# Patient Record
Sex: FEMALE | Race: WHITE | Employment: OTHER
[De-identification: names, ages, dates, MRNs, and addresses within clinical notes are randomized per-mention and may not be internally consistent; named-entity substitution may affect disease eponyms.]

---

## 2017-01-03 ENCOUNTER — HOME CARE VISIT (OUTPATIENT)
Dept: SCHEDULING | Facility: HOME HEALTH | Age: 62
End: 2017-01-03
Payer: COMMERCIAL

## 2017-01-03 VITALS
RESPIRATION RATE: 17 BRPM | DIASTOLIC BLOOD PRESSURE: 78 MMHG | HEART RATE: 102 BPM | TEMPERATURE: 99.9 F | SYSTOLIC BLOOD PRESSURE: 118 MMHG

## 2017-01-03 PROCEDURE — G0157 HHC PT ASSISTANT EA 15: HCPCS

## 2017-01-06 ENCOUNTER — HOME CARE VISIT (OUTPATIENT)
Dept: SCHEDULING | Facility: HOME HEALTH | Age: 62
End: 2017-01-06
Payer: COMMERCIAL

## 2017-01-06 VITALS
SYSTOLIC BLOOD PRESSURE: 128 MMHG | HEART RATE: 96 BPM | TEMPERATURE: 99.2 F | RESPIRATION RATE: 19 BRPM | DIASTOLIC BLOOD PRESSURE: 76 MMHG

## 2017-01-06 PROCEDURE — G0157 HHC PT ASSISTANT EA 15: HCPCS

## 2017-01-11 ENCOUNTER — HOME CARE VISIT (OUTPATIENT)
Dept: SCHEDULING | Facility: HOME HEALTH | Age: 62
End: 2017-01-11
Payer: COMMERCIAL

## 2017-01-11 VITALS
RESPIRATION RATE: 17 BRPM | DIASTOLIC BLOOD PRESSURE: 78 MMHG | SYSTOLIC BLOOD PRESSURE: 160 MMHG | HEART RATE: 60 BPM | TEMPERATURE: 96.6 F

## 2017-01-11 PROCEDURE — G0151 HHCP-SERV OF PT,EA 15 MIN: HCPCS

## 2018-01-29 ENCOUNTER — HOSPITAL ENCOUNTER (OUTPATIENT)
Dept: GENERAL RADIOLOGY | Age: 63
Discharge: HOME | End: 2018-01-29
Payer: COMMERCIAL

## 2018-01-29 DIAGNOSIS — R10.9 RIGHT FLANK PAIN: ICD-10-CM

## 2018-01-29 DIAGNOSIS — N20.0 RENAL STONE: ICD-10-CM

## 2018-01-29 PROCEDURE — 74018 RADEX ABDOMEN 1 VIEW: CPT

## 2018-01-31 ENCOUNTER — HOSPITAL ENCOUNTER (OUTPATIENT)
Dept: SURGERY | Age: 63
Discharge: HOME | End: 2018-01-31
Payer: COMMERCIAL

## 2018-01-31 VITALS
WEIGHT: 206.38 LBS | BODY MASS INDEX: 38.96 KG/M2 | TEMPERATURE: 98.4 F | OXYGEN SATURATION: 97 % | DIASTOLIC BLOOD PRESSURE: 75 MMHG | HEART RATE: 57 BPM | RESPIRATION RATE: 20 BRPM | HEIGHT: 61 IN | SYSTOLIC BLOOD PRESSURE: 147 MMHG

## 2018-01-31 LAB
ANION GAP SERPL CALC-SCNC: 11 MMOL/L (ref 7–16)
APPEARANCE UR: CLEAR
BILIRUB UR QL: NEGATIVE
BUN SERPL-MCNC: 21 MG/DL (ref 8–23)
CALCIUM SERPL-MCNC: 9.8 MG/DL (ref 8.3–10.4)
CHLORIDE SERPL-SCNC: 102 MMOL/L (ref 98–107)
CO2 SERPL-SCNC: 26 MMOL/L (ref 21–32)
COLOR UR: YELLOW
CREAT SERPL-MCNC: 0.79 MG/DL (ref 0.6–1)
ERYTHROCYTE [DISTWIDTH] IN BLOOD BY AUTOMATED COUNT: 13.1 % (ref 11.9–14.6)
GLUCOSE BLD STRIP.AUTO-MCNC: 156 MG/DL (ref 65–100)
GLUCOSE SERPL-MCNC: 150 MG/DL (ref 65–100)
GLUCOSE UR STRIP.AUTO-MCNC: NEGATIVE MG/DL
HCT VFR BLD AUTO: 39.6 % (ref 35.8–46.3)
HGB BLD-MCNC: 13.8 G/DL (ref 11.7–15.4)
HGB UR QL STRIP: NEGATIVE
KETONES UR QL STRIP.AUTO: NEGATIVE MG/DL
LEUKOCYTE ESTERASE UR QL STRIP.AUTO: NEGATIVE
MCH RBC QN AUTO: 32.5 PG (ref 26.1–32.9)
MCHC RBC AUTO-ENTMCNC: 34.8 G/DL (ref 31.4–35)
MCV RBC AUTO: 93.2 FL (ref 79.6–97.8)
NITRITE UR QL STRIP.AUTO: NEGATIVE
PH UR STRIP: 6 [PH] (ref 5–9)
PLATELET # BLD AUTO: 204 K/UL (ref 150–450)
PMV BLD AUTO: 11.4 FL (ref 10.8–14.1)
POTASSIUM SERPL-SCNC: 3.7 MMOL/L (ref 3.5–5.1)
PROT UR STRIP-MCNC: NEGATIVE MG/DL
RBC # BLD AUTO: 4.25 M/UL (ref 4.05–5.25)
SODIUM SERPL-SCNC: 139 MMOL/L (ref 136–145)
SP GR UR REFRACTOMETRY: 1.02 (ref 1–1.02)
UROBILINOGEN UR QL STRIP.AUTO: 0.2 EU/DL (ref 0.2–1)
WBC # BLD AUTO: 6.6 K/UL (ref 4.3–11.1)

## 2018-01-31 PROCEDURE — 81003 URINALYSIS AUTO W/O SCOPE: CPT | Performed by: UROLOGY

## 2018-01-31 PROCEDURE — 85027 COMPLETE CBC AUTOMATED: CPT | Performed by: UROLOGY

## 2018-01-31 PROCEDURE — 80048 BASIC METABOLIC PNL TOTAL CA: CPT | Performed by: UROLOGY

## 2018-01-31 PROCEDURE — 87086 URINE CULTURE/COLONY COUNT: CPT | Performed by: UROLOGY

## 2018-01-31 PROCEDURE — 82962 GLUCOSE BLOOD TEST: CPT

## 2018-01-31 PROCEDURE — 93005 ELECTROCARDIOGRAM TRACING: CPT | Performed by: ANESTHESIOLOGY

## 2018-01-31 RX ORDER — GLUCOSAMINE SULFATE 1500 MG
1000 POWDER IN PACKET (EA) ORAL DAILY
COMMUNITY
End: 2020-01-01

## 2018-01-31 NOTE — PERIOP NOTES
Patient verified name, , and surgery as listed in [facility]. Patient provided medical/health information and PTA medications to the best of their ability. TYPE  CASE:1b  Orders per surgeon: Received on chart-- dated 17   Labs per surgeon:cbc, bmp, urine, urine culture  Labs per anesthesia protocol: no add't  EKG  :  Pt with last ekg 2016 --- 2nd degree AV BLOCK-- MOBITZ1---- was found in p.a.t. 2016 prior to a surg-dr jacques---- per pt-- no follow up was done-- pt states has had a \" skipped beat \" my whole life----- pt states that mda in 2016 said if it became a problem then she should f/u with a cardioolgist--- pt also states surg was not postpone for this-- this found to be true based on notes in cc--- spoke to dr Luan Pelletier-- per protocol no ekg needed but since this has been documented in cc wanted to see what mda said-- spoke to dr Luan Pelletier-- does want ekg today--- this completed-- does cont to show type1--- will have mda to review in he comes back to unit    Patient provided with and instructed on education handouts including Guide to Surgery, blood transfusions, pain management, and hand hygiene for the family and community, and [facility] brochure. dawnmist and instructions given per hospital policy. Instructed patient to continue previous medications as prescribed prior to surgery unless otherwise directed and to take the following medications the day of surgery according to anesthesia guidelines : synthroid, claritin, omeprazole, levemir = 8 units am of surg--and if needed norco .  Instructed patient to hold  the following medications: glucosamine. Original medication prescription bottles was not visualized during patient appointment. Patient teach back successful and patient demonstrates knowledge of instruction.

## 2018-01-31 NOTE — PERIOP NOTES
todays ekg same as 9/22/2016--dr león whiting with this for surg----- he came to unit to review 2016 ekg along with todays--- sent chart to chartroom-- pending urine cult results

## 2018-02-01 LAB
ATRIAL RATE: 84 BPM
CALCULATED R AXIS, ECG10: 3 DEGREES
CALCULATED T AXIS, ECG11: 54 DEGREES
DIAGNOSIS, 93000: NORMAL
Q-T INTERVAL, ECG07: 442 MS
QRS DURATION, ECG06: 90 MS
QTC CALCULATION (BEZET), ECG08: 433 MS
VENTRICULAR RATE, ECG03: 58 BPM

## 2018-02-03 LAB
BACTERIA SPEC CULT: NORMAL
SERVICE CMNT-IMP: NORMAL

## 2018-02-05 ENCOUNTER — ANESTHESIA EVENT (OUTPATIENT)
Dept: SURGERY | Age: 63
End: 2018-02-05
Payer: COMMERCIAL

## 2018-02-05 RX ORDER — FAMOTIDINE 20 MG/1
20 TABLET, FILM COATED ORAL ONCE
Status: CANCELLED | OUTPATIENT
Start: 2018-02-05 | End: 2018-02-05

## 2018-02-05 RX ORDER — MIDAZOLAM HYDROCHLORIDE 1 MG/ML
2 INJECTION, SOLUTION INTRAMUSCULAR; INTRAVENOUS ONCE
Status: CANCELLED | OUTPATIENT
Start: 2018-02-05 | End: 2018-02-05

## 2018-02-05 RX ORDER — FENTANYL CITRATE 50 UG/ML
25 INJECTION, SOLUTION INTRAMUSCULAR; INTRAVENOUS ONCE
Status: CANCELLED | OUTPATIENT
Start: 2018-02-05 | End: 2018-02-05

## 2018-02-06 ENCOUNTER — HOSPITAL ENCOUNTER (OUTPATIENT)
Age: 63
Setting detail: OUTPATIENT SURGERY
Discharge: HOME | End: 2018-02-06
Attending: UROLOGY | Admitting: UROLOGY
Payer: COMMERCIAL

## 2018-02-06 ENCOUNTER — ANESTHESIA (OUTPATIENT)
Dept: SURGERY | Age: 63
End: 2018-02-06
Payer: COMMERCIAL

## 2018-02-06 VITALS
RESPIRATION RATE: 15 BRPM | HEART RATE: 56 BPM | WEIGHT: 208.4 LBS | SYSTOLIC BLOOD PRESSURE: 150 MMHG | OXYGEN SATURATION: 96 % | DIASTOLIC BLOOD PRESSURE: 69 MMHG | BODY MASS INDEX: 39.38 KG/M2 | TEMPERATURE: 97.4 F

## 2018-02-06 DIAGNOSIS — N20.0 KIDNEY STONE: Primary | ICD-10-CM

## 2018-02-06 LAB
GLUCOSE BLD STRIP.AUTO-MCNC: 101 MG/DL (ref 65–100)
GLUCOSE BLD STRIP.AUTO-MCNC: 108 MG/DL (ref 65–100)

## 2018-02-06 PROCEDURE — 76060000033 HC ANESTHESIA 1 TO 1.5 HR: Performed by: UROLOGY

## 2018-02-06 PROCEDURE — 77030032490 HC SLV COMPR SCD KNE COVD -B: Performed by: UROLOGY

## 2018-02-06 PROCEDURE — 77030008703 HC TU ET UNCUF COVD -A: Performed by: ANESTHESIOLOGY

## 2018-02-06 PROCEDURE — 76210000021 HC REC RM PH II 0.5 TO 1 HR: Performed by: UROLOGY

## 2018-02-06 PROCEDURE — 74011000250 HC RX REV CODE- 250

## 2018-02-06 PROCEDURE — 74011250636 HC RX REV CODE- 250/636

## 2018-02-06 PROCEDURE — 74011250636 HC RX REV CODE- 250/636: Performed by: UROLOGY

## 2018-02-06 PROCEDURE — 76210000063 HC OR PH I REC FIRST 0.5 HR: Performed by: UROLOGY

## 2018-02-06 PROCEDURE — 77030008477 HC STYL SATN SLP COVD -A: Performed by: ANESTHESIOLOGY

## 2018-02-06 PROCEDURE — 50590 FRAGMENTING OF KIDNEY STONE: CPT | Performed by: UROLOGY

## 2018-02-06 PROCEDURE — 74011250636 HC RX REV CODE- 250/636: Performed by: ANESTHESIOLOGY

## 2018-02-06 PROCEDURE — 82962 GLUCOSE BLOOD TEST: CPT

## 2018-02-06 PROCEDURE — 76010000149 HC OR TIME 1 TO 1.5 HR: Performed by: UROLOGY

## 2018-02-06 RX ORDER — SODIUM CHLORIDE, SODIUM LACTATE, POTASSIUM CHLORIDE, CALCIUM CHLORIDE 600; 310; 30; 20 MG/100ML; MG/100ML; MG/100ML; MG/100ML
100 INJECTION, SOLUTION INTRAVENOUS CONTINUOUS
Status: DISCONTINUED | OUTPATIENT
Start: 2018-02-06 | End: 2018-02-06 | Stop reason: HOSPADM

## 2018-02-06 RX ORDER — SODIUM CHLORIDE 9 MG/ML
50 INJECTION, SOLUTION INTRAVENOUS CONTINUOUS
Status: DISCONTINUED | OUTPATIENT
Start: 2018-02-06 | End: 2018-02-06 | Stop reason: HOSPADM

## 2018-02-06 RX ORDER — EPHEDRINE SULFATE 50 MG/ML
INJECTION, SOLUTION INTRAVENOUS AS NEEDED
Status: DISCONTINUED | OUTPATIENT
Start: 2018-02-06 | End: 2018-02-06 | Stop reason: HOSPADM

## 2018-02-06 RX ORDER — KETOROLAC TROMETHAMINE 30 MG/ML
INJECTION, SOLUTION INTRAMUSCULAR; INTRAVENOUS AS NEEDED
Status: DISCONTINUED | OUTPATIENT
Start: 2018-02-06 | End: 2018-02-06 | Stop reason: HOSPADM

## 2018-02-06 RX ORDER — HYDROMORPHONE HYDROCHLORIDE 2 MG/ML
0.5 INJECTION, SOLUTION INTRAMUSCULAR; INTRAVENOUS; SUBCUTANEOUS
Status: DISCONTINUED | OUTPATIENT
Start: 2018-02-06 | End: 2018-02-06 | Stop reason: HOSPADM

## 2018-02-06 RX ORDER — SODIUM CHLORIDE 0.9 % (FLUSH) 0.9 %
5-10 SYRINGE (ML) INJECTION AS NEEDED
Status: DISCONTINUED | OUTPATIENT
Start: 2018-02-06 | End: 2018-02-06 | Stop reason: HOSPADM

## 2018-02-06 RX ORDER — ONDANSETRON 2 MG/ML
INJECTION INTRAMUSCULAR; INTRAVENOUS AS NEEDED
Status: DISCONTINUED | OUTPATIENT
Start: 2018-02-06 | End: 2018-02-06 | Stop reason: HOSPADM

## 2018-02-06 RX ORDER — DIPHENHYDRAMINE HYDROCHLORIDE 50 MG/ML
12.5 INJECTION, SOLUTION INTRAMUSCULAR; INTRAVENOUS ONCE
Status: DISCONTINUED | OUTPATIENT
Start: 2018-02-06 | End: 2018-02-06 | Stop reason: HOSPADM

## 2018-02-06 RX ORDER — MIDAZOLAM HYDROCHLORIDE 1 MG/ML
2 INJECTION, SOLUTION INTRAMUSCULAR; INTRAVENOUS
Status: DISCONTINUED | OUTPATIENT
Start: 2018-02-06 | End: 2018-02-06 | Stop reason: HOSPADM

## 2018-02-06 RX ORDER — DEXAMETHASONE SODIUM PHOSPHATE 4 MG/ML
INJECTION, SOLUTION INTRA-ARTICULAR; INTRALESIONAL; INTRAMUSCULAR; INTRAVENOUS; SOFT TISSUE AS NEEDED
Status: DISCONTINUED | OUTPATIENT
Start: 2018-02-06 | End: 2018-02-06 | Stop reason: HOSPADM

## 2018-02-06 RX ORDER — PROPOFOL 10 MG/ML
INJECTION, EMULSION INTRAVENOUS AS NEEDED
Status: DISCONTINUED | OUTPATIENT
Start: 2018-02-06 | End: 2018-02-06 | Stop reason: HOSPADM

## 2018-02-06 RX ORDER — OXYCODONE HYDROCHLORIDE 5 MG/1
5 TABLET ORAL
Status: DISCONTINUED | OUTPATIENT
Start: 2018-02-06 | End: 2018-02-06 | Stop reason: HOSPADM

## 2018-02-06 RX ORDER — LIDOCAINE HYDROCHLORIDE 20 MG/ML
INJECTION, SOLUTION EPIDURAL; INFILTRATION; INTRACAUDAL; PERINEURAL AS NEEDED
Status: DISCONTINUED | OUTPATIENT
Start: 2018-02-06 | End: 2018-02-06 | Stop reason: HOSPADM

## 2018-02-06 RX ORDER — LIDOCAINE HYDROCHLORIDE 10 MG/ML
0.1 INJECTION INFILTRATION; PERINEURAL AS NEEDED
Status: DISCONTINUED | OUTPATIENT
Start: 2018-02-06 | End: 2018-02-06 | Stop reason: HOSPADM

## 2018-02-06 RX ORDER — ROCURONIUM BROMIDE 10 MG/ML
INJECTION, SOLUTION INTRAVENOUS AS NEEDED
Status: DISCONTINUED | OUTPATIENT
Start: 2018-02-06 | End: 2018-02-06 | Stop reason: HOSPADM

## 2018-02-06 RX ORDER — TAMSULOSIN HYDROCHLORIDE 0.4 MG/1
0.4 CAPSULE ORAL
Qty: 7 CAP | Refills: 0 | Status: SHIPPED | OUTPATIENT
Start: 2018-02-06 | End: 2018-02-20 | Stop reason: CLARIF

## 2018-02-06 RX ORDER — OXYCODONE AND ACETAMINOPHEN 5; 325 MG/1; MG/1
1 TABLET ORAL AS NEEDED
Status: DISCONTINUED | OUTPATIENT
Start: 2018-02-06 | End: 2018-02-06 | Stop reason: HOSPADM

## 2018-02-06 RX ORDER — CIPROFLOXACIN 2 MG/ML
400 INJECTION, SOLUTION INTRAVENOUS ONCE
Status: COMPLETED | OUTPATIENT
Start: 2018-02-06 | End: 2018-02-06

## 2018-02-06 RX ORDER — HYDROCODONE BITARTRATE AND ACETAMINOPHEN 10; 325 MG/1; MG/1
1 TABLET ORAL
Qty: 20 TAB | Refills: 0 | Status: SHIPPED | OUTPATIENT
Start: 2018-02-06 | End: 2018-02-20

## 2018-02-06 RX ORDER — PROMETHAZINE HYDROCHLORIDE 25 MG/1
25 TABLET ORAL
Qty: 10 TAB | Refills: 1 | Status: SHIPPED | OUTPATIENT
Start: 2018-02-06 | End: 2018-02-20 | Stop reason: CLARIF

## 2018-02-06 RX ORDER — FENTANYL CITRATE 50 UG/ML
INJECTION, SOLUTION INTRAMUSCULAR; INTRAVENOUS AS NEEDED
Status: DISCONTINUED | OUTPATIENT
Start: 2018-02-06 | End: 2018-02-06 | Stop reason: HOSPADM

## 2018-02-06 RX ORDER — SODIUM CHLORIDE 0.9 % (FLUSH) 0.9 %
5-10 SYRINGE (ML) INJECTION EVERY 8 HOURS
Status: DISCONTINUED | OUTPATIENT
Start: 2018-02-06 | End: 2018-02-06 | Stop reason: HOSPADM

## 2018-02-06 RX ORDER — SUCCINYLCHOLINE CHLORIDE 20 MG/ML
INJECTION INTRAMUSCULAR; INTRAVENOUS AS NEEDED
Status: DISCONTINUED | OUTPATIENT
Start: 2018-02-06 | End: 2018-02-06 | Stop reason: HOSPADM

## 2018-02-06 RX ADMIN — SUCCINYLCHOLINE CHLORIDE 200 MG: 20 INJECTION INTRAMUSCULAR; INTRAVENOUS at 10:24

## 2018-02-06 RX ADMIN — KETOROLAC TROMETHAMINE 30 MG: 30 INJECTION, SOLUTION INTRAMUSCULAR; INTRAVENOUS at 10:53

## 2018-02-06 RX ADMIN — ROCURONIUM BROMIDE 10 MG: 10 INJECTION, SOLUTION INTRAVENOUS at 10:24

## 2018-02-06 RX ADMIN — EPHEDRINE SULFATE 5 MG: 50 INJECTION, SOLUTION INTRAVENOUS at 10:38

## 2018-02-06 RX ADMIN — SODIUM CHLORIDE, SODIUM LACTATE, POTASSIUM CHLORIDE, AND CALCIUM CHLORIDE 100 ML/HR: 600; 310; 30; 20 INJECTION, SOLUTION INTRAVENOUS at 09:31

## 2018-02-06 RX ADMIN — FENTANYL CITRATE 50 MCG: 50 INJECTION, SOLUTION INTRAMUSCULAR; INTRAVENOUS at 10:15

## 2018-02-06 RX ADMIN — MIDAZOLAM HYDROCHLORIDE 2 MG: 1 INJECTION, SOLUTION INTRAMUSCULAR; INTRAVENOUS at 09:31

## 2018-02-06 RX ADMIN — PROPOFOL 200 MG: 10 INJECTION, EMULSION INTRAVENOUS at 10:24

## 2018-02-06 RX ADMIN — EPHEDRINE SULFATE 5 MG: 50 INJECTION, SOLUTION INTRAVENOUS at 10:51

## 2018-02-06 RX ADMIN — EPHEDRINE SULFATE 5 MG: 50 INJECTION, SOLUTION INTRAVENOUS at 11:05

## 2018-02-06 RX ADMIN — DEXAMETHASONE SODIUM PHOSPHATE 4 MG: 4 INJECTION, SOLUTION INTRA-ARTICULAR; INTRALESIONAL; INTRAMUSCULAR; INTRAVENOUS; SOFT TISSUE at 10:33

## 2018-02-06 RX ADMIN — ONDANSETRON 4 MG: 2 INJECTION INTRAMUSCULAR; INTRAVENOUS at 10:33

## 2018-02-06 RX ADMIN — CIPROFLOXACIN 400 MG: 2 INJECTION, SOLUTION INTRAVENOUS at 10:30

## 2018-02-06 RX ADMIN — LIDOCAINE HYDROCHLORIDE 100 MG: 20 INJECTION, SOLUTION EPIDURAL; INFILTRATION; INTRACAUDAL; PERINEURAL at 10:24

## 2018-02-06 NOTE — BRIEF OP NOTE
BRIEF OPERATIVE NOTE    Date of Procedure: 2/6/2018   Preoperative Diagnosis: Flank pain [R10. 9]Right Ureteral Stone  Postoperative Diagnosis: Flank pain [R10. 9]Right Ureteral Stone    Procedure(s):  RIGHT LITHOTRIPSY EXTRACORPOREAL SHOCKWAVE ESWL  Surgeon(s) and Role:     * Lord Alexandru MD - Primary         Assistant Staff: None      Surgical Staff:  Circ-1: Rocky Bunch RN  Radiology Technician: Sudha Rosas, RT, R, CT  Event Time In   Incision Start 1038   Incision Close 1112     Anesthesia: General   Estimated Blood Loss: 0  Specimens: * No specimens in log *   Findings: stone appears in more than one piece  Complications: 0Implants: * No implants in log *      Op [id number]

## 2018-02-06 NOTE — ANESTHESIA POSTPROCEDURE EVALUATION
Post-Anesthesia Evaluation and Assessment    Patient: Raj Lagos MRN: [id number]  SSN: [id number]    YOB: 1955  Age: 58 y.o. Sex: female       Cardiovascular Function/Vital Signs  Visit Vitals    /69 (BP 1 Location: Left arm, BP Patient Position: At rest)    Pulse (!) 56    Temp 36.3 °C (97.4 °F)    Resp 15    Wt 94.5 kg (208 lb 6.4 oz)    SpO2 96%    BMI 39.38 kg/m2       Patient is status post general anesthesia for Procedure(s):  RIGHT LITHOTRIPSY EXTRACORPOREAL SHOCKWAVE ESWL. Nausea/Vomiting: None    Postoperative hydration reviewed and adequate. Pain:  Pain Scale 1: Numeric (0 - 10) (02/06/18 1153)  Pain Intensity 1: 0 (02/06/18 1153)   Managed    Neurological Status:   Neuro (WDL): Exceptions to WDL (02/06/18 1153)  Neuro  Neurologic State: Drowsy (02/06/18 1153)   At baseline    Mental Status and Level of Consciousness: Arousable    Pulmonary Status:   O2 Device: Room air (02/06/18 1153)   Adequate oxygenation and airway patent    Complications related to anesthesia: None    Post-anesthesia assessment completed.  No concerns    Signed By: Mark Celeste MD     February 6, 2018

## 2018-02-06 NOTE — PERIOP NOTES
Discharge instructions given to spouse. Verbalized understanding and opportunity for questions was given. Dr Randy whitingay to discharge at this time. Pt and belongings taken via wheelchair to car.

## 2018-02-06 NOTE — DISCHARGE INSTRUCTIONS
Lithotripsy is a way to treat kidney stones without surgery. It is also called extracorporeal shock wave lithotripsy, or ESWL. This treatment uses sound waves to break kidney stones into tiny pieces. These pieces can then pass out of the body in the urine. Lithotripsy does not make your stone disappear. The treatment is meant to crush your stone so that the fragments can be passed. Strain your urine, save any fragments, and take them to your doctor. You may experience slight bruising at the treated site. ACTIVITY  · As tolerated and as directed by your doctor. · Walking and mild exercise help to pass the stone fragments. DIET  · Clear liquids until no nausea and vomiting; then resume light diet for the first day  · Advance to regular diet on second day, unless your doctor orders otherwise. · If nauseated and/ or vomiting, call your doctor. · Drink at least 8 glasses of water a day (avoid caffeinated beverages). PAIN  · Take pain medication as directed. · Call your doctor if pain is NOT relieved by medication. · DO NOT take aspirin or blood thinners until directed by your doctor. CALL YOUR DOCTOR IF   · Expect blood-tinged urine. Call your doctor if it lasts more than 72 hours or if you cannot see through the urine. · Aches, chills, or fever of 101 degree F or above  · Unable to urinate      AFTER ANESTHESIA   · For the first 24 hours: DO NOT Drive, Drink alcoholic beverages, or Make important decisions. · Be aware of dizziness following anesthesia and while taking pain medication. YOUR DOCTOR'S PHONE NUMBER [phone].     DISCHARGE SUMMARY from Nurse    PATIENT INSTRUCTIONS:    After general anesthesia or intravenous sedation, for 24 hours or while taking prescription Narcotics:  · Limit your activities  · Do not drive and operate hazardous machinery  · Do not make important personal or business decisions  · Do  not drink alcoholic beverages  · If you have not urinated within 8 hours after discharge, please contact your surgeon on call. *  Please give a list of your current medications to your Primary Care Provider. *  Please update this list whenever your medications are discontinued, doses are      changed, or new medications (including over-the-counter products) are added. *  Please carry medication information at all times in case of emergency situations. These are general instructions for a healthy lifestyle:    No smoking/ No tobacco products/ Avoid exposure to second hand smoke    Surgeon General's Warning:  Quitting smoking now greatly reduces serious risk to your health. Obesity, smoking, and sedentary lifestyle greatly increases your risk for illness    A healthy diet, regular physical exercise & weight monitoring are important for maintaining a healthy lifestyle    You may be retaining fluid if you have a history of heart failure or if you experience any of the following symptoms:  Weight gain of 3 pounds or more overnight or 5 pounds in a week, increased swelling in our hands or feet or shortness of breath while lying flat in bed. Please call your doctor as soon as you notice any of these symptoms; do not wait until your next office visit. Recognize signs and symptoms of STROKE:    F-face looks uneven    A-arms unable to move or move unevenly    S-speech slurred or non-existent    T-time-call 911 as soon as signs and symptoms begin-DO NOT go       Back to bed or wait to see if you get better-TIME IS BRAIN.

## 2018-02-06 NOTE — ANESTHESIA PREPROCEDURE EVALUATION
Anesthetic History     PONV          Review of Systems / Medical History  Patient summary reviewed and pertinent labs reviewed    Pulmonary            Asthma (years ago)        Neuro/Psych   Within defined limits           Cardiovascular    Hypertension: well controlled        Dysrhythmias : PVC    Pertinent negatives: No CAD  Exercise tolerance: >4 METS     GI/Hepatic/Renal     GERD: well controlled    Renal disease: stones       Endo/Other    Diabetes: type 2  Hypothyroidism: well controlled  Morbid obesity, arthritis and cancer (melanoma)     Other Findings   Comments: DJD         Physical Exam    Airway  Mallampati: II  TM Distance: 4 - 6 cm  Neck ROM: normal range of motion   Mouth opening: Normal     Cardiovascular  Regular rate and rhythm,  S1 and S2 normal,  no murmur, click, rub, or gallop  Rhythm: regular  Rate: normal         Dental  No notable dental hx       Pulmonary  Breath sounds clear to auscultation               Abdominal  GI exam deferred       Other Findings            Anesthetic Plan    ASA: 3  Anesthesia type: general          Induction: Intravenous  Anesthetic plan and risks discussed with: Patient

## 2018-02-06 NOTE — IP AVS SNAPSHOT
Vicky Christian 
 
 
 [address] 
[phone] Patient: Charito Ugarte MRN: [id number] EQI:0/07/[birth year]19 About your hospitalization You were admitted on:  February 6, 2018 You last received care in the:  [facility] OP PACU You were discharged on:  February 6, 2018 Why you were hospitalized Your primary diagnosis was:  Not on File Follow-up Information Follow up With Details Comments Contact Info Vannesa Hernandez MD  Office will call you with your follow up appointment [address] [address] 
[phone] Your Scheduled Appointments Thursday February 15, 2018  1:30 PM EST  
SFD PHONE ASSESSMENT with SFD PHONE APPOINTMENT [facility] Pre Assessment [facility] OR PRE ASSESSMENT) [address]  
[phone] Date/time displayed does not reflect when your phone assessment will be completed. Wednesday February 21, 2018 SHOULDER ARTHROSCOPY ACROMIOPLASTY ROTATOR CUFF REPAIR with Mandy Zacarias MD  
[facility] ([address] [place] [address]) [address]  
[phone] Tuesday March 20, 2018  9:30 AM EDT  
ESTABLISHED PATIENT with Paty Simmons MD  
[address] [place] [address] ([address] [place] [address]) [address] [place] [address]  
[phone] Discharge Orders None A check doyle indicates which time of day the medication should be taken. My Medications START taking these medications Instructions Each Dose to Equal  
 Morning Noon Evening Bedtime  
 promethazine 25 mg tablet Commonly known as:  PHENERGAN Your last dose was: Your next dose is: Take 1 Tab by mouth every six (6) hours as needed for Nausea. 25 mg  
    
   
   
   
  
 tamsulosin 0.4 mg capsule Commonly known as:  FLOMAX Your last dose was: Your next dose is: Take 1 Cap by mouth nightly. 0.4 mg  
    
   
   
   
  
  
CHANGE how you take these medications Instructions Each Dose to Equal  
 Morning Noon Evening Bedtime * HYDROcodone-acetaminophen 5-325 mg per tablet Commonly known as:  Sonido Daring What changed:  Another medication with the same name was added. Make sure you understand how and when to take each. Your last dose was: Your next dose is: Take 1-2 Tabs by mouth every six (6) hours as needed for Pain. Max Daily Amount: 8 Tabs. 1-2 Tab  
    
   
   
   
  
 * HYDROcodone-acetaminophen  mg tablet Commonly known as:  Biloxi Daring What changed: You were already taking a medication with the same name, and this prescription was added. Make sure you understand how and when to take each. Your last dose was: Your next dose is: Take 1 Tab by mouth every six (6) hours as needed for Pain. Max Daily Amount: 4 Tabs. 1 Tab  
    
   
   
   
  
 insulin aspart 100 unit/mL Inpn Commonly known as:  Katie Pulido What changed:   
- when to take this 
- additional instructions Your last dose was: Your next dose is:    
   
   
 10 units before meals if blood sugar is greater than 150  Indications: type 2 diabetes mellitus  
     
   
   
   
  
 insulin detemir 100 unit/mL (3 mL) Inpn Commonly known as:  Kt Bumps What changed:   
- how much to take - when to take this 
- additional instructions Your last dose was: Your next dose is:    
   
   
 65-75 Units by SubCUTAneous route every evening. 15 units every morning and 60 units at bedtime. by SubCUTAneous route every evening. Indications: type 2 diabetes mellitus  Indications: type 2 diabetes mellitus 65-75 Units  
    
   
   
   
  
 omeprazole 40 mg capsule Commonly known as:  PRILOSEC What changed:   
- when to take this - reasons to take this Your last dose was: Your next dose is: Take 1 Cap by mouth daily. Indications: gastroesophageal reflux disease 40 mg  
    
   
   
   
  
 * Notice: This list has 2 medication(s) that are the same as other medications prescribed for you. Read the directions carefully, and ask your doctor or other care provider to review them with you. CONTINUE taking these medications Instructions Each Dose to Equal  
 Morning Noon Evening Bedtime CALCIUM 600 + D 600-125 mg-unit Tab Generic drug:  calcium-cholecalciferol (d3) Your last dose was: Your next dose is: Take 1 Tab by mouth two (2) times a day. Hold for surgery - last dose 9/22/16  
 1 Tab CLARITIN 10 mg tablet Generic drug:  loratadine Your last dose was: Your next dose is: Take 10 mg by mouth daily. 10 mg  
    
   
   
   
  
 glipiZIDE 10 mg tablet Commonly known as:  Idalia Merced Your last dose was: Your next dose is: TAKE ONE TABLET BY MOUTH TWICE DAILY  Indications: type 2 diabetes mellitus  
     
   
   
   
  
 levothyroxine 100 mcg tablet Commonly known as:  SYNTHROID Your last dose was: Your next dose is: TAKE 1 TABLET BY MOUTH BEFORE BREAKFAST  Indications: hypothyroidism  
     
   
   
   
  
 lisinopril 20 mg tablet Commonly known as:  Amrit Kansky Your last dose was: Your next dose is: Take 1 Tab by mouth daily. 20 mg  
    
   
   
   
  
 metFORMIN 1,000 mg tablet Commonly known as:  GLUCOPHAGE Your last dose was: Your next dose is: Take 1 Tab by mouth two (2) times daily (with meals). 1000 mg OSTEO BI-FLEX PO Your last dose was: Your next dose is: Take 1 Tab by mouth two (2) times a day. Stopped 1/31/18  
 1 Tab VITAMIN D3 1,000 unit Cap Generic drug:  cholecalciferol Your last dose was: Your next dose is: Take 1,000 Units by mouth daily. 1000 Units Where to Get Your Medications Information on where to get these meds will be given to you by the nurse or doctor. ! Ask your nurse or doctor about these medications HYDROcodone-acetaminophen  mg tablet  
 promethazine 25 mg tablet  
 tamsulosin 0.4 mg capsule Discharge Instructions Lithotripsy is a way to treat kidney stones without surgery. It is also called extracorporeal shock wave lithotripsy, or ESWL. This treatment uses sound waves to break kidney stones into tiny pieces. These pieces can then pass out of the body in the urine. Lithotripsy does not make your stone disappear. The treatment is meant to crush your stone so that the fragments can be passed. Strain your urine, save any fragments, and take them to your doctor. You may experience slight bruising at the treated site. ACTIVITY · As tolerated and as directed by your doctor. · Walking and mild exercise help to pass the stone fragments. DIET · Clear liquids until no nausea and vomiting; then resume light diet for the first day · Advance to regular diet on second day, unless your doctor orders otherwise. · If nauseated and/ or vomiting, call your doctor. · Drink at least 8 glasses of water a day (avoid caffeinated beverages). PAIN 
· Take pain medication as directed. · Call your doctor if pain is NOT relieved by medication. · DO NOT take aspirin or blood thinners until directed by your doctor. CALL YOUR DOCTOR IF  
· Expect blood-tinged urine. Call your doctor if it lasts more than 72 hours or if you cannot see through the urine. · Aches, chills, or fever of 101 degree F or above · Unable to urinate AFTER ANESTHESIA · For the first 24 hours: DO NOT Drive, Drink alcoholic beverages, or Make important decisions. · Be aware of dizziness following anesthesia and while taking pain medication. YOUR DOCTOR'S PHONE NUMBER [phone]. DISCHARGE SUMMARY from Nurse PATIENT INSTRUCTIONS: 
 
After general anesthesia or intravenous sedation, for 24 hours or while taking prescription Narcotics: · Limit your activities · Do not drive and operate hazardous machinery · Do not make important personal or business decisions · Do  not drink alcoholic beverages · If you have not urinated within 8 hours after discharge, please contact your surgeon on call. *  Please give a list of your current medications to your Primary Care Provider. *  Please update this list whenever your medications are discontinued, doses are 
    changed, or new medications (including over-the-counter products) are added. *  Please carry medication information at all times in case of emergency situations. These are general instructions for a healthy lifestyle: No smoking/ No tobacco products/ Avoid exposure to second hand smoke Surgeon General's Warning:  Quitting smoking now greatly reduces serious risk to your health. Obesity, smoking, and sedentary lifestyle greatly increases your risk for illness A healthy diet, regular physical exercise & weight monitoring are important for maintaining a healthy lifestyle You may be retaining fluid if you have a history of heart failure or if you experience any of the following symptoms:  Weight gain of 3 pounds or more overnight or 5 pounds in a week, increased swelling in our hands or feet or shortness of breath while lying flat in bed. Please call your doctor as soon as you notice any of these symptoms; do not wait until your next office visit. Recognize signs and symptoms of STROKE: 
 
F-face looks uneven A-arms unable to move or move unevenly S-speech slurred or non-existent T-time-call 911 as soon as signs and symptoms begin-DO NOT go Back to bed or wait to see if you get better-TIME IS BRAIN. Introducing hospitals & HEALTH SERVICES! Dear Karan Gonzalez: Thank you for requesting a [facility] account. Our records indicate that you already have an active [facility] account. You can access your account anytime at [email or web address] Did you know that you can access your hospital and ER discharge instructions at any time in [facility]? You can also review all of your test results from your hospital stay or ER visit. Additional Information If you have questions, please visit the Frequently Asked Questions section of the [facility] website at [email or web address] Remember, [facility] is NOT to be used for urgent needs. For medical emergencies, dial 911. Now available from your iPhone and Android! Providers Seen During Your Hospitalization Provider Specialty Primary office phone George Burton MD Urology [phone] Your Primary Care Physician (PCP) Primary Care Physician Office Phone Office Fax Essence Chicas [address] You are allergic to the following Allergen Reactions Other Food Swelling Jalapeno. -- tongue and lip swells Bee Sting (Sting, Bee) Swelling Local swelling Other Plant, Animal, Environmental Other (comments) Trees, grass, dust, mold---- congestion Recent Documentation Weight BMI OB Status Smoking Status 94.5 kg 39.38 kg/m2 Postmenopausal Never Smoker Emergency Contacts Name Discharge Info Relation Home Work Mobile [facility] CAREGIVER [3] Spouse [3] [phone] Patient Belongings The following personal items are in your possession at time of discharge: 
  Dental Appliances: None         Home Medications: None   Jewelry: None  Clothing: Footwear, Pants, Shirt    Other Valuables: None Please provide this summary of care documentation to your next provider. Signatures-by signing, you are acknowledging that this After Visit Summary has been reviewed with you and you have received a copy. Patient Signature:  ____________________________________________________________ Date:  ____________________________________________________________  
  
Flora Payor Provider Signature:  ____________________________________________________________ Date:  ____________________________________________________________

## 2018-02-06 NOTE — ANESTHESIA POSTPROCEDURE EVALUATION
Post-Anesthesia Evaluation and Assessment    Patient: Riley Mosher MRN: [id number]  SSN: [id number]    YOB: 1955  Age: 58 y.o. Sex: female       Cardiovascular Function/Vital Signs  Visit Vitals    /69    Pulse (!) 56    Temp 36.3 °C (97.4 °F)    Resp 13    Wt 94.5 kg (208 lb 6.4 oz)    SpO2 96%    BMI 39.38 kg/m2       Patient is status post general anesthesia for Procedure(s):  RIGHT LITHOTRIPSY EXTRACORPOREAL SHOCKWAVE ESWL. Nausea/Vomiting: None    Postoperative hydration reviewed and adequate. Pain:  Pain Scale 1: Numeric (0 - 10) (02/06/18 1125)  Pain Intensity 1: 0 (02/06/18 1125)   Managed    Neurological Status:   Neuro (WDL): Exceptions to WDL (02/06/18 1125)  Neuro  Neurologic State: Drowsy (02/06/18 1125)   At baseline    Mental Status and Level of Consciousness: Arousable    Pulmonary Status:   O2 Device: Nasal cannula (02/06/18 1125)   Adequate oxygenation and airway patent    Complications related to anesthesia: None    Post-anesthesia assessment completed.  No concerns    Signed By: Keyla Jacobsen MD     February 6, 2018

## 2018-02-06 NOTE — PERIOP NOTES
Preoperative flank skin condition: clear  Lead shield: YES  Patient ear protection: L YES  Gel applied to patient's flank and Lithotripsy head:YES  Starting power level: 3  Shock start time (first  fluoroscopy): 1038  Shock stop time (last lithotripsy shock): 0944  Ending power level:11  Total shocks:3000  Total fluoroscopy time: 3.07  Postoperative flank skin condition:pink

## 2018-02-20 ENCOUNTER — ANESTHESIA EVENT (OUTPATIENT)
Dept: SURGERY | Age: 63
End: 2018-02-20
Payer: COMMERCIAL

## 2018-02-20 RX ORDER — FAMOTIDINE 20 MG/1
20 TABLET, FILM COATED ORAL ONCE
Status: CANCELLED | OUTPATIENT
Start: 2018-02-20 | End: 2018-02-20

## 2018-02-21 ENCOUNTER — HOSPITAL ENCOUNTER (OUTPATIENT)
Age: 63
Setting detail: OUTPATIENT SURGERY
Discharge: HOME | End: 2018-02-21
Attending: ORTHOPAEDIC SURGERY | Admitting: ORTHOPAEDIC SURGERY
Payer: COMMERCIAL

## 2018-02-21 ENCOUNTER — ANESTHESIA (OUTPATIENT)
Dept: SURGERY | Age: 63
End: 2018-02-21
Payer: COMMERCIAL

## 2018-02-21 VITALS
HEART RATE: 80 BPM | BODY MASS INDEX: 38.55 KG/M2 | TEMPERATURE: 97.4 F | DIASTOLIC BLOOD PRESSURE: 68 MMHG | SYSTOLIC BLOOD PRESSURE: 121 MMHG | OXYGEN SATURATION: 97 % | WEIGHT: 204 LBS | RESPIRATION RATE: 16 BRPM

## 2018-02-21 LAB
ATRIAL RATE: 91 BPM
CALCULATED R AXIS, ECG10: -14 DEGREES
CALCULATED T AXIS, ECG11: 44 DEGREES
DIAGNOSIS, 93000: NORMAL
GLUCOSE BLD STRIP.AUTO-MCNC: 126 MG/DL (ref 65–100)
GLUCOSE BLD STRIP.AUTO-MCNC: 167 MG/DL (ref 65–100)
Q-T INTERVAL, ECG07: 428 MS
QRS DURATION, ECG06: 92 MS
QTC CALCULATION (BEZET), ECG08: 465 MS
VENTRICULAR RATE, ECG03: 71 BPM

## 2018-02-21 PROCEDURE — C1713 ANCHOR/SCREW BN/BN,TIS/BN: HCPCS | Performed by: ORTHOPAEDIC SURGERY

## 2018-02-21 PROCEDURE — 77030033073 HC TBNG ARTHSC PMP OUTFLO STRY -B: Performed by: ORTHOPAEDIC SURGERY

## 2018-02-21 PROCEDURE — 82962 GLUCOSE BLOOD TEST: CPT

## 2018-02-21 PROCEDURE — 77030020143 HC AIRWY LARYN INTUB CGAS -A: Performed by: ANESTHESIOLOGY

## 2018-02-21 PROCEDURE — 77030003666 HC NDL SPINAL BD -A: Performed by: ORTHOPAEDIC SURGERY

## 2018-02-21 PROCEDURE — 76010010054 HC POST OP PAIN BLOCK: Performed by: ORTHOPAEDIC SURGERY

## 2018-02-21 PROCEDURE — 76942 ECHO GUIDE FOR BIOPSY: CPT | Performed by: ORTHOPAEDIC SURGERY

## 2018-02-21 PROCEDURE — 77030027384 HC PRB ARTHSCP SERFAS STRY -C: Performed by: ORTHOPAEDIC SURGERY

## 2018-02-21 PROCEDURE — 77030018673: Performed by: ORTHOPAEDIC SURGERY

## 2018-02-21 PROCEDURE — 77030033005 HC TBNG ARTHSC PMP STRY -B: Performed by: ORTHOPAEDIC SURGERY

## 2018-02-21 PROCEDURE — 77030002933 HC SUT MCRYL J&J -A: Performed by: ORTHOPAEDIC SURGERY

## 2018-02-21 PROCEDURE — 77030006668 HC BLD SHV MENSCS STRY -B: Performed by: ORTHOPAEDIC SURGERY

## 2018-02-21 PROCEDURE — 76060000033 HC ANESTHESIA 1 TO 1.5 HR: Performed by: ORTHOPAEDIC SURGERY

## 2018-02-21 PROCEDURE — 77030018836 HC SOL IRR NACL ICUM -A: Performed by: ORTHOPAEDIC SURGERY

## 2018-02-21 PROCEDURE — 77030003660 HC NDL SHTTL S&N -C: Performed by: ORTHOPAEDIC SURGERY

## 2018-02-21 PROCEDURE — 74011000250 HC RX REV CODE- 250

## 2018-02-21 PROCEDURE — 93005 ELECTROCARDIOGRAM TRACING: CPT | Performed by: ANESTHESIOLOGY

## 2018-02-21 PROCEDURE — 74011250636 HC RX REV CODE- 250/636

## 2018-02-21 PROCEDURE — 77030032490 HC SLV COMPR SCD KNE COVD -B: Performed by: ORTHOPAEDIC SURGERY

## 2018-02-21 PROCEDURE — 76010000161 HC OR TIME 1 TO 1.5 HR INTENSV-TIER 1: Performed by: ORTHOPAEDIC SURGERY

## 2018-02-21 PROCEDURE — 77030008477 HC STYL SATN SLP COVD -A: Performed by: ANESTHESIOLOGY

## 2018-02-21 PROCEDURE — 77030004453 HC BUR SHV STRY -B: Performed by: ORTHOPAEDIC SURGERY

## 2018-02-21 PROCEDURE — 77030008703 HC TU ET UNCUF COVD -A: Performed by: ANESTHESIOLOGY

## 2018-02-21 PROCEDURE — 74011250636 HC RX REV CODE- 250/636: Performed by: ORTHOPAEDIC SURGERY

## 2018-02-21 PROCEDURE — 77030010430: Performed by: ORTHOPAEDIC SURGERY

## 2018-02-21 PROCEDURE — 74011250636 HC RX REV CODE- 250/636: Performed by: ANESTHESIOLOGY

## 2018-02-21 PROCEDURE — 77030019605: Performed by: ORTHOPAEDIC SURGERY

## 2018-02-21 PROCEDURE — 77030003602 HC NDL NRV BLK BBMI -B: Performed by: ANESTHESIOLOGY

## 2018-02-21 PROCEDURE — 76210000006 HC OR PH I REC 0.5 TO 1 HR: Performed by: ORTHOPAEDIC SURGERY

## 2018-02-21 PROCEDURE — 76210000021 HC REC RM PH II 0.5 TO 1 HR: Performed by: ORTHOPAEDIC SURGERY

## 2018-02-21 PROCEDURE — 77030010427: Performed by: ORTHOPAEDIC SURGERY

## 2018-02-21 DEVICE — MULTIFIX S-ULTRA 5.5MM KNOTLESS ANCHOR
Type: IMPLANTABLE DEVICE | Site: SHOULDER | Status: FUNCTIONAL
Brand: MULTIFIX

## 2018-02-21 RX ORDER — EPINEPHRINE 1 MG/ML
INJECTION, SOLUTION, CONCENTRATE INTRAVENOUS AS NEEDED
Status: DISCONTINUED | OUTPATIENT
Start: 2018-02-21 | End: 2018-02-21 | Stop reason: HOSPADM

## 2018-02-21 RX ORDER — MIDAZOLAM HYDROCHLORIDE 1 MG/ML
2 INJECTION, SOLUTION INTRAMUSCULAR; INTRAVENOUS
Status: DISCONTINUED | OUTPATIENT
Start: 2018-02-21 | End: 2018-02-21 | Stop reason: HOSPADM

## 2018-02-21 RX ORDER — HYDROCODONE BITARTRATE AND ACETAMINOPHEN 5; 325 MG/1; MG/1
2 TABLET ORAL AS NEEDED
Status: DISCONTINUED | OUTPATIENT
Start: 2018-02-21 | End: 2018-02-21 | Stop reason: HOSPADM

## 2018-02-21 RX ORDER — LIDOCAINE HYDROCHLORIDE 10 MG/ML
0.1 INJECTION INFILTRATION; PERINEURAL AS NEEDED
Status: DISCONTINUED | OUTPATIENT
Start: 2018-02-21 | End: 2018-02-21 | Stop reason: HOSPADM

## 2018-02-21 RX ORDER — LIDOCAINE HYDROCHLORIDE 20 MG/ML
INJECTION, SOLUTION EPIDURAL; INFILTRATION; INTRACAUDAL; PERINEURAL AS NEEDED
Status: DISCONTINUED | OUTPATIENT
Start: 2018-02-21 | End: 2018-02-21 | Stop reason: HOSPADM

## 2018-02-21 RX ORDER — PROPOFOL 10 MG/ML
INJECTION, EMULSION INTRAVENOUS AS NEEDED
Status: DISCONTINUED | OUTPATIENT
Start: 2018-02-21 | End: 2018-02-21 | Stop reason: HOSPADM

## 2018-02-21 RX ORDER — SODIUM CHLORIDE, SODIUM LACTATE, POTASSIUM CHLORIDE, CALCIUM CHLORIDE 600; 310; 30; 20 MG/100ML; MG/100ML; MG/100ML; MG/100ML
75 INJECTION, SOLUTION INTRAVENOUS CONTINUOUS
Status: DISCONTINUED | OUTPATIENT
Start: 2018-02-21 | End: 2018-02-21 | Stop reason: HOSPADM

## 2018-02-21 RX ORDER — CEFAZOLIN SODIUM/WATER 2 G/20 ML
2 SYRINGE (ML) INTRAVENOUS ONCE
Status: COMPLETED | OUTPATIENT
Start: 2018-02-21 | End: 2018-02-21

## 2018-02-21 RX ORDER — SUCCINYLCHOLINE CHLORIDE 20 MG/ML
INJECTION INTRAMUSCULAR; INTRAVENOUS AS NEEDED
Status: DISCONTINUED | OUTPATIENT
Start: 2018-02-21 | End: 2018-02-21 | Stop reason: HOSPADM

## 2018-02-21 RX ORDER — ROPIVACAINE HYDROCHLORIDE 10 MG/ML
INJECTION EPIDURAL; INFILTRATION; PERINEURAL AS NEEDED
Status: DISCONTINUED | OUTPATIENT
Start: 2018-02-21 | End: 2018-02-21 | Stop reason: HOSPADM

## 2018-02-21 RX ORDER — MIDAZOLAM HYDROCHLORIDE 1 MG/ML
5 INJECTION, SOLUTION INTRAMUSCULAR; INTRAVENOUS ONCE
Status: COMPLETED | OUTPATIENT
Start: 2018-02-21 | End: 2018-02-21

## 2018-02-21 RX ORDER — FENTANYL CITRATE 50 UG/ML
100 INJECTION, SOLUTION INTRAMUSCULAR; INTRAVENOUS ONCE
Status: DISCONTINUED | OUTPATIENT
Start: 2018-02-21 | End: 2018-02-21 | Stop reason: HOSPADM

## 2018-02-21 RX ORDER — HYDROMORPHONE HYDROCHLORIDE 2 MG/ML
0.5 INJECTION, SOLUTION INTRAMUSCULAR; INTRAVENOUS; SUBCUTANEOUS
Status: DISCONTINUED | OUTPATIENT
Start: 2018-02-21 | End: 2018-02-21 | Stop reason: HOSPADM

## 2018-02-21 RX ORDER — OXYCODONE HYDROCHLORIDE 5 MG/1
5 TABLET ORAL
Status: DISCONTINUED | OUTPATIENT
Start: 2018-02-21 | End: 2018-02-21 | Stop reason: HOSPADM

## 2018-02-21 RX ORDER — EPHEDRINE SULFATE 50 MG/ML
INJECTION, SOLUTION INTRAVENOUS AS NEEDED
Status: DISCONTINUED | OUTPATIENT
Start: 2018-02-21 | End: 2018-02-21 | Stop reason: HOSPADM

## 2018-02-21 RX ORDER — ONDANSETRON 2 MG/ML
INJECTION INTRAMUSCULAR; INTRAVENOUS AS NEEDED
Status: DISCONTINUED | OUTPATIENT
Start: 2018-02-21 | End: 2018-02-21 | Stop reason: HOSPADM

## 2018-02-21 RX ADMIN — PROPOFOL 200 MG: 10 INJECTION, EMULSION INTRAVENOUS at 08:04

## 2018-02-21 RX ADMIN — LIDOCAINE HYDROCHLORIDE 100 MG: 20 INJECTION, SOLUTION EPIDURAL; INFILTRATION; INTRACAUDAL; PERINEURAL at 08:03

## 2018-02-21 RX ADMIN — ROPIVACAINE HYDROCHLORIDE 15 ML: 10 INJECTION EPIDURAL; INFILTRATION; PERINEURAL at 07:22

## 2018-02-21 RX ADMIN — SUCCINYLCHOLINE CHLORIDE 140 MG: 20 INJECTION INTRAMUSCULAR; INTRAVENOUS at 08:09

## 2018-02-21 RX ADMIN — EPHEDRINE SULFATE 10 MG: 50 INJECTION, SOLUTION INTRAVENOUS at 08:30

## 2018-02-21 RX ADMIN — SODIUM CHLORIDE, SODIUM LACTATE, POTASSIUM CHLORIDE, AND CALCIUM CHLORIDE 75 ML/HR: 600; 310; 30; 20 INJECTION, SOLUTION INTRAVENOUS at 07:00

## 2018-02-21 RX ADMIN — PROPOFOL 200 MG: 10 INJECTION, EMULSION INTRAVENOUS at 08:03

## 2018-02-21 RX ADMIN — EPHEDRINE SULFATE 10 MG: 50 INJECTION, SOLUTION INTRAVENOUS at 08:44

## 2018-02-21 RX ADMIN — ONDANSETRON 4 MG: 2 INJECTION INTRAMUSCULAR; INTRAVENOUS at 08:55

## 2018-02-21 RX ADMIN — MIDAZOLAM 3 MG: 1 INJECTION INTRAMUSCULAR; INTRAVENOUS at 07:19

## 2018-02-21 RX ADMIN — Medication 2 G: at 07:57

## 2018-02-21 RX ADMIN — EPHEDRINE SULFATE 5 MG: 50 INJECTION, SOLUTION INTRAVENOUS at 08:23

## 2018-02-21 NOTE — OP NOTES
[facility] REPORT    Thuy Mackay  MR#: [id number]  : 1955  ACCOUNT #: [de-identified]   DATE OF SERVICE: 2018    PREOPERATIVE DIAGNOSES  1. Rotator cuff tear. 2.  AC arthritis. 3.  Labral tearing. 4.  Partial intra-articular long head biceps tear. 5.  Chondromalacia glenoid. 6.  Impingement, left shoulder. POSTOPERATIVE DIAGNOSES  1. Rotator cuff tear. 2.  AC arthritis. 3.  Labral tearing. 4.  Partial intra-articular long head biceps tear. 5.  Chondromalacia glenoid. 6.  Impingement, left shoulder. OPERATION PERFORMED  1. Arthroscopic rotator cuff repair. 2.  Arthroscopic distal clavicle excision (Phuong procedure). 3.  Debridement of labral tearing. 4.  Debridement of partial intra-articular long head biceps tear. 5.  Chondroplasty glenoid. 6.  Arthroscopic subacromial decompression, left shoulder. SURGEON: Clayton Sargent MD    ASSISTANT:  PANFILO John     ANESTHESIA: General.     ESTIMATED BLOOD LOSS:  Minimal.     SPECIMENS REMOVED: None. COMPLICATIONS: None. IMPLANTS: Yes, see record. FLUIDS:  Crystalloid. FINDINGS:  There was a complete tear of the rotator cuff. This is approximately 10-12 mm. No significant retraction. There was intra-articular tearing of the long head of the biceps and a 20% thickness area. There was some tearing of the superior, posterior and inferior labrum. There was some grade II-III chondromalacia at the inferior portion of the glenoid. There was an anterior inferior acromial slope. There was undersurface osteophyte formation and degenerative change of the distal clavicle at the [facility] joint. DESCRIPTION OF PROCEDURE:  After informed consent, the patient was brought to the operating room and placed on the table in supine position. General endotracheal anesthesia was administered without difficulty. The patient was placed in lateral decubitus position. All pressure points were inspected and  padded appropriately. The left upper extremity was suspended over traction. The left shoulder was prepped and draped in sterile fashion. The glenohumeral joint was insufflated with 50 mL of sterile saline and a posterior portal was established. The glenohumeral joint was arthroscoped in sequential manner. The above mentioned findings were noted. An anterior portal was established. Intra-articular long head biceps tearing was debrided back to a smooth stable rim. There was no sharp edges or unstable fragments after the biceps debridement. The labral tear was debrided back to a smooth stable rim. There were no sharp edges or unstable fragments. After the labral debridement and chondroplasty, the glenoid was performed back to a smooth stable area. There were no sharp edges or unstable fragments After the chondroplasty. The under surface rotator cuff tearing was debrided back to a smooth stable area. The scope was brought in the subacromial space. A lateral portal was established. Bursal proliferative tissue was excised. The undersurface of the acromion was exposed and then acromioplasty was performed. All of the acromion anterior to the leading edge of distal clavicle was excised a  depth of 5-6 mm and 2 cm anterior to posterior direction was removed. This opened up the subacromial space nicely. Attention was then directed to the distal clavicle to both the anterior and lateral portals and circumferential distal clavicle excision was performed. Approximately 10 mm of distal clavicle was excised. The circumferential excision was verified arthroscopically. The Phuong procedure was performed without difficulty. Attention was then directed to the rotator cuff. The tear was easily mobilized back to its original insertion. The area underneath the original insertion was lightly decorticated.   A MULTIFIX anchor and two UltraTape suture in a mattress fashion were used to complete repair of the tear. Anatomic rotator cuff repair was performed. The rotator cuff tear was repaired without difficulty. The shoulder was thoroughly irrigated. Portals were closed. Sterile dressings were applied. Patient extubated and taken to recovery in stable condition. PANFILO Miranda assisted during the procedure. He was necessary for patient positioning, wound closure and assistance with the major portion of the operation including the rotator cuff repair that was present that decreased the operative time and potential complication rate.       MD RUBY Garza / Dinorah.Toni  D: 02/21/2018 09:05     T: 02/21/2018 09:42  JOB #: [id number]

## 2018-02-21 NOTE — ANESTHESIA PREPROCEDURE EVALUATION
Anesthetic History     PONV          Review of Systems / Medical History  Patient summary reviewed and pertinent labs reviewed    Pulmonary            Asthma (years ago) : well controlled       Neuro/Psych   Within defined limits           Cardiovascular    Hypertension: well controlled        Dysrhythmias : PVC  CAD    Exercise tolerance: >4 METS     GI/Hepatic/Renal     GERD: well controlled    Renal disease: stones       Endo/Other    Diabetes: well controlled, type 2  Hypothyroidism: well controlled  Morbid obesity, arthritis and cancer (melanoma)     Other Findings   Comments: DJD           Physical Exam    Airway  Mallampati: II  TM Distance: 4 - 6 cm  Neck ROM: normal range of motion   Mouth opening: Normal     Cardiovascular  Regular rate and rhythm,  S1 and S2 normal,  no murmur, click, rub, or gallop  Rhythm: regular  Rate: normal         Dental  No notable dental hx       Pulmonary  Breath sounds clear to auscultation               Abdominal  GI exam deferred       Other Findings            Anesthetic Plan    ASA: 3  Anesthesia type: general      Post-op pain plan if not by surgeon: peripheral nerve block single    Induction: Intravenous  Anesthetic plan and risks discussed with: Patient

## 2018-02-21 NOTE — PERIOP NOTES
Pt c/o left chest pressure, left to sternum, no radiation, strip of MCL1, no ST elevation, Leads check for leads 1,2,& 3 no ST elevation, Dr Elsa Del Valle here to see pt.

## 2018-02-21 NOTE — DISCHARGE INSTRUCTIONS
Post-Operative Instructions   For  Shoulder Arthroscopy Rotator Cuff Repair  Phone:  [phone]    1. If you do not have an \"Iceman\" type cooling unit, for the first 48-72 hours following surgery, use ice on the shoulder every two hours (while awake) for 20-30 minutes at a time to help prevent swelling and lessen pain. If you have a cooling unit, follow the instructions given to you- continually as much as possible the first 48-72 hours, then 3-4 times a day for 4 weeks. 2. You may shower after the arthroscopy. Keep dressings in place for the first three days. After three days, you may remove the dressings and leave the steri-strip bandages in place; they will peel off naturally. 3. Stay in sling at all times except to shower. 4. Begin therapy as ordered. 5. Use any pain medication as instructed. You should take your pain medication as soon as you feel the anesthetic wearing off. Do not wait until you are in severe pain to begin taking your pain medication. 6. You may have some side effects from your pain medication. If you have nausea, try taking your medication with food. For itching, you may take over the counter Benadryl. 7. You may have been given a prescription for Zofran or Phenergan. This medication is used for nausea and vomiting. You do not need to get this prescription filled unless you have a problem. 8. If you have a problem, please call [address] [place] [address] at [phone]    [address] [place] [address], P.A[facility] ACTIVITY  · As tolerated and as directed by your doctor. · Bathe or shower as directed by your doctor. DIET  · Clear liquids until no nausea or vomiting; then light diet for the first day. · Advance to regular diet on second day, unless your doctor orders otherwise. · If nausea and vomiting continues, call your doctor. PAIN  · Take pain medication as directed by your doctor.    · Call your doctor if pain is NOT relieved by medication. · DO NOT take aspirin of blood thinners unless directed by your doctor. DRESSING CARE as directed above    CALL YOUR DOCTOR IF   · Excessive bleeding that does not stop after holding pressure over the area  · Temperature of 101 degrees F or above  · Excessive redness, swelling or bruising, and/ or green or yellow, smelly discharge from incision    AFTER ANESTHESIA   · For the first 24 hours: DO NOT Drive, Drink alcoholic beverages, or Make important decisions. · Be aware of dizziness following anesthesia and while taking pain medication. [facility] office will call you for appt. YOUR DOCTOR'S PHONE NUMBER [phone]      DISCHARGE SUMMARY from Nurse    PATIENT INSTRUCTIONS:    After general anesthesia or intravenous sedation, for 24 hours or while taking prescription Narcotics:  · Limit your activities  · Do not drive and operate hazardous machinery  · Do not make important personal or business decisions  · Do  not drink alcoholic beverages  · If you have not urinated within 8 hours after discharge, please contact your surgeon on call. *  Please give a list of your current medications to your Primary Care Provider. *  Please update this list whenever your medications are discontinued, doses are      changed, or new medications (including over-the-counter products) are added. *  Please carry medication information at all times in case of emergency situations. These are general instructions for a healthy lifestyle:    No smoking/ No tobacco products/ Avoid exposure to second hand smoke    Surgeon General's Warning:  Quitting smoking now greatly reduces serious risk to your health.     Obesity, smoking, and sedentary lifestyle greatly increases your risk for illness    A healthy diet, regular physical exercise & weight monitoring are important for maintaining a healthy lifestyle    You may be retaining fluid if you have a history of heart failure or if you experience any of the following symptoms:  Weight gain of 3 pounds or more overnight or 5 pounds in a week, increased swelling in our hands or feet or shortness of breath while lying flat in bed. Please call your doctor as soon as you notice any of these symptoms; do not wait until your next office visit. Recognize signs and symptoms of STROKE:    F-face looks uneven    A-arms unable to move or move unevenly    S-speech slurred or non-existent    T-time-call 911 as soon as signs and symptoms begin-DO NOT go       Back to bed or wait to see if you get better-TIME IS BRAIN. Heart Blocks: Care Instructions  Your Care Instructions    A heart block is a problem with your heart's electrical system. Normally, a small area of the heart (sinus node) creates the electrical signals that cause the heart to beat in a timed and regular way. A heart block occurs when the signal is blocked. This disrupts the heartbeat. A heart block does not mean that blood flow to the heart is blocked. There are three types of heart blocks. In a first-degree heart block, the signal is slower than normal. But the heart rate is normal, and the heart usually is not damaged. Some medicines may cause a first-degree heart block. In a second-degree heart block, some signals do not reach the lower chambers of the heart. This can cause the heart to skip a beat or have an abnormal rhythm. In a third-degree heart block, the signal is completely blocked from reaching the lower chambers. This can cause the heart to slow down a lot or even stop beating. It is a very serious condition. A first-degree heart block usually does not cause problems and does not need treatment. Second- and third-degree heart blocks can be treated by placing a pacemaker under your skin. The pacemaker is connected to your heart with thin wires. It helps your heart to beat in an even rhythm. Follow-up care is a key part of your treatment and safety.  Be sure to make and go to all appointments, and call your doctor if you are having problems. It's also a good idea to know your test results and keep a list of the medicines you take. How can you care for yourself at home? · If you feel lightheaded, sit or lie down to avoid injury that might occur if you faint and fall. · Get regular exercise. Try for 30 minutes on most days of the week. If you do not have other heart problems, you likely do not have limits on the type or level of activity that you can do. You may want to walk, swim, bike, or do other activities. Ask your doctor what level of exercise is safe for you. · Get plenty of sleep and rest. If you get overtired, your changes in heart rate or rhythm may be more severe or occur more often. · If you received a pacemaker or an implantable cardioverter-defibrillator (ICD), you will get more information about it. · Wear medical alert jewelry that describes your condition and says you have a pacemaker or ICD. You can buy this at most [employer]. When should you call for help? Call 911 anytime you think you may need emergency care. For example, call if:  ? · You passed out (lost consciousness). ? · You have symptoms of a heart attack. These may include:  ¨ Chest pain or pressure, or a strange feeling in the chest.  ¨ Sweating. ¨ Shortness of breath. ¨ Nausea or vomiting. ¨ Pain, pressure, or a strange feeling in the back, neck, jaw, or upper belly or in one or both shoulders or arms. ¨ Lightheadedness or sudden weakness. ¨ A fast or irregular heartbeat. After you call 911, the  may tell you to chew 1 adult-strength or 2 to 4 low-dose aspirin. Wait for an ambulance. Do not try to drive yourself. ?Call your doctor now or seek immediate medical care if:  ? · You are dizzy or lightheaded, or you feel like you may faint. ? · You have new or increased shortness of breath. ? Watch closely for changes in your health, and be sure to contact your doctor if you have any problems. Where can you learn more? Go to http://suri-kristan.info/. Enter V521 in the search box to learn more about \"Heart Blocks: Care Instructions. \"  Current as of: September 21, 2016  Content Version: 11.4  © 8379-6493 [employer]. Care instructions adapted under license by [employer] (which disclaims liability or warranty for this information). If you have questions about a medical condition or this instruction, always ask your healthcare professional. [address] any warranty or liability for your use of this information.

## 2018-02-21 NOTE — PERIOP NOTES
Dr Ann Wilcox reviewed ekg and ok to dc pt to home. Pt denies any discomfort. No more chest pressure.

## 2018-02-21 NOTE — ANESTHESIA PROCEDURE NOTES
Peripheral Block    Start time: 2/21/2018 7:20 AM  End time: 2/21/2018 7:22 AM  Performed by: Adriana Augustine  Authorized by: Jean Carlso TOVAR       Pre-procedure: Indications: at surgeon's request, post-op pain management and procedure for pain    Preanesthetic Checklist: patient identified, risks and benefits discussed, site marked, timeout performed, anesthesia consent given and patient being monitored    Timeout Time: 07:19          Block Type:   Block Type:   Interscalene  Laterality:  Left  Monitoring:  Standard ASA monitoring, responsive to questions, oxygen, continuous pulse ox, heart rate and frequent vital sign checks  Injection Technique:  Single shot  Procedures: ultrasound guided and nerve stimulator    Patient Position: supine  Prep: chlorhexidine    Location:  Interscalene  Needle Type:  Stimuplex  Needle Gauge:  22 G  Needle Localization:  Nerve stimulator and ultrasound guidance  Motor Response: minimal motor response >0.4 mA    Medication Injected:  1.0%  ropivacaine  Adds:  Epi 1:200K  Volume (mL):  15  Add'l Medication Injected:  1.5%  mepivacaine  Adds:  Epi 1:200K  Volume (mL):  15    Assessment:  Number of attempts:  1  Injection Assessment:  Incremental injection every 5 mL, no paresthesia, ultrasound image on chart, local visualized surrounding nerve on ultrasound, negative aspiration for blood and no intravascular symptoms  Patient tolerance:  Patient tolerated the procedure well with no immediate complications

## 2018-02-21 NOTE — H&P
Outpatient Surgery History and Physical:  Jennifer Ugarte was seen and examined. CHIEF COMPLAINT:   Left shoulder pain. PE:     Visit Vitals    /66 (BP 1 Location: Right arm, BP Patient Position: Supine)    Pulse 67    Temp 98.5 °F (36.9 °C)    Resp 18    Wt 92.5 kg (204 lb)    LMP 2001    SpO2 97%    BMI 38.55 kg/m2       Heart:   Regular rhythm      Lungs:  Are clear      Past Medical History:    Patient Active Problem List    Diagnosis    S/P total knee arthroplasty    Osteoarthritis    Fatigue    Snoring    Recurrent UTI    Stress incontinence    Hyperglycemia due to type 2 diabetes mellitus ([facility]    Unspecified hypothyroidism    HTN (hypertension)    Exogenous obesity    RA [facility]heumatoid arthritis) ([facility]    GERD (gastroesophageal reflux disease)    DJD (degenerative joint disease)       Surgical History:   Past Surgical History:   Procedure Laterality Date    ABDOMEN SURGERY PROC UNLISTED      fatty tumor removed- from abd--benign    FRAGMENT KIDNEY STONE/ ESWL      HX  SECTION      HX COLONOSCOPY  ;     HX DILATION AND CURETTAGE      and hysteroscopy    HX GYN      vaginal cryocautery     HX HEENT  1986    Oral Surgery--per pt  still has metal in mouth since     HX KNEE ARTHROSCOPY Right 2007    HX KNEE REPLACEMENT Left 2008    HX KNEE REPLACEMENT Right 2016    HX LAP CHOLECYSTECTOMY  1993    HX LITHOTRIPSY  1998, 2003; 2016; 18    HX MENISCECTOMY Right 2011    Right knee    HX MOHS PROCEDURES Right     HX OTHER SURGICAL      lipoma of abdomen    HX OTHER SURGICAL  1984    melanoma of back    HX REFRACTIVE SURGERY      HX TUBAL LIGATION         Social History: Patient  reports that she has never smoked. She has never used smokeless tobacco. She reports that she drinks alcohol. She reports that she does not use illicit drugs.     Family History:   Family History   Problem Relation Age of Onset    Cancer Mother      Lung   [address] [facility] Arthritis-rheumatoid Mother     Thyroid Disease Mother     Cancer Father      Lung; Liver    Diabetes Father     Heart Disease Father     Hypertension Father     Diabetes Brother     SLE Sister     Breast Cancer Paternal Aunt     Breast Cancer Other      cousins       Allergies: Reviewed per EMR  Allergies   Allergen Reactions    Other Food Swelling     Jalapeno. -- tongue and lip swells    Bee Sting [Sting, Bee] Swelling     Local swelling    Other Plant, Animal, Environmental Other (comments)     Trees, grass, dust, mold---- congestion       Medications:    No current facility-administered medications on file prior to encounter. Current Outpatient Prescriptions on File Prior to Encounter   Medication Sig    insulin detemir (LEVEMIR FLEXTOUCH) 100 unit/mL (3 mL) inpn 65-75 Units by SubCUTAneous route every evening. 15 units every morning and 60 units at bedtime. by SubCUTAneous route every evening. Indications: type 2 diabetes mellitus  Indications: type 2 diabetes mellitus (Patient taking differently: by SubCUTAneous route two (2) times a day. Usually takes 10-15 units in am and then 60 units at hs--- BUT TOTAL FOR 24 HOURS IS NOT OVER 75 UNITS  (STARTS COUNTING UNITS T NIGHT DOSE)  Indications: type 2 diabetes mellitus)    omeprazole (PRILOSEC) 40 mg capsule Take 1 Cap by mouth daily. Indications: gastroesophageal reflux disease (Patient taking differently: Take 40 mg by mouth daily as needed. Indications: gastroesophageal reflux disease)    glipiZIDE (GLUCOTROL) 10 mg tablet TAKE ONE TABLET BY MOUTH TWICE DAILY  Indications: type 2 diabetes mellitus    levothyroxine (SYNTHROID) 100 mcg tablet TAKE 1 TABLET BY MOUTH BEFORE BREAKFAST  Indications: hypothyroidism    lisinopril (PRINIVIL, ZESTRIL) 20 mg tablet Take 1 Tab by mouth daily.  metFORMIN (GLUCOPHAGE) 1,000 mg tablet Take 1 Tab by mouth two (2) times daily (with meals).     insulin aspart (NOVOLOG FLEXPEN) 100 unit/mL inpn 10 units before meals if blood sugar is greater than 150  Indications: type 2 diabetes mellitus (Patient taking differently: nightly. 10 units before meals if blood sugar is greater than 150--then sliding scale  Goes up 4 units per 50 glucose  Indications: type 2 diabetes mellitus)    loratadine (CLARITIN) 10 mg tablet Take 10 mg by mouth daily.  calcium-cholecalciferol, d3, (CALCIUM 600 + D) 600-125 mg-unit tab Take 1 Tab by mouth two (2) times a day. Hold for surgery - last dose 9/22/16     GLUCOSAMINE HCL/CHONDR MERAZ A NA (OSTEO BI-FLEX PO) Take 1 Tab by mouth two (2) times a day. Stopped 1/31/18       The surgery is planned for the left shoulder. History and physical has been reviewed. The patient has been examined. There have been no significant clinical changes since the completion of the originally dated History and Physical.  Patient identified by surgeon; surgical site was confirmed by patient and surgeon. The patient is here today for outpatient surgery. I have examined the patient, no changes are noted in the patient's medical status. Necessity for the procedure/care is still present and the history and physical above is current. See the office notes for the full long term history of the problem. Please see the recent office notes for the musculoskeletal examination.     Signed By: PANFILO Guaman     February 21, 2018 7:12 AM

## 2018-02-21 NOTE — IP AVS SNAPSHOT
[facility] 
 
 
 [address] [place] [address] 
[phone] Patient: Kennie Merlin Sanfilippo MRN: [id number] FYE:[id number] About your hospitalization You were admitted on:  February 21, 2018 You last received care in the:  [facility] OP PACU You were discharged on:  February 21, 2018 Why you were hospitalized Your primary diagnosis was:  Not on File Follow-up Information Follow up With Details Comments Contact Info Shandra Begum MD   [address] Laura Rush 
[phone] Your Scheduled Appointments Tuesday March 20, 2018  9:30 AM EDT  
ESTABLISHED PATIENT with Shandra Begum MD  
[facility] ([address] [place] [address]) [address] [place] [address]  
[phone] Discharge Orders None A check doyle indicates which time of day the medication should be taken. My Medications CHANGE how you take these medications Instructions Each Dose to Equal  
 Morning Noon Evening Bedtime  
 insulin aspart U-100 100 unit/mL Inpn Commonly known as:  NovoLOG Flexpen U-100 Insulin What changed:   
- when to take this 
- additional instructions Your last dose was: Your next dose is:    
   
   
 10 units before meals if blood sugar is greater than 150  Indications: type 2 diabetes mellitus  
     
   
   
   
  
 insulin detemir U-100 100 unit/mL (3 mL) Inpn Commonly known as:  LEVEMIR FLEXTOUCH U-100 INSULN What changed:   
- how much to take - when to take this 
- additional instructions Your last dose was: Your next dose is:    
   
   
 65-75 Units by SubCUTAneous route every evening. 15 units every morning and 60 units at bedtime. by SubCUTAneous route every evening. Indications: type 2 diabetes mellitus  Indications: type 2 diabetes mellitus 65-75 Units omeprazole 40 mg capsule Commonly known as:  PRILOSEC What changed:   
- when to take this 
- reasons to take this Your last dose was: Your next dose is: Take 1 Cap by mouth daily. Indications: gastroesophageal reflux disease 40 mg CONTINUE taking these medications Instructions Each Dose to Equal  
 Morning Noon Evening Bedtime CALCIUM 600 + D 600-125 mg-unit Tab Generic drug:  calcium-cholecalciferol (d3) Your last dose was: Your next dose is: Take 1 Tab by mouth two (2) times a day. Hold for surgery - last dose 9/22/16  
 1 Tab CLARITIN 10 mg tablet Generic drug:  loratadine Your last dose was: Your next dose is: Take 10 mg by mouth daily. 10 mg  
    
   
   
   
  
 glipiZIDE 10 mg tablet Commonly known as:  Jethro Salazar Your last dose was: Your next dose is: TAKE ONE TABLET BY MOUTH TWICE DAILY  Indications: type 2 diabetes mellitus  
     
   
   
   
  
 levothyroxine 100 mcg tablet Commonly known as:  SYNTHROID Your last dose was: Your next dose is: TAKE 1 TABLET BY MOUTH BEFORE BREAKFAST  Indications: hypothyroidism  
     
   
   
   
  
 lisinopril 20 mg tablet Commonly known as:  Malina Jenkins Your last dose was: Your next dose is: Take 1 Tab by mouth daily. 20 mg  
    
   
   
   
  
 metFORMIN 1,000 mg tablet Commonly known as:  GLUCOPHAGE Your last dose was: Your next dose is: Take 1 Tab by mouth two (2) times daily (with meals). 1000 mg OSTEO BI-FLEX PO Your last dose was: Your next dose is: Take 1 Tab by mouth two (2) times a day. Stopped 1/31/18  
 1 Tab VITAMIN D3 1,000 unit Cap Generic drug:  cholecalciferol Your last dose was: Your next dose is: Take 1,000 Units by mouth daily. 1000 Units Discharge Instructions Post-Operative Instructions For Shoulder Arthroscopy Rotator Cuff Repair Phone:  [phone] 1. If you do not have an \"Iceman\" type cooling unit, for the first 48-72 hours following surgery, use ice on the shoulder every two hours (while awake) for 20-30 minutes at a time to help prevent swelling and lessen pain. If you have a cooling unit, follow the instructions given to you- continually as much as possible the first 48-72 hours, then 3-4 times a day for 4 weeks. 2. You may shower after the arthroscopy. Keep dressings in place for the first three days. After three days, you may remove the dressings and leave the steri-strip bandages in place; they will peel off naturally. 3. Stay in sling at all times except to shower. 4. Begin therapy as ordered. 5. Use any pain medication as instructed. You should take your pain medication as soon as you feel the anesthetic wearing off. Do not wait until you are in severe pain to begin taking your pain medication. 6. You may have some side effects from your pain medication. If you have nausea, try taking your medication with food. For itching, you may take over the counter Benadryl. 7. You may have been given a prescription for Zofran or Phenergan. This medication is used for nausea and vomiting. You do not need to get this prescription filled unless you have a problem. 8. If you have a problem, please call [address] [place] [address] at [phone] [employer] ACTIVITY · As tolerated and as directed by your doctor. · Bathe or shower as directed by your doctor. DIET · Clear liquids until no nausea or vomiting; then light diet for the first day. · Advance to regular diet on second day, unless your doctor orders otherwise. · If nausea and vomiting continues, call your doctor. PAIN 
· Take pain medication as directed by your doctor. · Call your doctor if pain is NOT relieved by medication. · DO NOT take aspirin of blood thinners unless directed by your doctor. DRESSING CARE as directed above CALL YOUR DOCTOR IF  
· Excessive bleeding that does not stop after holding pressure over the area · Temperature of 101 degrees F or above · Excessive redness, swelling or bruising, and/ or green or yellow, smelly discharge from incision AFTER ANESTHESIA · For the first 24 hours: DO NOT Drive, Drink alcoholic beverages, or Make important decisions. · Be aware of dizziness following anesthesia and while taking pain medication. [facility] office will call you for appt. YOUR DOCTOR'S PHONE NUMBER [phone] DISCHARGE SUMMARY from Nurse PATIENT INSTRUCTIONS: 
 
After general anesthesia or intravenous sedation, for 24 hours or while taking prescription Narcotics: · Limit your activities · Do not drive and operate hazardous machinery · Do not make important personal or business decisions · Do  not drink alcoholic beverages · If you have not urinated within 8 hours after discharge, please contact your surgeon on call. *  Please give a list of your current medications to your Primary Care Provider. *  Please update this list whenever your medications are discontinued, doses are 
    changed, or new medications (including over-the-counter products) are added. *  Please carry medication information at all times in case of emergency situations. These are general instructions for a healthy lifestyle: No smoking/ No tobacco products/ Avoid exposure to second hand smoke Surgeon General's Warning:  Quitting smoking now greatly reduces serious risk to your health. Obesity, smoking, and sedentary lifestyle greatly increases your risk for illness A healthy diet, regular physical exercise & weight monitoring are important for maintaining a healthy lifestyle You may be retaining fluid if you have a history of heart failure or if you experience any of the following symptoms:  Weight gain of 3 pounds or more overnight or 5 pounds in a week, increased swelling in our hands or feet or shortness of breath while lying flat in bed. Please call your doctor as soon as you notice any of these symptoms; do not wait until your next office visit. Recognize signs and symptoms of STROKE: 
 
F-face looks uneven A-arms unable to move or move unevenly S-speech slurred or non-existent T-time-call 911 as soon as signs and symptoms begin-DO NOT go Back to bed or wait to see if you get better-TIME IS BRAIN. Heart Blocks: Care Instructions Your Care Instructions A heart block is a problem with your heart's electrical system. Normally, a small area of the heart (sinus node) creates the electrical signals that cause the heart to beat in a timed and regular way. A heart block occurs when the signal is blocked. This disrupts the heartbeat. A heart block does not mean that blood flow to the heart is blocked. There are three types of heart blocks. In a first-degree heart block, the signal is slower than normal. But the heart rate is normal, and the heart usually is not damaged. Some medicines may cause a first-degree heart block. In a second-degree heart block, some signals do not reach the lower chambers of the heart. This can cause the heart to skip a beat or have an abnormal rhythm. In a third-degree heart block, the signal is completely blocked from reaching the lower chambers. This can cause the heart to slow down a lot or even stop beating. It is a very serious condition. A first-degree heart block usually does not cause problems and does not need treatment.  Second- and third-degree heart blocks can be treated by placing a pacemaker under your skin. The pacemaker is connected to your heart with thin wires. It helps your heart to beat in an even rhythm. Follow-up care is a key part of your treatment and safety. Be sure to make and go to all appointments, and call your doctor if you are having problems. It's also a good idea to know your test results and keep a list of the medicines you take. How can you care for yourself at home? · If you feel lightheaded, sit or lie down to avoid injury that might occur if you faint and fall. · Get regular exercise. Try for 30 minutes on most days of the week. If you do not have other heart problems, you likely do not have limits on the type or level of activity that you can do. You may want to walk, swim, bike, or do other activities. Ask your doctor what level of exercise is safe for you. · Get plenty of sleep and rest. If you get overtired, your changes in heart rate or rhythm may be more severe or occur more often. · If you received a pacemaker or an implantable cardioverter-defibrillator (ICD), you will get more information about it. · Wear medical alert jewelry that describes your condition and says you have a pacemaker or ICD. You can buy this at most drugstores. When should you call for help? Call 911 anytime you think you may need emergency care. For example, call if: 
? · You passed out (lost consciousness). ? · You have symptoms of a heart attack. These may include: ¨ Chest pain or pressure, or a strange feeling in the chest. 
¨ Sweating. ¨ Shortness of breath. ¨ Nausea or vomiting. ¨ Pain, pressure, or a strange feeling in the back, neck, jaw, or upper belly or in one or both shoulders or arms. ¨ Lightheadedness or sudden weakness. ¨ A fast or irregular heartbeat. After you call 911, the  may tell you to chew 1 adult-strength or 2 to 4 low-dose aspirin. Wait for an ambulance. Do not try to drive yourself. ?Call your doctor now or seek immediate medical care if: 
? · You are dizzy or lightheaded, or you feel like you may faint. ? · You have new or increased shortness of breath. ? Watch closely for changes in your health, and be sure to contact your doctor if you have any problems. Where can you learn more? Go to http://suri-kristan.info/. Enter A757 in the search box to learn more about \"Heart Blocks: Care Instructions. \" Current as of: September 21, 2016 Content Version: 11.4 © 9675-1970 [employer]. Care instructions adapted under license by [employer] (which disclaims liability or warranty for this information). If you have questions about a medical condition or this instruction, always ask your healthcare professional. [address] any warranty or liability for your use of this information. Introducing \[facility]\"" & HEALTH SERVICES! Dear Liset Watters: Thank you for requesting a WangYou account. Our records indicate that you already have an active WangYou account. You can access your account anytime at [email or web address]. [email or web address]/Project Insiders Did you know that you can access your hospital and ER discharge instructions at any time in WangYou? You can also review all of your test results from your hospital stay or ER visit. Additional Information If you have questions, please visit the Frequently Asked Questions section of the WangYou website at [email or web address] Remember, WangYou is NOT to be used for urgent needs. For medical emergencies, dial 911. Now available from your iPhone and Android! Providers Seen During Your Hospitalization Provider Specialty Primary office phone Sofi Meneses MD Orthopedic Surgery [phone] Your Primary Care Physician (PCP) Primary Care Physician Office Phone Office Fax Genaro Jauregui You are allergic to the following Allergen Reactions Other Food Swelling Jalapeno. -- tongue and lip swells Bee Sting (Sting, Bee) Swelling Local swelling Other Plant, Animal, Environmental Other (comments) Trees, grass, dust, mold---- congestion Recent Documentation Weight BMI OB Status Smoking Status 92.5 kg 38.55 kg/m2 Postmenopausal Never Smoker Emergency Contacts Name Discharge Info Relation Home Work Mobile [facility] CAREGIVER [3] Spouse [3] [phone] Patient Belongings The following personal items are in your possession at time of discharge: 
  Dental Appliances: None Please provide this summary of care documentation to your next provider. Signatures-by signing, you are acknowledging that this After Visit Summary has been reviewed with you and you have received a copy. Patient Signature:  ____________________________________________________________ Date:  ____________________________________________________________  
  
Audrey Rivers Provider Signature:  ____________________________________________________________ Date:  ____________________________________________________________

## 2018-03-02 NOTE — BRIEF OP NOTE
BRIEF OPERATIVE NOTE    Date of Procedure: 2/21/2018   Preoperative Diagnosis: Adhesive capsulitis of left shoulder [M75.02]  Postoperative Diagnosis: Left Shoulder Rotatpr Cudd Tear/ AC Joint Arthritis/ Impingement    Procedure(s):  LEFT SHOULDER ARTHROSCOPY  ROTATOR CUFF REPAIR/SUBACROMIAL DECOMPRESSION/ DISTSAL CLAVICLE EXCISION  Surgeon(s) and Role: Sena Hennessy MD - Primary         Assistant Staff: Physician Assistant: PANFILO Mendes      Surgical Staff:  Circ-1: Mikael Lopez RN  Physician Assistant: PANFILO Mendes  Scrub Tech-1: Macrina Short  Event Time In   Incision Start 0923   Incision Close 5863     Anesthesia: General   Estimated Blood Loss: min  Specimens: * No specimens in log *   Findings: rtc   Complications: none  Implants:   Implant Name Type Inv.  Item Serial No.  Lot No. LRB No. Used Action   ANCHOR SUT MULTIFIX 5.5MM --  - [id number]   ANCHOR SUT MULTIFIX 5.5MM --    [facility] ENDOSCOPY [id number] Left 1 Implanted

## 2018-03-28 ENCOUNTER — HOSPITAL ENCOUNTER (OUTPATIENT)
Dept: GENERAL RADIOLOGY | Age: 63
Discharge: HOME | End: 2018-03-28
Payer: COMMERCIAL

## 2018-03-28 DIAGNOSIS — R07.89 OTHER CHEST PAIN: ICD-10-CM

## 2018-03-28 DIAGNOSIS — I44.1 MOBITZ TYPE 1 SECOND DEGREE ATRIOVENTRICULAR BLOCK: ICD-10-CM

## 2018-03-28 PROCEDURE — 71046 X-RAY EXAM CHEST 2 VIEWS: CPT

## 2018-04-10 PROBLEM — E66.01 SEVERE OBESITY (BMI 35.0-39.9) WITH COMORBIDITY (HCC): Status: ACTIVE | Noted: 2018-04-10

## 2019-02-12 ENCOUNTER — HOSPITAL ENCOUNTER (OUTPATIENT)
Dept: GENERAL RADIOLOGY | Age: 64
Discharge: HOME | End: 2019-02-12
Attending: UROLOGY
Payer: COMMERCIAL

## 2019-02-12 DIAGNOSIS — N20.0 KIDNEY STONE: ICD-10-CM

## 2019-02-12 PROCEDURE — 74018 RADEX ABDOMEN 1 VIEW: CPT

## 2019-03-09 ENCOUNTER — HOSPITAL ENCOUNTER (OUTPATIENT)
Age: 64
Setting detail: OBSERVATION
Discharge: HOME | End: 2019-03-11
Attending: EMERGENCY MEDICINE | Admitting: INTERNAL MEDICINE
Payer: COMMERCIAL

## 2019-03-09 ENCOUNTER — APPOINTMENT (OUTPATIENT)
Dept: CT IMAGING | Age: 64
End: 2019-03-09
Attending: EMERGENCY MEDICINE
Payer: COMMERCIAL

## 2019-03-09 DIAGNOSIS — G45.9 TIA (TRANSIENT ISCHEMIC ATTACK): Primary | ICD-10-CM

## 2019-03-09 PROBLEM — I44.1 MOBITZ TYPE 1 SECOND DEGREE ATRIOVENTRICULAR BLOCK: Chronic | Status: ACTIVE | Noted: 2018-03-28

## 2019-03-09 PROBLEM — I63.9 ACUTE ISCHEMIC STROKE (HCC): Status: ACTIVE | Noted: 2019-03-09

## 2019-03-09 PROBLEM — E66.01 SEVERE OBESITY (BMI 35.0-39.9) WITH COMORBIDITY (HCC): Chronic | Status: ACTIVE | Noted: 2018-04-10

## 2019-03-09 LAB
ALBUMIN SERPL-MCNC: 4.1 G/DL (ref 3.2–4.6)
ALBUMIN/GLOB SERPL: 1 {RATIO} (ref 1.2–3.5)
ALP SERPL-CCNC: 97 U/L (ref 50–136)
ALT SERPL-CCNC: 22 U/L (ref 12–65)
ANION GAP SERPL CALC-SCNC: 10 MMOL/L (ref 7–16)
AST SERPL-CCNC: 19 U/L (ref 15–37)
BASOPHILS # BLD: 0.1 K/UL (ref 0–0.2)
BASOPHILS NFR BLD: 1 % (ref 0–2)
BILIRUB SERPL-MCNC: 0.8 MG/DL (ref 0.2–1.1)
BUN SERPL-MCNC: 16 MG/DL (ref 8–23)
CALCIUM SERPL-MCNC: 10.2 MG/DL (ref 8.3–10.4)
CHLORIDE SERPL-SCNC: 105 MMOL/L (ref 98–107)
CO2 SERPL-SCNC: 25 MMOL/L (ref 21–32)
CREAT SERPL-MCNC: 0.91 MG/DL (ref 0.6–1)
DIFFERENTIAL METHOD BLD: NORMAL
EOSINOPHIL # BLD: 0.3 K/UL (ref 0–0.8)
EOSINOPHIL NFR BLD: 4 % (ref 0.5–7.8)
ERYTHROCYTE [DISTWIDTH] IN BLOOD BY AUTOMATED COUNT: 12.2 % (ref 11.9–14.6)
GLOBULIN SER CALC-MCNC: 4.3 G/DL (ref 2.3–3.5)
GLUCOSE SERPL-MCNC: 199 MG/DL (ref 65–100)
HCT VFR BLD AUTO: 41.3 % (ref 35.8–46.3)
HGB BLD-MCNC: 14 G/DL (ref 11.7–15.4)
IMM GRANULOCYTES # BLD AUTO: 0 K/UL (ref 0–0.5)
IMM GRANULOCYTES NFR BLD AUTO: 0 % (ref 0–5)
LYMPHOCYTES # BLD: 2.3 K/UL (ref 0.5–4.6)
LYMPHOCYTES NFR BLD: 32 % (ref 13–44)
MCH RBC QN AUTO: 32.7 PG (ref 26.1–32.9)
MCHC RBC AUTO-ENTMCNC: 33.9 G/DL (ref 31.4–35)
MCV RBC AUTO: 96.5 FL (ref 79.6–97.8)
MONOCYTES # BLD: 0.5 K/UL (ref 0.1–1.3)
MONOCYTES NFR BLD: 7 % (ref 4–12)
NEUTS SEG # BLD: 4.1 K/UL (ref 1.7–8.2)
NEUTS SEG NFR BLD: 56 % (ref 43–78)
NRBC # BLD: 0 K/UL (ref 0–0.2)
PLATELET # BLD AUTO: 191 K/UL (ref 150–450)
PMV BLD AUTO: 11.3 FL (ref 9.4–12.3)
POTASSIUM SERPL-SCNC: 3.9 MMOL/L (ref 3.5–5.1)
PROT SERPL-MCNC: 8.4 G/DL (ref 6.3–8.2)
RBC # BLD AUTO: 4.28 M/UL (ref 4.05–5.2)
SODIUM SERPL-SCNC: 140 MMOL/L (ref 136–145)
TROPONIN I SERPL-MCNC: <0.02 NG/ML (ref 0.02–0.05)
WBC # BLD AUTO: 7.2 K/UL (ref 4.3–11.1)

## 2019-03-09 PROCEDURE — 99218 HC RM OBSERVATION: CPT

## 2019-03-09 PROCEDURE — 80053 COMPREHEN METABOLIC PANEL: CPT

## 2019-03-09 PROCEDURE — 70450 CT HEAD/BRAIN W/O DYE: CPT

## 2019-03-09 PROCEDURE — 93005 ELECTROCARDIOGRAM TRACING: CPT | Performed by: EMERGENCY MEDICINE

## 2019-03-09 PROCEDURE — 85025 COMPLETE CBC W/AUTO DIFF WBC: CPT

## 2019-03-09 PROCEDURE — 84484 ASSAY OF TROPONIN QUANT: CPT

## 2019-03-09 PROCEDURE — 99285 EMERGENCY DEPT VISIT HI MDM: CPT | Performed by: EMERGENCY MEDICINE

## 2019-03-09 RX ORDER — HYDRALAZINE HYDROCHLORIDE 20 MG/ML
20 INJECTION INTRAMUSCULAR; INTRAVENOUS
Status: DISCONTINUED | OUTPATIENT
Start: 2019-03-09 | End: 2019-03-11 | Stop reason: HOSPADM

## 2019-03-10 ENCOUNTER — APPOINTMENT (OUTPATIENT)
Dept: CT IMAGING | Age: 64
End: 2019-03-10
Attending: INTERNAL MEDICINE
Payer: COMMERCIAL

## 2019-03-10 ENCOUNTER — APPOINTMENT (OUTPATIENT)
Dept: MRI IMAGING | Age: 64
End: 2019-03-10
Attending: INTERNAL MEDICINE
Payer: COMMERCIAL

## 2019-03-10 ENCOUNTER — APPOINTMENT (OUTPATIENT)
Dept: GENERAL RADIOLOGY | Age: 64
End: 2019-03-10
Attending: INTERNAL MEDICINE
Payer: COMMERCIAL

## 2019-03-10 LAB
ANION GAP SERPL CALC-SCNC: 10 MMOL/L (ref 7–16)
ATRIAL RATE: 93 BPM
BUN SERPL-MCNC: 18 MG/DL (ref 8–23)
CALCIUM SERPL-MCNC: 8.9 MG/DL (ref 8.3–10.4)
CALCULATED P AXIS, ECG09: 70 DEGREES
CALCULATED R AXIS, ECG10: -18 DEGREES
CALCULATED T AXIS, ECG11: 54 DEGREES
CHLORIDE SERPL-SCNC: 106 MMOL/L (ref 98–107)
CHOLEST SERPL-MCNC: 115 MG/DL
CO2 SERPL-SCNC: 23 MMOL/L (ref 21–32)
CREAT SERPL-MCNC: 0.78 MG/DL (ref 0.6–1)
DIAGNOSIS, 93000: NORMAL
ERYTHROCYTE [DISTWIDTH] IN BLOOD BY AUTOMATED COUNT: 12 % (ref 11.9–14.6)
EST. AVERAGE GLUCOSE BLD GHB EST-MCNC: 194 MG/DL
GLUCOSE BLD STRIP.AUTO-MCNC: 157 MG/DL (ref 65–100)
GLUCOSE BLD STRIP.AUTO-MCNC: 180 MG/DL (ref 65–100)
GLUCOSE BLD STRIP.AUTO-MCNC: 189 MG/DL (ref 65–100)
GLUCOSE BLD STRIP.AUTO-MCNC: 204 MG/DL (ref 65–100)
GLUCOSE SERPL-MCNC: 200 MG/DL (ref 65–100)
HBA1C MFR BLD: 8.4 % (ref 4.8–6)
HCT VFR BLD AUTO: 34.2 % (ref 35.8–46.3)
HDLC SERPL-MCNC: 34 MG/DL (ref 40–60)
HDLC SERPL: 3.4 {RATIO}
HGB BLD-MCNC: 11.4 G/DL (ref 11.7–15.4)
LDLC SERPL CALC-MCNC: 53.6 MG/DL
LIPID PROFILE,FLP: ABNORMAL
MCH RBC QN AUTO: 32.2 PG (ref 26.1–32.9)
MCHC RBC AUTO-ENTMCNC: 33.3 G/DL (ref 31.4–35)
MCV RBC AUTO: 96.6 FL (ref 79.6–97.8)
NRBC # BLD: 0 K/UL (ref 0–0.2)
PLATELET # BLD AUTO: 173 K/UL (ref 150–450)
PMV BLD AUTO: 11.8 FL (ref 9.4–12.3)
POTASSIUM SERPL-SCNC: 3.7 MMOL/L (ref 3.5–5.1)
Q-T INTERVAL, ECG07: 406 MS
QRS DURATION, ECG06: 110 MS
QTC CALCULATION (BEZET), ECG08: 465 MS
RBC # BLD AUTO: 3.54 M/UL (ref 4.05–5.2)
SODIUM SERPL-SCNC: 139 MMOL/L (ref 136–145)
TRIGL SERPL-MCNC: 137 MG/DL (ref 35–150)
VENTRICULAR RATE, ECG03: 79 BPM
VLDLC SERPL CALC-MCNC: 27.4 MG/DL (ref 6–23)
WBC # BLD AUTO: 8.3 K/UL (ref 4.3–11.1)

## 2019-03-10 PROCEDURE — 74011250636 HC RX REV CODE- 250/636: Performed by: INTERNAL MEDICINE

## 2019-03-10 PROCEDURE — 70551 MRI BRAIN STEM W/O DYE: CPT

## 2019-03-10 PROCEDURE — 74011636320 HC RX REV CODE- 636/320: Performed by: INTERNAL MEDICINE

## 2019-03-10 PROCEDURE — 80048 BASIC METABOLIC PNL TOTAL CA: CPT

## 2019-03-10 PROCEDURE — 86580 TB INTRADERMAL TEST: CPT | Performed by: INTERNAL MEDICINE

## 2019-03-10 PROCEDURE — 99218 HC RM OBSERVATION: CPT

## 2019-03-10 PROCEDURE — 97161 PT EVAL LOW COMPLEX 20 MIN: CPT

## 2019-03-10 PROCEDURE — 74011000258 HC RX REV CODE- 258: Performed by: INTERNAL MEDICINE

## 2019-03-10 PROCEDURE — 80061 LIPID PANEL: CPT

## 2019-03-10 PROCEDURE — 96372 THER/PROPH/DIAG INJ SC/IM: CPT

## 2019-03-10 PROCEDURE — 99205 OFFICE O/P NEW HI 60 MIN: CPT | Performed by: PSYCHIATRY & NEUROLOGY

## 2019-03-10 PROCEDURE — 83036 HEMOGLOBIN GLYCOSYLATED A1C: CPT

## 2019-03-10 PROCEDURE — 74011000302 HC RX REV CODE- 302: Performed by: INTERNAL MEDICINE

## 2019-03-10 PROCEDURE — 82962 GLUCOSE BLOOD TEST: CPT

## 2019-03-10 PROCEDURE — 92610 EVALUATE SWALLOWING FUNCTION: CPT

## 2019-03-10 PROCEDURE — 85027 COMPLETE CBC AUTOMATED: CPT

## 2019-03-10 PROCEDURE — 74011636637 HC RX REV CODE- 636/637: Performed by: INTERNAL MEDICINE

## 2019-03-10 PROCEDURE — 74011250637 HC RX REV CODE- 250/637: Performed by: INTERNAL MEDICINE

## 2019-03-10 PROCEDURE — 70496 CT ANGIOGRAPHY HEAD: CPT

## 2019-03-10 PROCEDURE — 97165 OT EVAL LOW COMPLEX 30 MIN: CPT

## 2019-03-10 PROCEDURE — 70030 X-RAY EYE FOR FOREIGN BODY: CPT

## 2019-03-10 PROCEDURE — 36415 COLL VENOUS BLD VENIPUNCTURE: CPT

## 2019-03-10 PROCEDURE — 97530 THERAPEUTIC ACTIVITIES: CPT

## 2019-03-10 PROCEDURE — 74011250637 HC RX REV CODE- 250/637: Performed by: PHYSICIAN ASSISTANT

## 2019-03-10 RX ORDER — INSULIN LISPRO 100 [IU]/ML
INJECTION, SOLUTION INTRAVENOUS; SUBCUTANEOUS
Status: DISCONTINUED | OUTPATIENT
Start: 2019-03-10 | End: 2019-03-11 | Stop reason: HOSPADM

## 2019-03-10 RX ORDER — DEXTROSE 40 %
15 GEL (GRAM) ORAL AS NEEDED
Status: DISCONTINUED | OUTPATIENT
Start: 2019-03-10 | End: 2019-03-11 | Stop reason: HOSPADM

## 2019-03-10 RX ORDER — AMOXICILLIN AND CLAVULANATE POTASSIUM 875; 125 MG/1; MG/1
1 TABLET, FILM COATED ORAL EVERY 12 HOURS
Status: DISCONTINUED | OUTPATIENT
Start: 2019-03-10 | End: 2019-03-11 | Stop reason: HOSPADM

## 2019-03-10 RX ORDER — LABETALOL HYDROCHLORIDE 5 MG/ML
5 INJECTION, SOLUTION INTRAVENOUS
Status: DISCONTINUED | OUTPATIENT
Start: 2019-03-10 | End: 2019-03-11 | Stop reason: HOSPADM

## 2019-03-10 RX ORDER — GUAIFENESIN 100 MG/5ML
81 LIQUID (ML) ORAL DAILY
Status: DISCONTINUED | OUTPATIENT
Start: 2019-03-10 | End: 2019-03-11 | Stop reason: HOSPADM

## 2019-03-10 RX ORDER — LEVOTHYROXINE SODIUM 112 UG/1
112 TABLET ORAL
Status: DISCONTINUED | OUTPATIENT
Start: 2019-03-10 | End: 2019-03-11 | Stop reason: HOSPADM

## 2019-03-10 RX ORDER — DEXTROSE 50 % IN WATER (D50W) INTRAVENOUS SYRINGE
25-50 AS NEEDED
Status: DISCONTINUED | OUTPATIENT
Start: 2019-03-10 | End: 2019-03-11 | Stop reason: HOSPADM

## 2019-03-10 RX ORDER — MELOXICAM 7.5 MG/1
7.5 TABLET ORAL DAILY
Status: DISCONTINUED | OUTPATIENT
Start: 2019-03-10 | End: 2019-03-11 | Stop reason: HOSPADM

## 2019-03-10 RX ORDER — ATORVASTATIN CALCIUM 40 MG/1
40 TABLET, FILM COATED ORAL
Status: DISCONTINUED | OUTPATIENT
Start: 2019-03-10 | End: 2019-03-11 | Stop reason: HOSPADM

## 2019-03-10 RX ORDER — INSULIN GLARGINE 100 [IU]/ML
60 INJECTION, SOLUTION SUBCUTANEOUS
Status: DISCONTINUED | OUTPATIENT
Start: 2019-03-10 | End: 2019-03-11 | Stop reason: HOSPADM

## 2019-03-10 RX ORDER — SODIUM CHLORIDE 0.9 % (FLUSH) 0.9 %
5-40 SYRINGE (ML) INJECTION EVERY 8 HOURS
Status: DISCONTINUED | OUTPATIENT
Start: 2019-03-10 | End: 2019-03-11 | Stop reason: HOSPADM

## 2019-03-10 RX ORDER — HEPARIN SODIUM 5000 [USP'U]/ML
5000 INJECTION, SOLUTION INTRAVENOUS; SUBCUTANEOUS EVERY 8 HOURS
Status: DISCONTINUED | OUTPATIENT
Start: 2019-03-10 | End: 2019-03-11 | Stop reason: HOSPADM

## 2019-03-10 RX ORDER — ONDANSETRON 2 MG/ML
4 INJECTION INTRAMUSCULAR; INTRAVENOUS
Status: DISCONTINUED | OUTPATIENT
Start: 2019-03-10 | End: 2019-03-11 | Stop reason: HOSPADM

## 2019-03-10 RX ORDER — ACETAMINOPHEN 325 MG/1
650 TABLET ORAL
Status: DISCONTINUED | OUTPATIENT
Start: 2019-03-10 | End: 2019-03-11 | Stop reason: HOSPADM

## 2019-03-10 RX ORDER — SODIUM CHLORIDE 0.9 % (FLUSH) 0.9 %
10 SYRINGE (ML) INJECTION
Status: COMPLETED | OUTPATIENT
Start: 2019-03-10 | End: 2019-03-10

## 2019-03-10 RX ORDER — SODIUM CHLORIDE 0.9 % (FLUSH) 0.9 %
5-40 SYRINGE (ML) INJECTION AS NEEDED
Status: DISCONTINUED | OUTPATIENT
Start: 2019-03-10 | End: 2019-03-11 | Stop reason: HOSPADM

## 2019-03-10 RX ORDER — POLYETHYLENE GLYCOL 3350 17 G/17G
17 POWDER, FOR SOLUTION ORAL
Status: DISCONTINUED | OUTPATIENT
Start: 2019-03-10 | End: 2019-03-11 | Stop reason: HOSPADM

## 2019-03-10 RX ADMIN — Medication 10 ML: at 12:23

## 2019-03-10 RX ADMIN — AMOXICILLIN AND CLAVULANATE POTASSIUM 1 TABLET: 875; 125 TABLET, FILM COATED ORAL at 16:34

## 2019-03-10 RX ADMIN — INSULIN GLARGINE 60 UNITS: 100 INJECTION, SOLUTION SUBCUTANEOUS at 01:32

## 2019-03-10 RX ADMIN — Medication 10 ML: at 01:32

## 2019-03-10 RX ADMIN — ASPIRIN 81 MG 81 MG: 81 TABLET ORAL at 08:05

## 2019-03-10 RX ADMIN — HEPARIN SODIUM 5000 UNITS: 5000 INJECTION INTRAVENOUS; SUBCUTANEOUS at 01:22

## 2019-03-10 RX ADMIN — ATORVASTATIN CALCIUM 40 MG: 40 TABLET, FILM COATED ORAL at 01:22

## 2019-03-10 RX ADMIN — IOPAMIDOL 80 ML: 755 INJECTION, SOLUTION INTRAVENOUS at 12:23

## 2019-03-10 RX ADMIN — INSULIN LISPRO 2 UNITS: 100 INJECTION, SOLUTION INTRAVENOUS; SUBCUTANEOUS at 11:30

## 2019-03-10 RX ADMIN — HEPARIN SODIUM 5000 UNITS: 5000 INJECTION INTRAVENOUS; SUBCUTANEOUS at 08:05

## 2019-03-10 RX ADMIN — Medication 10 ML: at 14:00

## 2019-03-10 RX ADMIN — ATORVASTATIN CALCIUM 40 MG: 40 TABLET, FILM COATED ORAL at 21:54

## 2019-03-10 RX ADMIN — TUBERCULIN PURIFIED PROTEIN DERIVATIVE 5 UNITS: 5 INJECTION, SOLUTION INTRADERMAL at 01:30

## 2019-03-10 RX ADMIN — HEPARIN SODIUM 5000 UNITS: 5000 INJECTION INTRAVENOUS; SUBCUTANEOUS at 16:34

## 2019-03-10 RX ADMIN — LISINOPRIL 30 MG: 5 TABLET ORAL at 08:05

## 2019-03-10 RX ADMIN — SODIUM CHLORIDE 100 ML: 900 INJECTION, SOLUTION INTRAVENOUS at 12:23

## 2019-03-10 RX ADMIN — Medication 10 ML: at 06:24

## 2019-03-10 RX ADMIN — INSULIN LISPRO 2 UNITS: 100 INJECTION, SOLUTION INTRAVENOUS; SUBCUTANEOUS at 21:55

## 2019-03-10 RX ADMIN — MELOXICAM 7.5 MG: 7.5 TABLET ORAL at 08:05

## 2019-03-10 RX ADMIN — Medication 10 ML: at 21:55

## 2019-03-10 RX ADMIN — INSULIN LISPRO 4 UNITS: 100 INJECTION, SOLUTION INTRAVENOUS; SUBCUTANEOUS at 16:30

## 2019-03-10 RX ADMIN — INSULIN LISPRO 2 UNITS: 100 INJECTION, SOLUTION INTRAVENOUS; SUBCUTANEOUS at 08:05

## 2019-03-10 RX ADMIN — LEVOTHYROXINE SODIUM 112 MCG: 0.11 TABLET ORAL at 06:23

## 2019-03-10 RX ADMIN — INSULIN GLARGINE 60 UNITS: 100 INJECTION, SOLUTION SUBCUTANEOUS at 21:54

## 2019-03-10 NOTE — PROGRESS NOTES
OCCUPATIONAL THERAPY: Initial Assessment, Discharge and AM 3/10/2019  OBSERVATION:    Payor: Elois Schlatter / [place] BLUE ESSENTIALS REINALDO / Product Type: REINALDO /      NAME/AGE/GENDER: Ryan Christianson is a 61 y.o. female   PRIMARY DIAGNOSIS:  Acute ischemic stroke ([facility] [address] [I63.9]  TIA (transient ischemic attack) [G45.9] TIA (transient ischemic attack) TIA (transient ischemic attack)       ICD-10: Treatment Diagnosis:    · Generalized Muscle Weakness (M62.81)   Precautions/Allergies: Other food; Bee sting [sting, bee]; and Other plant, animal, environmental      ASSESSMENT:     Ms. Malvin Burkitt presents for TIA with acute onset of R arm and leg weakness, facial droop, and slurred speech, all of which have now been resolved. Upon arrival, pt sitting upright in bed eating breakfast. Pt alert, oriented x 4, and agreeable to OT evaluation. Pt reports living with  in a single story home. At baseline, pt reports independence with all ADLs, IADLs, and functional mobility with no use of any DME. Today, pt independent for all fx mobility/transfers. Pt's BUE AROM, strength, and coordination are [facility]. Pt's static and dynamic sitting balance are intact. At this time, pt is functioning at baseline for ADLs and functional mobility and endorses no OT needs. Pt to be discharged from OT services. This section established at most recent assessment   PROBLEM LIST (Impairments causing functional limitations):  1. Decreased Strength   INTERVENTIONS PLANNED: (Benefits and precautions of occupational therapy have been discussed with the patient.)  1. none     TREATMENT PLAN: Frequency/Duration: d/c  Rehabilitation Potential For Stated Goals: d/c     RECOMMENDED REHABILITATION/EQUIPMENT: (at time of discharge pending progress): Due to the probability of continued deficits (see above) this patient will not likely need continued skilled occupational therapy after discharge.   Equipment:    None at this time OCCUPATIONAL PROFILE AND HISTORY:   History of Present Injury/Illness (Reason for Referral):  See H&P  Past Medical History/Comorbidities:   Ms. Sena Reyes  has a past medical history of Actinic keratosis, Arthritis, Asthma, DJD (degenerative joint disease), Environmental allergies, GERD (gastroesophageal reflux disease), History of malignant melanoma (), Hypertension, Insulin dependent diabetes mellitus ([address] (), Kidney stones, Mobitz type 1 second degree AV block, Obese, Osteopenia, PONV (postoperative nausea and vomiting), RA (rheumatoid arthritis) ([address] (--), Rosacea, and Unspecified hypothyroidism. She also has no past medical history of Adverse effect of anesthesia, Difficult intubation, Malignant hyperthermia due to anesthesia, or Pseudocholinesterase deficiency. Ms. Sena Reyes  has a past surgical history that includes hx refractive surgery (); hx meniscectomy (Right, ); hx colonoscopy (; ); hx mohs procedure (Right, ); hx lap cholecystectomy (); pr abdomen surgery proc unlisted; hx dilation and curettage (); hx  section (); hx tubal ligation (); hx gyn (); hx other surgical (); hx other surgical (); hx knee arthroscopy (Right, ); hx knee replacement (Left, ); hx knee replacement (Right, ); hx heent (); fragment kidney stone/ eswl; and hx lithotripsy (, ; 2016; 18). Social History/Living Environment:   Home Environment: Private residence  One/Two Story Residence: One story  Living Alone: No  Support Systems: Spouse/Significant Other/Partner  Patient Expects to be Discharged to[de-identified] Private residence  Current DME Used/Available at Home: None  Prior Level of Function/Work/Activity:  Independent with all ADLs, IADLs, and functional mobility. Personal Factors:          Sex:  female        Age:  61 y.o.         Other factors that influence how disability is experienced by the patient:  Multiple co-morbidities    Number of Personal Factors/Comorbidities that affect the Plan of Care: Brief history (0):  LOW COMPLEXITY   ASSESSMENT OF OCCUPATIONAL PERFORMANCE[de-identified]   Activities of Daily Living:   Basic ADLs (From Assessment) Complex ADLs (From Assessment)   Feeding: Independent  Oral Facial Hygiene/Grooming: Independent  Bathing: Independent  Upper Body Dressing: Independent  Lower Body Dressing: Independent  Toileting: Independent Instrumental ADL  Meal Preparation: Independent  Homemaking: Independent   Grooming/Bathing/Dressing Activities of Daily Living     Cognitive Retraining  Safety/Judgement: Awareness of environment                       Bed/Mat Mobility  Rolling: Independent  Supine to Sit: Independent  Sit to Supine: Independent  Scooting: Independent       Most Recent Physical Functioning:   Gross Assessment:                  Posture:     Balance:  Sitting: Intact Bed Mobility:  Rolling: Independent  Supine to Sit: Independent  Sit to Supine: Independent  Scooting: Independent  Wheelchair Mobility:     Transfers:               Patient Vitals for the past 6 hrs:   BP SpO2 Pulse   03/10/19 0800 147/77 97 % (!) 58       Mental Status  Neurologic State: Alert  Orientation Level: Oriented X4  Cognition: Follows commands  Perception: Appears intact  Perseveration: No perseveration noted  Safety/Judgement: Awareness of environment                          Physical Skills Involved:  1. Strength Cognitive Skills Affected (resulting in the inability to perform in a timely and safe manner): 1. none Psychosocial Skills Affected:  1. none   Number of elements that affect the Plan of Care: 1-3:  LOW COMPLEXITY   CLINICAL DECISION MAKING:   [employer] AM-PAC 6 Clicks   Daily Activity Inpatient Short Form  How much help from another person does the patient currently need. .. Total A Lot A Little None   1. Putting on and taking off regular lower body clothing? [] 1   [] 2   [] 3   [x] 4   2.   Bathing (including washing, rinsing, drying)? [] 1   [] 2   [] 3   [x] 4   3. Toileting, which includes using toilet, bedpan or urinal?   [] 1   [] 2   [] 3   [x] 4   4. Putting on and taking off regular upper body clothing? [] 1   [] 2   [] 3   [x] 4   5. Taking care of personal grooming such as brushing teeth? [] 1   [] 2   [] 3   [x] 4   6. Eating meals? [] 1   [] 2   [] 3   [x] 4   © 2007, Trustees of [address] [place] [address] [address] [address], under license to [employer]. All rights reserved      Score:  Initial: 24 Most Recent: X (Date: -- )    Interpretation of Tool:  Represents activities that are increasingly more difficult (i.e. Bed mobility, Transfers, Gait). Medical Necessity:     · discharge  Reason for Services/Other Comments:  · discharge   Use of outcome tool(s) and clinical judgement create a POC that gives a: LOW COMPLEXITY         TREATMENT:   (In addition to Assessment/Re-Assessment sessions the following treatments were rendered)     Pre-treatment Symptoms/Complaints:  \"I feel like I am back to normal.\"  Pain: Initial:   Pain Intensity 1: 0 /10  Post Session:  same     Assessment/Reassessment only, no treatment provided today    Braces/Orthotics/Lines/Etc:   · O2 Device: Room air  Treatment/Session Assessment:    · Response to Treatment:  eval only.    · Interdisciplinary Collaboration:   o Occupational Therapist  o Registered Nurse  · After treatment position/precautions:   o sitting upright in bed eating breakfast   · Compliance with Program/Exercises: discharge   · Recommendations/Intent for next treatment session: discharge  Total Treatment Duration:  OT Patient Time In/Time Out  Time In: 0815  Time Out: [address], OT

## 2019-03-10 NOTE — PROGRESS NOTES
03/10/19 0010   Dual Skin Pressure Injury Assessment   Dual Skin Pressure Injury Assessment X   Second Care Provider (Based on [address] [place] [address]) Navjot Stahl RN   Skin Integumentary   Skin Integumentary (WDL) X   Skin Color Appropriate for ethnicity   Skin Condition/Temp Dry;Flaky; Warm   Skin Integrity Intact   Turgor Non-tenting   Hair Growth Present   Varicosities Absent   Wound Prevention and Protection Methods   Orientation of Wound Prevention Posterior   Location of Wound Prevention Sacrum/Coccyx   Wound Offloading (Prevention Methods) Bed, pressure reduction mattress;Pillows;Repositioning     Sacral area intact. Capillary refill <3 seconds. Skin turgor good.

## 2019-03-10 NOTE — ED TRIAGE NOTES
Pt to ED via [facility] from [facility]. Patient experienced R side facial droop, weakness, and aphasia around approx 2045 tonight. Advised that the symptoms are now resolved at this time. Patient denies any pain or SOB    . Hx thyroid disease, DM, HTN, no previous hx a fib but EMS advises was running a fib on monitor.

## 2019-03-10 NOTE — PROGRESS NOTES
Problem: Falls - Risk of  Goal: *Absence of Falls  Document Tahir Fall Risk and appropriate interventions in the flowsheet.   Outcome: Progressing Towards Goal  Fall Risk Interventions:  Mobility Interventions: Bed/chair exit alarm, OT consult for ADLs, Patient to call before getting OOB, PT Consult for mobility concerns, PT Consult for assist device competence, Strengthening exercises (ROM-active/passive), Utilize walker, cane, or other assistive device         Medication Interventions: Assess postural VS orthostatic hypotension, Bed/chair exit alarm, Patient to call before getting OOB, Teach patient to arise slowly    Elimination Interventions: Bed/chair exit alarm, Call light in reach, Patient to call for help with toileting needs, Toilet paper/wipes in reach

## 2019-03-10 NOTE — PROGRESS NOTES
This RN called Echo tech to check on when echo could be completed. Gaby stated that it probably wouldn't be completed until tomorrow.

## 2019-03-10 NOTE — PROGRESS NOTES
Problem: Falls - Risk of  Goal: *Absence of Falls  Document Tahir Fall Risk and appropriate interventions in the flowsheet.   Fall Risk Interventions:  Mobility Interventions: Bed/chair exit alarm              Elimination Interventions: Call light in reach

## 2019-03-10 NOTE — PROGRESS NOTES
Problem: Dysphagia (Adult)  Goal: *Acute Goals and Plan of Care (Insert Text)  Goals     None        Outcome: Resolved/Met Date Met: 03/10/19  Speech language pathology: bedside swallow note: Initial Assessment and Discharge    NAME/AGE/GENDER: Vernon Harris is a 61 y.o. female  DATE: 3/10/2019  PRIMARY DIAGNOSIS: Acute ischemic stroke ([facility] [address] [I63.9]  TIA (transient ischemic attack) [G45.9]      ICD-10: Treatment Diagnosis: R13.12 Dysphagia, Oropharyngeal Phase  INTERDISCIPLINARY COLLABORATION: Registered Nurse  PRECAUTIONS/ALLERGIES: Other food; Bee sting [sting, bee]; and Other plant, animal, environmentalASSESSMENT:Based on the objective data described below, Ms. Ugarte presents with speech and swallow functions returning to baseline. Minimal right facial droop and right tongue deviation residual. No speech/language deficits identified during screening. Tolerated any/all consistencies without overt s/sx. Recommend continue regular diet/thin liquids. No further acute skilled speech therapy services indicated at this time. ?????? ? ? This section established at most recent assessment??????????  PROBLEM LIST (Impairments causing functional limitations):  1. TIA/CVA    PLAN OF CARE:   Patient will benefit from skilled intervention to address the following impairments. RECOMMENDATIONS AND PLANNED INTERVENTIONS (Benefits and precautions of therapy have been discussed with the patient.):  · continue prescribed diet  MEDICATIONS:  · With liquid  ASPIRATION PRECAUTIONS:  · Slow rate of intake  · Small bites/sips  · Upright at 90 degrees during meal  COMPENSATORY STRATEGIES/MODIFICATIONS INCLUDING:  · None  OTHER RECOMMENDATIONS (including follow up treatment recommendations):   · Patient education  RECOMMENDED DIET MODIFICATIONS DISCUSSED WITH:  · Patient  FREQUENCY/DURATION: No further speech therapy indicated at this time as oropharyngeal swallow function is within normal limits. RECOMMENDED REHABILITATION/EQUIPMENT: (at time of discharge pending progress): Due to the probability of continued deficits (see above) this patient will not likely need continued skilled speech therapy after discharge. SUBJECTIVE:   \"I couldn't say it right, but I knew it was a stroke\"  History of Present Injury/Illness: Ms. Alexandra Wood  has a past medical history of Actinic keratosis, Arthritis, Asthma, DJD (degenerative joint disease), Environmental allergies, GERD (gastroesophageal reflux disease), History of malignant melanoma (), Hypertension, Insulin dependent diabetes mellitus ([address] (), Kidney stones, Mobitz type 1 second degree AV block, Obese, Osteopenia, PONV (postoperative nausea and vomiting), RA (rheumatoid arthritis) ([facility] [address] (--), Rosacea, and Unspecified hypothyroidism. She also has no past medical history of Adverse effect of anesthesia, Difficult intubation, Malignant hyperthermia due to anesthesia, or Pseudocholinesterase deficiency. .  She also  has a past surgical history that includes hx refractive surgery (); hx meniscectomy (Right, ); hx colonoscopy (; ); hx mohs procedure (Right, ); hx lap cholecystectomy (); pr abdomen surgery proc unlisted; hx dilation and curettage (); hx  section (); hx tubal ligation (); hx gyn (); hx other surgical (); hx other surgical (); hx knee arthroscopy (Right, ); hx knee replacement (Left, ); hx knee replacement (Right, 2016); hx heent (); fragment kidney stone/ eswl; and hx lithotripsy (, ; 2016; 18). Present Symptoms:    Pain Scale 1: Numeric (0 - 10)  Pain Intensity 1: 0  Current Medications:   No current facility-administered medications on file prior to encounter.       Current Outpatient Medications on File Prior to Encounter   Medication Sig Dispense Refill    levothyroxine (SYNTHROID) 112 mcg tablet TAKE 1 TABLET BY MOUTH BEFORE BREAKFAST 90 Tab 1    lisinopril (PRINIVIL, ZESTRIL) 30 mg tablet Take 1 Tab by mouth daily. 90 Tab 1    insulin detemir U-100 (LEVEMIR FLEXTOUCH U-100 INSULN) 100 unit/mL (3 mL) inpn Usually takes 10-15 units in am and then 60 units at hs--- BUT TOTAL FOR 24 HOURS IS NOT OVER 75 UNITS  (STARTS COUNTING UNITS T NIGHT DOSE) 2 Units 3    glipiZIDE (GLUCOTROL) 10 mg tablet TAKE ONE TABLET BY MOUTH TWICE DAILY 180 Tab 1    metFORMIN (GLUCOPHAGE) 1,000 mg tablet Take 1 Tab by mouth two (2) times daily (with meals). 180 Tab 1    insulin aspart U-100 (NOVOLOG FLEXPEN U-100 INSULIN) 100 unit/mL inpn 10 units before meals if blood sugar is greater than 150--then sliding scale  Goes up 4 units per 50 glucose 2 Units 3    meloxicam (MOBIC) 7.5 mg tablet Take 1 Tab by mouth daily. 30 Tab 3    glucose blood VI test strips (ACCU-CHEK KALEB PLUS TEST STRP) strip Check BG three to four times daily. Please dispense strips based on glucometer, possibly Accu-chek Stephanie. 200 Strip 5    NOVOFINE 32 32 gauge x 1/4\" ndle Use  Pen Needle 3    cholecalciferol (VITAMIN D3) 1,000 unit cap Take 1,000 Units by mouth daily.  GLUCOSAMINE HCL/CHONDR MERAZ A NA (OSTEO BI-FLEX PO) Take 1 Tab by mouth two (2) times a day. Stopped 1/31/18      calcium-cholecalciferol, d3, (CALCIUM 600 + D) 600-125 mg-unit tab Take 1 Tab by mouth two (2) times a day.  Hold for surgery - last dose 9/22/16        Current Dietary Status:     Regular/thin  History of reflux:  YES    Reflux medication:none  Social History/Home Situation: n/a      OBJECTIVE:   Respiratory Status:  Room air     CXR Results:n/a  MRI pending  CT Results:no acute intracranial abnormality   Oral Motor Structure/Speech:  Oral-Motor Structure/Motor Speech  Labial: Right droop(minimal)  Dentition: Intact  Oral Hygiene: fair  Lingual: Right deviation(minimal)    Cognitive and Communication Status:  Neurologic State: Alert  Orientation Level: Oriented X4  Cognition: Follows commands  Perception: Appears intact  Perseveration: No perseveration noted  Safety/Judgement: Awareness of environment    BEDSIDE SWALLOW EVALUATION  Oral Assessment:  Oral Assessment  Labial: Right droop(minimal)  Dentition: Intact  Oral Hygiene: fair  Lingual: Right deviation(minimal)  P.O. Trials:  Patient Position: upright in bed    The patient was given bite/sip amounts of the following:   Consistency Presented: All consistencies  How Presented: Self-fed/presented;SLP-fed/presented;Cup/sip;Spoon;Straw;Successive swallows    ORAL PHASE:  Bolus Acceptance: No impairment  Bolus Formation/Control: No impairment  Propulsion: No impairment     Oral Residue: None    PHARYNGEAL PHASE:  Initiation of Swallow: No impairment  Laryngeal Elevation: Functional  Aspiration Signs/Symptoms: None  Vocal Quality: No impairment           Pharyngeal Phase Characteristics: No impairment, issues, or problems     OTHER OBSERVATIONS:  Rate/bite size: WNL   Endurance: WNL   Comments:       Tool Used: Dysphagia Outcome and Severity Scale (LINDA)    Score Comments   Normal Diet  [x] 7 With no strategies or extra time needed   Functional Swallow  [] 6 May have mild oral or pharyngeal delay       Mild Dysphagia    [] 5 Which may require one diet consistency restricted (those who demonstrate penetration which is entirely cleared on MBS would be included)   Mild-Moderate Dysphagia  [] 4 With 1-2 diet consistencies restricted       Moderate Dysphagia  [] 3 With 2 or more diet consistencies restricted       Moderately Severe Dysphagia  [] 2 With partial PO strategies (trials with ST only)       Severe Dysphagia  [] 1 With inability to tolerate any PO safely          Score:  Initial: 7 Most Recent: X (Date: --)   Interpretation of Tool: The Dysphagia Outcome and Severity Scale (LINDA) is a simple, easy-to-use, 7-point scale developed to systematically rate the functional severity of dysphagia based on objective assessment and make recommendations for diet level, independence level, and type of nutrition. Payor: BLUE CROSS / Plan: [place] BLUE CROSS BLUE ESSENTIALS REINALDO / Product Type: REINALDO /     TREATMENT:    (In addition to Assessment/Re-Assessment sessions the following treatments were rendered)  Assessment/Reassessment only, no treatment provided today  MODALITIES:                                                                    ORAL MOTOR  EXERCISES:                                                                                                                                                                      LARYNGEAL / PHARYNGEAL EXERCISES:                                                                                                                                     __________________________________________________________________________________________________  Safety:   After treatment position/precautions:  · Call light within reach  · Upright in Bed  Treatment Assessment:  Patient actively participated in evaluation. Progression/Medical Necessity:   · discontinue  Compliance with Program/Exercises: Compliant all of the time, Will assess as treatment progresses  Reason for Continuation of Services/Other Comments:  · discontinue  Recommendations/Intent for next treatment session: Discontinue  Total Treatment Duration:  Time In: 0826  Time Out: [address] [facility].  MS Ida, CCC-SLP

## 2019-03-10 NOTE — ED PROVIDER NOTES
Presents with complaint of right-sided weakness and facial droop and slurred speech and difficulty with word finding for about 5 minutes while at a party this evening. She's never had anything like this before. She had nausea and vomiting associated with itshe denies any chest pain or shortness of breath. She is not nauseated now. Her symptoms have all resolved. The history is provided by the patient, the spouse and the EMS personnel. TIA   This is a new problem. The current episode started less than 1 hour ago. The problem has been resolved. There was right lower extremity, right upper extremity and right facial focality noted. Primary symptoms include focal weakness and slurred speech. Pertinent negatives include no visual change, no mental status change and no disorientation. There has been no fever. Associated symptoms include vomiting and nausea. Pertinent negatives include no shortness of breath, no chest pain, no altered mental status, no confusion, no headaches, no choking, no bowel incontinence and no bladder incontinence. There were no medications administered prior to arrival. Associated medical issues do not include CVA. Past Medical History:   Diagnosis Date    Actinic keratosis     uses Solaraze gel when needed    Arthritis     rheumatoid.   Orencia infusion every 4 weeks    Asthma     none in yrs per pt--- no inhalers per pt    DJD (degenerative joint disease)     Environmental allergies     GERD (gastroesophageal reflux disease)     prn med    History of malignant melanoma 1980's    on back    Hypertension     controlled with medication    Insulin dependent diabetes mellitus ([facility] [address] 1990's    Type 2. sqbs avg am--100---- hypo at 73- last A1C= 8.1 on 12/19/17    Kidney stones     4th one at present    Mobitz type 1 second degree AV block     EKG today 9/22/16- Dr Warden Zapata reviewed----- per pt-- \" been that way my whole life\"-- does not see a cardiologist    Obese     bmi =39    Osteopenia     PONV (postoperative nausea and vomiting)     POV- responds to IV antiemetic    RA (rheumatoid arthritis) ([address] dx--2013    Rosacea     Unspecified hypothyroidism     hypothyroid.   Controlled with Levoxyl       Past Surgical History:   Procedure Laterality Date    ABDOMEN SURGERY PROC UNLISTED      fatty tumor removed- from abd--benign    FRAGMENT KIDNEY STONE/ ESWL      HX  SECTION      HX COLONOSCOPY  2008;     HX [address]    and hysteroscopy    HX GYN      vaginal cryocautery     HX HEENT  1986    Oral Surgery--per pt  still has metal in mouth since     HX KNEE ARTHROSCOPY Right 2007    HX KNEE REPLACEMENT Left 2008    HX KNEE REPLACEMENT Right 2016    HX LAP CHOLECYSTECTOMY  1993    HX LITHOTRIPSY  , ; 2016; 18    HX MENISCECTOMY Right 2011    Right knee    HX MOHS PROCEDURES Right     HX OTHER SURGICAL      lipoma of abdomen    HX OTHER SURGICAL  1984    melanoma of back    HX REFRACTIVE SURGERY      HX TUBAL LIGATION           Family History:   Problem Relation Age of Onset    Cancer Mother         Lung    Arthritis-rheumatoid Mother     Thyroid Disease Mother     Cancer Father         Lung; Liver    Diabetes Father     Heart Disease Father     Hypertension Father     Diabetes Brother     SLE Sister     Breast Cancer Paternal Aunt     Breast Cancer Other         cousins       Social History     Socioeconomic History    Marital status:      Spouse name: Not on file    Number of children: Not on file    Years of education: Not on file    Highest education level: Not on file   Social Needs    Financial resource strain: Not on file    Food insecurity - worry: Not on file    Food insecurity - inability: Not on file   Sensor[employer] needs - medical: Not on file   S[employer] needs - non-medical: Not on file   Occupational History    Not on file   Tobacco Use    Smoking status: Never Smoker    Smokeless tobacco: Never Used   Substance and Sexual Activity    Alcohol use: Yes     Alcohol/week: 0.0 oz     Comment: occasionally    Drug use: No    Sexual activity: Not on file   Other Topics Concern    Not on file   Social History Narrative    Not on file         ALLERGIES: Other food; Bee sting [sting, bee]; and Other plant, animal, environmental    Review of Systems   Constitutional: Negative for chills and fever. Respiratory: Negative for choking and shortness of breath. Cardiovascular: Negative for chest pain. Gastrointestinal: Positive for nausea and vomiting. Negative for bowel incontinence. Genitourinary: Negative for bladder incontinence. Neurological: Positive for focal weakness. Negative for headaches. Psychiatric/Behavioral: Negative for confusion. All other systems reviewed and are negative. Vitals:    03/09/19 2137 03/09/19 2138 03/09/19 2146   BP: 179/77 179/77    Pulse: 67     Resp: 18     Temp: 98.3 °F (36.8 °C)     SpO2: 98% 99% 97%   Weight: 92.5 kg (204 lb)     Height: 5' 1\" (1.549 m)              Physical Exam   Constitutional: She is oriented to person, place, and time. She appears well-developed and well-nourished. No distress. HENT:   Head: Normocephalic and atraumatic. Eyes: Conjunctivae are normal. Right eye exhibits no discharge. Left eye exhibits no discharge. Neck: Normal range of motion. Neck supple. Cardiovascular: Normal rate. An irregular rhythm present. Pulmonary/Chest: Effort normal and breath sounds normal. No respiratory distress. Abdominal: Soft. She exhibits no distension. There is no tenderness. Musculoskeletal: Normal range of motion. She exhibits no edema. Neurological: She is alert and oriented to person, place, and time. No cranial nerve deficit or sensory deficit. She exhibits normal muscle tone. Coordination normal.   Skin: Skin is warm and dry. Capillary refill takes less than 2 seconds.  She is not diaphoretic. Psychiatric: She has a normal mood and affect. Her behavior is normal.   Nursing note and vitals reviewed. MDM  Number of Diagnoses or Management Options  TIA (transient ischemic attack):   Diagnosis management comments: Patient's symptoms have all resolved. They sounded fairly significant. Will admit for TIA workup. Patient did not know about her history of Mobitz type I that is listed in her history. ( the same finding on the EKG today. Admitted to the hospitalist for likely a cardiology consult.        Amount and/or Complexity of Data Reviewed  Clinical lab tests: ordered and reviewed  Decide to obtain previous medical records or to obtain history from someone other than the patient: yes (EMS)  Review and summarize past medical records: yes  Discuss the patient with other providers: yes  Independent visualization of images, tracings, or specimens: yes    Risk of Complications, Morbidity, and/or Mortality  Presenting problems: high  Diagnostic procedures: moderate  Management options: high    Patient Progress  Patient progress: improved         Procedures

## 2019-03-10 NOTE — ED NOTES
TRANSFER - OUT REPORT:    Verbal report given to Nitish Murray RN(name) on [facility]  being transferred to [facility](unit) for routine progression of care       Report consisted of patients Situation, Background, Assessment and   Recommendations(SBAR). Information from the following report(s) SBAR, ED Summary and Recent Results was reviewed with the receiving nurse. Lines:   Peripheral IV 03/09/19 Left Hand (Active)       Peripheral IV 03/09/19 Left Antecubital (Active)        Opportunity for questions and clarification was provided.

## 2019-03-10 NOTE — PROGRESS NOTES
Hospitalist Progress Note    3/10/2019  Admit Date: 3/9/2019  9:30 PM   NAME: Lyssa Ugarte   :  1955   MRN:  [id number]   Attending: Alejandra yLnn MD  PCP:  Tony Orlando MD    SUBJECTIVE:   Patient is a 60 yo CF with a history of obesity, HTN, DM who presented to the ED with a complaint of  right arm and right leg weakness, right facial droop, and slurred speech. She said prior to episode she had a HA and difficulty recalling some names of doctors during conversation. She also felt herself drifting to the right when she was sitting in a chair, and she was unable to straighten up. Her symptoms have completely resolved. 3/10: No Events over night. No new complaints. States she has right maxillary pain worse with bending over. Denies confusion, slurred speech, ataxia, focal weakness at the moment. Review of Systems negative with exception of pertinent positives noted above  PHYSICAL EXAM     Visit Vitals  /79   Pulse (!) 40   Temp 97.9 °F (36.6 °C)   Resp 18   Ht 5' 1\" (1.549 m)   Wt 92.5 kg (204 lb)   LMP 2001   SpO2 98%   BMI 38.55 kg/m²      Temp (24hrs), Av.4 °F (36.9 °C), Min:97.9 °F (36.6 °C), Max:99.1 °F (37.3 °C)    Oxygen Therapy  O2 Sat (%): 98 % (03/10/19 1200)  Pulse via Oximetry: 59 beats per minute (19 2258)  O2 Device: Room air (19 2137)  No intake or output data in the 24 hours ending 03/10/19 1453   General: Alert and oriented, No acute distress. Eye: EOMI, Normal conjunctiva, Vision Unchanged. HENT: Normocephalic, atraumatic, Normal hearing, moist mucous membranes. Neck: Supple, Non-tender. Respiratory: Lungs are clear to auscultation, Respirations are non-labored. Cardiovascular: Normal rate, Regular rhythm, No murmur. Gastrointestinal: Soft, Non-tender, nondistended, positive bowel sounds   Musculoskeletal: No cyanosis, clubbing or edema. Integumentary: Warm, Dry, Pink, no rash.   Neurologic: Alert, Oriented, No focal deficits. Tongue protrudes midline. Normal coordination. Strength equal ue and le bilaterally. Cognition and Speech: Oriented, Speech clear and coherent. Psychiatric: Cooperative, Appropriate mood & affect. ASSESSMENT      Active Hospital Problems    Diagnosis Date Noted    TIA (transient ischemic attack) 03/09/2019    Severe obesity (BMI 35.0-39. 9) with comorbidity ([facility] [address] 04/10/2018    Mobitz type 1 second degree atrioventricular block 03/28/2018    Hyperglycemia due to type 2 diabetes mellitus ([facility]     HTN (hypertension)     Hypothyroidism      TIA; likely small vessel ischemic disease  - Symptoms resolved  - Neurology consulted. - Echo. Pending  - MRI: 1. No evidence of acute intracranial abnormality. 2.  Right maxillary sinusitis  - CT arteriogram of the neck and head: No evidence of large vessel occlusion. No significant stenosis  - Tele. Neu[place]cks. - Cardiac enzymes negative. - ASA, statin.   - PT, ST.     Acute on chronic Sinusiutis  - Amox 875 bid    DM II uncontrolled, hyperglycemia. - ACs qAC & qHS. - ISS. Lantus. Preprandial.  - Hold oral hypoglycemic medications. - Adjust medications as necessary based on each 24 hour insulin requirements. - Diabetic Diet. - A1C 8.4    Hypertension  -  Continue home antihypertensives. -  Monitor and trend BP. 2nd AVB Mobitz type I - no intervention indicated unless she has symptoms from it. Has already seen Dr Ned Hardy for this in 2018 who affirmed this. - Bradycardia, asymptomatic  - tele and monitor    Obesity   - diet, exercise and lifestyle changes advised. - counseled patient due to BMI    Hypothyroidism  - Cont synthroid. DVT prophylaxis: heparin  Full Code  Discussed with Dr. Joy Byrd: to home in am  Total Time spent: 30 minutes. Counseling & coordinating care dominated the encounter >55%. Counseled patient and family regarding dx, rx, tx.   Reviewed prior records, labs, vitals, diagnostic tests, flow sheet, home medications, inpatient medications, nursing and provider notes.     Erika Flores PA-C, MPAS

## 2019-03-10 NOTE — PROGRESS NOTES
Problem: Mobility Impaired (Adult and Pediatric)  Goal: *Acute Goals and Plan of Care (Insert Text)    PHYSICAL THERAPY: Initial Assessment, Discharge and PM 3/10/2019  OBSERVATION:    Payor: Quinn Ren / Plan: [place] BLUE ESSENTIALS REINALDO / Product Type: REINALDO /       NAME/AGE/GENDER: Skyler Gonzales is a 61 y.o. female   PRIMARY DIAGNOSIS: Acute ischemic stroke ([facility] [address] [I63.9]  TIA (transient ischemic attack) [G45.9] TIA (transient ischemic attack) TIA (transient ischemic attack)       ICD-10: Treatment Diagnosis:    · Generalized Muscle Weakness (M62.81)  · Difficulty in walking, Not elsewhere classified (R26.2)   Precaution/Allergies: Other food; Bee sting [sting, bee]; and Other plant, animal, environmental      ASSESSMENT:     Ms. Rosetta Powers presents supine at arrival with 3 family members present. She is IND with trnf to standing, then able to ambulate 400ft no AD, good balance control. She returned to room and sat in chair. Pt is indepndent and will be discharged from PT. Anticipate to home when medically cleared. This section established at most recent assessment   PROBLEM LIST (Impairments causing functional limitations):  1. NA   INTERVENTIONS PLANNED: (Benefits and precautions of physical therapy have been discussed with the patient.)  1. NA     TREATMENT PLAN: Frequency/Duration: 1 time a week for 1 week  Rehabilitation Potential For Stated Goals: Excellent     RECOMMENDED REHABILITATION/EQUIPMENT: (at time of discharge pending progress): Due to the probability of continued deficits (see above) this patient will not likely need continued skilled physical therapy after discharge. Equipment:    None at this time              HISTORY:   History of Present Injury/Illness (Reason for Referral):  60 y/o F with obesity, HTN, DM, who presented to ER with episode of  right arm and right leg weakness, right facial droop, and slurred speech.  Symptoms started earlier this evening at a party she was attending, lasted 5 minutes, and gradually improved thereafter. She said prior to episode she had a HA and difficulty recalling some names of doctors during conversation. She also felt herself drifting to the right when she was sitting in a chair, and she was unable to straighten up. Her symptoms have completely resolved at this point and she and family say she is back to normal.  Denies vision changes, CP, SOB, palpitations. Never had TIA/CVA before. No smoking hx. Past Medical History/Comorbidities:   Ms. Alexandra Wood  has a past medical history of Actinic keratosis, Arthritis, Asthma, DJD (degenerative joint disease), Environmental allergies, GERD (gastroesophageal reflux disease), History of malignant melanoma (), Hypertension, Insulin dependent diabetes mellitus ([facility] [address] (), Kidney stones, Mobitz type 1 second degree AV block, Obese, Osteopenia, PONV (postoperative nausea and vomiting), RA (rheumatoid arthritis) ([facility] [address] (--), Rosacea, and Unspecified hypothyroidism. She also has no past medical history of Adverse effect of anesthesia, Difficult intubation, Malignant hyperthermia due to anesthesia, or Pseudocholinesterase deficiency. Ms. Alexandra Wood  has a past surgical history that includes hx refractive surgery (); hx meniscectomy (Right, ); hx colonoscopy (; ); hx mohs procedure (Right, ); hx lap cholecystectomy (); pr abdomen surgery proc unlisted; hx dilation and curettage (); hx  section (); hx tubal ligation (); hx gyn (); hx other surgical (); hx other surgical (); hx knee arthroscopy (Right, 2007); hx knee replacement (Left, 2008); hx knee replacement (Right, 2016); hx heent (); fragment kidney stone/ eswl; and hx lithotripsy (, ; 2016; 18).   Social History/Living Environment:   Home Environment: Private residence  One/Two Story Residence: One story  Living Alone: No  Support Systems: Spouse/Significant Other/Partner  Patient Expects to be Discharged to[de-identified] Private residence  Current DME Used/Available at Home: None  Prior Level of Function/Work/Activity:  Pt independent, no AD, drives, does not work. Number of Personal Factors/Comorbidities that affect the Plan of Care: 0: LOW COMPLEXITY   EXAMINATION:   Most Recent Physical Functioning:   Gross Assessment:  AROM: Within functional limits  Strength: Within functional limits  Coordination: Within functional limits  Tone: Normal  Sensation: Intact               Posture:     Balance:  Sitting: Intact  Standing: Intact Bed Mobility:  Rolling: Independent  Supine to Sit: Independent  Sit to Supine: Independent  Scooting: Independent  Wheelchair Mobility:     Transfers:  Sit to Stand: Supervision  Stand to Sit: Supervision  Gait:     Distance (ft): 400 Feet (ft)  Ambulation - Level of Assistance: Independent  Number of Stairs Trained: 20  Stairs - Level of Assistance: Supervision  Rail Use: Both      Body Structures Involved:  1. Nerves  2. Heart Body Functions Affected:  1. Mental  2. Sensory/Pain  3. Cardio  4. Neuromusculoskeletal Activities and Participation Affected:  1. General Tasks and Demands  2. Mobility  3. Self Care  4. Domestic Life  5. Community, Social and [place]   Number of elements that affect the Plan of Care: 1-2: LOW COMPLEXITY   CLINICAL PRESENTATION:   Presentation: Stable and uncomplicated: LOW COMPLEXITY   CLINICAL DECISION MAKIN [facility] Mobility Inpatient Short Form  How much difficulty does the patient currently have. .. Unable A Lot A Little None   1. Turning over in bed (including adjusting bedclothes, sheets and blankets)? [] 1   [] 2   [] 3   [x] 4   2. Sitting down on and standing up from a chair with arms ( e.g., wheelchair, bedside commode, etc.)   [] 1   [] 2   [] 3   [x] 4   3. Moving from lying on back to sitting on the side of the bed?    [] 1   [] 2   [] 3   [x] 4   How much help from another person does the patient currently need. .. Total A Lot A Little None   4. Moving to and from a bed to a chair (including a wheelchair)? [] 1   [] 2   [] 3   [x] 4   5. Need to walk in hospital room? [] 1   [] 2   [] 3   [x] 4   6. Climbing 3-5 steps with a railing? [] 1   [] 2   [] 3   [x] 4   © 2007, Trustees of [facility] [employer], under license to [employer]. All rights reserved      Score:  Initial: 24 Most Recent: X (Date: -- )    Interpretation of Tool:  Represents activities that are increasingly more difficult (i.e. Bed mobility, Transfers, Gait). Medical Necessity:     · NA. Reason for Services/Other Comments:  · NA. Use of outcome tool(s) and clinical judgement create a POC that gives a: Clear prediction of patient's progress: LOW COMPLEXITY            TREATMENT:   (In addition to Assessment/Re-Assessment sessions the following treatments were rendered)   Pre-treatment Symptoms/Complaints:  none  Pain: Initial:   Pain Intensity 1: 0  Post Session:  0     Therapeutic Activity: (    8min): Therapeutic activities including Bed transfers, Chair transfers and Ambulation on level ground to improve mobility, strength, balance and coordination. Required minimal   to promote static and dynamic balance in standing, promote coordination of bilateral, lower extremity(s) and promote motor control of bilateral, lower extremity(s). Braces/Orthotics/Lines/Etc:   · O2 Device: Room air  Treatment/Session Assessment:    · Response to Treatment:  See above  · Interdisciplinary Collaboration:   o Physical Therapist  o Registered Nurse  · After treatment position/precautions:   o Up in chair  o Call light within reach  o RN notified  o Family at bedside   · Compliance with Program/Exercises: Compliant all of the time, Will assess as treatment progresses  · Recommendations/Intent for next treatment session: \"Next visit will focus on NA\".   Total Treatment Duration:  PT Patient Time In/Time Out  Time In: 3621  Time Out: [facility], DPT

## 2019-03-10 NOTE — H&P
Hospitalist H&P/Consult Note     Admit Date:  3/9/2019  9:30 PM   Name:  Donna Oh   Age:  61 y.o.  :  1955   MRN:  [id number]   PCP:  Jackee Saint, MD  Treatment Team: Attending Provider: Dimitris Tolliver MD; Primary Nurse: Aneesh Hernandez RN    HPI/Subjective:   Pt is a 60 y/o F with obesity, HTN, DM, who presented to ER with episode of  right arm and right leg weakness, right facial droop, and slurred speech. Symptoms started earlier this evening at a party she was attending, lasted 5 minutes, and gradually improved thereafter. She said prior to episode she had a HA and difficulty recalling some names of doctors during conversation. She also felt herself drifting to the right when she was sitting in a chair, and she was unable to straighten up. Her symptoms have completely resolved at this point and she and family say she is back to normal.  Denies vision changes, CP, SOB, palpitations. Never had TIA/CVA before. No smoking hx.      10 systems reviewed and negative except as noted in HPI. Past Medical History:   Diagnosis Date    Actinic keratosis     uses Solaraze gel when needed    Arthritis     rheumatoid.   Orencia infusion every 4 weeks    Asthma     none in yrs per pt--- no inhalers per pt    DJD (degenerative joint disease)     Environmental allergies     GERD (gastroesophageal reflux disease)     prn med    History of malignant melanoma 's    on back    Hypertension     controlled with medication    Insulin dependent diabetes mellitus ([facility] [address] 's    Type 2. sqbs avg am--100---- hypo at 73- last A1C= 8.1 on 17    Kidney stones     4th one at present    Mobitz type 1 second degree AV block     EKG today 16- Dr Osmar Urrutia reviewed----- per pt-- \" been that way my whole life\"-- does not see a cardiologist    Obese     bmi =39    Osteopenia     PONV (postoperative nausea and vomiting)     POV- responds to IV antiemetic    RA (rheumatoid arthritis) ([facility] [address] dx--2013    Rosacea     Unspecified hypothyroidism     hypothyroid. Controlled with Levoxyl      Past Surgical History:   Procedure Laterality Date    ABDOMEN SURGERY PROC UNLISTED      fatty tumor removed- from abd--[address] [place]      HX  SECTION      HX COLONOSCOPY  ;     HX [address]    and hysteroscopy    HX GYN  1978    vaginal cryocautery     HX HEENT  1986    Oral Surgery--per pt  still has metal in mouth since     HX KNEE ARTHROSCOPY Right 2007    HX KNEE REPLACEMENT Left 2008    HX KNEE REPLACEMENT Right 2016    HX LAP CHOLECYSTECTOMY  1993    HX LITHOTRIPSY  , ; 2016; 18    HX MENISCECTOMY Right 2011    Right knee    HX MOHS PROCEDURES Right     HX OTHER SURGICAL      lipoma of abdomen    HX OTHER SURGICAL      melanoma of back    HX REFRACTIVE SURGERY      HX TUBAL LIGATION        Allergies   Allergen Reactions    Other Food Swelling     Jalapeno. -- tongue and lip swells    Bee Sting [Sting, Bee] Swelling     Local swelling    Other Plant, Animal, Environmental Other (comments)     Trees, grass, dust, mold---- congestion      Social History     Tobacco Use    Smoking status: Never Smoker    Smokeless tobacco: Never Used   Substance Use Topics    Alcohol use:  Yes     Alcohol/week: 0.0 oz     Comment: occasionally      Family History   Problem Relation Age of Onset    Cancer Mother         Lung    Arthritis-rheumatoid Mother     Thyroid Disease Mother     Cancer Father         Lung; Liver    Diabetes Father     Heart Disease Father     Hypertension Father     Diabetes Brother     SLE Sister     Breast Cancer Paternal Aunt     Breast Cancer Other         cousins      Immunization History   Administered Date(s) Administered    Pneumococcal Polysaccharide (PPSV-23) 2015    TB Skin Test (PPD) Intradermal 2016     PTA Medications:  Prior to Admission Medications   Prescriptions Last Dose Informant Patient Reported? Taking? GLUCOSAMINE HCL/CHONDR MERAZ A NA (OSTEO BI-FLEX PO)   Yes No   Sig: Take 1 Tab by mouth two (2) times a day. Stopped 1/31/18   NOVOFINE 32 32 gauge x 1/4\" ndle   No No   Sig: Use TID   calcium-cholecalciferol, d3, (CALCIUM 600 + D) 600-125 mg-unit tab   Yes No   Sig: Take 1 Tab by mouth two (2) times a day. Hold for surgery - last dose 9/22/16    cholecalciferol (VITAMIN D3) 1,000 unit cap   Yes No   Sig: Take 1,000 Units by mouth daily. glipiZIDE (GLUCOTROL) 10 mg tablet   No No   Sig: TAKE ONE TABLET BY MOUTH TWICE DAILY   glucose blood VI test strips (ACCU-CHEK KALEB PLUS TEST STRP) strip   No No   Sig: Check BG three to four times daily. Please dispense strips based on glucometer, possibly Accu-chek Stephanie. insulin aspart U-100 (NOVOLOG FLEXPEN U-100 INSULIN) 100 unit/mL inpn   No No   Sig: 10 units before meals if blood sugar is greater than 150--then sliding scale  Goes up 4 units per 50 glucose   insulin detemir U-100 (LEVEMIR FLEXTOUCH U-100 INSULN) 100 unit/mL (3 mL) inpn   No No   Sig: Usually takes 10-15 units in am and then 60 units at hs--- BUT TOTAL FOR 24 HOURS IS NOT OVER 75 UNITS  (STARTS COUNTING UNITS T NIGHT DOSE)   levothyroxine (SYNTHROID) 112 mcg tablet   No No   Sig: TAKE 1 TABLET BY MOUTH BEFORE BREAKFAST   lisinopril (PRINIVIL, ZESTRIL) 30 mg tablet   No No   Sig: Take 1 Tab by mouth daily. meloxicam (MOBIC) 7.5 mg tablet   No No   Sig: Take 1 Tab by mouth daily. metFORMIN (GLUCOPHAGE) 1,000 mg tablet   No No   Sig: Take 1 Tab by mouth two (2) times daily (with meals).       Facility-Administered Medications: None       Objective:     Patient Vitals for the past 24 hrs:   Temp Pulse Resp BP SpO2   03/09/19 2258  60  167/72 97 %   03/09/19 2241  (!) 59  164/71 98 %   03/09/19 2146     97 %   03/09/19 2138    179/77 99 %   03/09/19 2137 98.3 °F (36.8 °C) 67 18 179/77 98 % Oxygen Therapy  O2 Sat (%): 97 % (03/09/19 2258)  Pulse via Oximetry: 59 beats per minute (03/09/19 2258)  O2 Device: Room air (03/09/19 2137)  No intake or output data in the 24 hours ending 03/09/19 2312    Physical Exam:  General:    Well nourished. Alert and oriented. Eyes:   Normal sclera. Extraocular movements intact. ENT:  Normocephalic, atraumatic. Moist mucous membranes  CV:   RRR. No murmur, rub, or gallop. Lungs:  Clear to auscultation bilaterally. No wheezing, rhonchi, or rales. Abdomen: Soft, nontender, nondistended. Extremities: Warm and dry. No cyanosis or edema. Neurologic: CN II-XII intact. Sensation intact. Str 5/5 all extremities. Finger to nose intact. PERRLA. No slurred speech. No confusion. No tremors  Skin:     No rashes or jaundice. No wounds. Psych:  Normal mood and affect. I reviewed the labs, imaging, EKGs, telemetry, and other studies done this admission. Data Review:   Recent Results (from the past 24 hour(s))   CBC WITH AUTOMATED DIFF    Collection Time: 03/09/19  9:45 PM   Result Value Ref Range    WBC 7.2 4.3 - 11.1 K/uL    RBC 4.28 4.05 - 5.2 M/uL    HGB 14.0 11.7 - 15.4 g/dL    HCT 41.3 35.8 - 46.3 %    MCV 96.5 79.6 - 97.8 FL    MCH 32.7 26.1 - 32.9 PG    MCHC 33.9 31.4 - 35.0 g/dL    RDW 12.2 11.9 - 14.6 %    PLATELET 996 168 - 814 K/uL    MPV 11.3 9.4 - 12.3 FL    ABSOLUTE NRBC 0.00 0.0 - 0.2 K/uL    DF AUTOMATED      NEUTROPHILS 56 43 - 78 %    LYMPHOCYTES 32 13 - 44 %    MONOCYTES 7 4.0 - 12.0 %    EOSINOPHILS 4 0.5 - 7.8 %    BASOPHILS 1 0.0 - 2.0 %    IMMATURE GRANULOCYTES 0 0.0 - 5.0 %    ABS. NEUTROPHILS 4.1 1.7 - 8.2 K/UL    ABS. LYMPHOCYTES 2.3 0.5 - 4.6 K/UL    ABS. MONOCYTES 0.5 0.1 - 1.3 K/UL    ABS. EOSINOPHILS 0.3 0.0 - 0.8 K/UL    ABS. BASOPHILS 0.1 0.0 - 0.2 K/UL    ABS. IMM.  GRANS. 0.0 0.0 - 0.5 K/UL   METABOLIC PANEL, COMPREHENSIVE    Collection Time: 03/09/19  9:45 PM   Result Value Ref Range    Sodium 140 136 - 145 mmol/L Potassium 3.9 3.5 - 5.1 mmol/L    Chloride 105 98 - 107 mmol/L    CO2 25 21 - 32 mmol/L    Anion gap 10 7 - 16 mmol/L    Glucose 199 (H) 65 - 100 mg/dL    BUN 16 8 - 23 MG/DL    Creatinine 0.91 0.6 - 1.0 MG/DL    GFR est AA >60 >60 ml/min/1.73m2    GFR est non-AA >60 >60 ml/min/1.73m2    Calcium 10.2 8.3 - 10.4 MG/DL    Bilirubin, total 0.8 0.2 - 1.1 MG/DL    ALT (SGPT) 22 12 - 65 U/L    AST (SGOT) 19 15 - 37 U/L    Alk. phosphatase 97 50 - 136 U/L    Protein, total 8.4 (H) 6.3 - 8.2 g/dL    Albumin 4.1 3.2 - 4.6 g/dL    Globulin 4.3 (H) 2.3 - 3.5 g/dL    A-G Ratio 1.0 (L) 1.2 - 3.5     TROPONIN I    Collection Time: 03/09/19  9:45 PM   Result Value Ref Range    Troponin-I, Qt. <0.02 (L) 0.02 - 0.05 NG/ML       All Micro Results     None          Current Facility-Administered Medications   Medication Dose Route Frequency    hydrALAZINE (APRESOLINE) 20 mg/mL injection 20 mg  20 mg IntraVENous Q4H PRN     Current Outpatient Medications   Medication Sig    levothyroxine (SYNTHROID) 112 mcg tablet TAKE 1 TABLET BY MOUTH BEFORE BREAKFAST    lisinopril (PRINIVIL, ZESTRIL) 30 mg tablet Take 1 Tab by mouth daily.  insulin detemir U-100 (LEVEMIR FLEXTOUCH U-100 INSULN) 100 unit/mL (3 mL) inpn Usually takes 10-15 units in am and then 60 units at hs--- BUT TOTAL FOR 24 HOURS IS NOT OVER 75 UNITS  (STARTS COUNTING UNITS T NIGHT DOSE)    glipiZIDE (GLUCOTROL) 10 mg tablet TAKE ONE TABLET BY MOUTH TWICE DAILY    metFORMIN (GLUCOPHAGE) 1,000 mg tablet Take 1 Tab by mouth two (2) times daily (with meals).  insulin aspart U-100 (NOVOLOG FLEXPEN U-100 INSULIN) 100 unit/mL inpn 10 units before meals if blood sugar is greater than 150--then sliding scale  Goes up 4 units per 50 glucose    meloxicam (MOBIC) 7.5 mg tablet Take 1 Tab by mouth daily.  glucose blood VI test strips (ACCU-CHEK KALEB PLUS TEST STRP) strip Check BG three to four times daily. Please dispense strips based on glucometer, possibly Accu-chek Stephanie.     NOVOFINE 32 32 gauge x 1/4\" ndle Use TID    cholecalciferol (VITAMIN D3) 1,000 unit cap Take 1,000 Units by mouth daily.  GLUCOSAMINE HCL/CHONDR MERAZ A NA (OSTEO BI-FLEX PO) Take 1 Tab by mouth two (2) times a day. Stopped 1/31/18    calcium-cholecalciferol, d3, (CALCIUM 600 + D) 600-125 mg-unit tab Take 1 Tab by mouth two (2) times a day. Hold for surgery - last dose 9/22/16        Other Studies:  Ct Head Wo Cont    Result Date: 3/9/2019  CT HEAD WITHOUT CONTRAST HISTORY:  CVA. COMPARISON: None TECHNIQUE: Axial imaging was performed without intravenous contrast utilizing 5mm slice thickness. Sagittal and coronal reformats were performed. Radiation dose reduction techniques were used for this study. Our CT scanner uses one or all of the following: Automated exposure control, adjustment of the MAS or KUB according to patient's size and iterative reconstruction. FINDINGS:    *BRAIN:    -  There are no early signs of territorial or lacunar infarction by CT.    -  No intracranial mass, hematoma, or hydrocephalus.    -  No gross white matter abnormality by CT. *VISUALIZED PARANASAL SINUSES: Chronic right maxillary sinusitis. *MASTOIDS:  Clear. *CALVARIUM AND SCALP: Unremarkable. IMPRESSION: No acute intracranial abnormalities. Chronic right maxillary sinusitis. Date of Dictation: 3/9/2019 10:24 PM         Assessment and Plan:     Hospital Problems as of 3/9/2019 Date Reviewed: 2/12/2019          Codes Class Noted - Resolved POA    * (Principal) TIA (transient ischemic attack) ICD-10-CM: G45.9  ICD-9-CM: 435.9  3/9/2019 - Present Yes        Severe obesity (BMI 35.0-39. 9) with comorbidity ([facility] [address] (Chronic) ICD-10-CM: E66.01  ICD-9-CM: 278.01  4/10/2018 - Present Yes        Mobitz type 1 second degree atrioventricular block (Chronic) ICD-10-CM: I44.1  ICD-9-CM: 426.13  3/28/2018 - Present Yes        Hyperglycemia due to type 2 diabetes mellitus (HCC) (Chronic) ICD-10-CM: E11.65  ICD-9-CM: 250.00  Unknown - Present Yes Hypothyroidism (Chronic) ICD-10-CM: E03.9  ICD-9-CM: 244.9  Unknown - Present Yes        HTN (hypertension) (Chronic) ICD-10-CM: I10  ICD-9-CM: 401.9  Unknown - Present Yes              Plan:  TIA  · observation  · Check MRI brain, 2d echo, CTA head/neck, lipids, and a1c.    · Antiplatelet and statin ordered  · PT/OT/SLP evals  · Cardiac monitoring    DM - Cont home lantus and ISS    HTN- cont home lisinopril    2nd AVB Mobitz type I - no intervention indicated unless she has symptoms from it. Has already seen Dr Sondra Esqueda for this in 2018 who affirmed this. Other listed chronic conditions stable, cont home meds    DVT ppx:  heparin  Discharge planning:  CM consulted.   PPD in case of placement  Code status:  Full  Estimated LOS:  Greater than 2 midnights  Risk:  high     Signed:  Fide Ng MD

## 2019-03-10 NOTE — PROGRESS NOTES
TRANSFER - IN REPORT:    Verbal report received from too low on Brittany Apo  being received from ed for routine progression of care      Report consisted of patients Situation, Background, Assessment and   Recommendations(SBAR). Information from the following report(s) SBAR, Kardex, ED Summary, Intake/Output, MAR and Recent Results was reviewed with the receiving nurse. Opportunity for questions and clarification was provided. Assessment completed upon patients arrival to unit and care assumed.

## 2019-03-10 NOTE — CONSULTS
Consult    Patient: Dawna Gao MRN: [id number]     YOB: 1955  Age: 61 y.o. Sex: female      Subjective: Ayanna Ugarte is a 61 y.o. female who is being seen for right sided weakness. The patient was in her normal state of health until she had acute onset right sided weakness that was located in her right face, arm, and leg. Duration was 5 minutes. Symptoms resolve spontaneously. Nothing made better or worse. Nothing like this is ever happened before. She did not try anything to relieve her symptoms. She was concern for strokes as she came to the emergency department. To my understanding, she was not seen by telemetry neurology because her symptoms had resolved. She was admitted to the hospital with likely transient ischemic attack. CT scan of the head did not show acute intracranial pathology. Brain MRI has yet to be done. CTA of the head and neck are pending. She did not take aspirin on a daily basis. Past Medical History:   Diagnosis Date    Actinic keratosis     uses Solaraze gel when needed    Arthritis     rheumatoid.   Orencia infusion every 4 weeks    Asthma     none in yrs per pt--- no inhalers per pt    DJD (degenerative joint disease)     Environmental allergies     GERD (gastroesophageal reflux disease)     prn med    History of malignant melanoma 1980's    on back    Hypertension     controlled with medication    Insulin dependent diabetes mellitus ([address] 1990's    Type 2. sqbs avg am--100---- hypo at 73- last A1C= 8.1 on 12/19/17    Kidney stones     4th one at present    Mobitz type 1 second degree AV block     EKG today 9/22/16- Dr Elizabeth Tijerina reviewed----- per pt-- \" been that way my whole life\"-- does not see a cardiologist    Obese     bmi =39    Osteopenia     PONV (postoperative nausea and vomiting)     POV- responds to IV antiemetic    RA (rheumatoid arthritis) ([address] dx--2013    Rosacea     Unspecified hypothyroidism hypothyroid. Controlled with Levoxyl     Past Surgical History:   Procedure Laterality Date    ABDOMEN SURGERY PROC UNLISTED      fatty tumor removed- from abd--benign    [address] [place] [place] Final HX COLONOSCOPY  2008; 2010    [address]X [id number]    and hysteroscopy    HX GYN  1978    vaginal cryocautery     HX HEENT  1986    Oral Surgery--per pt  still has metal in mouth since 18's    HX KNEE ARTHROSCOPY Right 2007    HX KNEE REPLACEMENT Left 2008    HX KNEE REPLACEMENT Right 2016    HX LAP CHOLECYSTECTOMY  1993    HX LITHOTRIPSY  1998, 2003; 2016; 2/6/18    HX MENISCECTOMY Right 2011    Right knee    HX MOHS PROCEDURES Right 2004    HX OTHER SURGICAL  1999    lipoma of abdomen    HX OTHER SURGICAL  1984    melanoma of back    HX REFRACTIVE SURGERY  2002    HX TUBAL LIGATION  1989      Family History   Problem Relation Age of Onset    Cancer Mother         Lung    Arthritis-rheumatoid Mother     Thyroid Disease Mother     Cancer Father         Lung; Liver    Diabetes Father     Heart Disease Father     Hypertension Father     Diabetes Brother     SLE Sister     Breast Cancer Paternal Aunt     Breast Cancer Other         cousins     Social History     Tobacco Use    Smoking status: Never Smoker    Smokeless tobacco: Never Used   Substance Use Topics    Alcohol use:  Yes     Alcohol/week: 0.0 oz     Comment: occasionally      Current Facility-Administered Medications   Medication Dose Route Frequency Provider Last Rate Last Dose    insulin glargine (LANTUS) injection 60 Units  60 Units SubCUTAneous QHS Bal Veloz MD   60 Units at 03/10/19 0132    levothyroxine (SYNTHROID) tablet 112 mcg  112 mcg Oral ACB Bal Veloz MD   112 mcg at 03/10/19 7180    lisinopril (PRINIVIL, ZESTRIL) tablet 30 mg  30 mg Oral DAILY Bal Veloz MD   30 mg at 03/10/19 0805    meloxicam (MOBIC) tablet 7.5 mg  7.5 mg Oral Katie Mcconnell MD   7.5 mg at 03/10/19 3162    tuberculin injection 5 Units  5 Units IntraDERMal ONCE Epi Felipe MD   5 Units at 03/10/19 0130    sodium chloride (NS) flush 5-40 mL  5-40 mL IntraVENous Q8H Epi Felipe MD   10 mL at 03/10/19 1081    sodium chloride (NS) flush 5-40 mL  5-40 mL IntraVENous PRN Epi Felipe MD        ondansetron [facility]) injection 4 mg  4 mg IntraVENous Q4H PRN Epi Felipe MD        aspirin chewable tablet 81 mg  81 mg Oral DAILY Epi Felipe MD   81 mg at 03/10/19 0805    atorvastatin (LIPITOR) tablet 40 mg  40 mg Oral QHS Epi Felipe MD   40 mg at 03/10/19 0122    acetaminophen (TYLENOL) tablet 650 mg  650 mg Oral Q4H PRN Epi Felipe MD        labetalol (NORMODYNE;TRANDATE) injection 5 mg  5 mg IntraVENous Q10MIN PRN Epi Felipe MD        heparin (porcine) injection 5,000 Units  5,000 Units SubCUTAneous Paul Rosas MD   5,000 Units at 03/10/19 0805    polyethylene glycol (MIRALAX) packet 17 g  17 g Oral DAILY PRN Epi Felipe MD        insulin lispro (HUMALOG) injection   SubCUTAneous AC&HS Epi Felipe MD   2 Units at 03/10/19 0805    dextrose 40% (GLUTOSE) oral gel 1 Tube  15 g Oral PRN Epi Felipe MD        glucagon [facility]) injection 1 mg  1 mg IntraMUSCular PRN Epi Felipe MD        dextrose (D50W) injection syrg 12.5-25 g  25-50 mL IntraVENous PRN Epi Felipe MD        iopamidol (ISOVUE-370) 76 % injection 80 mL  80 mL IntraVENous RAD Katie Tierney MD        sodium chloride 0.9 % bolus infusion 100 mL  100 mL IntraVENous RAD Katie Tierney MD        saline peripheral flush soln 10 mL  10 mL InterCATHeter RAD ISIDORO Felipe MD        hydrALAZINE (APRESOLINE) 20 mg/mL injection 20 mg  20 mg IntraVENous Q4H PRN Epi Felipe MD            Allergies   Allergen Reactions    Other Food Swelling     Mario. -- tongue and lip swells    Bee Sting [Sting, Bee] Swelling     Local swelling    Other Plant, Animal, Environmental Other (comments)     Trees, grass, dust, mold---- congestion       Review of Systems:  CONSTITUTIONAL: No weight loss, fever, chills, weakness or fatigue. HEENT: Eyes: No visual loss, blurred vision, double vision or yellow sclerae. Ears, Nose, Throat: No hearing loss, sneezing, congestion, runny nose or sore throat. SKIN: No rash or itching. CARDIOVASCULAR: No chest pain, chest pressure or chest discomfort. No palpitations or edema. RESPIRATORY: No shortness of breath, cough or sputum. GASTROINTESTINAL: No anorexia, nausea, vomiting or diarrhea. No abdominal pain or blood. GENITOURINARY: no burning with urination. NEUROLOGICAL: No headache, dizziness, syncope, paralysis, ataxia, numbness or tingling in the extremities. No change in bowel or bladder control. MUSCULOSKELETAL: No muscle, back pain, joint pain or stiffness. HEMATOLOGIC: No anemia, bleeding or bruising. LYMPHATICS: No enlarged nodes. No history of splenectomy. PSYCHIATRIC: No history of depression or anxiety. ENDOCRINOLOGIC: No reports of sweating, cold or heat intolerance. No polyuria or polydipsia. ALLERGIES: No history of asthma, hives, eczema or rhinitis. Objective:     Vitals:    03/09/19 2258 03/10/19 0016 03/10/19 0316 03/10/19 0800   BP: 167/72 166/81 149/82 147/77   Pulse: 60 84 (!) 58 (!) 58   Resp: 18 18 18 18   Temp: 98 °F (36.7 °C) 98.6 °F (37 °C) 98.4 °F (36.9 °C) 99.1 °F (37.3 °C)   SpO2: 97% 98% 98% 97%   Weight:       Height:            Physical Exam:  General - Well developed, well nourished, in no apparent distress. Pleasant and conversant  HEENT - Normocephalic, atraumatic. Conjunctiva, tympanic membranes, and oropharynx are clear. Neck - Supple without masses, no bruits   Cardiovascular - Regular rate and rhythm.  Normal S1, S2 without murmurs, rubs, or gallops. Lungs - Clear to auscultation. Abdomen - Soft, nontender with normal bowel sounds. Extremities - Peripheral pulses intact. No edema and no rashes. Neurological examination - Comprehension, attention , memory and reasoning are intact. Language and speech are normal. On cranial nerve examination pupils are equal round and reactive to light. Fundoscopic examination is normal. Visual acuity is adequate. Visual fields are full to finger confrontation. Extraocular motility is normal. Face is symmetric and sensation is intact to light touch. Hearing is intact to finger rustle bilaterally. Motor examination - There is normal muscle tone and bulk. Power is full throughout. Muscle stretch reflexes are normoactive and there are no pathological reflexes present. Sensation is intact to light touch, pinprick, vibration and proprioception in all extremities. Cerebellar examination is normal. Gait and stance are normal.     NIHSS   NIHSS Score: 0  1a-Level of Consciousness 0  1b-What is Month/Age 0  1c-Open/Close Eyes&Hand 0  2 -Best Gaze 0  3 -Visual Fields 0  4 -Facial Palsy 0  5a-Motor-Left Arm 0  5b-Motor-Right Arm 0  6a-Motor-Left Leg 0  6b-Motor-Right Leg 0  7 -Limb Ataxia 0  8 -Sensory 0  9 -Best Language 0  10-Dysarthria 0  11-Extinction/Inattention 0    Lab Results   Component Value Date/Time    Cholesterol, total 115 03/10/2019 05:21 AM    HDL Cholesterol 34 (L) 03/10/2019 05:21 AM    LDL, calculated 53.6 03/10/2019 05:21 AM    VLDL, calculated 27.4 (H) 03/10/2019 05:21 AM    Triglyceride 137 03/10/2019 05:21 AM    CHOL/HDL Ratio 3.4 03/10/2019 05:21 AM        Lab Results   Component Value Date/Time    Hemoglobin A1c 8.4 (H) 03/10/2019 05:21 AM    Hemoglobin A1c (POC) 8.7 (A) 07/25/2016 02:11 PM        CT Results (most recent): Personally Reviewed   Results from Hospital Encounter encounter on 03/09/19   CT HEAD WO CONT    Narrative CT HEAD WITHOUT CONTRAST     HISTORY:  CVA.     COMPARISON: None    TECHNIQUE: Axial imaging was performed without intravenous contrast utilizing  5mm slice thickness. Sagittal and coronal reformats were performed. Radiation  dose reduction techniques were used for this study. Our CT scanner uses one or  all of the following:    Automated exposure control, adjustment of the MAS or KUB according to patient's  size and iterative reconstruction. FINDINGS:        *BRAIN:      -  There are no early signs of territorial or lacunar infarction by CT.     -  No intracranial mass, hematoma, or hydrocephalus.      -  No gross white matter abnormality by CT.    *VISUALIZED PARANASAL SINUSES: Chronic right maxillary sinusitis. *MASTOIDS:  Clear. *CALVARIUM AND SCALP: Unremarkable. Impression IMPRESSION:    No acute intracranial abnormalities. Chronic right maxillary sinusitis. Date of Dictation: 3/9/2019 10:24 PM         Results for orders placed or performed during the hospital encounter of 02/21/18   EKG, 12 LEAD, INITIAL   Result Value Ref Range    Ventricular Rate 71 BPM    Atrial Rate 91 BPM    QRS Duration 92 ms    Q-T Interval 428 ms    QTC Calculation (Bezet) 465 ms    Calculated R Axis -14 degrees    Calculated T Axis 44 degrees    Diagnosis       Sinus rhythm with 2nd degree A-V block (Mobitz I)  Minimal voltage criteria for LVH, may be normal variant  Nonspecific ST abnormality  When compared with ECG of 31-JAN-2018 14:08,  No significant change was found  Confirmed by Jakob Petersen MD (), YEN TOVAR ([id number]) on 2/21/2018 4:03:13 PM                 Assessment:     Transient ischemic attack: Etiology is likely small vessel ischemic disease. Lesion was somewhere along the corticospinal tract, possibly the posterior limb of the internal capsule. Risk factors include hypertension, diabetes, and obesity.     Plan:     · Start ASA 81 mg daily   · Continue high potency statin  · Neurochecks Q4H  · BMRI, CTA head and neck   · Bedside swallow test   · Telemetry and echocardiogram with bubble study  · PT/OT/ST  · Lovenox or heparin for DVT ppx   · BP management - no need for permissive hypertension. Long term goal blood pressure is less than 130/80 mmHg. Signed By: Bernadene Dandy, DO     March 10, 2019      .

## 2019-03-11 VITALS
WEIGHT: 204 LBS | DIASTOLIC BLOOD PRESSURE: 90 MMHG | OXYGEN SATURATION: 94 % | TEMPERATURE: 98.7 F | HEART RATE: 80 BPM | SYSTOLIC BLOOD PRESSURE: 144 MMHG | RESPIRATION RATE: 17 BRPM | HEIGHT: 61 IN | BODY MASS INDEX: 38.51 KG/M2

## 2019-03-11 LAB
GLUCOSE BLD STRIP.AUTO-MCNC: 117 MG/DL (ref 65–100)
GLUCOSE BLD STRIP.AUTO-MCNC: 162 MG/DL (ref 65–100)
GLUCOSE BLD STRIP.AUTO-MCNC: 208 MG/DL (ref 65–100)
MM INDURATION POC: 0 MM (ref 0–5)
PPD POC: NEGATIVE NEGATIVE

## 2019-03-11 PROCEDURE — 74011000250 HC RX REV CODE- 250: Performed by: INTERNAL MEDICINE

## 2019-03-11 PROCEDURE — 74011250637 HC RX REV CODE- 250/637: Performed by: PHYSICIAN ASSISTANT

## 2019-03-11 PROCEDURE — 74011250636 HC RX REV CODE- 250/636: Performed by: INTERNAL MEDICINE

## 2019-03-11 PROCEDURE — 96372 THER/PROPH/DIAG INJ SC/IM: CPT

## 2019-03-11 PROCEDURE — 99214 OFFICE O/P EST MOD 30 MIN: CPT | Performed by: PSYCHIATRY & NEUROLOGY

## 2019-03-11 PROCEDURE — C8929 TTE W OR WO FOL WCON,DOPPLER: HCPCS

## 2019-03-11 PROCEDURE — 99218 HC RM OBSERVATION: CPT

## 2019-03-11 PROCEDURE — 74011636637 HC RX REV CODE- 636/637: Performed by: INTERNAL MEDICINE

## 2019-03-11 PROCEDURE — 82962 GLUCOSE BLOOD TEST: CPT

## 2019-03-11 PROCEDURE — 74011250637 HC RX REV CODE- 250/637: Performed by: INTERNAL MEDICINE

## 2019-03-11 RX ORDER — ASPIRIN 325 MG
325 TABLET ORAL DAILY
Qty: 30 TAB | Refills: 0 | Status: SHIPPED
Start: 2019-03-11 | End: 2019-08-12

## 2019-03-11 RX ORDER — ATORVASTATIN CALCIUM 40 MG/1
40 TABLET, FILM COATED ORAL
Qty: 30 TAB | Refills: 0 | Status: SHIPPED | OUTPATIENT
Start: 2019-03-11 | End: 2019-04-11 | Stop reason: SDUPTHER

## 2019-03-11 RX ORDER — AMOXICILLIN AND CLAVULANATE POTASSIUM 875; 125 MG/1; MG/1
1 TABLET, FILM COATED ORAL EVERY 12 HOURS
Qty: 14 TAB | Refills: 0 | Status: SHIPPED | OUTPATIENT
Start: 2019-03-11 | End: 2019-04-11

## 2019-03-11 RX ORDER — LORATADINE 10 MG/1
10 TABLET ORAL DAILY
COMMUNITY
End: 2019-05-02 | Stop reason: SDUPTHER

## 2019-03-11 RX ADMIN — MELOXICAM 7.5 MG: 7.5 TABLET ORAL at 08:00

## 2019-03-11 RX ADMIN — LISINOPRIL 30 MG: 5 TABLET ORAL at 08:00

## 2019-03-11 RX ADMIN — ASPIRIN 81 MG 81 MG: 81 TABLET ORAL at 08:00

## 2019-03-11 RX ADMIN — AMOXICILLIN AND CLAVULANATE POTASSIUM 1 TABLET: 875; 125 TABLET, FILM COATED ORAL at 05:00

## 2019-03-11 RX ADMIN — HEPARIN SODIUM 5000 UNITS: 5000 INJECTION INTRAVENOUS; SUBCUTANEOUS at 01:13

## 2019-03-11 RX ADMIN — Medication 10 ML: at 14:33

## 2019-03-11 RX ADMIN — HEPARIN SODIUM 5000 UNITS: 5000 INJECTION INTRAVENOUS; SUBCUTANEOUS at 08:00

## 2019-03-11 RX ADMIN — Medication 10 ML: at 05:00

## 2019-03-11 RX ADMIN — INSULIN LISPRO 2 UNITS: 100 INJECTION, SOLUTION INTRAVENOUS; SUBCUTANEOUS at 11:59

## 2019-03-11 RX ADMIN — PERFLUTREN 1 ML: 6.52 INJECTION, SUSPENSION INTRAVENOUS at 08:00

## 2019-03-11 RX ADMIN — LEVOTHYROXINE SODIUM 112 MCG: 0.11 TABLET ORAL at 05:00

## 2019-03-11 NOTE — PROGRESS NOTES
Discharge instructions  all new medications,medication side effects sheet, follow up appointment and  prescriptions reviewed and explained to the patient. Signs and symptoms of stroke  and when to return to hospital reviewed with patient. Verbalizes understanding Stroke education book included in discharge information to go home with patient Patient verbalizes understanding of instructions. A copy of discharge instructions   have been given to patient. Opportunity for questions provided.

## 2019-03-11 NOTE — DISCHARGE INSTRUCTIONS
Stroke: After Your Visit     Your Care Instructions     You have had a stroke. Risk factors for stroke include being overweight, smoking, and sedentary lifestyle. This means that the blood flow to a part of your brain was blocked for some time, which damages the nerve cells in that part of the brain. The part of your body controlled by that part of your brain may not function properly now. The brain is an amazing organ that can heal itself to some degree. The stroke you had damaged part of your brain, but other parts of your brain may take over in some way for the damaged areas. You have already started this process. Going home may be hard for you and your family. The more you can try to do for yourself, the better. Remember to take each day one at a time. Follow-up care is a key part of your treatment and safety. Be sure to make and go to all appointments, and call your doctor if you are having problems. Its also a good idea to know your test results and keep a list of the medicines you take. How can you care for yourself at home? Enter a stroke rehabilitation (rehab) program, if your doctor recommends it. Physical, speech, and occupational therapies can help you manage bathing, dressing, eating, and other basics of daily living. Eat a heart-healthy diet that is low in cholesterol, saturated fat, and salt. Eat lots of fresh fruits and vegetables and foods high in fiber. Increase your activities slowly. Take short rest breaks when you get tired. Gradually increase the amount you walk. Start out by walking a little more than you did the day before. Do not drive until your doctor says it is okay. It is normal to feel sad or depressed after a stroke. If the blues last, talk to your doctor. If you are having problems with urine leakage, go to the bathroom at regular times, including when you first wake up and at bedtime. Also, limit fluids after dinner.   If you are constipated, drink plenty of fluids, enough so that your urine is light yellow or clear like water. If you have kidney, heart, or liver disease and have to limit fluids, talk with your doctor before you increase the amount of fluids you drink. Set up a regular time for using the toilet. If you continue to have constipation, your doctor may suggest using a bulking agent, such as Metamucil, or a stool softener, laxative, or enema. Medicines  Take your medicines exactly as prescribed. Call your doctor if you think you are having a problem with your medicine. You may be taking several medicines. ACE (angiotensin-converting enzyme) inhibitors, angiotensin II receptor blockers (ARBs), beta-blockers, diuretics (water pills), and calcium channel blockers control your blood pressure. Statins help lower cholesterol. Your doctor may also prescribe medicines for depression, pain, sleep problems, anxiety, or agitation. If your doctor has given you medicine that prevents blood clots, such as warfarin (Coumadin), aspirin combined with extended-release dipyridamole (Aggrenox), clopidogrel (Plavix), or aspirin to prevent another stroke, you should:  Tell your dentist, pharmacist, and other health professionals that you take these medicines. Watch for unusual bruising or bleeding, such as blood in your urine, red or black stools, or bleeding from your nose or gums. Get regular blood tests to check your clotting time if you are taking Coumadin. Wear medical alert jewelry that says you take blood thinners. You can buy this at most drugstores. Do not take any over-the-counter medicines or herbal products without talking to your doctor first.  If you take birth control pills or hormone replacement therapy, talk to your doctor about whether they are right for you. For family members and caregivers  Make the home safe. Set up a room so that your loved one does not have to climb stairs. Be sure the bathroom is on the same floor.  Move throw rugs and furniture that could cause falls, and make sure that the lighting is good. Put grab bars and seats in tubs and showers. Find out what your loved one can do and what he or she needs help with. Try not to do things for your loved one that your loved one can do on his or her own. Help him or her learn and practice new skills. Visit and talk with your loved one often. Try doing activities together that you both enjoy, such as playing cards or board games. Keep in touch with your loved one's friends as much as you can, and encourage them to visit. Take care of yourself. Do not try to do everything yourself. Ask other family members to help. Eat well, get enough rest, and take time to do things that you enjoy. Keep up with your own doctor visits, and make sure to take your medicines regularly. Get out of the house as much as you can. Join a local support group. Find out if you qualify for home health care visits to help with rehab or for adult day care. When should you call for help? Call 911 anytime you think you may need emergency care. For example, call if:  You have signs of another stroke. These may include:  Sudden numbness, paralysis, or weakness in your face, arm, or leg, especially on only one side of your body. New problems with walking or balance. Sudden vision changes. Drooling or slurred speech. New problems speaking or understanding simple statements, or you feel confused. A sudden, severe headache that is different from past headaches. Call 911 even if these symptoms go away in a few minutes. You cough up blood. You vomit blood or what looks like coffee grounds. You pass maroon or very bloody stools. Call your doctor now or seek immediate medical care if:  You have new bruises or blood spots under your skin. You have a nosebleed. Your gums bleed when you brush your teeth. You have blood in your urine. Your stools are black and tarlike or have streaks of blood.   You have vaginal bleeding when you are not having your period, or heavy period bleeding. You have new symptoms that may be related to your stroke, such as falls or trouble swallowing. Watch closely for changes in your health, and be sure to contact your doctor if you have any problems. Where can you learn more? Go to SplitSecnd.be    Enter C294  in the search box to learn more about \"Stroke: After Your Visit\". © 2728-8854 [employer], [employer]. Care instructions adapted under license by [employer] (which disclaims liability or warranty for this information). This care instruction is for use with your licensed healthcare professional. If you have questions about a medical condition or this instruction, always ask your healthcare professional. Guillermo Montañomer any warranty or liability for your use of this information. Patient Education        Sinusitis: Care Instructions  Your Care Instructions    Sinusitis is an infection of the lining of the sinus cavities in your head. Sinusitis often follows a cold. It causes pain and pressure in your head and face. In most cases, sinusitis gets better on its own in 1 to 2 weeks. But some mild symptoms may last for several weeks. Sometimes antibiotics are needed. Follow-up care is a key part of your treatment and safety. Be sure to make and go to all appointments, and call your doctor if you are having problems. It's also a good idea to know your test results and keep a list of the medicines you take. How can you care for yourself at home? · Take an over-the-counter pain medicine, such as acetaminophen (Tylenol), ibuprofen (Advil, Motrin), or naproxen (Aleve). Read and follow all instructions on the label. · If the doctor prescribed antibiotics, take them as directed. Do not stop taking them just because you feel better. You need to take the full course of antibiotics.   · Be careful when taking over-the-counter cold or flu medicines and Tylenol at the same time. Many of these medicines have acetaminophen, which is Tylenol. Read the labels to make sure that you are not taking more than the recommended dose. Too much acetaminophen (Tylenol) can be harmful. · Breathe warm, moist air from a steamy shower, a hot bath, or a sink filled with hot water. Avoid cold, dry air. Using a humidifier in your home may help. Follow the directions for cleaning the machine. · Use saline (saltwater) nasal washes to help keep your nasal passages open and wash out mucus and bacteria. You can buy saline nose drops at a grocery store or drugstore. Or you can make your own at home by adding 1 teaspoon of salt and 1 teaspoon of baking soda to 2 cups of distilled water. If you make your own, fill a bulb syringe with the solution, insert the tip into your nostril, and squeeze gently. Lupie Sulphur your nose. · Put a hot, wet towel or a warm gel pack on your face 3 or 4 times a day for 5 to 10 minutes each time. · Try a decongestant nasal spray like oxymetazoline (Afrin). Do not use it for more than 3 days in a row. Using it for more than 3 days can make your congestion worse. When should you call for help? Call your doctor now or seek immediate medical care if:    · You have new or worse swelling or redness in your face or around your eyes.     · You have a new or higher fever.    Watch closely for changes in your health, and be sure to contact your doctor if:    · You have new or worse facial pain.     · The mucus from your nose becomes thicker (like pus) or has new blood in it.     · You are not getting better as expected. Where can you learn more? Go to http://suri-kristan.info/. Enter E812 in the search box to learn more about \"Sinusitis: Care Instructions. \"  Current as of: March 27, 2018  Content Version: 11.9  © 8877-5040 [employer].  Care instructions adapted under license by [employer] (which disclaims liability or warranty for this information). If you have questions about a medical condition or this instruction, always ask your healthcare professional. [address] any warranty or liability for your use of this information. DISCHARGE SUMMARY from Nurse    PATIENT INSTRUCTIONS:    After general anesthesia or intravenous sedation, for 24 hours or while taking prescription Narcotics:  · Limit your activities  · Do not drive and operate hazardous machinery  · Do not make important personal or business decisions  · Do  not drink alcoholic beverages  · If you have not urinated within 8 hours after discharge, please contact your surgeon on call. Report the following to your surgeon:  · Excessive pain, swelling, redness or odor of or around the surgical area  · Temperature over 100.5  · Nausea and vomiting lasting longer than 4 hours or if unable to take medications  · Any signs of decreased circulation or nerve impairment to extremity: change in color, persistent  numbness, tingling, coldness or increase pain  · Any questions    What to do at Home:  Recommended activity: Activity as tolerated,     If you experience any of the following symptoms see discharge instructions, please follow up with primary care     *  Please give a list of your current medications to your Primary Care Provider. *  Please update this list whenever your medications are discontinued, doses are      changed, or new medications (including over-the-counter products) are added. *  Please carry medication information at all times in case of emergency situations. These are general instructions for a healthy lifestyle:    No smoking/ No tobacco products/ Avoid exposure to second hand smoke  Surgeon General's Warning:  Quitting smoking now greatly reduces serious risk to your health.     Obesity, smoking, and sedentary lifestyle greatly increases your risk for illness    A healthy diet, regular physical exercise & weight monitoring are important for maintaining a healthy lifestyle    You may be retaining fluid if you have a history of heart failure or if you experience any of the following symptoms:  Weight gain of 3 pounds or more overnight or 5 pounds in a week, increased swelling in our hands or feet or shortness of breath while lying flat in bed. Please call your doctor as soon as you notice any of these symptoms; do not wait until your next office visit. Recognize signs and symptoms of STROKE:    F-face looks uneven    A-arms unable to move or move unevenly    S-speech slurred or non-existent    T-time-call 911 as soon as signs and symptoms begin-DO NOT go       Back to bed or wait to see if you get better-TIME IS BRAIN. Warning Signs of HEART ATTACK     Call 911 if you have these symptoms:   Chest discomfort. Most heart attacks involve discomfort in the center of the chest that lasts more than a few minutes, or that goes away and comes back. It can feel like uncomfortable pressure, squeezing, fullness, or pain.  Discomfort in other areas of the upper body. Symptoms can include pain or discomfort in one or both arms, the back, neck, jaw, or stomach.  Shortness of breath with or without chest discomfort.  Other signs may include breaking out in a cold sweat, nausea, or lightheadedness. Don't wait more than five minutes to call 911 - MINUTES MATTER! Fast action can save your life. Calling 911 is almost always the fastest way to get lifesaving treatment. Emergency Medical Services staff can begin treatment when they arrive -- up to an hour sooner than if someone gets to the hospital by car. The discharge information has been reviewed with the patient. The patient verbalized understanding.   Discharge medications reviewed with the patient and appropriate educational materials and side effects teaching were provided.   ___________________________________________________________________________________________________________________________________

## 2019-03-11 NOTE — PROGRESS NOTES
Problem: Interdisciplinary Rounds  Goal: Interdisciplinary Rounds  Outcome: Progressing Towards Goal  Interdisciplinary team rounds were held 3/11/2019 with the following team members:Care Management, Nursing, Physical Therapy and . Anticipate discharge home today or tomorrow. Plan of care discussed. See clinical pathway and/or care plan for interventions and desired outcomes.

## 2019-03-11 NOTE — PROGRESS NOTES
CM attempted to meet with patient regarding consult for discharge planning. Patient was presently meeting with other specialty. CM will attempt again at a later time.

## 2019-03-11 NOTE — PROGRESS NOTES
Neurology Daily Progress Note   Neurology Physician Attestation Statement    I have seen and examined the patient along with Felipe Wray NP. I agree with the documentation as recorded. In addition I would add:    I have seen the patient independently during which time I reviewed the history and performed a physical examination, reviewed the NP documentation and discussed with the NP . The interval history obtained is notable for: No neurologic complaint wants to go home    The physical examination performed is notable for: Alert and oriented ×3 no aphasia cranial nerves II through XII intact no weakness    The medical decision making including assessment and recommendations is notable for:  Pure motor TIA secondary to small vessel disease recommend continuing aspirin aggressive statin therapy and blood pressure control. Follow-up with Dr. Bonita Anthony as an outpatient      Gertrude Lezama MD      Assessment:     Transient ischemic attack: Etiology is likely small vessel ischemic disease. Lesion was somewhere along the corticospinal tract, possibly the posterior limb of the internal capsule. Risk factors include hypertension, diabetes, and obesity. Plan:     · Continue ASA 81 mg daily   · Continue high potency statin  · Neurochecks Q4H  · Telemetry  · TTE pending   · PT/OT/ST  · Continue DVT prophylaxis   · Normotensive BP management with long term goal <130/80          Subjective: Interval history:    Patient is doing well today. She reports that weakness has resolved. She denies any focal neurologic symptoms. MRI of brain was negative. CTA was negative. TTE pending. History:    Allyssa Bliss is a 61 y.o. female who is being seen for stroke symptoms. Review of systems negative with exception of pertinent positives and negatives noted above.        Objective:     Vitals:    03/10/19 2000 03/11/19 0000 03/11/19 0401 03/11/19 0759   BP: 144/73 143/76 114/67 116/71   Pulse: (!) 51 66 83 61   Resp: 20 18 18 17   Temp: 98.3 °F (36.8 °C) 98.6 °F (37 °C) 98.3 °F (36.8 °C) 98.4 °F (36.9 °C)   SpO2: 96% 96% 99% 94%   Weight:       Height:              Current Facility-Administered Medications:     insulin glargine (LANTUS) injection 60 Units, 60 Units, SubCUTAneous, QHS, Stefan Perera MD, 60 Units at 03/10/19 2154    levothyroxine (SYNTHROID) tablet 112 mcg, 112 mcg, Oral, ACB, Stefan Perera MD, 112 mcg at 03/11/19 0500    lisinopril (PRINIVIL, ZESTRIL) tablet 30 mg, 30 mg, Oral, DAILY, Stefan Perera MD, 30 mg at 03/11/19 0800    meloxicam (MOBIC) tablet 7.5 mg, 7.5 mg, Oral, DAILY, Stefan Perera MD, 7.5 mg at 03/11/19 0800    sodium chloride (NS) flush 5-40 mL, 5-40 mL, IntraVENous, Q8H, Stefan Perera MD, 10 mL at 03/11/19 0500    sodium chloride (NS) flush 5-40 mL, 5-40 mL, IntraVENous, PRN, Stefan Perera MD    ondansetron [facility]) injection 4 mg, 4 mg, IntraVENous, Q4H PRN, Stefan Perera MD    aspirin chewable tablet 81 mg, 81 mg, Oral, DAILY, Stefan Perera MD, 81 mg at 03/11/19 0800    atorvastatin (LIPITOR) tablet 40 mg, 40 mg, Oral, QHS, Stefan Perera MD, 40 mg at 03/10/19 2154    acetaminophen (TYLENOL) tablet 650 mg, 650 mg, Oral, Q4H PRN, Stefan Perera MD    labetalol (NORMODYNE;TRANDATE) injection 5 mg, 5 mg, IntraVENous, Q10MIN PRN, Stefan Perera MD    heparin (porcine) injection 5,000 Units, 5,000 Units, SubCUTAneous, Q8H, Stefan Perera MD, 5,000 Units at 03/11/19 0800    polyethylene glycol (MIRALAX) packet 17 g, 17 g, Oral, DAILY PRN, Stefan Perera MD    insulin lispro (HUMALOG) injection, , SubCUTAneous, AC&HS, Stefan Perera MD, Stopped at 03/11/19 0758    dextrose 40% (GLUTOSE) oral gel 1 Tube, 15 g, Oral, PRN, Stefan Perera MD    glucagon [facility]) injection 1 mg, 1 mg, IntraMUSCular, PRN, Stefan Perera MD    dextrose (D50W) injection syrg 12.5-25 g, 25-50 mL, IntraVENous, PRN, Lulu Wilkins MD    amoxicillin-clavulanate (AUGMENTIN) 875-125 mg per tablet 1 Tab, 1 Tab, Oral, Q12H, Brian Pedersen PA, 1 Tab at 03/11/19 0500    influenza vaccine 2018-19 (6 mos+)(PF) (FLUARIX QUAD/FLULAVAL QUAD) injection 0.5 mL, 0.5 mL, IntraMUSCular, PRIOR TO DISCHARGE, Chava Watters DO    hydrALAZINE (APRESOLINE) 20 mg/mL injection 20 mg, 20 mg, IntraVENous, Q4H PRN, Lulu Wilkins MD    Recent Results (from the past 12 hour(s))   PLEASE READ & DOCUMENT PPD TEST IN 24 HRS    Collection Time: 03/11/19  2:27 AM   Result Value Ref Range    PPD Negative Negative    mm Induration 0 0 - 5 mm   GLUCOSE, POC    Collection Time: 03/11/19  7:42 AM   Result Value Ref Range    Glucose (POC) 117 (H) 65 - 100 mg/dL         Physical Exam:  General - Well developed, well nourished, in no apparent distress. Pleasant and conversent. HEENT - Normocephalic, atraumatic. Conjunctiva are clear. Neck - Supple without masses  Extremities - Peripheral pulses intact. No edema and no rashes. Neurological examination - Comprehension, attention, memory and reasoning are intact. Language and speech are normal.   On cranial nerve examination, (II, III, IV, VI) pupils are equal, round, and reactive to light. Visual acuity is adequate. Visual fields are full to finger confrontation. Extraocular motility is normal. (V, VII) Face is symmetric and sensation is intact to light touch. (VIII) Hearing is intact. (IX, X) Palate and uvula elevate symmetrically. Voice is normal. (XI) Shoulder shrug is strong and equal bilaterally. (XII)Tongue is midline. Motor examination - There is normal muscle tone and bulk. Power is full throughout. Muscle stretch reflexes are normoactive and there are no pathological reflexes present. Sensation is intact to light touch, pinprick, vibration and proprioception in all extremities.  Cerebellar examination is normal.      Signed By: Precious Salazar Geovanny Ramsey NP     March 11, 2019

## 2019-03-11 NOTE — DISCHARGE SUMMARY
Pt is a 62 y/o F with obesity, HTN, DM, who presented to ER on 3/10 with episode of  right arm and right leg weakness, right facial droop, and slurred speech which started acutely yesterday evening and resolved after about 5 minutes. She has had no residual.  She said prior to episode she had a HA and difficulty recalling some names of doctors during conversation. She also felt herself drifting to the right when she was sitting in a chair, and she was unable to straighten up. Her symptoms have completely resolved at this point and she and family say she is back to normal. Pt was seem in the ER and ultimately admitted for stroke evaluation. Her blood pressure was controlled on her home regimen. She is on Meloxicam and she was advised to stop this medication. She was started on aspirin and lipitor. MRI was w/o acute abnormality:IMPRESSION:   1. No evidence of acute intracranial abnormality. 2.  Right maxillary sinusitis    CTA: IMPRESSION  Impression: No evidence of large vessel occlusion. No significant stenosis      Echo: SUMMARY:    -  Left ventricle: Systolic function was normal. Ejection fraction was  estimated in the range of 55 % to 60 %. There were no regional wall motion  abnormalities. Left ventricular diastolic function was normal. Average E/e'   Of 8.15.  -  Right ventricle: The size was normal. Systolic function was normal.  -  Atrial septum: Contrast injection was performed. There was no   right-to-left shunt, with provocative maneuvers to increase right atrial pressure. Pt was started on Augmentin for acute sinusitis. Otherwise, pt is doing well. She is symptoms free, stable and wanting to go home. Will discharge home.     Visit Vitals  /72 (BP 1 Location: Right arm, BP Patient Position: Supine)   Pulse 76   Temp 98.4 °F (36.9 °C)   Resp 17   Ht 5' 1\" (1.549 m)   Wt 92.5 kg (204 lb)   SpO2 95%   BMI 38.55 kg/m²     Physical Exam:  gen - no distress  Lungs: CTA b/l  CVS: +s1s2 reg  Abd: soft, nontender  Ext: no edema or cyanosis  Psych: A+Ox3    Current Discharge Medication List      START taking these medications    Details   atorvastatin (LIPITOR) 40 mg tablet Take 1 Tab by mouth nightly. Qty: 30 Tab, Refills: 0      amoxicillin-clavulanate (AUGMENTIN) 875-125 mg per tablet Take 1 Tab by mouth every twelve (12) hours every twelve (12) hours. Qty: 14 Tab, Refills: 0      aspirin (ASPIRIN) 325 mg tablet Take 1 Tab by mouth daily. Qty: 30 Tab, Refills: 0         CONTINUE these medications which have NOT CHANGED    Details   loratadine (CLARITIN) 10 mg tablet Take 10 mg by mouth daily. levothyroxine (SYNTHROID) 112 mcg tablet TAKE 1 TABLET BY MOUTH BEFORE BREAKFAST  Qty: 90 Tab, Refills: 1    Comments: Please discontinue Levothyroxine 100 mcg  Associated Diagnoses: Hypothyroidism, unspecified type      lisinopril (PRINIVIL, ZESTRIL) 30 mg tablet Take 1 Tab by mouth daily. Qty: 90 Tab, Refills: 1    Associated Diagnoses: Essential hypertension      insulin detemir U-100 (LEVEMIR FLEXTOUCH U-100 INSULN) 100 unit/mL (3 mL) inpn Usually takes 10-15 units in am and then 60 units at hs--- BUT TOTAL FOR 24 HOURS IS NOT OVER 75 UNITS  (STARTS COUNTING UNITS T NIGHT DOSE)  Qty: 2 Units, Refills: 3    Associated Diagnoses: Type 2 diabetes mellitus without complication, with long-term current use of insulin ([facility])      glipiZIDE (GLUCOTROL) 10 mg tablet TAKE ONE TABLET BY MOUTH TWICE DAILY  Qty: 180 Tab, Refills: 1    Associated Diagnoses: Type 2 diabetes mellitus without complication, with long-term current use of insulin ([facility])      metFORMIN (GLUCOPHAGE) 1,000 mg tablet Take 1 Tab by mouth two (2) times daily (with meals).   Qty: 180 Tab, Refills: 1    Associated Diagnoses: Type 2 diabetes mellitus without complication, with long-term current use of insulin ([facility])      insulin aspart U-100 (NOVOLOG FLEXPEN U-100 INSULIN) 100 unit/mL inpn 10 units before meals if blood sugar is greater than 150--then sliding scale  Goes up 4 units per 50 glucose  Qty: 2 Units, Refills: 3    Associated Diagnoses: Type 2 diabetes mellitus without complication, with long-term current use of insulin ([facility])      glucose blood VI test strips (ACCU-CHEK KALEB PLUS TEST STRP) strip Check BG three to four times daily. Please dispense strips based on glucometer, possibly Accu-chek Stephanie. Qty: 200 Strip, Refills: 5    Associated Diagnoses: Type 2 diabetes mellitus without complication, with long-term current use of insulin ([facility])      NOVOFINE 32 32 gauge x /\" ndle Use TID  Qty: 100 Pen Needle, Refills: 3    Associated Diagnoses: Type 2 diabetes mellitus without complication, with long-term current use of insulin ([facility])      cholecalciferol (VITAMIN D3) 1,000 unit cap Take 1,000 Units by mouth daily. GLUCOSAMINE HCL/CHONDR MERAZ A NA (OSTEO BI-FLEX PO) Take 1 Tab by mouth two (2) times a day. calcium-cholecalciferol, d3, (CALCIUM 600 + D) 600-125 mg-unit tab Take 1 Tab by mouth two (2) times a day. STOP taking these medications       meloxicam (MOBIC) 7.5 mg tablet Comments:   Reason for Stoppin minutes spent on discharge.     Manuel Mercado

## 2019-03-11 NOTE — PROGRESS NOTES
Problem: Patient Education: Go to Patient Education Activity  Goal: Patient/Family Education  Nutrition   Received consult for Diet Education \"suspected stroke\" (Dr. Alondra Murdock)    Assessment:  Diet order: [facility]  Food/Nutrition and Pertinent History: Pt admitted with TIA. H/O DM and HTN. Pt reports that she is aware that she typically eats too many CHO at meals. She has not had any previous diet education. She gives a brief diet recall of cereal and milk or eggs and maya at breakfast, a sandwich and cookies at lunch, and a spaghetti at dinner. She does not drink sugar sweetened beverages. Pt talks about how she is Brian and pasta is large part of her diet. A1C of 8.4 on 3/10. HDL noted to be 34. Anthropometrics:Height: 5' 1\" (154.9 cm),  Weight: 92.5 kg (204 lb), Weight Source: Patient stated, Body mass index is 38.55 kg/m². BMI class of obesity class I. Macronutrient needs:  EER:  6961-0206 kcal /day (15-20 kcal/kg listed BW)  EPR:  74-93 grams protein/day (0.8-1 grams/kg listed BW)                                                                           Nutrition Diagnosis: Food and nutrition knowledge deficit r/t lack of exposure to nutrition related information as evidenced by pt report of no previous diet education, recall with large portion sizes of CHO. Intervention:   Meals and snacks: continue CCHO diet. Add 2 gm Na diet restriction. Education: Provided patient with written and verbal instruction on CCHO diet and Mediterranean diet. Handouts provided: My Meal plan CCHO diet, carbohydrate counting, my plate; mediterranean diet guidelines, 2 gm Na diet guidelines, DASH diet. Patient verbalizes understanding of diet. Expect fair compliance.      Robson Neely [address], [address] [place] [address], [facility] [address], [phone]

## 2019-03-17 PROBLEM — E66.9 OBESITY (BMI 30-39.9): Status: ACTIVE | Noted: 2019-03-17

## 2019-03-17 PROBLEM — Z86.73 HISTORY OF TRANSIENT ISCHEMIC ATTACK (TIA): Status: ACTIVE | Noted: 2019-03-17

## 2019-03-30 ENCOUNTER — HOSPITAL ENCOUNTER (EMERGENCY)
Age: 64
Discharge: HOME | End: 2019-03-30
Attending: EMERGENCY MEDICINE
Payer: COMMERCIAL

## 2019-03-30 ENCOUNTER — APPOINTMENT (OUTPATIENT)
Dept: CT IMAGING | Age: 64
End: 2019-03-30
Attending: EMERGENCY MEDICINE
Payer: COMMERCIAL

## 2019-03-30 VITALS
BODY MASS INDEX: 37.38 KG/M2 | WEIGHT: 198 LBS | RESPIRATION RATE: 18 BRPM | SYSTOLIC BLOOD PRESSURE: 128 MMHG | TEMPERATURE: 98 F | HEIGHT: 61 IN | HEART RATE: 58 BPM | DIASTOLIC BLOOD PRESSURE: 66 MMHG | OXYGEN SATURATION: 100 %

## 2019-03-30 DIAGNOSIS — R53.1 WEAKNESS: Primary | ICD-10-CM

## 2019-03-30 DIAGNOSIS — E11.65 TYPE 2 DIABETES MELLITUS WITH HYPERGLYCEMIA, UNSPECIFIED WHETHER LONG TERM INSULIN USE (HCC): ICD-10-CM

## 2019-03-30 DIAGNOSIS — I10 ESSENTIAL HYPERTENSION: ICD-10-CM

## 2019-03-30 LAB
ALBUMIN SERPL-MCNC: 4.1 G/DL (ref 3.2–4.6)
ALBUMIN/GLOB SERPL: 1.1 {RATIO} (ref 1.2–3.5)
ALP SERPL-CCNC: 68 U/L (ref 50–136)
ALT SERPL-CCNC: 22 U/L (ref 12–65)
ANION GAP SERPL CALC-SCNC: 7 MMOL/L (ref 7–16)
AST SERPL-CCNC: 17 U/L (ref 15–37)
BACTERIA URNS QL MICRO: 0 /HPF
BASOPHILS # BLD: 0.1 K/UL (ref 0–0.2)
BASOPHILS NFR BLD: 1 % (ref 0–2)
BILIRUB SERPL-MCNC: 1.1 MG/DL (ref 0.2–1.1)
BUN SERPL-MCNC: 26 MG/DL (ref 8–23)
CALCIUM SERPL-MCNC: 10.3 MG/DL (ref 8.3–10.4)
CASTS URNS QL MICRO: NORMAL /LPF
CHLORIDE SERPL-SCNC: 108 MMOL/L (ref 98–107)
CO2 SERPL-SCNC: 25 MMOL/L (ref 21–32)
CREAT SERPL-MCNC: 0.87 MG/DL (ref 0.6–1)
DIFFERENTIAL METHOD BLD: ABNORMAL
EOSINOPHIL # BLD: 0.3 K/UL (ref 0–0.8)
EOSINOPHIL NFR BLD: 3 % (ref 0.5–7.8)
EPI CELLS #/AREA URNS HPF: NORMAL /HPF
ERYTHROCYTE [DISTWIDTH] IN BLOOD BY AUTOMATED COUNT: 12.3 % (ref 11.9–14.6)
GLOBULIN SER CALC-MCNC: 3.7 G/DL (ref 2.3–3.5)
GLUCOSE SERPL-MCNC: 122 MG/DL (ref 65–100)
HCT VFR BLD AUTO: 38.2 % (ref 35.8–46.3)
HGB BLD-MCNC: 13 G/DL (ref 11.7–15.4)
IMM GRANULOCYTES # BLD AUTO: 0.1 K/UL (ref 0–0.5)
IMM GRANULOCYTES NFR BLD AUTO: 1 % (ref 0–5)
LYMPHOCYTES # BLD: 2.6 K/UL (ref 0.5–4.6)
LYMPHOCYTES NFR BLD: 35 % (ref 13–44)
MCH RBC QN AUTO: 32.5 PG (ref 26.1–32.9)
MCHC RBC AUTO-ENTMCNC: 34 G/DL (ref 31.4–35)
MCV RBC AUTO: 95.5 FL (ref 79.6–97.8)
MONOCYTES # BLD: 0.6 K/UL (ref 0.1–1.3)
MONOCYTES NFR BLD: 8 % (ref 4–12)
NEUTS SEG # BLD: 4 K/UL (ref 1.7–8.2)
NEUTS SEG NFR BLD: 52 % (ref 43–78)
NRBC # BLD: 0 K/UL (ref 0–0.2)
PLATELET # BLD AUTO: 199 K/UL (ref 150–450)
PMV BLD AUTO: 11.9 FL (ref 9.4–12.3)
POTASSIUM SERPL-SCNC: 4.1 MMOL/L (ref 3.5–5.1)
PROT SERPL-MCNC: 7.8 G/DL (ref 6.3–8.2)
RBC # BLD AUTO: 4 M/UL (ref 4.05–5.2)
RBC #/AREA URNS HPF: NORMAL /HPF
SODIUM SERPL-SCNC: 140 MMOL/L (ref 136–145)
WBC # BLD AUTO: 7.6 K/UL (ref 4.3–11.1)
WBC URNS QL MICRO: NORMAL /HPF

## 2019-03-30 PROCEDURE — 81001 URINALYSIS AUTO W/SCOPE: CPT

## 2019-03-30 PROCEDURE — 81003 URINALYSIS AUTO W/O SCOPE: CPT | Performed by: EMERGENCY MEDICINE

## 2019-03-30 PROCEDURE — 99284 EMERGENCY DEPT VISIT MOD MDM: CPT | Performed by: EMERGENCY MEDICINE

## 2019-03-30 PROCEDURE — 70450 CT HEAD/BRAIN W/O DYE: CPT

## 2019-03-30 PROCEDURE — 80053 COMPREHEN METABOLIC PANEL: CPT

## 2019-03-30 PROCEDURE — 85025 COMPLETE CBC W/AUTO DIFF WBC: CPT

## 2019-03-30 NOTE — ED PROVIDER NOTES
Patient states she will come six forty-five and was unable to move her right side. Her right arm and leg would not move, she denies any facial involvement. Should not sensation. She states she was lying on her right side and was unable to get off of her right side because of the weakness. This lasted for around five minutes and then resolved. She states her  came around and helped her get up and then her weakness was gone. She states she had similar symptoms on March 9 with weakness in her right side. She was seen here and admitted and diagnosed with a TIA. She states some other symptoms occurred again approximately a week ago, again lasting a very short period of time. She did not come to the emergency department at that time. Elements of this note were created using speech recognition software. As such, errors of speech recognition may be present. Past Medical History:  
Diagnosis Date  Actinic keratosis   
 uses Solaraze gel when needed  Arthritis   
 rheumatoid. Orencia infusion every 4 weeks  Asthma   
 none in yrs per pt--- no inhalers per pt  DJD (degenerative joint disease)  Environmental allergies  GERD (gastroesophageal reflux disease)   
 prn med  History of malignant melanoma 1980's  
 on back  Hypertension   
 controlled with medication  Insulin dependent diabetes mellitus ([address] 1990's Type 2. sqbs avg am--100---- hypo at 73- last A1C= 8.1 on 12/19/17  Kidney stones 4th one at present  Mobitz type 1 second degree AV block EKG today 9/22/16- Dr Keke Fried reviewed----- per pt-- \" been that way my whole life\"-- does not see a cardiologist  
 Obese   
 bmi =39  
 Osteopenia  PONV (postoperative nausea and vomiting) POV- responds to IV antiemetic  RA (rheumatoid arthritis) ([facility] [address] dx--2013  Rosacea  Unspecified hypothyroidism   
 hypothyroid. Controlled with Levoxyl Past Surgical History: Procedure Laterality Date  ABDOMEN SURGERY PROC UNLISTED    
 fatty tumor removed- from abd--benign [address] [place]  HX COLONOSCOPY  2008; 2010  HX DILATION AND CURETTAGE  1999  
 and hysteroscopy  HX GYN  1978  
 vaginal cryocautery [facility] Oral Surgery--per pt  still has metal in mouth since 1980's  HX KNEE ARTHROSCOPY Right 2007  HX KNEE REPLACEMENT Left 2008  HX KNEE REPLACEMENT Right 2016 [address], 2003; 2016; 2/6/18  HX MENISCECTOMY Right 2011 Right knee  HX MOHS PROCEDURES Right 2004  HX OTHER SURGICAL  1999  
 lipoma of abdomen  HX OTHER SURGICAL  1984  
 melanoma of back [address]GERY  [address] Family History:  
Problem Relation Age of Onset  Cancer Mother Lung  Arthritis-rheumatoid Mother  Thyroid Disease Mother  Cancer Father   
     Lung; Liver  Diabetes Father  Heart Disease Father  Hypertension Father  Diabetes Brother  SLE Sister  Breast Cancer Paternal Aunt  Breast Cancer Other   
     cousins Social History Socioeconomic History  Marital status:  Spouse name: Not on file  Number of children: Not on file  Years of education: Not on file  Highest education level: Not on file Occupational History  Not on file Social Needs  Financial resource strain: Not on file  Food insecurity:  
  Worry: Not on file Inability: Not on file  Transportation needs:  
  Medical: Not on file Non-medical: Not on file Tobacco Use  Smoking status: Never Smoker  Smokeless tobacco: Never Used Substance and Sexual Activity  Alcohol use: Yes Alcohol/week: 0.0 oz  
  Comment: occasionally  Drug use: No  
 Sexual activity: Not on file Lifestyle  Physical activity:  
  Days per week: Not on file Minutes per session: Not on file  Stress: Not on file Relationships  Social connections:  
  Talks on phone: Not on file Gets together: Not on file Attends Orthodoxy service: Not on file Active member of club or organization: Not on file Attends meetings of clubs or organizations: Not on file Relationship status: Not on file  Intimate partner violence:  
  Fear of current or ex partner: Not on file Emotionally abused: Not on file Physically abused: Not on file Forced sexual activity: Not on file Other Topics Concern  Not on file Social History Narrative  Not on file ALLERGIES: Other food; Bee sting [sting, bee]; and Other plant, animal, environmental 
 
Review of Systems Constitutional: Negative for chills and fever. Gastrointestinal: Negative for nausea and vomiting. All other systems reviewed and are negative. Vitals:  
 03/30/19 8035 BP: 148/73 Pulse: 79 Resp: 16 Temp: 98 °F (36.7 °C) SpO2: 97% Weight: 89.8 kg (198 lb) Height: 5' 1\" (1.549 m) Physical Exam  
Constitutional: She is oriented to person, place, and time. She appears well-developed and well-nourished. HENT:  
Head: Normocephalic and atraumatic. Eyes: Pupils are equal, round, and reactive to light. Conjunctivae are normal.  
Neck: Normal range of motion. Neck supple. Cardiovascular: Normal rate, regular rhythm and normal heart sounds. Pulmonary/Chest: Effort normal and breath sounds normal.  
Abdominal: Soft. Bowel sounds are normal.  
Musculoskeletal: She exhibits no edema or tenderness. Neurological: She is alert and oriented to person, place, and time. No cranial nerve deficit. She exhibits normal muscle tone. Skin: Skin is warm and dry. Psychiatric: She has a normal mood and affect. Her behavior is normal.  
Nursing note and vitals reviewed. MDM Number of Diagnoses or Management Options Essential hypertension: Type 2 diabetes mellitus with hyperglycemia, unspecified whether long term insulin use ([facility] [address]):  
Weakness: new and does not require workup Diagnosis management comments: 3/9/19 discharge summary reviewed. She had an MRI, CTA and echo done during that observation stay which were unremarkable. She was diagnosed with a TIA 
10:10 AM discussed results with patient and , unremarkable workup. Given her extensive workup on her recent admission, I do not feel it is necessary to repeat said testing Amount and/or Complexity of Data Reviewed Clinical lab tests: ordered and reviewed Tests in the radiology section of CPT®: ordered and reviewed Decide to obtain previous medical records or to obtain history from someone other than the patient: yes Review and summarize past medical records: yes Risk of Complications, Morbidity, and/or Mortality Presenting problems: moderate Diagnostic procedures: moderate Management options: moderate Patient Progress Patient progress: stable Procedures

## 2019-03-30 NOTE — ED NOTES
I have reviewed discharge instructions with the patient. The patient verbalized understanding. Patient left ED via Discharge Method: ambulatory to Home with . Opportunity for questions and clarification provided. Patient given 0 scripts. To continue your aftercare when you leave the hospital, you may receive an automated call from our care team to check in on how you are doing. This is a free service and part of our promise to provide the best care and service to meet your aftercare needs.  If you have questions, or wish to unsubscribe from this service please call [phone]. Thank you for Choosing our [facility] Emergency Department.

## 2019-04-11 ENCOUNTER — HOSPITAL ENCOUNTER (OUTPATIENT)
Dept: LAB | Age: 64
Discharge: HOME | End: 2019-04-11
Payer: COMMERCIAL

## 2019-04-11 PROBLEM — R53.1 RIGHT SIDED WEAKNESS: Status: ACTIVE | Noted: 2019-04-11

## 2019-04-11 PROBLEM — R42 DIZZINESS: Status: ACTIVE | Noted: 2019-04-11

## 2019-04-11 LAB — TSH SERPL DL<=0.005 MIU/L-ACNC: 0.1 UIU/ML (ref 0.36–3.74)

## 2019-04-11 PROCEDURE — 36415 COLL VENOUS BLD VENIPUNCTURE: CPT

## 2019-04-11 PROCEDURE — 84443 ASSAY THYROID STIM HORMONE: CPT

## 2019-04-12 NOTE — PROGRESS NOTES
[facility], would you please call patient as relay to her that her TSH level is a little lower than it should be. I would like for her to f/u with her PCP to adjust the level of her Synthroid. Thanks Hayley Jacob

## 2019-04-13 ENCOUNTER — HOSPITAL ENCOUNTER (EMERGENCY)
Age: 64
Discharge: HOME | End: 2019-04-14
Attending: EMERGENCY MEDICINE
Payer: COMMERCIAL

## 2019-04-13 ENCOUNTER — APPOINTMENT (OUTPATIENT)
Dept: CT IMAGING | Age: 64
End: 2019-04-13
Attending: EMERGENCY MEDICINE
Payer: COMMERCIAL

## 2019-04-13 DIAGNOSIS — G45.9 TIA (TRANSIENT ISCHEMIC ATTACK): Primary | ICD-10-CM

## 2019-04-13 DIAGNOSIS — E78.5 HYPERLIPIDEMIA, UNSPECIFIED HYPERLIPIDEMIA TYPE: ICD-10-CM

## 2019-04-13 LAB
ALBUMIN SERPL-MCNC: 3.9 G/DL (ref 3.2–4.6)
ALBUMIN/GLOB SERPL: 1 {RATIO} (ref 1.2–3.5)
ALP SERPL-CCNC: 135 U/L (ref 50–136)
ALT SERPL-CCNC: 81 U/L (ref 12–65)
ANION GAP SERPL CALC-SCNC: 8 MMOL/L (ref 7–16)
APTT PPP: 26.3 SEC (ref 24.7–39.8)
AST SERPL-CCNC: 91 U/L (ref 15–37)
BASOPHILS # BLD: 0.1 K/UL (ref 0–0.2)
BASOPHILS NFR BLD: 1 % (ref 0–2)
BILIRUB SERPL-MCNC: 1 MG/DL (ref 0.2–1.1)
BUN SERPL-MCNC: 20 MG/DL (ref 8–23)
CALCIUM SERPL-MCNC: 10.6 MG/DL (ref 8.3–10.4)
CHLORIDE SERPL-SCNC: 104 MMOL/L (ref 98–107)
CO2 SERPL-SCNC: 27 MMOL/L (ref 21–32)
CREAT SERPL-MCNC: 0.84 MG/DL (ref 0.6–1)
DIFFERENTIAL METHOD BLD: ABNORMAL
EOSINOPHIL # BLD: 0.4 K/UL (ref 0–0.8)
EOSINOPHIL NFR BLD: 5 % (ref 0.5–7.8)
ERYTHROCYTE [DISTWIDTH] IN BLOOD BY AUTOMATED COUNT: 12.7 % (ref 11.9–14.6)
GLOBULIN SER CALC-MCNC: 3.8 G/DL (ref 2.3–3.5)
GLUCOSE SERPL-MCNC: 100 MG/DL (ref 65–100)
HCT VFR BLD AUTO: 35.7 % (ref 35.8–46.3)
HGB BLD-MCNC: 12.1 G/DL (ref 11.7–15.4)
IMM GRANULOCYTES # BLD AUTO: 0 K/UL (ref 0–0.5)
IMM GRANULOCYTES NFR BLD AUTO: 0 % (ref 0–5)
INR PPP: 1
LYMPHOCYTES # BLD: 2.9 K/UL (ref 0.5–4.6)
LYMPHOCYTES NFR BLD: 37 % (ref 13–44)
MCH RBC QN AUTO: 32.5 PG (ref 26.1–32.9)
MCHC RBC AUTO-ENTMCNC: 33.9 G/DL (ref 31.4–35)
MCV RBC AUTO: 96 FL (ref 79.6–97.8)
MONOCYTES # BLD: 0.6 K/UL (ref 0.1–1.3)
MONOCYTES NFR BLD: 7 % (ref 4–12)
NEUTS SEG # BLD: 3.8 K/UL (ref 1.7–8.2)
NEUTS SEG NFR BLD: 49 % (ref 43–78)
NRBC # BLD: 0 K/UL (ref 0–0.2)
PLATELET # BLD AUTO: 175 K/UL (ref 150–450)
PMV BLD AUTO: 11.9 FL (ref 9.4–12.3)
POTASSIUM SERPL-SCNC: 3.9 MMOL/L (ref 3.5–5.1)
PROT SERPL-MCNC: 7.7 G/DL (ref 6.3–8.2)
PROTHROMBIN TIME: 12.8 SEC (ref 11.7–14.5)
RBC # BLD AUTO: 3.72 M/UL (ref 4.05–5.2)
SODIUM SERPL-SCNC: 139 MMOL/L (ref 136–145)
TROPONIN I BLD-MCNC: 0 NG/ML (ref 0.02–0.05)
WBC # BLD AUTO: 7.8 K/UL (ref 4.3–11.1)

## 2019-04-13 PROCEDURE — 93005 ELECTROCARDIOGRAM TRACING: CPT | Performed by: EMERGENCY MEDICINE

## 2019-04-13 PROCEDURE — 80053 COMPREHEN METABOLIC PANEL: CPT

## 2019-04-13 PROCEDURE — 84484 ASSAY OF TROPONIN QUANT: CPT

## 2019-04-13 PROCEDURE — 85025 COMPLETE CBC W/AUTO DIFF WBC: CPT

## 2019-04-13 PROCEDURE — 99285 EMERGENCY DEPT VISIT HI MDM: CPT | Performed by: EMERGENCY MEDICINE

## 2019-04-13 PROCEDURE — 70450 CT HEAD/BRAIN W/O DYE: CPT

## 2019-04-13 PROCEDURE — 85610 PROTHROMBIN TIME: CPT

## 2019-04-13 PROCEDURE — 85730 THROMBOPLASTIN TIME PARTIAL: CPT

## 2019-04-13 RX ORDER — CLOPIDOGREL BISULFATE 75 MG/1
300 TABLET ORAL
Status: COMPLETED | OUTPATIENT
Start: 2019-04-14 | End: 2019-04-14

## 2019-04-14 VITALS
RESPIRATION RATE: 16 BRPM | HEART RATE: 43 BPM | HEIGHT: 61 IN | OXYGEN SATURATION: 98 % | TEMPERATURE: 98.1 F | BODY MASS INDEX: 37.19 KG/M2 | SYSTOLIC BLOOD PRESSURE: 169 MMHG | DIASTOLIC BLOOD PRESSURE: 70 MMHG | WEIGHT: 197 LBS

## 2019-04-14 LAB
ATRIAL RATE: 91 BPM
CALCULATED P AXIS, ECG09: 71 DEGREES
CALCULATED R AXIS, ECG10: -7 DEGREES
CALCULATED T AXIS, ECG11: 46 DEGREES
DIAGNOSIS, 93000: NORMAL
Q-T INTERVAL, ECG07: 468 MS
QRS DURATION, ECG06: 114 MS
QTC CALCULATION (BEZET), ECG08: 443 MS
VENTRICULAR RATE, ECG03: 54 BPM

## 2019-04-14 PROCEDURE — 74011250637 HC RX REV CODE- 250/637: Performed by: EMERGENCY MEDICINE

## 2019-04-14 RX ORDER — ATORVASTATIN CALCIUM 80 MG/1
80 TABLET, FILM COATED ORAL
Qty: 30 TAB | Refills: 2 | Status: SHIPPED | OUTPATIENT
Start: 2019-04-14 | End: 2019-08-06 | Stop reason: SDUPTHER

## 2019-04-14 RX ORDER — CLOPIDOGREL BISULFATE 75 MG/1
75 TABLET ORAL DAILY
Qty: 30 TAB | Refills: 2 | Status: SHIPPED | OUTPATIENT
Start: 2019-04-14 | End: 2019-08-06 | Stop reason: SDUPTHER

## 2019-04-14 RX ADMIN — CLOPIDOGREL BISULFATE 300 MG: 75 TABLET ORAL at 00:29

## 2019-04-14 NOTE — DISCHARGE INSTRUCTIONS
Patient Education        Learning About Transient Ischemic Attack (TIA)  What is a TIA? A transient ischemic attack (TIA) means that the blood flow to a part of the brain is blocked for a short time. A TIA feels like a stroke but usually lasts only 10 to 20 minutes. Unlike a stroke, a TIA does not cause lasting brain damage. A TIA is usually caused by a blood clot that blocks blood flow in the brain. A blood clot can form in another part of the body (often the heart) and travel through the bloodstream to the brain. When blood flow to part of the brain is blocked, the brain cells in that area are affected within seconds. This causes symptoms in parts of the body controlled by those brain cells. When the blood clot dissolves, blood flow returns, and the symptoms go away. Blood clots can be the result of hardening of the arteries (atherosclerosis) or a heart attack. Sometimes a TIA is caused by a sharp drop in blood pressure that reduces blood flow to the brain. This is called a \"low-flow\" TIA. It is not as common as a TIA caused by a blood clot. What happens after a TIA? TIA is a serious warning sign of a possible stroke in the future. If you have other medical conditions such as coronary artery disease or atherosclerosis, you may also have an increased risk for a heart attack. Talk to your doctor about your risk. Understanding your risk will help you and your doctor plan your treatment options. You can do a lot to lower your chance of having another TIA or a stroke. Medicines can help, and you may also need to make lifestyle changes. What are the symptoms? Symptoms of a TIA are the same as symptoms of a stroke. But symptoms of a TIA don't last very long. Most of the time, they go away in 10 to 20 minutes. They may include:  · Sudden numbness, tingling, weakness, or loss of movement in your face, arm, or leg, especially on only one side of your body. · Sudden vision changes.   · Sudden trouble speaking. · Sudden confusion or trouble understanding simple statements. · Sudden problems with walking or balance. If you have any of these symptoms, call 911 or other emergency services right away. Ask your family, friends, and coworkers to learn the signs of a TIA. They may notice these signs before you do. Make sure they know to call 911 if these signs appear. How is a TIA treated? If you've had a TIA, you may need more testing and treatment after you get checked by your doctor. If you have a high risk of stroke, you may have to stay in the hospital for treatment. Your treatment for a TIA may include taking medicines to prevent blood clots or a stroke, or having surgery to reopen narrow arteries. How can you prevent another TIA? · Work with your doctor to treat any health problems you have. High blood pressure, high cholesterol, atrial fibrillation, and diabetes all raise your chances of having a stroke. · Be safe with medicines. Take your medicine exactly as prescribed. Call your doctor if you think you are having a problem with your medicine. · Have a healthy lifestyle. ? Do not smoke or allow others to smoke around you. If you need help quitting, talk to your doctor about stop-smoking programs and medicines. These can increase your chances of quitting for good. Smoking makes a stroke more likely. ? Limit alcohol to 2 drinks a day for men and 1 drink a day for women. ? Lose weight if you need to. A healthy weight will help you keep your heart and body healthy. ? Be active. Ask your doctor what type and level of activity are safe for you.  ? Eat heart-healthy foods, like fruits, vegetables, and high-fiber foods. Follow-up care is a key part of your treatment and safety. Be sure to make and go to all appointments, and call your doctor if you are having problems. It's also a good idea to know your test results and keep a list of the medicines you take. Where can you learn more?   Go to http://kendra.info/. Enter L298 in the search box to learn more about \"Learning About Transient Ischemic Attack (TIA). \"  Current as of: September 26, 2018  Content Version: 11.9  © 1986-5508 [employer]. Care instructions adapted under license by [employer] (which disclaims liability or warranty for this information). If you have questions about a medical condition or this instruction, always ask your healthcare professional. [address] any warranty or liability for your use of this information.

## 2019-04-14 NOTE — ED TRIAGE NOTES
Pt wheeled into triage and stated I had a tia. Pt denies any sx now, but states earlier she had facial droop, right sided arm and leg numbness, speech slurred. Pt states it last for 9min. Pt states she was originally dx with a tia 3/9/19. Denies cp or sob. Pt states she was sob during the episode.

## 2019-04-14 NOTE — ED PROVIDER NOTES
Presents with complaints of TIA. Patient states at 2120 she had 9 minutes of right-sided facial drooping and numbness slurred speech and right-sided weakness in her leg and arm. She's had multiple episodes of these symptoms since March. She was admitted for it. She has followed up with neurology. She has not had headaches associated with it but states she had a headache earlier in the day and reports taking Tylenol prior to these symptoms. She takes an aspirin every day. She was seen in triage but her symptoms completely resolved and a code stroke was not done. Her NIH was 0. The history is provided by the patient and the spouse. TIA This is a new problem. The current episode started less than 1 hour ago. The problem has been resolved. There was right facial, right upper extremity and right lower extremity focality noted. Primary symptoms include focal weakness and slurred speech. Pertinent negatives include no loss of balance, no mental status change and no disorientation. There has been no fever. Pertinent negatives include no shortness of breath, no chest pain, no vomiting, no altered mental status, no confusion, no headaches and no nausea. There were no medications administered prior to arrival. Associated medical issues do not include CVA. Past Medical History:  
Diagnosis Date  Actinic keratosis   
 uses Solaraze gel when needed  Arthritis   
 rheumatoid. Orencia infusion every 4 weeks  Asthma   
 none in yrs per pt--- no inhalers per pt  DJD (degenerative joint disease)  Environmental allergies  GERD (gastroesophageal reflux disease)   
 prn med  History of malignant melanoma 1980's  
 on back  Hypertension   
 controlled with medication  Insulin dependent diabetes mellitus ([facility] [address] 1990's Type 2. sqbs avg am--100---- hypo at 73- last A1C= 8.1 on 12/19/17  Kidney stones 4th one at present  Mobitz type 1 second degree AV block EKG today 9/22/16- Dr Alyson Mckeon reviewed----- per pt-- \" been that way my whole life\"-- does not see a cardiologist  
 Obese   
 bmi =39  
 Osteopenia  PONV (postoperative nausea and vomiting) POV- responds to IV antiemetic  RA (rheumatoid arthritis) ([address] dx--2013  Rosacea  Unspecified hypothyroidism   
 hypothyroid. Controlled with Levoxyl Past Surgical History:  
Procedure Laterality Date  ABDOMEN SURGERY PROC UNLISTED    
 fatty tumor removed- from abd--benign [address]  HX COLONOSCOPY  2008; 2010  HX DILATION AND CURETTAGE  1999  
 and hysteroscopy  HX GYN  1978  
 vaginal cryocautery [address] [facility]--per pt  still has metal in mouth since 1980's  HX KNEE ARTHROSCOPY Right 2007  HX KNEE REPLACEMENT Left 2008  HX KNEE REPLACEMENT Right 2016 [address], 2003; 2016; 2/6/18  HX MENISCECTOMY Right 2011 Right knee  HX MOHS PROCEDURES Right 2004  HX OTHER SURGICAL  1999  
 lipoma of abdomen  HX OTHER SURGICAL  1984  
 melanoma of back [address] SURGERY  2002 [address] [place] [address] [address] Family History:  
Problem Relation Age of Onset  Cancer Mother Lung  Arthritis-rheumatoid Mother  Thyroid Disease Mother  Cancer Father   
     Lung; Liver  Diabetes Father  Heart Disease Father  Hypertension Father  Diabetes Brother  SLE Sister  Breast Cancer Paternal Aunt  Breast Cancer Other   
     cousins Social History Socioeconomic History  Marital status:  Spouse name: Not on file  Number of children: Not on file  Years of education: Not on file  Highest education level: Not on file Occupational History  Not on file Social Needs  Financial resource strain: Not on file  Food insecurity:  
  Worry: Not on file Inability: Not on file  Transportation needs:  
  Medical: Not on file Non-medical: Not on file Tobacco Use  Smoking status: Never Smoker  Smokeless tobacco: Never Used Substance and Sexual Activity  Alcohol use: Yes Alcohol/week: 0.0 oz  
  Comment: occasionally  Drug use: No  
 Sexual activity: Not on file Lifestyle  Physical activity:  
  Days per week: Not on file Minutes per session: Not on file  Stress: Not on file Relationships  Social connections:  
  Talks on phone: Not on file Gets together: Not on file Attends Confucianism service: Not on file Active member of club or organization: Not on file Attends meetings of clubs or organizations: Not on file Relationship status: Not on file  Intimate partner violence:  
  Fear of current or ex partner: Not on file Emotionally abused: Not on file Physically abused: Not on file Forced sexual activity: Not on file Other Topics Concern  Not on file Social History Narrative  Not on file ALLERGIES: Other food; Bee sting [sting, bee]; and Other plant, animal, environmental 
 
Review of Systems Respiratory: Negative for shortness of breath. Cardiovascular: Negative for chest pain. Gastrointestinal: Negative for nausea and vomiting. Neurological: Positive for focal weakness. Negative for headaches and loss of balance. Psychiatric/Behavioral: Negative for confusion. All other systems reviewed and are negative. Vitals:  
 04/13/19 2210 04/13/19 2325 04/13/19 2332 BP: 166/71 (!) 143/115 149/65 Pulse: (!) 44 (!) 43 (!) 47 Resp: 18  19 Temp: 98.1 °F (36.7 °C) SpO2: 99% 97% 97% Weight: 89.4 kg (197 lb) Height: 5' 1\" (1.549 m) Physical Exam  
Constitutional: She is oriented to person, place, and time. She appears well-developed and well-nourished. No distress. HENT:  
Head: Normocephalic and atraumatic. Eyes: Conjunctivae are normal. Right eye exhibits no discharge. Left eye exhibits no discharge. Neck: Normal range of motion. Neck supple. Cardiovascular: An irregular rhythm present. Bradycardia present. Pulmonary/Chest: Effort normal and breath sounds normal. No respiratory distress. Abdominal: Soft. She exhibits no distension. There is no tenderness. Musculoskeletal: Normal range of motion. She exhibits no edema. Neurological: She is alert and oriented to person, place, and time. No cranial nerve deficit. Skin: Skin is warm and dry. Capillary refill takes less than 2 seconds. She is not diaphoretic. Psychiatric: She has a normal mood and affect. Her behavior is normal.  
Nursing note and vitals reviewed. MDM Number of Diagnoses or Management Options Diagnosis management comments: Her EKG is unchanged and shows a 2nd degree AV block 2:1 conduction. She has had this since at least 2018 and had a stress test and was seen by cardiology and found to have no significant issues and was deemed not a pacemaker candidate. I spoke with Dr. Orozco Prudent about it again today since she has had multiple TIA episodes. From a cardiac standpoint this is highly unlikely to be the cause. I will refer her back to cardiology for a loop recorder. Her heart rate has been in the 40s here but she is asymptomatic. She was admitted in March for her TIA symptoms had an echo that was negative and MRI and CTA that had no findings. She is followed up with neurology. She was seen by specialist on-call neurology while here and recommended Plavix load and daily Plavix in addition to her aspirin along with increasing her Lipitor to 80-please see their consult. Discussed at length with patient and family and will give her 300 of Plavix now and send her home.   I had a long discussion with pt and family.   
 
=================================================================== 
 This patient is critically ill and there is a high probability of of imminent or life threatening deterioration in the patient's condition without immediate management. The nature of the patient's clinical problem is: TIA I have spent 35 minutes in direct patient care, documentation, review of labs/xrays/old records, discussion with Family, Staff, Colleague, Nursing . The time involved in the performance of separately reportable procedures was not counted toward critical care time. Samuel Thompson MD; 4/14/2019 @12:19 AM 
=================================================================== Amount and/or Complexity of Data Reviewed Clinical lab tests: ordered and reviewed Decide to obtain previous medical records or to obtain history from someone other than the patient: yes ( ) Review and summarize past medical records: yes Discuss the patient with other providers: yes (Dr. Melissa Webster, [facility]) Independent visualization of images, tracings, or specimens: yes Risk of Complications, Morbidity, and/or Mortality Presenting problems: high Diagnostic procedures: moderate Management options: high Patient Progress Patient progress: improved Procedures

## 2019-04-14 NOTE — ED NOTES
I have reviewed discharge instructions with the patient. The patient verbalized understanding. Patient left ED via Discharge Method: ambulatory to Home with spouse. Opportunity for questions and clarification provided. Patient given 2 scripts. To continue your aftercare when you leave the hospital, you may receive an automated call from our care team to check in on how you are doing. This is a free service and part of our promise to provide the best care and service to meet your aftercare needs.  If you have questions, or wish to unsubscribe from this service please call [phone]. Thank you for Choosing our [facility] [employer] [facility] Emergency Department.

## 2019-04-24 ENCOUNTER — HOSPITAL ENCOUNTER (OUTPATIENT)
Dept: MRI IMAGING | Age: 64
Discharge: HOME | End: 2019-04-24
Attending: NURSE PRACTITIONER
Payer: COMMERCIAL

## 2019-04-24 DIAGNOSIS — R53.1 RIGHT SIDED WEAKNESS: ICD-10-CM

## 2019-04-24 PROCEDURE — 72141 MRI NECK SPINE W/O DYE: CPT

## 2019-04-25 NOTE — PROGRESS NOTES
[facility], would you please call patient and relay the following to her: For secondary stroke/TIA prevention:  1. Tight control of her blood sugars. Currently, her A1c is 8.4 and \"uncontrolled\". Her goal is to keep this <7.0 . Let her know I will place a diabetic educator consult for her if she will attend. 2. Blood pressure goal w/Diabetes is to keep <130/80. Continue taking blood pressure meds as directed. 3. Continue taking her Lipitor 80 mg daily w/her goal to keep her (bad cholesterol, LDL<70). Her most recent lipid (cholesterol) panel was normal from earlier this year. I also need to evaluate her speech and weakness in clinic as she will likely need speech and physical therapy. Thanks. Chacho Baldwin, would you please see if we can move patient's appointment up before her 4/30 appointment, as she has had a TIA?

## 2019-04-25 NOTE — PROGRESS NOTES
Dr. Vale Acosta,     I reviewed MRI C Spine + most recent MRI Brain w/Dr. Felisha Velasquez. This small lesion to radhika is not new. Dr. Silverio Ennis agreed this looks like white matter changes. However, since she was recently readmitted for TIA, I have requested an expedited appointment for her to return to clinic to see me and discuss cause of TIA, and goals of care. My pressing concerns for her is uncontrolled diabetes w/last A1c of 8.4, her weight and blood pressure goals.      Take Care,   Elier VOGT-BC

## 2019-05-16 PROBLEM — Z79.4 TYPE 2 DIABETES MELLITUS WITHOUT COMPLICATION, WITH LONG-TERM CURRENT USE OF INSULIN (HCC): Status: ACTIVE | Noted: 2019-05-16

## 2019-05-16 PROBLEM — M06.061: Status: ACTIVE | Noted: 2019-05-16

## 2019-05-16 PROBLEM — E11.9 TYPE 2 DIABETES MELLITUS WITHOUT COMPLICATION, WITH LONG-TERM CURRENT USE OF INSULIN (HCC): Status: ACTIVE | Noted: 2019-05-16

## 2019-06-10 ENCOUNTER — HOSPITAL ENCOUNTER (OUTPATIENT)
Dept: MAMMOGRAPHY | Age: 64
Discharge: HOME | End: 2019-06-10
Attending: FAMILY MEDICINE
Payer: COMMERCIAL

## 2019-06-10 DIAGNOSIS — Z12.39 BREAST CANCER SCREENING: ICD-10-CM

## 2019-06-10 PROCEDURE — 77067 SCR MAMMO BI INCL CAD: CPT

## 2019-06-17 ENCOUNTER — HOSPITAL ENCOUNTER (OUTPATIENT)
Dept: MAMMOGRAPHY | Age: 64
Discharge: HOME | End: 2019-06-17
Attending: FAMILY MEDICINE
Payer: COMMERCIAL

## 2019-06-17 DIAGNOSIS — R92.8 ABNORMAL SCREENING MAMMOGRAM: ICD-10-CM

## 2019-06-17 PROCEDURE — 77065 DX MAMMO INCL CAD UNI: CPT

## 2019-06-17 PROCEDURE — 76642 ULTRASOUND BREAST LIMITED: CPT

## 2019-06-21 ENCOUNTER — HOSPITAL ENCOUNTER (OUTPATIENT)
Dept: MAMMOGRAPHY | Age: 64
Discharge: HOME | End: 2019-06-21
Attending: NURSE PRACTITIONER
Payer: COMMERCIAL

## 2019-06-21 VITALS
OXYGEN SATURATION: 96 % | HEART RATE: 60 BPM | TEMPERATURE: 97.2 F | DIASTOLIC BLOOD PRESSURE: 65 MMHG | SYSTOLIC BLOOD PRESSURE: 133 MMHG

## 2019-06-21 DIAGNOSIS — R92.8 ABNORMAL MAMMOGRAM OF LEFT BREAST: ICD-10-CM

## 2019-06-21 PROCEDURE — A4648 IMPLANTABLE TISSUE MARKER: HCPCS

## 2019-06-21 PROCEDURE — 74011000250 HC RX REV CODE- 250

## 2019-06-21 PROCEDURE — 88305 TISSUE EXAM BY PATHOLOGIST: CPT

## 2019-06-21 PROCEDURE — 19081 BX BREAST 1ST LESION STRTCTC: CPT

## 2019-06-21 PROCEDURE — 77065 DX MAMMO INCL CAD UNI: CPT

## 2019-06-21 PROCEDURE — 74011250636 HC RX REV CODE- 250/636

## 2019-06-21 RX ORDER — LIDOCAINE HYDROCHLORIDE AND EPINEPHRINE 10; 10 MG/ML; UG/ML
15 INJECTION, SOLUTION INFILTRATION; PERINEURAL
Status: COMPLETED | OUTPATIENT
Start: 2019-06-21 | End: 2019-06-21

## 2019-06-21 RX ORDER — LIDOCAINE HYDROCHLORIDE 10 MG/ML
5 INJECTION INFILTRATION; PERINEURAL
Status: COMPLETED | OUTPATIENT
Start: 2019-06-21 | End: 2019-06-21

## 2019-06-21 RX ADMIN — LIDOCAINE HYDROCHLORIDE 9 ML: 10 INJECTION, SOLUTION INFILTRATION; PERINEURAL at 10:44

## 2019-06-21 RX ADMIN — LIDOCAINE HYDROCHLORIDE,EPINEPHRINE BITARTRATE 15 ML: 10; .01 INJECTION, SOLUTION INFILTRATION; PERINEURAL at 10:44

## 2019-06-21 RX ADMIN — SODIUM CHLORIDE 250 ML: 900 INJECTION, SOLUTION INTRAVENOUS at 10:46

## 2019-06-21 NOTE — PROGRESS NOTES
The patient had a left breast stereotactic biopsy today in the breast center, due to a history of recent frequent TIA's she was able to stop her plavix but was not able to stop her Asprin 325mg. The patient post biopsy has developed a left breast hematoma, she was kept in the breast center for an additional 45 minutes while compression was held. The Radiologist examined the patient prior to leaving and instructed the patient on what she should do. The patient was dressed with a compression bandage and is to call the number on her form or report to the emergency room if she has any concerning issues.

## 2019-06-25 NOTE — PROGRESS NOTES
Dr Shelby Murillo and I spoke with Ms Tulio Richmond her  regarding the results of her Lt breast stereo bx. Pathology: IDC w lobular features. She said she has quite a bit of breast discomfort but was afraid to take Tylenol due to prior hx of TIA. She also said her left arm/hand hurt for a few hours after bx. She was feeling much better now but was a bit weepy when she left. She is scheduled for the following:  MRI    Wed 6/40  [facility] 1/03  Oncology   Waiting for appt.

## 2019-06-26 ENCOUNTER — HOSPITAL ENCOUNTER (OUTPATIENT)
Dept: MRI IMAGING | Age: 64
Discharge: HOME | End: 2019-06-26
Attending: NURSE PRACTITIONER
Payer: COMMERCIAL

## 2019-06-26 DIAGNOSIS — C50.912 BREAST CANCER, LEFT (HCC): ICD-10-CM

## 2019-06-26 PROCEDURE — A9575 INJ GADOTERATE MEGLUMI 0.1ML: HCPCS | Performed by: NURSE PRACTITIONER

## 2019-06-26 PROCEDURE — 74011000258 HC RX REV CODE- 258: Performed by: NURSE PRACTITIONER

## 2019-06-26 PROCEDURE — 77049 MRI BREAST C-+ W/CAD BI: CPT

## 2019-06-26 PROCEDURE — 74011250636 HC RX REV CODE- 250/636: Performed by: NURSE PRACTITIONER

## 2019-06-26 RX ORDER — GADOTERATE MEGLUMINE 376.9 MG/ML
17 INJECTION INTRAVENOUS
Status: COMPLETED | OUTPATIENT
Start: 2019-06-26 | End: 2019-06-26

## 2019-06-26 RX ORDER — SODIUM CHLORIDE 0.9 % (FLUSH) 0.9 %
10 SYRINGE (ML) INJECTION
Status: COMPLETED | OUTPATIENT
Start: 2019-06-26 | End: 2019-06-26

## 2019-06-26 RX ADMIN — SODIUM CHLORIDE 100 ML: 900 INJECTION, SOLUTION INTRAVENOUS at 10:19

## 2019-06-26 RX ADMIN — Medication 10 ML: at 10:19

## 2019-06-26 RX ADMIN — GADOTERATE MEGLUMINE 17 ML: 376.9 INJECTION INTRAVENOUS at 10:19

## 2019-06-26 NOTE — PROGRESS NOTES
Dr Rahul Llanes and I spoke with Ms Ugarte and her  regarding the results of her left breast stereo bx. Pathology: IDC w/ lobular features. She did have breast discomfort and complained of arm pain but was afraid to take tylenol due to prior TIA's. She had no other issues or concerns. She seemed to have very good support and was just a little weepy.  She is scheduled for the following:  MRI    0/35  Lai  0/92  S[place]   7/9

## 2019-06-26 NOTE — PROGRESS NOTES
Dr Tamir Faria and I spoke with Ms Ugarte and her  regarding the reults of her left breast stereo bx. Pathology:IDC with lobular features. She did have a bit of breast discomfort and her Lt arm hurt a bit however, she was afraid to take Tylenol due to prior TIA's. She was a little weepy but handled the results well. Her  is a  and will be good support. She had no other issues or concerns and has the following appts:  MRI     2/80  Polo Quiñones  0/21  [place]   7/9  She declined prayer and said her & her  have already prayed. I gave her an info packet.

## 2019-06-28 ENCOUNTER — HOSPITAL ENCOUNTER (OUTPATIENT)
Dept: SURGERY | Age: 64
Discharge: HOME | End: 2019-06-28

## 2019-06-28 VITALS — BODY MASS INDEX: 34.36 KG/M2 | WEIGHT: 182 LBS | HEIGHT: 61 IN

## 2019-06-28 RX ORDER — SODIUM CHLORIDE 0.9 % (FLUSH) 0.9 %
5-40 SYRINGE (ML) INJECTION EVERY 8 HOURS
Status: CANCELLED | OUTPATIENT
Start: 2019-06-28

## 2019-06-28 RX ORDER — SODIUM CHLORIDE 0.9 % (FLUSH) 0.9 %
5-40 SYRINGE (ML) INJECTION AS NEEDED
Status: CANCELLED | OUTPATIENT
Start: 2019-06-28

## 2019-06-28 RX ORDER — CALCIUM CARBONATE 200(500)MG
1 TABLET,CHEWABLE ORAL AS NEEDED
COMMUNITY

## 2019-06-28 NOTE — PROGRESS NOTES
Patient will be having a lumpectomy with Dr. Shaw Tejeda. Left message on cell phone to call us if she needs us or has any questions.   Robyn Hernandez

## 2019-06-28 NOTE — PERIOP NOTES
Patient verified name and . Order for consent not found in EHR and unable to match consent with case posting; patient verifies procedure. Type 1b surgery, phone assessment complete. Orders not received. Labs per surgeon: unknown, no orders  Labs per anesthesia protocol: none     EKG 19 ~placed on chart along with:   ECHO 3/10/19; stress test 19; office note from Dr Jovanni Phan 19; office note Dr Juan Jose Lerma, neurologist 19~all available for anesthesia reference      Patient answered medical/surgical history questions at their best of ability. All prior to admission medications documented in [facility] Care. Patient instructed to take the following medications the day of surgery according to anesthesia guidelines with a small sip of water: use Flonase nasal spray; take:  Claritin, Levothyroxine, 81 mg Aspirin. The night before surgery take 24 units of LEvemir insulin per anesthesia guidelines . Hold all vitamins 7 days prior to surgery and NSAIDS 5 days prior to surgery. Prescription meds to hold:Pt instructed by Dr Nena Ko to hold Plavix for 3 days prior to surgery. Pt will decrease 325 mg Aspirin to 81 mg Aspirin for 5 days prior to surgery per anesthesia guidelines. Patient instructed on the following:  Arrive at A Entrance, time of arrival to be called the day before by 1700  NPO after midnight including gum, mints, and ice chips  Responsible adult must drive patient to the hospital, stay during surgery, and patient will need supervision 24 hours after anesthesia  Use antibacterial soap in shower the night before surgery and on the morning of surgery  All piercings must be removed prior to arrival.    Leave all valuables (money and jewelry) at home but bring insurance card and ID on       DOS. Do not wear make-up, nail polish, lotions, cologne, perfumes, powders, or oil on skin. Patient teach back successful and patient demonstrates knowledge of instruction.

## 2019-06-28 NOTE — PROGRESS NOTES
Dr Fitzgerald Every and I spoke with Ms Ugarte and her  on 6/25  regarding the results of her left breast stereo bx. She did complain of some breast and arm discomfort bus was beginning to feel better. She had no other post bx issues or concerns.    She has the following appt:  MRI  3/65  Lai 9/87

## 2019-07-01 PROBLEM — Z17.0 MALIGNANT NEOPLASM OF LOWER-OUTER QUADRANT OF LEFT BREAST OF FEMALE, ESTROGEN RECEPTOR POSITIVE (HCC): Status: ACTIVE | Noted: 2019-07-01

## 2019-07-01 PROBLEM — C50.512 MALIGNANT NEOPLASM OF LOWER-OUTER QUADRANT OF LEFT BREAST OF FEMALE, ESTROGEN RECEPTOR POSITIVE (HCC): Status: ACTIVE | Noted: 2019-07-01

## 2019-07-01 NOTE — PERIOP NOTES
Patient called wanting to know if she can take her Amoxicillin tomorrow prior to her teeth cleaning. Instructed her that it is okay to do so.

## 2019-07-03 ENCOUNTER — ANESTHESIA EVENT (OUTPATIENT)
Dept: SURGERY | Age: 64
End: 2019-07-03
Payer: COMMERCIAL

## 2019-07-05 ENCOUNTER — APPOINTMENT (OUTPATIENT)
Dept: MAMMOGRAPHY | Age: 64
End: 2019-07-05
Attending: SURGERY
Payer: COMMERCIAL

## 2019-07-05 ENCOUNTER — ANESTHESIA (OUTPATIENT)
Dept: SURGERY | Age: 64
End: 2019-07-05
Payer: COMMERCIAL

## 2019-07-05 ENCOUNTER — APPOINTMENT (OUTPATIENT)
Dept: NUCLEAR MEDICINE | Age: 64
End: 2019-07-05
Attending: SURGERY
Payer: COMMERCIAL

## 2019-07-05 ENCOUNTER — HOSPITAL ENCOUNTER (OUTPATIENT)
Age: 64
Setting detail: OUTPATIENT SURGERY
Discharge: HOME | End: 2019-07-05
Attending: SURGERY | Admitting: SURGERY
Payer: COMMERCIAL

## 2019-07-05 VITALS
OXYGEN SATURATION: 98 % | HEIGHT: 61 IN | TEMPERATURE: 99.4 F | WEIGHT: 179.4 LBS | HEART RATE: 63 BPM | RESPIRATION RATE: 16 BRPM | SYSTOLIC BLOOD PRESSURE: 145 MMHG | BODY MASS INDEX: 33.87 KG/M2 | DIASTOLIC BLOOD PRESSURE: 65 MMHG

## 2019-07-05 DIAGNOSIS — Z17.0 MALIGNANT NEOPLASM OF LEFT BREAST IN FEMALE, ESTROGEN RECEPTOR POSITIVE, UNSPECIFIED SITE OF BREAST (HCC): Primary | ICD-10-CM

## 2019-07-05 DIAGNOSIS — C50.912 MALIGNANT NEOPLASM OF LEFT BREAST IN FEMALE, ESTROGEN RECEPTOR POSITIVE, UNSPECIFIED SITE OF BREAST (HCC): Primary | ICD-10-CM

## 2019-07-05 LAB — GLUCOSE BLD STRIP.AUTO-MCNC: 113 MG/DL (ref 65–100)

## 2019-07-05 PROCEDURE — 76060000032 HC ANESTHESIA 0.5 TO 1 HR: Performed by: SURGERY

## 2019-07-05 PROCEDURE — 77030018836 HC SOL IRR NACL ICUM -A: Performed by: SURGERY

## 2019-07-05 PROCEDURE — 74011250636 HC RX REV CODE- 250/636

## 2019-07-05 PROCEDURE — 77030002996 HC SUT SLK J&J -A: Performed by: SURGERY

## 2019-07-05 PROCEDURE — 74011250636 HC RX REV CODE- 250/636: Performed by: ANESTHESIOLOGY

## 2019-07-05 PROCEDURE — 76210000016 HC OR PH I REC 1 TO 1.5 HR: Performed by: SURGERY

## 2019-07-05 PROCEDURE — 74011000250 HC RX REV CODE- 250

## 2019-07-05 PROCEDURE — 77030002933 HC SUT MCRYL J&J -A: Performed by: SURGERY

## 2019-07-05 PROCEDURE — 88307 TISSUE EXAM BY PATHOLOGIST: CPT

## 2019-07-05 PROCEDURE — 77030032490 HC SLV COMPR SCD KNE COVD -B: Performed by: SURGERY

## 2019-07-05 PROCEDURE — 82962 GLUCOSE BLOOD TEST: CPT

## 2019-07-05 PROCEDURE — 74011250636 HC RX REV CODE- 250/636: Performed by: SURGERY

## 2019-07-05 PROCEDURE — 76210000020 HC REC RM PH II FIRST 0.5 HR: Performed by: SURGERY

## 2019-07-05 PROCEDURE — 77030031139 HC SUT VCRL2 J&J -A: Performed by: SURGERY

## 2019-07-05 PROCEDURE — 76010000160 HC OR TIME 0.5 TO 1 HR INTENSV-TIER 1: Performed by: SURGERY

## 2019-07-05 PROCEDURE — 77030003460 MAM PLC NDL/WIRE/CLIP/SEED BREAST LT

## 2019-07-05 PROCEDURE — 77030010509 HC AIRWY LMA MSK TELE -A: Performed by: ANESTHESIOLOGY

## 2019-07-05 PROCEDURE — 77030010938 HC CLP LIG TELE -A: Performed by: SURGERY

## 2019-07-05 RX ORDER — MIDAZOLAM HYDROCHLORIDE 1 MG/ML
2 INJECTION, SOLUTION INTRAMUSCULAR; INTRAVENOUS ONCE
Status: DISCONTINUED | OUTPATIENT
Start: 2019-07-05 | End: 2019-07-05 | Stop reason: HOSPADM

## 2019-07-05 RX ORDER — PROPOFOL 10 MG/ML
INJECTION, EMULSION INTRAVENOUS AS NEEDED
Status: DISCONTINUED | OUTPATIENT
Start: 2019-07-05 | End: 2019-07-05 | Stop reason: HOSPADM

## 2019-07-05 RX ORDER — DEXAMETHASONE SODIUM PHOSPHATE 4 MG/ML
INJECTION, SOLUTION INTRA-ARTICULAR; INTRALESIONAL; INTRAMUSCULAR; INTRAVENOUS; SOFT TISSUE AS NEEDED
Status: DISCONTINUED | OUTPATIENT
Start: 2019-07-05 | End: 2019-07-05 | Stop reason: HOSPADM

## 2019-07-05 RX ORDER — SODIUM CHLORIDE, SODIUM LACTATE, POTASSIUM CHLORIDE, CALCIUM CHLORIDE 600; 310; 30; 20 MG/100ML; MG/100ML; MG/100ML; MG/100ML
100 INJECTION, SOLUTION INTRAVENOUS CONTINUOUS
Status: DISCONTINUED | OUTPATIENT
Start: 2019-07-05 | End: 2019-07-05 | Stop reason: HOSPADM

## 2019-07-05 RX ORDER — EPHEDRINE SULFATE 50 MG/ML
INJECTION, SOLUTION INTRAVENOUS AS NEEDED
Status: DISCONTINUED | OUTPATIENT
Start: 2019-07-05 | End: 2019-07-05 | Stop reason: HOSPADM

## 2019-07-05 RX ORDER — HYDROMORPHONE HYDROCHLORIDE 2 MG/ML
0.5 INJECTION, SOLUTION INTRAMUSCULAR; INTRAVENOUS; SUBCUTANEOUS
Status: DISCONTINUED | OUTPATIENT
Start: 2019-07-05 | End: 2019-07-05 | Stop reason: HOSPADM

## 2019-07-05 RX ORDER — MIDAZOLAM HYDROCHLORIDE 1 MG/ML
2 INJECTION, SOLUTION INTRAMUSCULAR; INTRAVENOUS
Status: DISCONTINUED | OUTPATIENT
Start: 2019-07-05 | End: 2019-07-05 | Stop reason: HOSPADM

## 2019-07-05 RX ORDER — TRAMADOL HYDROCHLORIDE 50 MG/1
50 TABLET ORAL
Qty: 30 TAB | Refills: 0 | Status: SHIPPED | OUTPATIENT
Start: 2019-07-05 | End: 2019-07-08

## 2019-07-05 RX ORDER — SODIUM CHLORIDE 0.9 % (FLUSH) 0.9 %
5-40 SYRINGE (ML) INJECTION AS NEEDED
Status: DISCONTINUED | OUTPATIENT
Start: 2019-07-05 | End: 2019-07-05 | Stop reason: HOSPADM

## 2019-07-05 RX ORDER — FENTANYL CITRATE 50 UG/ML
100 INJECTION, SOLUTION INTRAMUSCULAR; INTRAVENOUS ONCE
Status: DISCONTINUED | OUTPATIENT
Start: 2019-07-05 | End: 2019-07-05 | Stop reason: HOSPADM

## 2019-07-05 RX ORDER — CEFAZOLIN SODIUM/WATER 2 G/20 ML
2 SYRINGE (ML) INTRAVENOUS ONCE
Status: COMPLETED | OUTPATIENT
Start: 2019-07-05 | End: 2019-07-05

## 2019-07-05 RX ORDER — FENTANYL CITRATE 50 UG/ML
INJECTION, SOLUTION INTRAMUSCULAR; INTRAVENOUS AS NEEDED
Status: DISCONTINUED | OUTPATIENT
Start: 2019-07-05 | End: 2019-07-05 | Stop reason: HOSPADM

## 2019-07-05 RX ORDER — LIDOCAINE HYDROCHLORIDE 10 MG/ML
0.1 INJECTION INFILTRATION; PERINEURAL AS NEEDED
Status: DISCONTINUED | OUTPATIENT
Start: 2019-07-05 | End: 2019-07-05 | Stop reason: HOSPADM

## 2019-07-05 RX ORDER — LIDOCAINE HYDROCHLORIDE 10 MG/ML
5 INJECTION INFILTRATION; PERINEURAL
Status: COMPLETED | OUTPATIENT
Start: 2019-07-05 | End: 2019-07-05

## 2019-07-05 RX ORDER — LIDOCAINE HYDROCHLORIDE 10 MG/ML
INJECTION INFILTRATION; PERINEURAL AS NEEDED
Status: DISCONTINUED | OUTPATIENT
Start: 2019-07-05 | End: 2019-07-05 | Stop reason: HOSPADM

## 2019-07-05 RX ORDER — SODIUM CHLORIDE 0.9 % (FLUSH) 0.9 %
5-40 SYRINGE (ML) INJECTION EVERY 8 HOURS
Status: DISCONTINUED | OUTPATIENT
Start: 2019-07-05 | End: 2019-07-05 | Stop reason: HOSPADM

## 2019-07-05 RX ORDER — LIDOCAINE HYDROCHLORIDE 20 MG/ML
INJECTION, SOLUTION EPIDURAL; INFILTRATION; INTRACAUDAL; PERINEURAL AS NEEDED
Status: DISCONTINUED | OUTPATIENT
Start: 2019-07-05 | End: 2019-07-05 | Stop reason: HOSPADM

## 2019-07-05 RX ORDER — ONDANSETRON 2 MG/ML
INJECTION INTRAMUSCULAR; INTRAVENOUS AS NEEDED
Status: DISCONTINUED | OUTPATIENT
Start: 2019-07-05 | End: 2019-07-05 | Stop reason: HOSPADM

## 2019-07-05 RX ORDER — OXYCODONE HYDROCHLORIDE 5 MG/1
5 TABLET ORAL
Status: DISCONTINUED | OUTPATIENT
Start: 2019-07-05 | End: 2019-07-05 | Stop reason: HOSPADM

## 2019-07-05 RX ORDER — NALOXONE HYDROCHLORIDE 0.4 MG/ML
0.04 INJECTION, SOLUTION INTRAMUSCULAR; INTRAVENOUS; SUBCUTANEOUS
Status: DISCONTINUED | OUTPATIENT
Start: 2019-07-05 | End: 2019-07-05 | Stop reason: HOSPADM

## 2019-07-05 RX ADMIN — ONDANSETRON 4 MG: 2 INJECTION INTRAMUSCULAR; INTRAVENOUS at 13:59

## 2019-07-05 RX ADMIN — FENTANYL CITRATE 25 MCG: 50 INJECTION, SOLUTION INTRAMUSCULAR; INTRAVENOUS at 13:59

## 2019-07-05 RX ADMIN — LIDOCAINE HYDROCHLORIDE 60 MG: 20 INJECTION, SOLUTION EPIDURAL; INFILTRATION; INTRACAUDAL; PERINEURAL at 13:44

## 2019-07-05 RX ADMIN — Medication 2 G: at 13:41

## 2019-07-05 RX ADMIN — DEXAMETHASONE SODIUM PHOSPHATE 5 MG: 4 INJECTION, SOLUTION INTRA-ARTICULAR; INTRALESIONAL; INTRAMUSCULAR; INTRAVENOUS; SOFT TISSUE at 13:58

## 2019-07-05 RX ADMIN — FENTANYL CITRATE 50 MCG: 50 INJECTION, SOLUTION INTRAMUSCULAR; INTRAVENOUS at 13:44

## 2019-07-05 RX ADMIN — EPHEDRINE SULFATE 15 MG: 50 INJECTION, SOLUTION INTRAVENOUS at 13:50

## 2019-07-05 RX ADMIN — SODIUM CHLORIDE, SODIUM LACTATE, POTASSIUM CHLORIDE, AND CALCIUM CHLORIDE: 600; 310; 30; 20 INJECTION, SOLUTION INTRAVENOUS at 14:21

## 2019-07-05 RX ADMIN — PROPOFOL 150 MG: 10 INJECTION, EMULSION INTRAVENOUS at 13:44

## 2019-07-05 RX ADMIN — SODIUM CHLORIDE, SODIUM LACTATE, POTASSIUM CHLORIDE, AND CALCIUM CHLORIDE 100 ML/HR: 600; 310; 30; 20 INJECTION, SOLUTION INTRAVENOUS at 08:34

## 2019-07-05 RX ADMIN — EPHEDRINE SULFATE 10 MG: 50 INJECTION, SOLUTION INTRAVENOUS at 13:52

## 2019-07-05 RX ADMIN — LIDOCAINE HYDROCHLORIDE 4 ML: 10 INJECTION, SOLUTION INFILTRATION; PERINEURAL at 12:06

## 2019-07-05 RX ADMIN — FENTANYL CITRATE 25 MCG: 50 INJECTION, SOLUTION INTRAMUSCULAR; INTRAVENOUS at 13:56

## 2019-07-05 NOTE — OP NOTES
Breast Lumpectomy, needle localization with Ewing Node PRE-PROCEDURE DIAGNOSIS: left breast cancer POSTOPERATIVE DIAGNOSIS: same NAME OF PROCEDURE: left breast lumpectomy, needle localized, sentinel lymph node biopsy and lymphatic mapping SURGEON: Vincent Osuna MD. ASSISTANT: None. ANESTHESIA: General.  
 
ESTIMATED BLOOD LOSS: 10ml SPECIMENS: lumpectomy , sln Drains: none INDICATIONS: The patient is a 56yo WF with T1N0 left breast cancer. Risks, benefits, and alternatives were discussed in detail with the patient preoperatively. She understood the risks and wished to  
proceed and consent was obtained. PROCEDURE: She was taken to the operating room, placed on table in supine  
position. Needle localization of the biopsy clip in left bresat was done in  
radiology prior and the lymph node was mapped using radioactive tracer. The left breast and axilla were prepped and draped in  
routine sterile fashion. The sentinel lymph node portion was done first.  
A  Small incision was made at the inferior border of the hairline in the  
axilla and dissection was carefully performed through the subcutaneous  
tissue to the axillary lymph nodes identified by neoprobe. The sentinel node was   
identified. It was dissected out. All lymph nodes with sufficient increase in uptake were removed and sent to pathology. Irrigation was performed and  
the wound closed in 2 layers with 3-0 Vicryl and 4-0 subcu Monocryl. Sterile dressing was applied. Attention was then turned to the  
lumpectomy. Incision was made along skin lines following the localization wire to the appropriate position. .Dissection  
around the mass was performed with electrocautery to include a generous margin. Specimen radiograph showed removal of the proper tissue and the previously  
placed biopsy clip. Irrigation was performed of the cavity. Hemostasis was assured.  The  
 tissues were closed with 3-0 Vicryl and 4-0 subcuticular Monocryl. Sterile dressing applied. She tolerated the procedure well with no  
complications and was taken to the recovery room in good condition.

## 2019-07-05 NOTE — ANESTHESIA POSTPROCEDURE EVALUATION
Procedure(s): LEFT BREAST LUMPECTOMY WITH NEEDLE  AND SENTINAL NODE/. 
 
general 
 
Anesthesia Post Evaluation Multimodal analgesia: multimodal analgesia used between 6 hours prior to anesthesia start to PACU discharge Patient location during evaluation: PACU Patient participation: complete - patient participated Level of consciousness: awake and alert Pain management: adequate Airway patency: patent Anesthetic complications: no 
Cardiovascular status: acceptable Respiratory status: acceptable Hydration status: acceptable Post anesthesia nausea and vomiting:  none Vitals Value Taken Time /65 7/5/2019  3:25 PM  
Temp 37.4 °C (99.4 °F) 7/5/2019  2:30 PM  
Pulse 63 7/5/2019  3:25 PM  
Resp 16 7/5/2019  3:25 PM  
SpO2 98 % 7/5/2019  3:25 PM

## 2019-07-05 NOTE — ANESTHESIA PREPROCEDURE EVALUATION
Relevant Problems No relevant active problems Anesthetic History PONV Review of Systems / Medical History Pertinent labs reviewed Pulmonary Undiagnosed apnea Neuro/Psych  
 
 
 
TIA (mult, full w/u by neurologist- unclear etiology. Last TIA one week ago. Gets right sided weakness,.) Cardiovascular Hypertension Dysrhythmias (Mobitz 1 AV block) Exercise tolerance: >4 METS Comments: Unremarkable echo 3/19 and stress test in 2018. GI/Hepatic/Renal 
  
GERD: well controlled Renal disease: stones Endo/Other Diabetes: type 2, using insulin Hypothyroidism Obesity and arthritis (RA) Other Findings Physical Exam 
 
Airway Mallampati: II 
TM Distance: 4 - 6 cm Neck ROM: normal range of motion Mouth opening: Normal 
 
 Cardiovascular Rhythm: irregular Rate: normal 
 
 
 
Comments: occ irreg beats Dental 
No notable dental hx Pulmonary Breath sounds clear to auscultation Abdominal 
GI exam deferred Other Findings Anesthetic Plan ASA: 3 Anesthesia type: general 
 
 
 
 
Induction: Intravenous Anesthetic plan and risks discussed with: Patient and Spouse Seen by Michael Morrow 5/19. [place] to be in Mobitz 1 based on holter results. Referred to EP but note states that unclear if pacer would be of benefit.

## 2019-07-05 NOTE — DISCHARGE INSTRUCTIONS
May shower on Sunday   Lumpectomy: What to Expect at [address]    For 1 or 2 days after the surgery you will probably feel tired and have some pain. The skin around the cut (incision) may feel firm, swollen, and tender, and be bruised. Tenderness should go away in about 2 or 3 days, and the bruising within 2 weeks. Firmness and swelling may last for 3 to 6 months. You may feel a soft lump in your breast that gradually turns hard. This is the incision healing. It is not cancer. Women should wear a well-fitted and supportive bra, even during the night, for 1 week. You will probably be able to go back to work or your normal routine in 1 to 3 weeks after the surgery. This may depend on whether you have more treatment. Your doctor may have removed some lymph nodes in your armpit to see if the cancer has spread. If so, you may feel either numbness or tingling (\"pins and needles\") in your armpit or on the inside of your upper arm. This should improve over the next several weeks. Some people have numbness for a longer time. When you find out that you have cancer, you may feel many emotions and may need some help coping. Seek out family, friends, and counselors for support. You also can do things at home to make yourself feel better while you go through treatment. Call the [facility] ([phone]) or visit its website at [address] Georgina Goodman for more information. This care sheet gives you a general idea about how long it will take for you to recover. But each person recovers at a different pace. Follow the steps below to get better as quickly as possible. How can you care for yourself at home? Activity    · Rest when you feel tired. Getting enough sleep will help you recover. You may want to sleep on the side that has not been operated on.  A woman may want to use a pillow to support the affected breast while lying on her side.     · Avoid strenuous activities, such as biking, jogging, weightlifting, or aerobic exercise, for 1 month or until your doctor says it is okay. This may include housework, such as washing windows, especially if you have to use the arm next to the affected breast.     · Most people can return to their normal activities within 2 weeks.     · Try to walk each day. Start out by walking a little more than you did the day before. Bit by bit, increase the amount you walk. Walking boosts blood flow and helps prevent pneumonia and constipation.     · For 1 to 2 weeks, avoid lifting anything over 10 to 15 pounds or that would make you strain. This may include heavy grocery bags and milk containers, a heavy briefcase or backpack, cat litter or dog food bags, a vacuum , or a child.     · You may drive when you are no longer taking pain medicine and can use your arm without pain. Talk to your doctor about when to start driving, especially if you are having radiation treatments.     · You will probably be able to go back to work or your normal routine in 1 to 3 weeks. It may be longer, depending on the type of work you do and whether you are having radiation or chemotherapy.     · You may shower 24 to 48 hours after surgery, if your doctor okays it. Pat the incision dry. Do not take a bath for the first 2 weeks, or until your doctor tells you it is okay. Diet    · You can eat your normal diet. If your stomach is upset, try bland, low-fat foods like plain rice, broiled chicken, toast, and yogurt.     · You may notice that your bowel movements are not regular right after your surgery. This is common. Try to avoid constipation and straining with bowel movements. You may want to take a fiber supplement every day. If you have not had a bowel movement after a couple of days, ask your doctor about taking a mild laxative. Medicines    · Your doctor will tell you if and when you can restart your medicines.  He or she will also give you instructions about taking any new medicines.     · If you take blood thinners, such as warfarin (Coumadin), clopidogrel (Plavix), or aspirin, be sure to talk to your doctor. He or she will tell you if and when to start taking those medicines again. Make sure that you understand exactly what your doctor wants you to do.     · Take pain medicines exactly as directed. ? Your doctor may have given you a medicine to numb the area inside and around your cut (incision). The numbness will last from 6 to 12 hours. If you went home right after the surgery, you may want to take pain medicine before this wears off.  ? If the doctor gave you a prescription medicine for pain, take it as prescribed. ? If you are not taking a prescription pain medicine, ask your doctor if you can take an over-the-counter medicine.     · If your doctor prescribed antibiotics, take them as directed. Do not stop taking them just because you feel better. You need to take the full course of antibiotics.     · If you think your pain medicine is making you sick to your stomach:  ? Take your medicine after meals (unless your doctor has told you not to). ? Ask your doctor for a different pain medicine. Incision care    · If you have strips of tape on the cut the doctor made (incision), leave the tape on for a week or until it falls off.     · When you can shower, wash the area daily with warm, soapy water and pat it dry. Follow-up care is a key part of your treatment and safety. Be sure to make and go to all appointments, and call your doctor if you are having problems. It's also a good idea to know your test results and keep a list of the medicines you take. When should you call for help? Call 911 anytime you think you may need emergency care.  For example, call if:    · You passed out (lost consciousness).     · You have chest pain, are short of breath, or cough up blood.    Call your doctor now or seek immediate medical care if:    · You are sick to your stomach or cannot drink fluids.     · You cannot pass stools or gas.     · You have pain that does not get better after you take your pain medicine.     · You have loose stitches, or your incision comes open.     · Bright red blood has soaked through the bandage over your incision.     · You have signs of a blood clot in your leg (called a deep vein thrombosis), such as:  ? Pain in your calf, back of the knee, thigh, or groin. ? Redness or swelling in your leg.     · You have signs of infection, such as:  ? Increased pain, swelling, warmth, or redness. ? Red streaks leading from the incision. ? Pus draining from the incision. ? A fever.    Watch closely for changes in your health, and be sure to contact your doctor if:    · You have any problems.     · You have new or worse swelling or pain in your arm. Where can you learn more? Go to http://suri-kristan.info/. Enter D222 in the search box to learn more about \"Lumpectomy: What to Expect at Home. \"  Current as of: March 27, 2018  Content Version: 11.9  © 5588-8210 [employer]. Care instructions adapted under license by [employer] (which disclaims liability or warranty for this information). If you have questions about a medical condition or this instruction, always ask your healthcare professional. [address] any warranty or liability for your use of this information.

## 2019-07-22 ENCOUNTER — PATIENT OUTREACH (OUTPATIENT)
Dept: CASE MANAGEMENT | Age: 64
End: 2019-07-22

## 2019-07-22 NOTE — PROGRESS NOTES
7/22/2019 Saw the patient with Dr Rene Galvez. She was recently diagnosed with breast cancer. Dr Rene Galvez discussed with her that she would need Oncotpye DX sent. She would benefit from hormone blocking pill and radiation. Navigation information given to the patient. Will continue to follow.

## 2019-07-25 PROBLEM — I10 ESSENTIAL HYPERTENSION: Status: ACTIVE | Noted: 2019-07-25

## 2019-07-25 PROBLEM — J30.89 ENVIRONMENTAL AND SEASONAL ALLERGIES: Status: ACTIVE | Noted: 2019-07-25

## 2019-07-29 ENCOUNTER — HOSPITAL ENCOUNTER (OUTPATIENT)
Dept: RADIATION ONCOLOGY | Age: 64
Discharge: HOME | End: 2019-07-29
Payer: COMMERCIAL

## 2019-07-29 VITALS
TEMPERATURE: 97.7 F | BODY MASS INDEX: 33.05 KG/M2 | SYSTOLIC BLOOD PRESSURE: 110 MMHG | HEART RATE: 71 BPM | WEIGHT: 174.9 LBS | OXYGEN SATURATION: 97 % | DIASTOLIC BLOOD PRESSURE: 61 MMHG

## 2019-07-29 PROCEDURE — 99211 OFF/OP EST MAY X REQ PHY/QHP: CPT

## 2019-07-29 NOTE — CONSULTS
Patient: Ernestine Gonzalez MRN: [id number]  SSN: [id number]    YOB: 1955  Age: 59 y.o. Sex: female      Other Providers:  Olinda Hanks MD    CHIEF COMPLAINT: Breast cancer    DIAGNOSIS: Left breast stage IA invasive ductal carcinoma, [id number], ER 95%, NC negative, HER2 negative. ER 95%, NC 0%, HER2 negative    PREVIOUS TREATMENT:  1) Left breast lumpectomy    HISTORY OF PRESENT ILLNESS:  Ernestine Gonzalez is a 59 y.o. female who I am seeing at the request of Dr. Jermaine Jama. She underwent screening mammogram in June 2019 which showed a new left breast mass prompting further evaluation with diagnostic views and ultrasound. Ultrasound was negative but the mass persisted on mammogram, prompting stereotactic biopsy. This showed IDC, low grade, ER 95%, NC negative, HER2 negative. She underwent MRI which showed a small area of reactive change with biopsy clip and artifact, no residual measurable lesion was noted. She was referred to surgery and underwent lumpectomy which showed the tumor to be 1.2 cm with close but negative anterior margins. Her single sentinel node was negative. Following surgery she was presented in conference and referrals were made to medical and radiation oncology. She met with Dr. Jermaine Jama 7/22/19 who recommended Oncotype for assistance with chemotherapy decision making. She was seen in our office prior to this returning 7/29/19. She will be seeing Dr. Jermaine Jama back in follow up for the Oncotype. She had prior CVA and TIA which limits her PS, ECOG 2.      OBSTETRIC HISTORY:    Menarche at the age of 16-14. G5,P5. Oral Contraceptive Pills: In 1980's  Hormone Replacement Therapy:  None  Menopause at [age]    PAST MEDICAL HISTORY:    Past Medical History:   Diagnosis Date    Actinic keratosis     uses Solaraze gel when needed    Arthritis     rheumatoid.   Orencia infusion every 4 weeks    Asthma     none in yrs per pt--- no inhalers per pt    Cancer ([address]     melanoma  DJD (degenerative joint disease)     Environmental allergies     GERD (gastroesophageal reflux disease)     prn med    History of malignant melanoma 1980's    on back    History of TIA (transient ischemic attack)     Hypertension     controlled with medication    Insulin dependent diabetes mellitus ([address] 1990's    Type 2. sqbs avg am--100---- hypo at 73- last A1C= 8.1 on 12/19/17    Kidney stones     4th one at present    Mobitz type 1 second degree AV block     EKG today 9/22/16- Dr Bean Asencio reviewed----- per pt-- \" been that way my whole life\"-- does not see a cardiologist    Obese     bmi =39    Osteopenia     PONV (postoperative nausea and vomiting)     POV- responds to IV antiemetic    RA (rheumatoid arthritis) ([address] dx--2013    Rosacea     Stroke [facility])     followed by neurologist      Unspecified hypothyroidism     hypothyroid. Controlled with Levoxyl       The patient denies history of collagen vascular diseases, pacemaker insertion, prior radiation or prior chemotherapy.      PAST SURGICAL HISTORY:   Past Surgical History:   Procedure Laterality Date    ABDOMEN SURGERY PROC UNLISTED      fatty tumor removed- from [facility]--[address] HERN[place]NDEZ      HX BREAST BIOPSY Left 06/21/2019    stereo bx    HX BREAST LUMPECTOMY Left 7/5/2019    LEFT BREAST LUMPECTOMY WITH NEEDLE  AND SENTINAL NODE/ performed by Rhonda Roberts MD at [address] [place] [address] HX [id number]. HX COLONOSCOPY  2008; 2010    HX [address]    and hysteroscopy    HX GYN  1978    vaginal cryocautery     HX HEENT  1986    Oral Surgery--per pt  still has metal in mouth since 1980's    HX KNEE ARTHROSCOPY Right 2007    HX KNEE REPLACEMENT Left 2008    HX KNEE REPLACEMENT Right 2016    HX LAP CHOLECYSTECTOMY  1993    HX LITHOTRIPSY  1998, 2003; 2016; 2/6/18    HX MENISCECTOMY Right 2011    Right knee    HX MOHS PROCEDURES Right 2004    HX OTHER SURGICAL  1999    lipoma of abdomen    HX OTHER SURGICAL  1984    melanoma of back    HX REFRACTIVE SURGERY  2002    HX TUBAL LIGATION  1989       MEDICATIONS:     Current Outpatient Medications:     metFORMIN (GLUCOPHAGE) 1,000 mg tablet, Take 1 Tab by mouth two (2) times daily (with meals). , Disp: 180 Tab, Rfl: 1    loratadine (CLARITIN) 10 mg tablet, Take 1 Tab by mouth daily. , Disp: 90 Tab, Rfl: 1    lisinopril (PRINIVIL, ZESTRIL) 30 mg tablet, Take 1 Tab by mouth daily. , Disp: 90 Tab, Rfl: 1    levothyroxine (SYNTHROID) 100 mcg tablet, Take 1 Tab by mouth Daily (before breakfast). , Disp: 90 Tab, Rfl: 1    glipiZIDE (GLUCOTROL) 10 mg tablet, TAKE ONE TABLET BY MOUTH TWICE DAILY  Indications: type 2 diabetes mellitus, Disp: 180 Tab, Rfl: 1    fluticasone propionate (FLONASE ALLERGY RELIEF) 50 mcg/actuation nasal spray, 2 Sprays by Both Nostrils route daily. , Disp: 3 Bottle, Rfl: 1    calcium carbonate (TUMS) 200 mg calcium (500 mg) chew, Take 1 Tab by mouth as needed. Indications: heartburn, Disp: , Rfl:     insulin detemir U-100 (LEVEMIR FLEXTOUCH U-100 INSULN) 100 unit/mL (3 mL) inpn, Usually takes 10-15 units in am and then 60 units at hs--- BUT TOTAL FOR 24 HOURS IS NOT OVER 75 UNITS (STARTS COUNTING UNITS T NIGHT DOSE)  Indications: type 2 diabetes mellitus (Patient taking differently: 28 Units by SubCUTAneous route nightly. Indications: type 2 diabetes mellitus), Disp: 2 Units, Rfl: 3    NOVOFINE 32 32 gauge x 1/4\" ndle, Use TID, Disp: 100 Pen Needle, Rfl: 3    glucose blood VI test strips (ACCU-CHEK KALEB PLUS TEST STRP) strip, Check BG three to four times daily. Please dispense strips based on glucometer, possibly Accu-chek Stephanie., Disp: 200 Strip, Rfl: 5    krill/om-3/dha/epa/phospho/ast (MEGARED OMEGA-3 KRILL OIL PO), Take 1 Cap by mouth daily. , Disp: , Rfl:     atorvastatin (LIPITOR) 80 mg tablet, Take 1 Tab by mouth nightly. (Patient taking differently: Take 80 mg by mouth nightly.  Indications: high cholesterol), Disp: 30 Tab, Rfl: 2    clopidogrel (PLAVIX) 75 mg tab, Take 1 Tab by mouth daily. (Patient taking differently: Take 75 mg by mouth daily. Pt instructed to hold Plavix 3 days prior to surgery per Lai; cleared with cardiologist Dr Jyotsna Bonds), Disp: 30 Tab, Rfl: 2    aspirin (ASPIRIN) 325 mg tablet, Take 1 Tab by mouth daily. (Patient taking differently: Take 325 mg by mouth daily. Indications: treatment to prevent a heart attack), Disp: 30 Tab, Rfl: 0    cholecalciferol (VITAMIN D3) 1,000 unit cap, Take 1,000 Units by mouth daily. Indications: Prevention of Vitamin D Deficiency, Disp: , Rfl:     GLUCOSAMINE HCL/CHONDR MERAZ A NA (OSTEO BI-FLEX PO), Take 1 Tab by mouth two (2) times a day., Disp: , Rfl:     calcium-cholecalciferol, d3, (CALCIUM 600 + D) 600-125 mg-unit tab, Take 1 Tab by mouth two (2) times a day. Indications: post-menopausal osteoporosis prevention, Disp: , Rfl:     ALLERGIES:   Allergies   Allergen Reactions    Other Food Swelling     Jalapeno. -- tongue and lip swells    Bee Sting [Sting, Bee] Swelling     Local swelling    Invokana [Canagliflozin] Other (comments)     Caused frequent Urinary Tract Infections    Other Plant, Animal, Environmental Other (comments)     Trees, grass, dust, mold---- congestion       SOCIAL HISTORY:   Social History     Socioeconomic History    Marital status:      Spouse name: Not on file    Number of children: Not on file    Years of education: Not on file    Highest education level: Not on file   Occupational History    Not on file   Social Needs    Financial resource strain: Not on file    Food insecurity:     Worry: Not on file     Inability: Not on file    Transportation needs:     Medical: Not on file     Non-medical: Not on file   Tobacco Use    Smoking status: Never Smoker    Smokeless tobacco: Never Used   Substance and Sexual Activity    Alcohol use:  Yes     Alcohol/week: 1.0 standard drinks     Types: 1 Glasses of wine per week     Comment: occasionally    Drug use: No    Sexual activity: Not on file   Lifestyle    Physical activity:     Days per week: Not on file     Minutes per session: Not on file    Stress: Not on file   Relationships    Social connections:     Talks on phone: Not on file     Gets together: Not on file     Attends Rastafarian service: Not on file     Active member of club or organization: Not on file     Attends meetings of clubs or organizations: Not on file     Relationship status: Not on file    Intimate partner violence:     Fear of current or ex partner: Not on file     Emotionally abused: Not on file     Physically abused: Not on file     Forced sexual activity: Not on file   Other Topics Concern     Service Not Asked    Blood Transfusions Not Asked    Caffeine Concern Not Asked    Occupational Exposure Not Asked   Kolleen Bonine Hazards Not Asked    Sleep Concern Not Asked    Stress Concern Not Asked    Weight Concern Not Asked    Special Diet Not Asked    Back Care Not Asked    Exercise Not Asked    Bike Helmet Not Asked   [address] Not Asked    Self-Exams Not Asked   Social History Narrative    Not on file       FAMILY HISTORY:   Family History   Problem Relation Age of Onset    Cancer Mother         Lung    Arthritis-rheumatoid Mother     Thyroid Disease Mother     Cancer Father         Lung; Liver    Diabetes Father     Heart Disease Father     Hypertension Father     Diabetes Brother     SLE Sister     Breast Cancer Paternal Aunt     Breast Cancer Other         cousins       REVIEW OF SYSTEMS: Please see the completed review of systems sheet in the chart that I have reviewed today.       PHYSICAL EXAMINATION:   ECOG Performance status 2  VITAL SIGNS:   Visit Vitals  /61 (BP 1 Location: Left arm, BP Patient Position: Sitting)   Pulse 71   Temp 97.7 °F (36.5 °C)   Wt 79.3 kg (174 lb 14.4 oz)   SpO2 97%   BMI 33.05 kg/m²        GENERAL: The patient is well-developed, ambulatory, alert and in no acute distress. HEENT: Head is normocephalic, atraumatic. Pupils are equal, round and reactive to light and accommodation. Extraocular movement intact. Hearing is intact bilaterally to finger rub. Oral cavity reveals no lesions. Mucous membranes are moist. NECK: Neck is supple with no masses. CARDIOVASCULAR: Heart is regular rate and rhythm. There are no murmurs rubs or gallups. Radial pulses are 2+ RESPIRATORY: Lungs are clear to auscultation and percussion. There is normal respiratory effort. GASTROINTESTINAL: The abdomen is soft, non-tender, nondistended with no hepatospelnomagaly. Digital rectal examination: deferred LYMPHATIC: There is no cervical, supraclavicular or axillary lymphadenopathy bilaterally. MUSCULOSKELETAL: Extremities reveal no cyanosis, clubbing or edema.  is 5+/5. BREASTS: Examination of the unaffected breast reveals no dominant nodules or masses. The lumpectomy cavity appears well healed with good aesthetics and symmetry. Well healed surgical incision. NEURO:  Cranial nerves II-XII grossly intact. Muscular strength and sensation are intact throughout all four extremities.         PATHOLOGY:    Pathology:           LABORATORY:   Lab Results   Component Value Date/Time    Sodium 140 05/02/2019 10:50 AM    Potassium 4.1 05/02/2019 10:50 AM    Chloride 103 05/02/2019 10:50 AM    CO2 21 05/02/2019 10:50 AM    Anion gap 8 04/13/2019 10:20 PM    Glucose 85 05/02/2019 10:50 AM    BUN 17 05/02/2019 10:50 AM    Creatinine 0.78 05/02/2019 10:50 AM    GFR est AA 93 05/02/2019 10:50 AM    GFR est non-AA 81 05/02/2019 10:50 AM    Calcium 10.3 05/02/2019 10:50 AM    Magnesium 2.4 05/22/2008 03:50 AM    Albumin 4.2 05/02/2019 10:50 AM    Protein, total 7.2 05/02/2019 10:50 AM    Globulin 3.8 (H) 04/13/2019 10:20 PM    A-G Ratio 1.4 05/02/2019 10:50 AM    AST (SGOT) 57 (H) 05/02/2019 10:50 AM    ALT (SGPT) 47 (H) 05/02/2019 10:50 AM     Lab Results   Component Value Date/Time    WBC 8.1 05/02/2019 10:50 AM    HGB 12.7 05/02/2019 10:50 AM    HCT 37.5 05/02/2019 10:50 AM    PLATELET 003 08/80/1959 10:50 AM       RADIOLOGY:    Mri Breast Bi W Wo Cont    Result Date: 6/26/2019  MRI of the Breasts with and without contrast CLINICAL INDICATION:  New diagnosis of left 6:00 infiltrating duct carcinoma with lobular features low-grade undergoing evaluation for extent of disease, staging, therapy planning; remote personal history of melanoma. Patient's paternal aunt had breast cancer at an unknown age. COMPARISON: Mammography 6/17/2019, 6/21/2019, 6/10/2019 TECHNIQUE: Standard MRI sequences were obtained through the breasts in multiple planes. Images were obtained before and after intravenous infusion of 17 mL of Dotarem contrast. Images were reviewed with PACS and with [employer] CAD software. FINDINGS: The breasts demonstrate mild background glandularity and minimal enhancement. The left posterior 6:00 site of biopsy demonstrates artifact from the clip and a small hematoma measuring about 1.5 cm. Minimal linear enhancement surrounding the biopsy cavity and tract is likely reactive change. No residual measurable enhancing lesion is identified. There is no other evidence of suspicious enhancing mass or nonmass enhancement to suggest malignancy elsewhere in either breast. There is no evidence of axillary or internal mammary lymphadenopathy. A few small lymph nodes are noted in each axilla no obvious dysmorphology. Elsewhere, limited visualization of the partially included thorax and upper abdomen shows no concerning findings. IMPRESSION: 1. Left 6:00 site of cancer diagnosis demonstrates a small hematoma, with no residual measurable lesion. 2. No additional abnormality evident in either breast, no obvious lymphadenopathy. Recommend continued management of left breast cancer as directed clinically.  BI-RADS Assessment Category 6: Known Biopsy Proven Malignancy     [facility] Mammo Bi Screening Incl Cad    Result Date: 6/11/2019  STUDY:  Bilateral digital screening mammogram  INDICATION:  Screening. COMPARISON:  10/20/2014, 07/07/2011 FINDINGS: Bilateral digital mammography was performed, and is interpreted in conjunction with a computer assisted detection (CAD) system. The are scattered fibroglandular elements. There is a mass within the inferior left breast posterior depth, not visualized on previous imaging which may be due to its far posterior location versus interval change. Spot compression views in the CC and MLO projection as well as a full 90 degree mediolateral view are recommended for further evaluation. If this finding persists an ultrasound should be considered. There is no skin thickening or nipple retraction. There is no architectural distortion. Few benign-appearing calcifications are present bilaterally. IMPRESSION: Left breast mass. BI-RADS Assessment Category 0: Incomplete: Needs additional imaging evaluation. We will attempt to contact the patient and arrange for this additional imaging. A reminder letter will be sent to the patient at the appropriate time pending additional views. BD2     [facility] Mammo Lt Dx Incl Cad    Result Date: 6/17/2019  DIAGNOSTIC DIGITAL left MAMMOGRAPHY AND left BREAST ULTRASOUND CLINICAL INDICATION: Further evaluation of left posterior inferior possible mass; patient reports she has lost about 20-25 pounds over the last year related to medication and diet changes, with several recent episodes of TIA; personal history of melanoma, no prior breast surgery. Paternal aunt had breast cancer. COMPARISON: 6/10/2019, others back to 6/18/2009 FINDINGS: Mammogram: Digital diagnostic mammography was performed, and is interpreted in conjunction with a computer assisted detection (CAD) system.   Additional left mediolateral and compression magnification views demonstrate persistence of a focal asymmetry versus mass in the posterior inferior breast at 5:00-6:00, which was not seen on the older mammograms, for which ultrasound is recommended. Ultrasound: Real time targeted sonography was performed of the left inferior breast by the radiologist and sonographer, demonstrating no abnormality. IMPRESSION: Left posterior 6:00 mammographic density, with no sonographic correlate. Recommend stereotactic biopsy. This was discussed in full with the patient at the time of interpretation. BI-RADS Assessment Category 4: Suspicious Finding- Biopsy should be considered. Us Breast Lt Limited=<3 Quad    Result Date: 6/17/2019  DIAGNOSTIC DIGITAL left MAMMOGRAPHY AND left BREAST ULTRASOUND CLINICAL INDICATION: Further evaluation of left posterior inferior possible mass; patient reports she has lost about 20-25 pounds over the last year related to medication and diet changes, with several recent episodes of TIA; personal history of melanoma, no prior breast surgery. Paternal aunt had breast cancer. COMPARISON: 6/10/2019, others back to 6/18/2009 FINDINGS: Mammogram: Digital diagnostic mammography was performed, and is interpreted in conjunction with a computer assisted detection (CAD) system. Additional left mediolateral and compression magnification views demonstrate persistence of a focal asymmetry versus mass in the posterior inferior breast at 5:00-6:00, which was not seen on the older mammograms, for which ultrasound is recommended. Ultrasound: Real time targeted sonography was performed of the left inferior breast by the radiologist and sonographer, demonstrating no abnormality. IMPRESSION: Left posterior 6:00 mammographic density, with no sonographic correlate. Recommend stereotactic biopsy. This was discussed in full with the patient at the time of interpretation. BI-RADS Assessment Category 4: Suspicious Finding- Biopsy should be considered.      [facility] Breast Lt Surg Spec    Result Date: 7/5/2019  Mammographic guided needle localization Surgical specimen radiograph CLINICAL INDICATION: Left posterior 6:00 breast cancer undergoing surgical treatment. COMPARISON: Previous mammography 6/21/2019 and others back to 6/10/2019, also breast MRI 6/26/2019 PROCEDURE: After discussing the risks and benefits, including but not limited to bleeding and infection, written and verbal informed consent were obtained. The overlying skin was prepped in sterile fashion. Total of 6 mL of 1% Lidocaine was used for local anesthesia. Under mammographic guidance with caudal-cranial approach, a 5 cm Kopans wire was placed adjacent to the clip and the small residual hematoma. Postprocedural mammographic films were then obtained and marked for the surgeon. The patient tolerated the procedure well without immediate complications and was transported to the operating room. IMPRESSION: Successful mammographic guided needle localization of left posterior 6:00 breast cancer. Pathology results are pending. Submitted specimen radiograph demonstrates intact localization wire and biopsy clip. This was reported to Dr. Fiona Geiger in the operating room at the time of acquisition. IMPRESSION:  Kyra Suarez is a 59 y.o. female with Stage IA breast cancer. The natural history of breast cancer was reviewed with the patient. Prognostic features including stage, performance status and presence or absence of lymph node involvement were reviewed with the patient. Various treatment options including lumpectomy alone, breast conservation therapy, and mastectomy were compared and contrasted with regard to outcome and quality of life. We discussed the data in support of breast conservation therapy, specifically NSABP B-06, which compared mastectomy to lumpectomy plus or minus radiation. This showed no difference in overall survival but improved local regional control with adjuvant radiation.   This has become the standard for patient's wishing to conserve the breast.  Subsequent trials have evaluated the role of boost following an initial course and again showed improved control. We therefore typically recommend 50 gray to the breast followed by a 10 Gray boost to the lumpectomy cavity. There is now also long-term data in support of hypofractionation which has come out of [address] [place] [address] where 3-4 weeks of radiation was compared to the conventional treatment and to date has shown equivalent tumor control and cosmetic outcomes. This is an option for for Ms. Ugarte. I have recommended a course of radiation therapy to the breast as a standard portion of breast conservation therapy. The patient asked numerous questions, which were answered to their satisfaction. As outlined by Dr. Vikki Burger, he is awaiting results of the Oncotype to make chemotherapy recommendations, either chemotherapy with AI if intermediate or high risk vs AI alone if low. We would deliver radiation either now or after chemotherapy if this is deemed necessary. I will schedule her for simulation with consent prior after the recommendation of Dr. Vikki Burger. If chemotherapy is administered, I will ask Dr. Vikki Burger and navigation to send her back to our clinic for radiation 2-3 weeks following chemotherapy. Plan:  1. Genetic testing-not indicated  2. Smoking cessation-not indicated  3. Patient will be simulated, with radiotherapy to begin shortly thereafter if chemotherapy is not indicated based on Oncotype score otherwise I will see her back after chemotherapy for simulation.   A dose of 42.56 Gy in 16 fractions with 10 Gy boost.     Ayanna Farley MD  07/29/19

## 2019-07-31 ENCOUNTER — HOSPITAL ENCOUNTER (OUTPATIENT)
Age: 64
Setting detail: OBSERVATION
Discharge: HOME | End: 2019-08-01
Attending: EMERGENCY MEDICINE | Admitting: FAMILY MEDICINE
Payer: COMMERCIAL

## 2019-07-31 ENCOUNTER — APPOINTMENT (OUTPATIENT)
Dept: MRI IMAGING | Age: 64
End: 2019-07-31
Attending: EMERGENCY MEDICINE
Payer: COMMERCIAL

## 2019-07-31 DIAGNOSIS — I63.9 CEREBELLAR INFARCT (HCC): Primary | ICD-10-CM

## 2019-07-31 PROBLEM — R07.89 OTHER CHEST PAIN: Status: RESOLVED | Noted: 2018-03-28 | Resolved: 2019-07-31

## 2019-07-31 PROBLEM — Z79.4 TYPE 2 DIABETES MELLITUS WITHOUT COMPLICATION, WITH LONG-TERM CURRENT USE OF INSULIN (HCC): Chronic | Status: ACTIVE | Noted: 2019-05-16

## 2019-07-31 PROBLEM — C50.512 MALIGNANT NEOPLASM OF LOWER-OUTER QUADRANT OF LEFT BREAST OF FEMALE, ESTROGEN RECEPTOR POSITIVE (HCC): Chronic | Status: ACTIVE | Noted: 2019-07-01

## 2019-07-31 PROBLEM — I10 ESSENTIAL HYPERTENSION: Chronic | Status: ACTIVE | Noted: 2019-07-25

## 2019-07-31 PROBLEM — E11.9 TYPE 2 DIABETES MELLITUS WITHOUT COMPLICATION, WITH LONG-TERM CURRENT USE OF INSULIN (HCC): Chronic | Status: ACTIVE | Noted: 2019-05-16

## 2019-07-31 PROBLEM — E66.01 MORBID OBESITY (HCC): Chronic | Status: ACTIVE | Noted: 2019-07-31

## 2019-07-31 PROBLEM — R53.1 RIGHT SIDED WEAKNESS: Status: RESOLVED | Noted: 2019-04-11 | Resolved: 2019-07-31

## 2019-07-31 PROBLEM — M06.061: Status: RESOLVED | Noted: 2019-05-16 | Resolved: 2019-07-31

## 2019-07-31 PROBLEM — J30.89 ENVIRONMENTAL AND SEASONAL ALLERGIES: Status: RESOLVED | Noted: 2019-07-25 | Resolved: 2019-07-31

## 2019-07-31 PROBLEM — E66.9 OBESITY (BMI 30-39.9): Status: RESOLVED | Noted: 2019-03-17 | Resolved: 2019-07-31

## 2019-07-31 PROBLEM — R42 DIZZINESS: Status: RESOLVED | Noted: 2019-04-11 | Resolved: 2019-07-31

## 2019-07-31 PROBLEM — Z17.0 MALIGNANT NEOPLASM OF LOWER-OUTER QUADRANT OF LEFT BREAST OF FEMALE, ESTROGEN RECEPTOR POSITIVE (HCC): Chronic | Status: ACTIVE | Noted: 2019-07-01

## 2019-07-31 PROBLEM — E66.01 SEVERE OBESITY (BMI 35.0-39.9) WITH COMORBIDITY (HCC): Chronic | Status: RESOLVED | Noted: 2018-04-10 | Resolved: 2019-07-31

## 2019-07-31 PROBLEM — G45.9 TIA (TRANSIENT ISCHEMIC ATTACK): Status: RESOLVED | Noted: 2019-03-09 | Resolved: 2019-07-31

## 2019-07-31 LAB
ALBUMIN SERPL-MCNC: 3.7 G/DL (ref 3.2–4.6)
ALBUMIN/GLOB SERPL: 0.9 {RATIO} (ref 1.2–3.5)
ALP SERPL-CCNC: 100 U/L (ref 50–136)
ALT SERPL-CCNC: 23 U/L (ref 12–65)
ANION GAP SERPL CALC-SCNC: 9 MMOL/L (ref 7–16)
AST SERPL-CCNC: 48 U/L (ref 15–37)
BASOPHILS # BLD: 0.1 K/UL (ref 0–0.2)
BASOPHILS NFR BLD: 1 % (ref 0–2)
BILIRUB SERPL-MCNC: 0.9 MG/DL (ref 0.2–1.1)
BUN SERPL-MCNC: 22 MG/DL (ref 8–23)
CALCIUM SERPL-MCNC: 10 MG/DL (ref 8.3–10.4)
CHLORIDE SERPL-SCNC: 106 MMOL/L (ref 98–107)
CO2 SERPL-SCNC: 21 MMOL/L (ref 21–32)
CREAT SERPL-MCNC: 0.89 MG/DL (ref 0.6–1)
DIFFERENTIAL METHOD BLD: ABNORMAL
EOSINOPHIL # BLD: 0.4 K/UL (ref 0–0.8)
EOSINOPHIL NFR BLD: 5 % (ref 0.5–7.8)
ERYTHROCYTE [DISTWIDTH] IN BLOOD BY AUTOMATED COUNT: 12.7 % (ref 11.9–14.6)
GLOBULIN SER CALC-MCNC: 4.3 G/DL (ref 2.3–3.5)
GLUCOSE BLD STRIP.AUTO-MCNC: 110 MG/DL (ref 65–100)
GLUCOSE SERPL-MCNC: 133 MG/DL (ref 65–100)
HCT VFR BLD AUTO: 37.3 % (ref 35.8–46.3)
HGB BLD-MCNC: 12.6 G/DL (ref 11.7–15.4)
IMM GRANULOCYTES # BLD AUTO: 0 K/UL (ref 0–0.5)
IMM GRANULOCYTES NFR BLD AUTO: 0 % (ref 0–5)
LYMPHOCYTES # BLD: 3.1 K/UL (ref 0.5–4.6)
LYMPHOCYTES NFR BLD: 37 % (ref 13–44)
MCH RBC QN AUTO: 32.8 PG (ref 26.1–32.9)
MCHC RBC AUTO-ENTMCNC: 33.8 G/DL (ref 31.4–35)
MCV RBC AUTO: 97.1 FL (ref 79.6–97.8)
MONOCYTES # BLD: 0.7 K/UL (ref 0.1–1.3)
MONOCYTES NFR BLD: 9 % (ref 4–12)
NEUTS SEG # BLD: 4 K/UL (ref 1.7–8.2)
NEUTS SEG NFR BLD: 48 % (ref 43–78)
NRBC # BLD: 0 K/UL (ref 0–0.2)
PLATELET # BLD AUTO: 190 K/UL (ref 150–450)
PMV BLD AUTO: 12 FL (ref 9.4–12.3)
POTASSIUM SERPL-SCNC: 4.7 MMOL/L (ref 3.5–5.1)
PROT SERPL-MCNC: 8 G/DL (ref 6.3–8.2)
RBC # BLD AUTO: 3.84 M/UL (ref 4.05–5.2)
SODIUM SERPL-SCNC: 136 MMOL/L (ref 136–145)
WBC # BLD AUTO: 8.4 K/UL (ref 4.3–11.1)

## 2019-07-31 PROCEDURE — 82962 GLUCOSE BLOOD TEST: CPT

## 2019-07-31 PROCEDURE — A9575 INJ GADOTERATE MEGLUMI 0.1ML: HCPCS | Performed by: EMERGENCY MEDICINE

## 2019-07-31 PROCEDURE — 74011250636 HC RX REV CODE- 250/636: Performed by: FAMILY MEDICINE

## 2019-07-31 PROCEDURE — 70553 MRI BRAIN STEM W/O & W/DYE: CPT

## 2019-07-31 PROCEDURE — 96372 THER/PROPH/DIAG INJ SC/IM: CPT

## 2019-07-31 PROCEDURE — 80053 COMPREHEN METABOLIC PANEL: CPT

## 2019-07-31 PROCEDURE — 85025 COMPLETE CBC W/AUTO DIFF WBC: CPT

## 2019-07-31 PROCEDURE — 99284 EMERGENCY DEPT VISIT MOD MDM: CPT | Performed by: EMERGENCY MEDICINE

## 2019-07-31 PROCEDURE — 74011250636 HC RX REV CODE- 250/636: Performed by: EMERGENCY MEDICINE

## 2019-07-31 PROCEDURE — 74011250637 HC RX REV CODE- 250/637: Performed by: FAMILY MEDICINE

## 2019-07-31 PROCEDURE — 99218 HC RM OBSERVATION: CPT

## 2019-07-31 RX ORDER — ASPIRIN 325 MG
325 TABLET ORAL DAILY
Status: DISCONTINUED | OUTPATIENT
Start: 2019-08-01 | End: 2019-07-31 | Stop reason: SDUPTHER

## 2019-07-31 RX ORDER — FERROUS SULFATE, DRIED 160(50) MG
1 TABLET, EXTENDED RELEASE ORAL 2 TIMES DAILY WITH MEALS
Status: DISCONTINUED | OUTPATIENT
Start: 2019-08-01 | End: 2019-08-01 | Stop reason: HOSPADM

## 2019-07-31 RX ORDER — INSULIN GLARGINE 100 [IU]/ML
28 INJECTION, SOLUTION SUBCUTANEOUS
Status: DISCONTINUED | OUTPATIENT
Start: 2019-07-31 | End: 2019-08-01 | Stop reason: HOSPADM

## 2019-07-31 RX ORDER — ACETAMINOPHEN 325 MG/1
650 TABLET ORAL
Status: DISCONTINUED | OUTPATIENT
Start: 2019-07-31 | End: 2019-08-01 | Stop reason: HOSPADM

## 2019-07-31 RX ORDER — GLIPIZIDE 5 MG/1
10 TABLET ORAL
Status: DISCONTINUED | OUTPATIENT
Start: 2019-08-01 | End: 2019-08-01 | Stop reason: HOSPADM

## 2019-07-31 RX ORDER — SODIUM CHLORIDE 0.9 % (FLUSH) 0.9 %
10 SYRINGE (ML) INJECTION
Status: COMPLETED | OUTPATIENT
Start: 2019-07-31 | End: 2019-07-31

## 2019-07-31 RX ORDER — LEVOTHYROXINE SODIUM 100 UG/1
100 TABLET ORAL
Status: DISCONTINUED | OUTPATIENT
Start: 2019-08-01 | End: 2019-08-01 | Stop reason: HOSPADM

## 2019-07-31 RX ORDER — ASPIRIN 325 MG
325 TABLET ORAL DAILY
Status: DISCONTINUED | OUTPATIENT
Start: 2019-08-01 | End: 2019-08-01 | Stop reason: HOSPADM

## 2019-07-31 RX ORDER — SODIUM CHLORIDE 0.9 % (FLUSH) 0.9 %
5-40 SYRINGE (ML) INJECTION AS NEEDED
Status: DISCONTINUED | OUTPATIENT
Start: 2019-07-31 | End: 2019-08-01 | Stop reason: HOSPADM

## 2019-07-31 RX ORDER — SODIUM CHLORIDE 9 MG/ML
100 INJECTION, SOLUTION INTRAVENOUS CONTINUOUS
Status: DISCONTINUED | OUTPATIENT
Start: 2019-07-31 | End: 2019-08-01

## 2019-07-31 RX ORDER — GADOTERATE MEGLUMINE 376.9 MG/ML
15 INJECTION INTRAVENOUS
Status: COMPLETED | OUTPATIENT
Start: 2019-07-31 | End: 2019-07-31

## 2019-07-31 RX ORDER — SODIUM CHLORIDE 0.9 % (FLUSH) 0.9 %
5-40 SYRINGE (ML) INJECTION EVERY 8 HOURS
Status: DISCONTINUED | OUTPATIENT
Start: 2019-07-31 | End: 2019-08-01 | Stop reason: HOSPADM

## 2019-07-31 RX ORDER — LORATADINE 10 MG/1
10 TABLET ORAL DAILY
Status: DISCONTINUED | OUTPATIENT
Start: 2019-08-01 | End: 2019-08-01 | Stop reason: HOSPADM

## 2019-07-31 RX ORDER — FLUTICASONE PROPIONATE 50 MCG
2 SPRAY, SUSPENSION (ML) NASAL DAILY
Status: DISCONTINUED | OUTPATIENT
Start: 2019-08-01 | End: 2019-08-01 | Stop reason: HOSPADM

## 2019-07-31 RX ORDER — ONDANSETRON 2 MG/ML
4 INJECTION INTRAMUSCULAR; INTRAVENOUS
Status: DISCONTINUED | OUTPATIENT
Start: 2019-07-31 | End: 2019-08-01 | Stop reason: HOSPADM

## 2019-07-31 RX ORDER — ENOXAPARIN SODIUM 100 MG/ML
40 INJECTION SUBCUTANEOUS EVERY 24 HOURS
Status: DISCONTINUED | OUTPATIENT
Start: 2019-07-31 | End: 2019-08-01 | Stop reason: HOSPADM

## 2019-07-31 RX ORDER — ATORVASTATIN CALCIUM 40 MG/1
80 TABLET, FILM COATED ORAL
Status: DISCONTINUED | OUTPATIENT
Start: 2019-07-31 | End: 2019-08-01 | Stop reason: HOSPADM

## 2019-07-31 RX ORDER — BISACODYL 5 MG
5 TABLET, DELAYED RELEASE (ENTERIC COATED) ORAL DAILY PRN
Status: DISCONTINUED | OUTPATIENT
Start: 2019-07-31 | End: 2019-08-01 | Stop reason: HOSPADM

## 2019-07-31 RX ORDER — CALCIUM CARBONATE 200(500)MG
200 TABLET,CHEWABLE ORAL DAILY
Status: DISCONTINUED | OUTPATIENT
Start: 2019-08-01 | End: 2019-08-01 | Stop reason: HOSPADM

## 2019-07-31 RX ORDER — CLOPIDOGREL BISULFATE 75 MG/1
75 TABLET ORAL DAILY
Status: DISCONTINUED | OUTPATIENT
Start: 2019-08-01 | End: 2019-08-01 | Stop reason: HOSPADM

## 2019-07-31 RX ADMIN — ATORVASTATIN CALCIUM 80 MG: 40 TABLET, FILM COATED ORAL at 22:09

## 2019-07-31 RX ADMIN — GADOTERATE MEGLUMINE 15 ML: 376.9 INJECTION INTRAVENOUS at 19:02

## 2019-07-31 RX ADMIN — ENOXAPARIN SODIUM 40 MG: 40 INJECTION SUBCUTANEOUS at 22:09

## 2019-07-31 RX ADMIN — SODIUM CHLORIDE 100 ML/HR: 900 INJECTION, SOLUTION INTRAVENOUS at 22:09

## 2019-07-31 RX ADMIN — Medication 10 ML: at 19:02

## 2019-07-31 NOTE — ED PROVIDER NOTES
Patient presents with a complaint of double vision. She reports intermittent episodes for the past 2 days. First episode occurred 2 days ago and started while she was in a parking lot and lasted about 15-20 minutes. She had another episode yesterday lasted about 10 minutes. The episode today has been going on now for about an hour. She reports also some associated disequilibrium which has been present for the past several days and she had an appointment to see a neurologist tomorrow in regards to that. She has a history of recurring TIAs which usually result in a right sided weakness some slurring speech and memory loss. She does report some right-sided weakness noted with the first episode of  Diplopia 2 days ago. She reports binocular diplopia with vertical skewing. She denies any other lateralizing neurological complaints. She denies any headache. The history is provided by the patient. Double Vision This is a new problem. The current episode started 2 days ago. The problem has not changed since onset. There was no focality noted. Primary symptoms include loss of balance and visual change. Pertinent negatives include no focal weakness, no loss of sensation, no slurred speech, no speech difficulty, no memory loss, no movement disorder, no agitation, no auditory change, no mental status change, no unresponsiveness and no disorientation. There has been no fever. Pertinent negatives include no shortness of breath, no vomiting, no altered mental status, no confusion, no headaches, no choking, no nausea, no bowel incontinence and no bladder incontinence. There were no medications administered prior to arrival.  
  
 
Past Medical History:  
Diagnosis Date  Actinic keratosis   
 uses Solaraze gel when needed  Arthritis   
 rheumatoid. Orencia infusion every 4 weeks  Asthma   
 none in yrs per pt--- no inhalers per pt  Cancer ([address] melanoma  DJD (degenerative joint disease)  Environmental allergies  GERD (gastroesophageal reflux disease)   
 prn med  History of malignant melanoma 1980's  
 on back  History of TIA (transient ischemic attack)  Hypertension   
 controlled with medication  Insulin dependent diabetes mellitus ([address] 1990's Type 2. sqbs avg am--100---- hypo at 73- last A1C= 8.1 on 12/19/17  Kidney stones 4th one at present  Mobitz type 1 second degree AV block EKG today 9/22/16- Dr Kell Carbone reviewed----- per pt-- \" been that way my whole life\"-- does not see a cardiologist  
 Obese   
 bmi =39  
 Osteopenia  PONV (postoperative nausea and vomiting) POV- responds to IV antiemetic  RA (rheumatoid arthritis) ([address] dx--2013  Rosacea  Stroke ([facility] followed by neurologist    
 Unspecified hypothyroidism   
 hypothyroid. Controlled with Levoxyl Past Surgical History:  
Procedure Laterality Date  ABDOMEN SURGERY PROC UNLISTED    
 fatty tumor removed- from abd--benign  FRAGMENT KIDNEY STONE/ ESWL    
 HX BREAST BIOPSY Left 06/21/2019  
 stereo bx  HX BREAST LUMPECTOMY Left 7/5/2019 LEFT BREAST LUMPECTOMY WITH NEEDLE  AND SENTINAL NODE/ performed by Juan Quijano MD at [facility]  HX COLONOSCOPY  2008; 2010  HX DILATION AND CURETTAGE  1999  
 and hysteroscopy  HX GYN  1978  
 vaginal cryocautery [facility] Oral Surgery--per pt  still has metal in mouth since 1980's  HX KNEE ARTHROSCOPY Right 2007  HX KNEE REPLACEMENT Left 2008  HX KNEE REPLACEMENT Right 2016 [address], 2003; 2016; 2/6/18  HX MENISCECTOMY Right 2011 Right knee  HX MOHS PROCEDURES Right 2004  HX OTHER SURGICAL  1999  
 lipoma of abdomen  HX OTHER SURGICAL  1984  
 melanoma of back [address] SURGERY  2002 [address]. Family History:  
Problem Relation Age of Onset  Cancer Mother Lung  Arthritis-rheumatoid Mother  Thyroid Disease Mother  Cancer Father   
     Lung; Liver  Diabetes Father  Heart Disease Father  Hypertension Father  Diabetes Brother  SLE Sister  Breast Cancer Paternal Aunt  Breast Cancer Other   
     cousins Social History Socioeconomic History  Marital status:  Spouse name: Not on file  Number of children: Not on file  Years of education: Not on file  Highest education level: Not on file Occupational History  Not on file Social Needs  Financial resource strain: Not on file  Food insecurity:  
  Worry: Not on file Inability: Not on file  Transportation needs:  
  Medical: Not on file Non-medical: Not on file Tobacco Use  Smoking status: Never Smoker  Smokeless tobacco: Never Used Substance and Sexual Activity  Alcohol use: Yes Alcohol/week: 1.0 standard drinks Types: 1 Glasses of wine per week Comment: occasionally  Drug use: No  
 Sexual activity: Not on file Lifestyle  Physical activity:  
  Days per week: Not on file Minutes per session: Not on file  Stress: Not on file Relationships  Social connections:  
  Talks on phone: Not on file Gets together: Not on file Attends Evangelical service: Not on file Active member of club or organization: Not on file Attends meetings of clubs or organizations: Not on file Relationship status: Not on file  Intimate partner violence:  
  Fear of current or ex partner: Not on file Emotionally abused: Not on file Physically abused: Not on file Forced sexual activity: Not on file Other Topics Concern [address] Service Not Asked  Blood Transfusions Not Asked  Caffeine Concern Not Asked  Occupational Exposure Not Asked Correne Shires Hazards Not Asked  Sleep Concern Not Asked  Stress Concern Not Asked  Weight Concern Not Asked  Special Diet Not Asked  Back Care Not Asked  Exercise Not Asked  Bike Helmet Not Asked  Seat Belt Not Asked  Self-Exams Not Asked Social History Narrative  Not on file ALLERGIES: Other food; Bee sting [sting, bee]; Invokana [canagliflozin]; and Other plant, animal, environmental 
 
Review of Systems Eyes: Positive for double vision. Respiratory: Negative for choking and shortness of breath. Gastrointestinal: Negative for bowel incontinence, nausea and vomiting. Genitourinary: Negative for bladder incontinence. Neurological: Positive for loss of balance. Negative for focal weakness, speech difficulty and headaches. Psychiatric/Behavioral: Negative for agitation, confusion and memory loss. All other systems reviewed and are negative. Vitals:  
 07/31/19 1726 BP: 127/77 Pulse: 86 Resp: 16 Temp: 98.5 °F (36.9 °C) SpO2: 99% Weight: 79.4 kg (175 lb) Height: 5' 1\" (1.549 m) Physical Exam  
Constitutional: She is oriented to person, place, and time. She appears well-developed and well-nourished. No distress. HENT:  
Head: Normocephalic and atraumatic. Right Ear: External ear normal.  
Left Ear: External ear normal.  
Nose: Nose normal.  
Mouth/Throat: Oropharynx is clear and moist. No oropharyngeal exudate. Eyes: Pupils are equal, round, and reactive to light. Conjunctivae and EOM are normal.  
Neck: Normal range of motion. Neck supple. Cardiovascular: Normal rate, regular rhythm, normal heart sounds and intact distal pulses. Pulmonary/Chest: Effort normal and breath sounds normal.  
Abdominal: Soft. Bowel sounds are normal.  
Musculoskeletal: Normal range of motion. She exhibits no edema, tenderness or deformity. Neurological: She is alert and oriented to person, place, and time. She displays normal reflexes. No cranial nerve deficit or sensory deficit. She exhibits normal muscle tone.  Coordination normal.  
 Positive Romberg and ataxia tandem gait. There is no pronator drift noted Skin: Skin is warm and dry. Capillary refill takes less than 2 seconds. She is not diaphoretic. Psychiatric: She has a normal mood and affect. Her behavior is normal.  
Nursing note and vitals reviewed. MDM Number of Diagnoses or Management Options Amount and/or Complexity of Data Reviewed Clinical lab tests: ordered and reviewed Tests in the radiology section of CPT®: ordered and reviewed Review and summarize past medical records: yes Independent visualization of images, tracings, or specimens: yes Risk of Complications, Morbidity, and/or Mortality Presenting problems: high Diagnostic procedures: moderate Management options: moderate Patient Progress Patient progress: stable Procedures

## 2019-08-01 ENCOUNTER — APPOINTMENT (OUTPATIENT)
Dept: ULTRASOUND IMAGING | Age: 64
End: 2019-08-01
Attending: FAMILY MEDICINE
Payer: COMMERCIAL

## 2019-08-01 VITALS
BODY MASS INDEX: 33.04 KG/M2 | TEMPERATURE: 98 F | RESPIRATION RATE: 16 BRPM | SYSTOLIC BLOOD PRESSURE: 137 MMHG | HEART RATE: 58 BPM | DIASTOLIC BLOOD PRESSURE: 82 MMHG | WEIGHT: 175 LBS | HEIGHT: 61 IN | OXYGEN SATURATION: 99 %

## 2019-08-01 LAB
ANION GAP SERPL CALC-SCNC: 8 MMOL/L (ref 7–16)
ATRIAL RATE: 94 BPM
BUN SERPL-MCNC: 15 MG/DL (ref 8–23)
CALCIUM SERPL-MCNC: 8.9 MG/DL (ref 8.3–10.4)
CALCULATED P AXIS, ECG09: 62 DEGREES
CALCULATED R AXIS, ECG10: -2 DEGREES
CALCULATED T AXIS, ECG11: 35 DEGREES
CHLORIDE SERPL-SCNC: 108 MMOL/L (ref 98–107)
CHOLEST SERPL-MCNC: 57 MG/DL
CO2 SERPL-SCNC: 23 MMOL/L (ref 21–32)
CREAT SERPL-MCNC: 0.65 MG/DL (ref 0.6–1)
DIAGNOSIS, 93000: NORMAL
ERYTHROCYTE [DISTWIDTH] IN BLOOD BY AUTOMATED COUNT: 12.5 % (ref 11.9–14.6)
EST. AVERAGE GLUCOSE BLD GHB EST-MCNC: 163 MG/DL
GLUCOSE BLD STRIP.AUTO-MCNC: 171 MG/DL (ref 65–100)
GLUCOSE BLD STRIP.AUTO-MCNC: 201 MG/DL (ref 65–100)
GLUCOSE BLD STRIP.AUTO-MCNC: 237 MG/DL (ref 65–100)
GLUCOSE SERPL-MCNC: 159 MG/DL (ref 65–100)
HBA1C MFR BLD: 7.3 %
HCT VFR BLD AUTO: 33.9 % (ref 35.8–46.3)
HDLC SERPL-MCNC: 30 MG/DL (ref 40–60)
HDLC SERPL: 1.9 {RATIO}
HGB BLD-MCNC: 11.3 G/DL (ref 11.7–15.4)
LDLC SERPL CALC-MCNC: 8.2 MG/DL
LIPID PROFILE,FLP: ABNORMAL
MAGNESIUM SERPL-MCNC: 1.2 MG/DL (ref 1.8–2.4)
MCH RBC QN AUTO: 32.4 PG (ref 26.1–32.9)
MCHC RBC AUTO-ENTMCNC: 33.3 G/DL (ref 31.4–35)
MCV RBC AUTO: 97.1 FL (ref 79.6–97.8)
NRBC # BLD: 0 K/UL (ref 0–0.2)
PLATELET # BLD AUTO: 152 K/UL (ref 150–450)
PMV BLD AUTO: 12 FL (ref 9.4–12.3)
POTASSIUM SERPL-SCNC: 3.6 MMOL/L (ref 3.5–5.1)
Q-T INTERVAL, ECG07: 482 MS
QRS DURATION, ECG06: 124 MS
QTC CALCULATION (BEZET), ECG08: 452 MS
RBC # BLD AUTO: 3.49 M/UL (ref 4.05–5.2)
SODIUM SERPL-SCNC: 139 MMOL/L (ref 136–145)
T4 FREE SERPL-MCNC: 1.4 NG/DL (ref 0.78–1.46)
TRIGL SERPL-MCNC: 94 MG/DL (ref 35–150)
TSH SERPL DL<=0.005 MIU/L-ACNC: 0.72 UIU/ML
VENTRICULAR RATE, ECG03: 53 BPM
VLDLC SERPL CALC-MCNC: 18.8 MG/DL (ref 6–23)
WBC # BLD AUTO: 6.6 K/UL (ref 4.3–11.1)

## 2019-08-01 PROCEDURE — 93005 ELECTROCARDIOGRAM TRACING: CPT | Performed by: INTERNAL MEDICINE

## 2019-08-01 PROCEDURE — 83036 HEMOGLOBIN GLYCOSYLATED A1C: CPT

## 2019-08-01 PROCEDURE — 97530 THERAPEUTIC ACTIVITIES: CPT

## 2019-08-01 PROCEDURE — 97165 OT EVAL LOW COMPLEX 30 MIN: CPT

## 2019-08-01 PROCEDURE — 97161 PT EVAL LOW COMPLEX 20 MIN: CPT

## 2019-08-01 PROCEDURE — 85027 COMPLETE CBC AUTOMATED: CPT

## 2019-08-01 PROCEDURE — 74011250636 HC RX REV CODE- 250/636: Performed by: INTERNAL MEDICINE

## 2019-08-01 PROCEDURE — 36415 COLL VENOUS BLD VENIPUNCTURE: CPT

## 2019-08-01 PROCEDURE — 84481 FREE ASSAY (FT-3): CPT

## 2019-08-01 PROCEDURE — 93880 EXTRACRANIAL BILAT STUDY: CPT

## 2019-08-01 PROCEDURE — 80061 LIPID PANEL: CPT

## 2019-08-01 PROCEDURE — 84439 ASSAY OF FREE THYROXINE: CPT

## 2019-08-01 PROCEDURE — 93306 TTE W/DOPPLER COMPLETE: CPT

## 2019-08-01 PROCEDURE — 80048 BASIC METABOLIC PNL TOTAL CA: CPT

## 2019-08-01 PROCEDURE — 99213 OFFICE O/P EST LOW 20 MIN: CPT | Performed by: PSYCHIATRY & NEUROLOGY

## 2019-08-01 PROCEDURE — 84443 ASSAY THYROID STIM HORMONE: CPT

## 2019-08-01 PROCEDURE — 83735 ASSAY OF MAGNESIUM: CPT

## 2019-08-01 PROCEDURE — 96365 THER/PROPH/DIAG IV INF INIT: CPT

## 2019-08-01 PROCEDURE — 92610 EVALUATE SWALLOWING FUNCTION: CPT

## 2019-08-01 PROCEDURE — 74011250637 HC RX REV CODE- 250/637: Performed by: FAMILY MEDICINE

## 2019-08-01 PROCEDURE — 96366 THER/PROPH/DIAG IV INF ADDON: CPT

## 2019-08-01 PROCEDURE — 97116 GAIT TRAINING THERAPY: CPT

## 2019-08-01 PROCEDURE — 82962 GLUCOSE BLOOD TEST: CPT

## 2019-08-01 PROCEDURE — 99218 HC RM OBSERVATION: CPT

## 2019-08-01 PROCEDURE — 97112 NEUROMUSCULAR REEDUCATION: CPT

## 2019-08-01 RX ORDER — MAGNESIUM SULFATE HEPTAHYDRATE 40 MG/ML
4 INJECTION, SOLUTION INTRAVENOUS ONCE
Status: COMPLETED | OUTPATIENT
Start: 2019-08-01 | End: 2019-08-01

## 2019-08-01 RX ORDER — LANOLIN ALCOHOL/MO/W.PET/CERES
400 CREAM (GRAM) TOPICAL 2 TIMES DAILY
Status: DISCONTINUED | OUTPATIENT
Start: 2019-08-01 | End: 2019-08-01 | Stop reason: HOSPADM

## 2019-08-01 RX ADMIN — LORATADINE 10 MG: 10 TABLET ORAL at 09:28

## 2019-08-01 RX ADMIN — Medication 400 MG: at 17:04

## 2019-08-01 RX ADMIN — GLIPIZIDE 10 MG: 5 TABLET ORAL at 09:28

## 2019-08-01 RX ADMIN — ASPIRIN 325 MG ORAL TABLET 325 MG: 325 PILL ORAL at 09:28

## 2019-08-01 RX ADMIN — CALCIUM CARBONATE 500 MG (1,250 MG)-VITAMIN D3 200 UNIT TABLET 1 TABLET: at 09:28

## 2019-08-01 RX ADMIN — CALCIUM CARBONATE 500 MG (1,250 MG)-VITAMIN D3 200 UNIT TABLET 1 TABLET: at 17:04

## 2019-08-01 RX ADMIN — MAGNESIUM SULFATE HEPTAHYDRATE 4 G: 40 INJECTION, SOLUTION INTRAVENOUS at 11:20

## 2019-08-01 RX ADMIN — GLIPIZIDE 10 MG: 5 TABLET ORAL at 17:04

## 2019-08-01 RX ADMIN — CLOPIDOGREL BISULFATE 75 MG: 75 TABLET ORAL at 09:33

## 2019-08-01 RX ADMIN — FLUTICASONE PROPIONATE 2 SPRAY: 50 SPRAY, METERED NASAL at 11:20

## 2019-08-01 RX ADMIN — LEVOTHYROXINE SODIUM 100 MCG: 100 TABLET ORAL at 09:29

## 2019-08-01 RX ADMIN — Medication 400 MG: at 09:29

## 2019-08-01 NOTE — ROUTINE PROCESS
TRANSFER - OUT REPORT: 
 
Verbal report given to [address] on [facility]  being transferred to room 355 for routine progression of care Report consisted of patients Situation, Background, Assessment and  
Recommendations(SBAR). Information from the following report(s) SBAR was reveiwed with the receiving nurse. Opportunity for questions and clarification was provided. Ischemic Stroke without Activase/TIA BP Parameters: Less Than 220/120 for 24 hours, then consult MD for parameters Controlled With: None Dysphagia Screen Completed: Yes: Pass NIH Stroke Scale Complete: Yes: 1 Frequency of Vital Signs: Every 2 hours Frequency of Neuro Checks: Every 2 hours

## 2019-08-01 NOTE — PROGRESS NOTES
Problem: Mobility Impaired (Adult and Pediatric) Goal: *Acute Goals and Plan of Care (Insert Text) Description STG: 
(1.)Ms. Ugarte will move from supine to sit and sit to supine  with INDEPENDENCE within 4-7 treatment day(s). (2.)Ms. Ugarte will transfer from bed to chair and chair to bed with MODIFIED INDEPENDENCE using the least restrictive device within 4-7 treatment day(s). (3.)Ms. Ugarte will ambulate with STAND BY ASSIST for 200 feet with the least restrictive device within 4-7 treatment day(s). (4.)Ms. Ugarte will participate in standing balance activities with close SBA/CGA to help with balance to increase safety with mobility within 4-7 treatment days. ________________________________________________________________________________________________ Outcome: Progressing Towards Goal 
  
PHYSICAL THERAPY: Initial Assessment and AM 8/1/2019 OBSERVATION: PT Visit Days : 1 Payor: MIRA HERNADEZ / Plan: [place] BLUE CROSS BLUE ESSENTIALS REINALDO / Product Type: REINALDO /   
  
NAME/AGE/GENDER: Lisa Guzman is a 59 y.o. female PRIMARY DIAGNOSIS: CVA (cerebral vascular accident) ([facility] [I63.9] CVA (cerebral vascular accident) ([facility] CVA (cerebral vascular accident) ([facility] ICD-10: Treatment Diagnosis:  
 Generalized Muscle Weakness (M62.81) Difficulty in walking, Not elsewhere classified (R26.2) Precaution/Allergies: Other food; Bee sting [sting, bee]; Invokana [canagliflozin]; and Other plant, animal, environmental  
  
ASSESSMENT:  
 
Ms. Nicola Walker presents coming back into room with transport in wheelchair. She was admitted with above diagnosis and was coming back from ultrasound. She has history of TIA and was suppose to follow up with her neurologist today-had been through balance work up, etc. She has been having trouble with her balance since around April. Was able to assess her mobility getting out of wheelchair and walking back over to bed.  Her lower extremity strength was symmetrical and sensation intact although pt reports her left leg feeling \"funny\". She presents overall with decreased strength and decreased independence with functional mobility. She will benefit from skilled PT interventions to maximize independence with functional mobility and strengthening especially in standing to help with her balance. Her right knee has been giving her some issues being off of her anti-inflammatories. Then worked with pt in [place] for gait training, stopping along way to work on calf raises with/without support to challenge her balance. In [place] worked on scanning left and right to also challenge balance. She was unsteady when her balance is challenged. She was left in room sitting on side of bed. Talked with SW. This section established at most recent assessment PROBLEM LIST (Impairments causing functional limitations): 
Decreased Strength Decreased ADL/Functional Activities Decreased Transfer Abilities Decreased Ambulation Ability/Technique Decreased Balance Decreased Activity Tolerance Decreased Jachin with Home Exercise Program 
 INTERVENTIONS PLANNED: (Benefits and precautions of physical therapy have been discussed with the patient.) Balance Exercise Bed Mobility Gait Training Therapeutic Activites Therapeutic Exercise/Strengthening Transfer Training TREATMENT PLAN: Frequency/Duration: daily for duration of hospital stay Rehabilitation Potential For Stated Goals: Good REHAB RECOMMENDATIONS (at time of discharge pending progress):   
Placement: It is my opinion, based on this patient's performance to date, that Ms. Ugarte may benefit from OUTPATIENT THERAPY after discharge due to the functional deficits listed above that are likely to improve with skilled rehabilitation because because he/she will benefit from the individualized approach tailored to his/her deficits. Equipment:  
None at this time HISTORY:  
 History of Present Injury/Illness (Reason for Referral): PER MD NOTE: Patient history was obtained from the ER provider prior to seeing the patient. Patient is a 59 y.o. female who presents to the ER due to ongoing, intermittent double vision. This has been happening for the last 2 days, with a few episodes of varying length. Today, it is persisting and has been present for over an hour. Also reports some dizziness ongoing, and was to see a neurologist for that tomorrow. She does have a h/o TIA usually affecting her right side, slurred speech and memory loss. Denies fever/chills, n/v/d, headache, chest pain, SOB, AMS, syncope, head injury. ER was able to get an MRI done: 
IMPRESSION: 
1. Questionable tiny punctate focus of restricted diffusion within the right cerebellum. Cannot exclude tiny lacunar infarct at this site. 2. Evidence of chronic microvascular ischemic change. 3. Mucosal thickening of the right maxillary sinus. We are asked to admit for continued CVA workup. She is obviously not a tPa candidate due to time of onset and constellation of symptoms. Past Medical History/Comorbidities: Ms. Colleen Hughes  has a past medical history of Actinic keratosis, Arthritis, Asthma, Cancer ([address] DJD (degenerative joint disease), Environmental allergies, Environmental and seasonal allergies (7/25/2019), GERD (gastroesophageal reflux disease), History of malignant melanoma (1980's), History of TIA (transient ischemic attack), Hypertension, Hypothyroidism, Insulin dependent diabetes mellitus ([address] (1990's), Kidney stones, Mobitz type 1 second degree AV block, Obese, Osteoarthritis (12/22/2016), Osteopenia, PONV (postoperative nausea and vomiting), RA (rheumatoid arthritis) ([address] (dx--2013), Rheumatoid arthritis involving right knee with negative rheumatoid factor ([address] (5/16/2019), Rosacea, S/P total knee arthroplasty (12/23/2016), Stroke ([address] and Unspecified hypothyroidism. She also has no past medical history of Adverse effect of anesthesia, Aneurysm ([address] CAD (coronary artery disease), Chronic kidney disease, Chronic obstructive pulmonary disease (HCC), Chronic pain, Coagulation disorder ([address] Difficult intubation, Endocarditis, Heart failure ([address] Liver disease, Malignant hyperthermia due to anesthesia, Nicotine vapor product user, Non-nicotine vapor product user, Pseudocholinesterase deficiency, Psychiatric disorder, PUD (peptic ulcer disease), Rheumatic fever, Seizures ([address] Sleep apnea, or Thromboembolus ([address] Ms. Nora Jacobson  has a past surgical history that includes hx refractive surgery (); hx meniscectomy (Right, ); hx colonoscopy (; ); hx mohs procedure (Right, ); hx lap cholecystectomy (); pr abdomen surgery proc unlisted; hx dilation and curettage (); hx  section (); hx tubal ligation (); hx gyn (); hx other surgical (); hx other surgical (); hx knee arthroscopy (Right, ); hx knee replacement (Left, ); hx knee replacement (Right, ); hx heent (); fragment kidney stone/ eswl; hx lithotripsy (, ; 2016; 18); hx breast biopsy (Left, 2019); and hx breast lumpectomy (Left, 2019). Social History/Living Environment:  
Home Environment: Private residence One/Two Story Residence: Two story # of Interior Steps: 23 Interior Rails: Left Lift Chair Available: No 
Living Alone: No 
Support Systems: Spouse/Significant Other/Partner Patient Expects to be Discharged to[de-identified] Private residence Current DME Used/Available at Home: Cane, straight, Glucometer, Grab bars, Shower chair, Coletta Brownie Tub or Shower Type: Tub 
Prior Level of Function/Work/Activity: 
Pt living at home with spouse, used a cane for stability Number of Personal Factors/Comorbidities that affect the Plan of Care: 0: LOW COMPLEXITY EXAMINATION:  
Most Recent Physical Functioning:  
Gross Assessment: AROM: Within functional limits Strength: Within functional limits Sensation: Intact Posture: 
Posture (WDL): Within defined limits Balance: 
Sitting: Intact Standing: Impaired Standing - Static: Good Standing - Dynamic : Fair Bed Mobility: 
Supine to Sit: Stand-by assistance Sit to Supine: Stand-by assistance Wheelchair Mobility: 
  
Transfers: 
Sit to Stand: Stand-by assistance Stand to Sit: Stand-by assistance Gait: 
  
Base of Support: Widened Speed/Trista: Pace decreased (<100 feet/min); Shuffled Step Length: Left shortened;Right shortened Gait Abnormalities: Decreased step clearance; Path deviations;Trunk sway increased Distance (ft): 100 Feet (ft) Assistive Device: (hand held assist) Ambulation - Level of Assistance: Contact guard assistance;Minimal assistance Interventions: Safety awareness training;Verbal cues Duration: 8 Minutes Body Structures Involved: Muscles Body Functions Affected: 
Neuromusculoskeletal 
Movement Related Activities and Participation Affected: Mobility Self Care Number of elements that affect the Plan of Care: 4+: HIGH COMPLEXITY CLINICAL PRESENTATION:  
Presentation: Stable and uncomplicated: LOW COMPLEXITY CLINICAL DECISION MAKIN[address] [place] [address] [address] [id number] AM-PAC 6 Clicks Basic Mobility Inpatient Short Form How much difficulty does the patient currently have. .. Unable A Lot A Little None 1. Turning over in bed (including adjusting bedclothes, sheets and blankets)? ? 1   ? 2   ? 3   ? 4  
2. Sitting down on and standing up from a chair with arms ( e.g., wheelchair, bedside commode, etc.)   ? 1   ? 2   ? 3   ? 4  
3. Moving from lying on back to sitting on the side of the bed?   ? 1   ? 2   ? 3   ? 4 How much help from another person does the patient currently need. .. Total A Lot A Little None 4. Moving to and from a bed to a chair (including a wheelchair)?    ? 1   ? 2   ? 3   ? 4  
 5.  Need to walk in hospital room? ? 1   ? 2   ? 3   ? 4  
6. Climbing 3-5 steps with a railing? ? 1   ? 2   ? 3   ? 4  
© 2007, Trustees of [address] [place] [address] [address] [address], under license to [employer]. All rights reserved Score:  Initial: 22 Most Recent: X (Date: -- ) Interpretation of Tool:  Represents activities that are increasingly more difficult (i.e. Bed mobility, Transfers, Gait). Medical Necessity:    
Patient is expected to demonstrate progress in strength, balance, coordination and functional technique 
 to decrease assistance required with functional mobility and improve safety during functional mobility Velora Sis Reason for Services/Other Comments: 
Patient continues to require skilled intervention due to inability to complete functional mobility independently Velora Sis Use of outcome tool(s) and clinical judgement create a POC that gives a: Clear prediction of patient's progress: LOW COMPLEXITY  
  
 
 
 
TREATMENT:  
(In addition to Assessment/Re-Assessment sessions the following treatments were rendered) Pre-treatment Symptoms/Complaints:  none Pain: Initial:  
Pain Intensity 1: 0  Post Session:  0/10 Gait Training (8 Minutes):  Gait training to improve and/or restore physical functioning as related to mobility and strength. Ambulated 100 Feet (ft) with Contact guard assistance;Minimal assistance using a (hand held assist) and minimal Safety awareness training;Verbal cues related to their stance phase and stride length to promote proper body alignment and promote proper body posture. Braces/Orthotics/Lines/Etc:  
O2 Device: Room air Treatment/Session Assessment:   
Response to Treatment:  pt tolerated well Interdisciplinary Collaboration: Occupational Therapist 
Registered Nurse  After treatment position/precautions:  
Family at bedside On side of bed Compliance with Program/Exercises: Will assess as treatment progresses Recommendations/Intent for next treatment session: \"Next visit will focus on advancements to more challenging activities and reduction in assistance provided\". Total Treatment Duration: PT Patient Time In/Time Out Time In: 1000 Time Out: [address] PT

## 2019-08-01 NOTE — DISCHARGE INSTRUCTIONS
DISCHARGE SUMMARY from Nurse    PATIENT INSTRUCTIONS:    After general anesthesia or intravenous sedation, for 24 hours or while taking prescription Narcotics:  · Limit your activities  · Do not drive and operate hazardous machinery  · Do not make important personal or business decisions  · Do  not drink alcoholic beverages  · If you have not urinated within 8 hours after discharge, please contact your surgeon on call. Report the following to your surgeon:  · Excessive pain, swelling, redness or odor of or around the surgical area  · Temperature over 100.5  · Nausea and vomiting lasting longer than 4 hours or if unable to take medications  · Any signs of decreased circulation or nerve impairment to extremity: change in color, persistent  numbness, tingling, coldness or increase pain  · Any questions    What to do at Home:  Recommended activity: Activity as tolerated and no driving while on pain medications    If you experience any of the following symptoms please see discharge instructions, please follow up with primary care MD.    *  Please give a list of your current medications to your Primary Care Provider. *  Please update this list whenever your medications are discontinued, doses are      changed, or new medications (including over-the-counter products) are added. *  Please carry medication information at all times in case of emergency situations. These are general instructions for a healthy lifestyle:    No smoking/ No tobacco products/ Avoid exposure to second hand smoke  Surgeon General's Warning:  Quitting smoking now greatly reduces serious risk to your health.     Obesity, smoking, and sedentary lifestyle greatly increases your risk for illness    A healthy diet, regular physical exercise & weight monitoring are important for maintaining a healthy lifestyle    You may be retaining fluid if you have a history of heart failure or if you experience any of the following symptoms:  Weight gain of 3 pounds or more overnight or 5 pounds in a week, increased swelling in our hands or feet or shortness of breath while lying flat in bed. Please call your doctor as soon as you notice any of these symptoms; do not wait until your next office visit. The discharge information has been reviewed with the patient. The patient verbalized understanding. Discharge medications reviewed with the patient and appropriate educational materials and side effects teaching were provided.   ___________________________________________________________________________________________________________________________________

## 2019-08-01 NOTE — PROGRESS NOTES
Problem: Self Care Deficits Care Plan (Adult) Goal: *Acute Goals and Plan of Care (Insert Text) Description Patient will complete bathing with independence to increase self care independence. Patient will complete toileting with independence to increase self care independence. Patient will improve dynamic standing at in shower for 10 minutes to improve independence with transfers and self cares. Patient will complete Vision exercises with supervision to increase balance and decrease dizziness. Timeframe: 7 visits OCCUPATIONAL THERAPY: Initial Assessment and Daily Note 8/1/2019 OBSERVATION: OT Visit Days: 1 Payor: BLUE CROSS / Plan: [place] BLUE CROSS BLUE ESSENTIALS REINALDO / Product Type: REINALDO /  
  
NAME/AGE/GENDER: Sophy Saul is a 59 y.o. female PRIMARY DIAGNOSIS:  CVA (cerebral vascular accident) ([facility] [I63.9] CVA (cerebral vascular accident) ([facility] CVA (cerebral vascular accident) ([facility] ICD-10: Treatment Diagnosis:  
 Other lack of cordination (R27.8) Precautions/Allergies: Other food; Bee sting [sting, bee]; Invokana [canagliflozin]; and Other plant, animal, environmental  
  
ASSESSMENT:  
 
Ms. Evelia Sherwood presents in room with spouse present. Long standing balance deficits progressively getting worse since TIA/CVA in march/April. She is unsteady on her feet during ADL's requiring SBA/CGA. Her tracking and saccades are also impaired L > R. No Diplopia during session, except with tracking to right lateral side. Completed tracking and saccade exercises with handout given. Recommend Vestibular/Balance rehab. Initiate OT POC. This section established at most recent assessment PROBLEM LIST (Impairments causing functional limitations): 
Decreased ADL/Functional Activities Decreased Balance Increased Fatigue Occular motor INTERVENTIONS PLANNED: (Benefits and precautions of occupational therapy have been discussed with the patient.) Activities of daily living training Neuromuscular re-eduation Therapeutic activity Therapeutic exercise TREATMENT PLAN: Frequency/Duration: Follow patient 1-2tx to address above goals. Rehabilitation Potential For Stated Goals: Excellent REHAB RECOMMENDATIONS (at time of discharge pending progress):   
Placement: It is my opinion, based on this patient's performance to date, that Ms. Ugarte may benefit from OUTPATIENT THERAPY after discharge due to the functional deficits listed above that are likely to improve with skilled rehabilitation because because he/she will benefit from the individualized approach tailored to his/her deficits. Equipment:  
None at this time OCCUPATIONAL PROFILE AND HISTORY:  
History of Present Injury/Illness (Reason for Referral): 
See H&P Past Medical History/Comorbidities: Ms. Ismael Street  has a past medical history of Actinic keratosis, Arthritis, Asthma, Cancer ([address] DJD (degenerative joint disease), Environmental allergies, Environmental and seasonal allergies (7/25/2019), GERD (gastroesophageal reflux disease), History of malignant melanoma (1980's), History of TIA (transient ischemic attack), Hypertension, Hypothyroidism, Insulin dependent diabetes mellitus ([address] (1990's), Kidney stones, Mobitz type 1 second degree AV block, Obese, Osteoarthritis (12/22/2016), Osteopenia, PONV (postoperative nausea and vomiting), RA (rheumatoid arthritis) ([address] (dx--2013), Rheumatoid arthritis involving right knee with negative rheumatoid factor ([address] (5/16/2019), Rosacea, S/P total knee arthroplasty (12/23/2016), Stroke ([address] and Unspecified hypothyroidism.  She also has no past medical history of Adverse effect of anesthesia, Aneurysm ([address] CAD (coronary artery disease), Chronic kidney disease, Chronic obstructive pulmonary disease (HCC), Chronic pain, Coagulation disorder ([address] Difficult intubation, Endocarditis, Heart failure ([facility] Liver disease, Malignant hyperthermia due to anesthesia, Nicotine vapor product user, Non-nicotine vapor product user, Pseudocholinesterase deficiency, Psychiatric disorder, PUD (peptic ulcer disease), Rheumatic fever, Seizures ([facility] Sleep apnea, or Thromboembolus ([facility] Ms. Angle Constantino  has a past surgical history that includes hx refractive surgery (); hx meniscectomy (Right, ); hx colonoscopy (; ); hx mohs procedure (Right, ); hx lap cholecystectomy (); pr abdomen surgery proc unlisted; hx dilation and curettage (); hx  section (); hx tubal ligation (); hx gyn (); hx other surgical (); hx other surgical (); hx knee arthroscopy (Right, ); hx knee replacement (Left, ); hx knee replacement (Right, ); hx heent (); fragment kidney stone/ eswl; hx lithotripsy (, ; 2016; 18); hx breast biopsy (Left, 2019); and hx breast lumpectomy (Left, 2019). Social History/Living Environment:  
Home Environment: Private residence One/Two Story Residence: Two story # of Interior Steps: 23 Interior Rails: Left Lift Chair Available: No 
Living Alone: No 
Support Systems: Spouse/Significant Other/Partner Patient Expects to be Discharged to[de-identified] Private residence Current DME Used/Available at Home: Cane, straight Tub or Shower Type: Tub 
Prior Level of Function/Work/Activity: SBA/CGA for ADL's. Number of Personal Factors/Comorbidities that affect the Plan of Care: Brief history (0):  LOW COMPLEXITY ASSESSMENT OF OCCUPATIONAL PERFORMANCE[de-identified]  
Activities of Daily Living:  
Basic ADLs (From Assessment) Complex ADLs (From Assessment) Feeding: Independent Oral Facial Hygiene/Grooming: Stand-by assistance Bathing: Stand-by assistance Upper Body Dressing: Stand-by assistance Lower Body Dressing: Stand-by assistance Toileting: Stand by assistance Grooming/Bathing/Dressing Activities of Daily Living Bed/Mat Mobility Supine to Sit: Stand-by assistance Sit to Supine: Stand-by assistance Sit to Stand: Stand-by assistance Stand to Sit: Stand-by assistance Most Recent Physical Functioning:  
Gross Assessment: 
  
         
  
Posture: 
Posture (WDL): Within defined limits Balance: 
Sitting: Intact Standing: Impaired Standing - Static: Good Standing - Dynamic : Fair Bed Mobility: 
Supine to Sit: Stand-by assistance Sit to Supine: Stand-by assistance Wheelchair Mobility: 
  
Transfers: 
Sit to Stand: Stand-by assistance Stand to Sit: Stand-by assistance Patient Vitals for the past 6 hrs: 
 BP BP Patient Position SpO2 Pulse 19 0752 105/54 At rest 95 % (!) 58 Mental Status Neurologic State: Alert Orientation Level: Oriented X4 Cognition: Appropriate decision making, Follows commands Physical Skills Involved: 
Balance Vision Cognitive Skills Affected (resulting in the inability to perform in a timely and safe manner): [facility]  Psychosocial Skills Affected: WFL Number of elements that affect the Plan of Care: 1-3:  LOW COMPLEXITY CLINICAL DECISION MAKIN[address] [place] [id number] AM-PAC 6 Clicks Daily Activity Inpatient Short Form How much help from another person does the patient currently need. .. Total A Lot A Little None 1. Putting on and taking off regular lower body clothing? ? 1   ? 2   ? 3   ? 4  
2. Bathing (including washing, rinsing, drying)? ? 1   ? 2   ? 3   ? 4  
3. Toileting, which includes using toilet, bedpan or urinal?   ? 1   ? 2   ? 3   ? 4  
4. Putting on and taking off regular upper body clothing? ? 1   ? 2   ? 3   ? 4  
5. Taking care of personal grooming such as brushing teeth? ? 1   ? 2   ? 3   ? 4  
6. Eating meals? ? 1   ? 2   ? 3   ? 4  
© 2007, Trustees of [address] [place] [address] [address] [address], under license to [employer]. All rights reserved Score:  Initial: 21 Most Recent: X (Date: -- ) Interpretation of Tool:  Represents activities that are increasingly more difficult (i.e. Bed mobility, Transfers, Gait). Medical Necessity:    
Skilled intervention continues to be required due to Deficits noted above. Reason for Services/Other Comments: 
Patient continues to require skilled intervention due to balance and vision deficits Lord Monten[place]ro Use of outcome tool(s) and clinical judgement create a POC that gives a: MODERATE COMPLEXITY  
 
 
 
TREATMENT:  
(In addition to Assessment/Re-Assessment sessions the following treatments were rendered) Pre-treatment Symptoms/Complaints:   
Pain: Initial:  
Pain Intensity 1: 0  Post Session:  0 Neuromuscular Re-education: (10 ):  Exercise/activities per grid below to improve balance and vision . Required minimal visual and verbal cues to promote coordination of bilateral, upper extremity(s), lower extremity(s), eyes . Handout given for lateral tracking and stability with head turns 1 set of 120 seconds each. Braces/Orthotics/Lines/Etc:  
O2 Device: Room air Treatment/Session Assessment:   
Response to Treatment:  Good, sitting up in recliner Interdisciplinary Collaboration:  
Physical Therapist 
Occupational Therapist 
Registered Nurse After treatment position/precautions:  
Bed in low position Call light within reach RN notified Edge of bed Compliance with Program/Exercises: Compliant all of the time, Will assess as treatment progresses. Recommendations/Intent for next treatment session: \"Next visit will focus on advancements to more challenging activities\". Total Treatment Duration: OT Patient Time In/Time Out Time In: 1020 Time Out: 1040 Hailee Woo OT

## 2019-08-01 NOTE — PROGRESS NOTES
Sw reviewed chart. Pt is a 59 yr old female adm yesterday due to stroke symptoms. Per Md MRI was not negative. Sw also talked with PT/OT earlier this am. Joy Meckel informed pt was supposed to have an appointment with her Neurologist TODAY. Pt needs a new appointment made before she leaves hospital. Neuro was working her up for vestibular issues and will likely need to make a ref for outpt vestibular rehab. Pt has BC Ins and is still coded as OBS at this point. Unsure when pt will d/c. Sw to follow up with pt for discharge needs. Alexis Street

## 2019-08-01 NOTE — PROGRESS NOTES
07/31/19 2050 Dual Skin Pressure Injury Assessment Dual Skin Pressure Injury Assessment WDL Second Care Provider (Based on [address] [place] [address]) Sunshine Worthington RN Skin Integumentary Skin Integumentary (WDL) X Skin Color Appropriate for ethnicity Skin Condition/Temp Warm Skin Integrity Scars (comment); Wound (add Wound LDA) 
(2 incisional wounds on L breast c/d/i) Turgor Epidermis thin w/ loss of subcut tissue

## 2019-08-01 NOTE — PROGRESS NOTES
Interdisciplinary team rounds were held 8/1/2019 with the following team members:Care Management, Nursing, Nutrition, Pharmacy, Physical Therapy and Physician. Plan of care discussed. See clinical pathway and/or care plan for interventions and desired outcomes.

## 2019-08-01 NOTE — H&P
HOSPITALIST H&P 
NAME:  Bry Ortegaippo Age:  59 y.o. 
:   1955 MRN:   [id number] PCP: Teja Blank MD 
Treatment Team: Attending Provider: Clari Pagan MD 
 
Full Code CC: Reason for admission is: cva 
 
HPI:  
Patient history was obtained from the ER provider prior to seeing the patient. Patient is a 59 y.o. female who presents to the ER due to ongoing, intermittent double vision. This has been happening for the last 2 days, with a few episodes of varying length. Today, it is persisting and has been present for over an hour. Also reports some dizziness ongoing, and was to see a neurologist for that tomorrow. She does have a h/o TIA usually affecting her right side, slurred speech and memory loss. Denies fever/chills, n/v/d, headache, chest pain, SOB, AMS, syncope, head injury. ER was able to get an MRI done: 
IMPRESSION: 
1. Questionable tiny punctate focus of restricted diffusion within the right cerebellum. Cannot exclude tiny lacunar infarct at this site. 2. Evidence of chronic microvascular ischemic change. 3. Mucosal thickening of the right maxillary sinus. We are asked to admit for continued CVA workup. She is obviously not a tPa candidate due to time of onset and constellation of symptoms. ROS: 
All systems have been reviewed and are negative except as stated in HPI or elsewhere. Past Medical History:  
Diagnosis Date  Actinic keratosis   
 uses Solaraze gel when needed  Arthritis   
 rheumatoid. Orencia infusion every 4 weeks  Asthma   
 none in yrs per pt--- no inhalers per pt  Cancer ([address] melanoma  DJD (degenerative joint disease)  Environmental allergies  Environmental and seasonal allergies 2019  GERD (gastroesophageal reflux disease)   
 prn med  History of malignant melanoma 's  
 on back  History of TIA (transient ischemic attack)  Hypertension   
 controlled with medication  Hypothyroidism  Insulin dependent diabetes mellitus ([address] 1990's Type 2. sqbs avg am--100---- hypo at 73- last A1C= 8.1 on 12/19/17  Kidney stones 4th one at present  Mobitz type 1 second degree AV block EKG today 9/22/16- Dr Yadiel Wilkins reviewed----- per pt-- \" been that way my whole life\"-- does not see a cardiologist  
 Obese   
 bmi =39  
 Osteoarthritis 12/22/2016  Osteopenia  PONV (postoperative nausea and vomiting) POV- responds to IV antiemetic  RA (rheumatoid arthritis) ([address] dx--2013  Rheumatoid arthritis involving right knee with negative rheumatoid factor ([address] 5/16/2019  Rosacea  S/P total knee arthroplasty 12/23/2016  Stroke ([facility] followed by neurologist    
 Unspecified hypothyroidism   
 hypothyroid. Controlled with Levoxyl Past Surgical History:  
Procedure Laterality Date  ABDOMEN SURGERY PROC UNLISTED    
 fatty tumor removed- from abd--benign  FRAGMENT KIDNEY STONE/ ESWL    
 HX BREAST BIOPSY Left 06/21/2019  
 stereo bx  HX BREAST LUMPECTOMY Left 7/5/2019 LEFT BREAST LUMPECTOMY WITH NEEDLE  AND SENTINAL NODE/ performed by Joleen Arroyo MD at [address] [place] [address]  HX COLONOSCOPY  2008; 2010  HX DILATION AND CURETTAGE  1999  
 and hysteroscopy  HX GYN  1978  
 vaginal cryocautery [facility] Oral Surgery--per pt  still has metal in mouth since 1980's  HX KNEE ARTHROSCOPY Right 2007  HX KNEE REPLACEMENT Left 2008  HX KNEE REPLACEMENT Right 2016 [address], 2003; 2016; 2/6/18  HX MENISCECTOMY Right 2011 Right knee  HX MOHS PROCEDURES Right 2004  HX OTHER SURGICAL  1999  
 lipoma of abdomen  HX OTHER SURGICAL  1984  
 melanoma of back [address] SURGERY  2002 [place] Social History Tobacco Use  Smoking status: Never Smoker  Smokeless tobacco: Never Used Substance Use Topics  Alcohol use: Yes Alcohol/week: 1.0 standard drinks Types: 1 Glasses of wine per week Comment: occasionally Family History Problem Relation Age of Onset  Cancer Mother Lung  Arthritis-rheumatoid Mother  Thyroid Disease Mother  Cancer Father   
     Lung; Liver  Diabetes Father  Heart Disease Father  Hypertension Father  Diabetes Brother  SLE Sister  Breast Cancer Paternal Aunt  Breast Cancer Other   
     cousins FH Reviewed and non-contributory to admitting diagnosis Allergies Allergen Reactions  Other Food Swelling Jalapeno. -- tongue and lip swells  Bee Sting [Sting, Bee] Swelling Local swelling  Invokana [Canagliflozin] Other (comments) Caused frequent Urinary Tract Infections  Other Plant, Animal, Environmental Other (comments) Trees, grass, dust, mold---- congestion Prior to Admission Medications Prescriptions Last Dose Informant Patient Reported? Taking? GLUCOSAMINE HCL/CHONDR MERAZ A NA (OSTEO BI-FLEX PO) 7/31/2019 at nn  Yes Yes Sig: Take 1 Tab by mouth two (2) times a day. NOVOFINE 32 32 gauge x 1/4\" ndle Unknown at Unknown time  No No  
Sig: Use TID  
aspirin (ASPIRIN) 325 mg tablet 7/31/2019 at AM  No Yes Sig: Take 1 Tab by mouth daily. Patient taking differently: Take 325 mg by mouth daily. Indications: treatment to prevent a heart attack  
atorvastatin (LIPITOR) 80 mg tablet 7/31/2019 at 1015 PM  No Yes Sig: Take 1 Tab by mouth nightly. Patient taking differently: Take 80 mg by mouth nightly. Indications: high cholesterol  
calcium carbonate (TUMS) 200 mg calcium (500 mg) chew Unknown at Unknown time  Yes No  
Sig: Take 1 Tab by mouth as needed. Indications: heartburn  
calcium-cholecalciferol, d3, (CALCIUM 600 + D) 600-125 mg-unit tab 7/31/2019 at AM  Yes Yes Sig: Take 1 Tab by mouth two (2) times a day.  Indications: post-menopausal osteoporosis prevention  
cholecalciferol (VITAMIN D3) 1,000 unit cap 2019 at AM  Yes Yes Sig: Take 1,000 Units by mouth daily. Indications: Prevention of Vitamin D Deficiency  
clopidogrel (PLAVIX) 75 mg tab 2019 at PM  No Yes Sig: Take 1 Tab by mouth daily. Patient taking differently: Take 75 mg by mouth daily. Pt instructed to hold Plavix 3 days prior to surgery per Lai; cleared with cardiologist Dr Milton Chappell  
fluticasone propionate [facility] ALLERGY RELIEF) 50 mcg/actuation nasal spray 2019 at AM  No Yes Si Sprays by Both Nostrils route daily. glipiZIDE (GLUCOTROL) 10 mg tablet 2019 at nn  No Yes Sig: TAKE ONE TABLET BY MOUTH TWICE DAILY  Indications: type 2 diabetes mellitus  
glucose blood VI test strips (ACCU-CHEK KALEB PLUS TEST STRP) strip 2019 at AM  No Yes Sig: Check BG three to four times daily. Please dispense strips based on glucometer, possibly Accu-chek Stephanie. insulin detemir U-100 (LEVEMIR FLEXTOUCH U-100 INSULN) 100 unit/mL (3 mL) inpn 2019 at Unknown time  No Yes Sig: Usually takes 10-15 units in am and then 60 units at hs--- BUT TOTAL FOR 24 HOURS IS NOT OVER 75 UNITS 
(STARTS COUNTING UNITS T NIGHT DOSE)  Indications: type 2 diabetes mellitus Patient taking differently: 28 Units by SubCUTAneous route nightly. Indications: type 2 diabetes mellitus  
krill/om-3/dha/epa/phospho/ast (MEGARED OMEGA-3 KRILL OIL PO) 2019 at AM  Yes Yes Sig: Take 1 Cap by mouth daily. levothyroxine (SYNTHROID) 100 mcg tablet 2019 at AM  No Yes Sig: Take 1 Tab by mouth Daily (before breakfast). lisinopril (PRINIVIL, ZESTRIL) 30 mg tablet 2019 at AM  No Yes Sig: Take 1 Tab by mouth daily. loratadine (CLARITIN) 10 mg tablet 2019 at AM  No Yes Sig: Take 1 Tab by mouth daily. metFORMIN (GLUCOPHAGE) 1,000 mg tablet 2019 at AM  No Yes Sig: Take 1 Tab by mouth two (2) times daily (with meals). Facility-Administered Medications: None Objective: No intake or output data in the 24 hours ending 19 0433 Temp (24hrs), Av.4 °F (36.9 °C), Min:97.7 °F (36.5 °C), Max:99 °F (37.2 °C) Oxygen Therapy O2 Sat (%): 96 % (19) Pulse via Oximetry: 56 beats per minute (19) O2 Device: Room air (19) Body mass index is 33.07 kg/m². Patient Vitals for the past 24 hrs: 
 Temp Pulse Resp BP SpO2  
19 99 °F (37.2 °C) 61 18 117/69 96 % 19 2334 98.2 °F (36.8 °C) (!) 57 17 117/79 97 % 19 2039 97.7 °F (36.5 °C) 74 18 135/82 100 % 19  82 17 118/58 100 % 199     99 % 19 1726 98.5 °F (36.9 °C) 86 16 127/77 99 % Physical Exam: 
 
General:    WD and WN, No apparent distress. Head:   Normocephalic, without obvious abnormality, atraumatic. Eyes:  PERRL; EOMI; sclera normal/non-icteric ENT:  Hearing is normal.  Oropharynx is clear with tacky mucous membranes Resp:    Clear to auscultation bilaterally. No Wheezing or Rhonchi. Resp are even and unlabored Heart[de-identified]  Regular rate and rhythm,  no murmur,   No LE edema Abdomen:   Soft, non-tender. Not distended. Bowel sounds normal.  hepato-splenomegaly Musc/SK: Muscle strength is good and tone normal; No cyanosis. No clubbing Skin:     Texture, turgor normal. No significant rashes or lesions. Capillary refill < 2 sec Neurologic: CN II - XII are grossly intact - Eye exam as noted above;  Positive Romberg and ataxia tandem gait. There is no pronator drift noted Psych: Alert and oriented x 4;  Judgement and insight are normal 
  
Data Review:  
Recent Results (from the past 24 hour(s)) CBC WITH AUTOMATED DIFF Collection Time: 19  5:47 PM  
Result Value Ref Range WBC 8.4 4.3 - 11.1 K/uL  
 RBC 3.84 (L) 4.05 - 5.2 M/uL  
 HGB 12.6 11.7 - 15.4 g/dL HCT 37.3 35.8 - 46.3 %  MCV 97.1 79.6 - 97.8 FL  
 MCH 32.8 26.1 - 32.9 PG  
 MCHC 33.8 31.4 - 35.0 g/dL  
 RDW 12.7 11.9 - 14.6 % PLATELET 611 555 - 629 K/uL MPV 12.0 9.4 - 12.3 FL ABSOLUTE NRBC 0.00 0.0 - 0.2 K/uL  
 DF AUTOMATED NEUTROPHILS 48 43 - 78 % LYMPHOCYTES 37 13 - 44 % MONOCYTES 9 4.0 - 12.0 % EOSINOPHILS 5 0.5 - 7.8 % BASOPHILS 1 0.0 - 2.0 % IMMATURE GRANULOCYTES 0 0.0 - 5.0 %  
 ABS. NEUTROPHILS 4.0 1.7 - 8.2 K/UL  
 ABS. LYMPHOCYTES 3.1 0.5 - 4.6 K/UL  
 ABS. MONOCYTES 0.7 0.1 - 1.3 K/UL  
 ABS. EOSINOPHILS 0.4 0.0 - 0.8 K/UL  
 ABS. BASOPHILS 0.1 0.0 - 0.2 K/UL  
 ABS. IMM. GRANS. 0.0 0.0 - 0.5 K/UL METABOLIC PANEL, COMPREHENSIVE Collection Time: 07/31/19  5:47 PM  
Result Value Ref Range Sodium 136 136 - 145 mmol/L Potassium 4.7 3.5 - 5.1 mmol/L Chloride 106 98 - 107 mmol/L  
 CO2 21 21 - 32 mmol/L Anion gap 9 7 - 16 mmol/L Glucose 133 (H) 65 - 100 mg/dL BUN 22 8 - 23 MG/DL Creatinine 0.89 0.6 - 1.0 MG/DL  
 GFR est AA >60 >60 ml/min/1.73m2 GFR est non-AA >60 >60 ml/min/1.73m2 Calcium 10.0 8.3 - 10.4 MG/DL Bilirubin, total 0.9 0.2 - 1.1 MG/DL  
 ALT (SGPT) 23 12 - 65 U/L  
 AST (SGOT) 48 (H) 15 - 37 U/L Alk. phosphatase 100 50 - 136 U/L Protein, total 8.0 6.3 - 8.2 g/dL Albumin 3.7 3.2 - 4.6 g/dL Globulin 4.3 (H) 2.3 - 3.5 g/dL A-G Ratio 0.9 (L) 1.2 - 3.5 GLUCOSE, POC Collection Time: 07/31/19  9:36 PM  
Result Value Ref Range Glucose (POC) 110 (H) 65 - 100 mg/dL CXR Results  (Last 48 hours) None CT Results  (Last 48 hours) None Assessment and Plan: Active Hospital Problems Diagnosis Date Noted  CVA (cerebral vascular accident) ([facility] 07/31/2019  Morbid obesity ([facility] 07/31/2019  Essential hypertension 07/25/2019  Type 2 diabetes mellitus without complication, with long-term current use of insulin ([facility] 05/16/2019  Hyperglycemia due to type 2 diabetes mellitus ([facility] Principal Problem: CVA (cerebral vascular accident) ([facility] (7/31/2019) CVA protocol with ECHO, carotids, labs, PT OT ST, neuro consult,  MRI is done,  She is already on asa and plavix, statin Active Problems: Hyperglycemia due to type 2 diabetes mellitus (HCC) () Continue home medications, with following changes: will hold metformin(if taking) due to acute illness; start SSI protocol; hold oral meds if NPO and reduce long acting insulins; will monitor and adjust treatments daily prn. Type 2 diabetes mellitus without complication, with long-term current use of insulin ([facility] (5/16/2019) As above Essential hypertension (7/25/2019) Continue home meds and add prn hydralazine, if needed. Morbid obesity ([facility] (7/31/2019)  pt; increases morbidity and mortality; can impede treatment and recovery · PLAN General 
·  
· Cont appropriate home meds (see MAR) · Control symptoms (pain, n/v, fever, etc) · Monitor appropriate labs · DVT prophylaxis:  Lovenox · Code status: Full;  HCPOA:  
· Risk: high · Anticipated DC needs: · Estimated LOS:  less than 2 midnights · Plans discussed with patient and/or caregiver; questions answered. Med records reviewed if applicable; findings:  
 
Critical care time if applicable:   
 
Signed By: Lakisha Hurley MD   
 August 1, 2019

## 2019-08-01 NOTE — PROGRESS NOTES
Pt in bed resting quietly, assessment complete. Neuro checks WNL.  at side. Waiting on neuro consult. RR even, unlabored, no noted desires. Pt denies needs. Bed L/L, call bell is within reach and side rails are up x 2.

## 2019-08-01 NOTE — PROGRESS NOTES
STG: Patient will participate in cognitive linguistic assessment. SPEECH LANGUAGE PATHOLOGY: DYSPHAGIA- Initial Assessment NAME/AGE/GENDER: Eliane Ugarte is a 59 y.o. female DATE: 8/1/2019 PRIMARY DIAGNOSIS: CVA (cerebral vascular accident) ([address] [I63.9] ICD-10: Treatment Diagnosis: R13.12 Dysphagia, Oropharyngeal Phase INTERDISCIPLINARY COLLABORATION: Registered Nurse PRECAUTIONS/ALLERGIES: Other food; Bee sting [sting, bee]; Invokana [canagliflozin]; and Other plant, animal, environmental  
 
 
SUBJECTIVE Alert and oriented x4. Cooperative throughout assessment. Reports decline in short term memory. Diet Prior to Evaluation: Regular diet/thin liquids History of Present Injury/Illness: Ms. Mirtha Owusu  has a past medical history of Actinic keratosis, Arthritis, Asthma, Cancer ([address] DJD (degenerative joint disease), Environmental allergies, Environmental and seasonal allergies (7/25/2019), GERD (gastroesophageal reflux disease), History of malignant melanoma (1980's), History of TIA (transient ischemic attack), Hypertension, Hypothyroidism, Insulin dependent diabetes mellitus ([address] (1990's), Kidney stones, Mobitz type 1 second degree AV block, Obese, Osteoarthritis (12/22/2016), Osteopenia, PONV (postoperative nausea and vomiting), RA (rheumatoid arthritis) ([address] (dx--2013), Rheumatoid arthritis involving right knee with negative rheumatoid factor ([address] (5/16/2019), Rosacea, S/P total knee arthroplasty (12/23/2016), Stroke ([address] and Unspecified hypothyroidism.  She also has no past medical history of Adverse effect of anesthesia, Aneurysm ([address] CAD (coronary artery disease), Chronic kidney disease, Chronic obstructive pulmonary disease (HCC), Chronic pain, Coagulation disorder ([address] Difficult intubation, Endocarditis, Heart failure ([address] Liver disease, Malignant hyperthermia due to anesthesia, Nicotine vapor product user, Non-nicotine vapor product user, Pseudocholinesterase deficiency, Psychiatric disorder, PUD (peptic ulcer disease), Rheumatic fever, Seizures ([address] Sleep apnea, or Thromboembolus ([facility] . She also  has a past surgical history that includes hx refractive surgery (); hx meniscectomy (Right, ); hx colonoscopy (; ); hx mohs procedure (Right, ); hx lap cholecystectomy (); pr abdomen surgery proc unlisted; hx dilation and curettage (); hx  section (); hx tubal ligation (); hx gyn (); hx other surgical (); hx other surgical (); hx knee arthroscopy (Right, ); hx knee replacement (Left, ); hx knee replacement (Right, ); hx heent (); fragment kidney stone/ eswl; hx lithotripsy (, ; 2016; 18); hx breast biopsy (Left, 2019); and hx breast lumpectomy (Left, 2019). Previous Dysphagia: NONE REPORTED Problem List:  (Impairments causing functional limitations): 1. Oropharyngeal dysphagia- No symptoms identified Orientation: Person Place Time Situation Pain: Pain Scale 1: Numeric (0 - 10) Pain Intensity 1: 0 Pain Location 1: Knee OBJECTIVE Oral Motor Assessment: 
· Unremarkable oral mech exam. 
  
Swallow assessment: 
 Patient exhibited no overt s/sx airway compromise with any trialed consistencies: 6 oz thin liquids via cup/straw, mixed, or solid. Laryngeal elevation present upon palpation, which appears prompt. Oral prep time and oral clearance WNL. Patient denies any globus sensation. ASSESSMENT Patient presents with normal oropharyngeal swallow function. Tool Used: Dysphagia Outcome and Severity Scale (LINDA) Score Comments Normal Diet  [x] 7 With no strategies or extra time needed Functional Swallow  [] 6 May have mild oral or pharyngeal delay Mild Dysphagia  [] 5 Which may require one diet consistency restricted Mild-Moderate Dysphagia  [] 4 With 1-2 diet consistencies restricted Moderate Dysphagia  [] 3 With 2 or more diet consistencies restricted Moderate-Severe Dysphagia  [] 2 With partial PO strategies (trials with ST only) Severe Dysphagia  [] 1 With inability to tolerate any PO safely Score:  Initial: 7 Most Recent:  (Date 08/01/19 ) Interpretation of Tool: The Dysphagia Outcome and Severity Scale (LINDA) is a simple, easy-to-use, 7-point scale developed to systematically rate the functional severity of dysphagia based on objective assessment and make recommendations for diet level, independence level, and type of nutrition. Current Medications: No current facility-administered medications on file prior to encounter. Current Outpatient Medications on File Prior to Encounter Medication Sig Dispense Refill  metFORMIN (GLUCOPHAGE) 1,000 mg tablet Take 1 Tab by mouth two (2) times daily (with meals). 180 Tab 1  
 loratadine (CLARITIN) 10 mg tablet Take 1 Tab by mouth daily. 90 Tab 1  
 lisinopril (PRINIVIL, ZESTRIL) 30 mg tablet Take 1 Tab by mouth daily. 90 Tab 1  
 levothyroxine (SYNTHROID) 100 mcg tablet Take 1 Tab by mouth Daily (before breakfast). 90 Tab 1  
 glipiZIDE (GLUCOTROL) 10 mg tablet TAKE ONE TABLET BY MOUTH TWICE DAILY  Indications: type 2 diabetes mellitus 180 Tab 1  
 fluticasone propionate (FLONASE ALLERGY RELIEF) 50 mcg/actuation nasal spray 2 Sprays by Both Nostrils route daily. 3 Bottle 1  
 insulin detemir U-100 (LEVEMIR FLEXTOUCH U-100 INSULN) 100 unit/mL (3 mL) inpn Usually takes 10-15 units in am and then 60 units at hs--- BUT TOTAL FOR 24 HOURS IS NOT OVER 75 UNITS 
(STARTS COUNTING UNITS T NIGHT DOSE)  Indications: type 2 diabetes mellitus (Patient taking differently: 28 Units by SubCUTAneous route nightly. Indications: type 2 diabetes mellitus) 2 Units 3  
 glucose blood VI test strips (ACCU-CHEK KALEB PLUS TEST STRP) strip Check BG three to four times daily.  Please dispense strips based on glucometer, possibly Accu-chek Stephanie. 200 Strip 5  
 krill/om-3/dha/epa/phospho/ast (MEGARED OMEGA-3 KRILL OIL PO) Take 1 Cap by mouth daily.  atorvastatin (LIPITOR) 80 mg tablet Take 1 Tab by mouth nightly. (Patient taking differently: Take 80 mg by mouth nightly. Indications: high cholesterol) 30 Tab 2  clopidogrel (PLAVIX) 75 mg tab Take 1 Tab by mouth daily. (Patient taking differently: Take 75 mg by mouth daily. Pt instructed to hold Plavix 3 days prior to surgery per Lai; cleared with cardiologist Dr Tres Butler) 30 Tab 2  
 aspirin (ASPIRIN) 325 mg tablet Take 1 Tab by mouth daily. (Patient taking differently: Take 325 mg by mouth daily. Indications: treatment to prevent a heart attack) 30 Tab 0  cholecalciferol (VITAMIN D3) 1,000 unit cap Take 1,000 Units by mouth daily. Indications: Prevention of Vitamin D Deficiency  GLUCOSAMINE HCL/CHONDR MERAZ A NA (OSTEO BI-FLEX PO) Take 1 Tab by mouth two (2) times a day.  calcium-cholecalciferol, d3, (CALCIUM 600 + D) 600-125 mg-unit tab Take 1 Tab by mouth two (2) times a day. Indications: post-menopausal osteoporosis prevention  calcium carbonate (TUMS) 200 mg calcium (500 mg) chew Take 1 Tab by mouth as needed. Indications: heartburn  NOVOFINE 32 32 gauge x 1/4\" ndle Use  Pen Needle 3 PLAN   
FREQUENCY/DURATION: Speech therapy to follow up for assessment of cognitive-linguistic function.  
 
- Recommendations for next treatment session: Next treatment will address cognitive linguistic assessment. REHABILITATION POTENTIAL FOR STATED GOALS: Excellent COMPLIANCE WITH PROGRAM/EXERCISES: Will assess as treatment progresses CONTINUATION OF SKILLED SERVICES/MEDICAL NECESSITY: 
? No further speech therapy indicated at this time as swallow function is within normal limits. Please re-consult if new concerns arise. RECOMMENDATIONS  
DIET:  
? continue prescribed diet 
? PO:  Regular ? Liquids:  regular thin MEDICATIONS: With liquid ASPIRATION PRECAUTIONS · Slow rate of intake · Small bites/sips · Upright at 90 degrees during meal 
  
COMPENSATORY STRATEGIES/MODIFICATIONS · None EDUCATION: 
· Recommendations discussed with Nursing · Patient RECOMMENDATIONS for CONTINUED SPEECH THERAPY:  
YES: Cognitive linguistic evaluation clinically indicated post possible tiny right side cerebellar infarct per MRI. SAFETY: 
After treatment position/precautions: · Up in chair · Call light within reach Total Treatment Duration:  
Time In: 3095 Time Out: 8131 Bob Cruz MS, CCC-SLP

## 2019-08-01 NOTE — PROGRESS NOTES
Problem: Falls - Risk of 
Goal: *Absence of Falls Description Document Marii Girt Fall Risk and appropriate interventions in the flowsheet. Outcome: Progressing Towards Goal 
Note:  
Fall Risk Interventions: 
Mobility Interventions: Assess mobility with egress test, Bed/chair exit alarm, Patient to call before getting OOB Medication Interventions: Assess postural VS orthostatic hypotension, Bed/chair exit alarm, Patient to call before getting OOB, Teach patient to arise slowly History of Falls Interventions: Bed/chair exit alarm, Investigate reason for fall, Room close to nurse's station

## 2019-08-01 NOTE — CONSULTS
Consult Patient: Ernestine Gonzalez MRN: [id number] YOB: 1955  Age: 59 y.o. Sex: female Subjective: Ernestine Gonzalez is a 59 y.o. female who is being seen for stroke. The patient had worsening double vision which led her to the ED. A brain MRI was done which shows a small punctate infarct in the cerebellum. Symptoms mostly resolved. Onset was sudden. Duration was many hours. Mild to moderate in severity. Past Medical History:  
Diagnosis Date  Actinic keratosis   
 uses Solaraze gel when needed  Arthritis   
 rheumatoid. Orencia infusion every 4 weeks  Asthma   
 none in yrs per pt--- no inhalers per pt  Cancer ([address] melanoma  DJD (degenerative joint disease)  Environmental allergies  Environmental and seasonal allergies 7/25/2019  GERD (gastroesophageal reflux disease)   
 prn med  History of malignant melanoma 1980's  
 on back  History of TIA (transient ischemic attack)  Hypertension   
 controlled with medication  Hypothyroidism  Insulin dependent diabetes mellitus ([address] 1990's Type 2. sqbs avg am--100---- hypo at 73- last A1C= 8.1 on 12/19/17  Kidney stones 4th one at present  Mobitz type 1 second degree AV block EKG today 9/22/16- Dr Negar Mayo reviewed----- per pt-- \" been that way my whole life\"-- does not see a cardiologist  
 Obese   
 bmi =39  
 Osteoarthritis 12/22/2016  Osteopenia  PONV (postoperative nausea and vomiting) POV- responds to IV antiemetic  RA (rheumatoid arthritis) ([address] dx--2013  Rheumatoid arthritis involving right knee with negative rheumatoid factor ([address] 5/16/2019  Rosacea  S/P total knee arthroplasty 12/23/2016  Stroke ([address] followed by neurologist    
 Unspecified hypothyroidism   
 hypothyroid. Controlled with Levoxyl Past Surgical History:  
Procedure Laterality Date [address] UNLISTED    
 fatty tumor removed- from abd--benign  FRAGMENT KIDNEY STONE/ ESWL    
 HX BREAST BIOPSY Left 06/21/2019  
 stereo bx  HX BREAST LUMPECTOMY Left 7/5/2019 LEFT BREAST LUMPECTOMY WITH NEEDLE  AND SENTINAL NODE/ performed by Deanne Leiva MD at [address] [place] [address]  HX COLONOSCOPY  2008; 2010  HX DILATION AND CURETTAGE  1999  
 and hysteroscopy  HX GYN  1978  
 vaginal cryocautery [facility] Oral Surgery--per pt  still has metal in mouth since 1980's  HX KNEE ARTHROSCOPY Right 2007  HX KNEE REPLACEMENT Left 2008  HX KNEE REPLACEMENT Right 2016 [address], 2003; 2016; 2/6/18  HX MENISCECTOMY Right 2011 Right knee  HX MOHS PROCEDURES Right 2004  HX OTHER SURGICAL  1999  
 lipoma of abdomen  HX OTHER SURGICAL  1984  
 melanoma of back [address]  [address] Family History Problem Relation Age of Onset  Cancer Mother Lung  Arthritis-rheumatoid Mother  Thyroid Disease Mother  Cancer Father   
     Lung; Liver  Diabetes Father  Heart Disease Father  Hypertension Father  Diabetes Brother  SLE Sister  Breast Cancer Paternal Aunt  Breast Cancer Other   
     cousins Social History Tobacco Use  Smoking status: Never Smoker  Smokeless tobacco: Never Used Substance Use Topics  Alcohol use: Yes Alcohol/week: 1.0 standard drinks Types: 1 Glasses of wine per week Comment: occasionally Current Facility-Administered Medications Medication Dose Route Frequency Provider Last Rate Last Dose  magnesium oxide (MAG-OX) tablet 400 mg  400 mg Oral BID Yamilet Stafford MD   400 mg at 08/01/19 0837  
 aspirin tablet 325 mg  325 mg Oral DAILY Yamilet Stafford MD   325 mg at 08/01/19 0608  atorvastatin (LIPITOR) tablet 80 mg  80 mg Oral QHS Miguelina Lenz Deb Roche MD   80 mg at 07/31/19 2209  calcium carbonate (TUMS) chewable tablet 200 mg [elemental]  200 mg Oral DAILY Yamilte Stafford MD      
 calcium-vitamin D (OS-TEOFILO) 500 mg-200 unit tablet  1 Tab Oral BID WITH MEALS Stephanie Montano MD   1 Tab at 08/01/19 6972  clopidogrel (PLAVIX) tablet 75 mg  75 mg Oral DAILY Jocelynn Man Stephanie Carlin MD   75 mg at 08/01/19 9999  fluticasone propionate (FLONASE) 50 mcg/actuation nasal spray 2 Spray  2 Spray Both Nostrils DAILY Yamilet Stafford MD   2 [place] at 08/01/19 1120  
 glipiZIDE (GLUCOTROL) tablet 10 mg  10 mg Oral ACB&D Yamilet Stafford MD   10 mg at 08/01/19 1309  insulin glargine (LANTUS) injection 28 Units  28 Units SubCUTAneous QHS Yamilet Stafford MD   Stopped at 07/31/19 2210  levothyroxine (SYNTHROID) tablet 100 mcg  100 mcg Oral ACB Stephanie Montano MD   100 mcg at 08/01/19 0929  
 lisinopril (PRINIVIL, ZESTRIL) tablet 30 mg  30 mg Oral DAILY Yamilet Stafford MD   Stopped at 08/01/19 4137  loratadine (CLARITIN) tablet 10 mg  10 mg Oral DAILY Yamilet Stafford MD   10 mg at 08/01/19 7911  sodium chloride (NS) flush 5-40 mL  5-40 mL IntraVENous Q8H Stephanie Montano MD      
 sodium chloride (NS) flush 5-40 mL  5-40 mL IntraVENous PRN Stephanie Montano MD      
 [facility]ron [facility]) injection 4 mg  4 mg IntraVENous Q6H PRN Yamilet Stafford MD      
 acetaminophen (TYLENOL) tablet 650 mg  650 mg Oral Q4H PRN Yamilet Stafford MD      
 bisacodyl (DULCOLAX) tablet 5 mg  5 mg Oral DAILY PRN Yamilet Stafford MD      
 enoxaparin (LOVENOX) injection 40 mg  40 mg SubCUTAneous Q24H Yamilet Stafford MD   40 mg at 07/31/19 2209 Allergies Allergen Reactions  Other Food Swelling Jalapeno. -- tongue and lip swells  Bee Sting [Sting, Bee] Swelling Local swelling  Invokana [Canagliflozin] Other (comments) Caused frequent Urinary Tract Infections  Other Plant, Animal, Environmental Other (comments) Trees, grass, dust, mold---- congestion Review of Systems: 
CONSTITUTIONAL: No weight loss, fever, chills, weakness or fatigue. HEENT: Eyes: No visual loss, blurred vision, double vision or yellow sclerae. Ears, Nose, Throat: No hearing loss, sneezing, congestion, runny nose or sore throat. SKIN: No rash or itching. CARDIOVASCULAR: No chest pain, chest pressure or chest discomfort. No palpitations or edema. RESPIRATORY: No shortness of breath, cough or sputum. GASTROINTESTINAL: No anorexia, nausea, vomiting or diarrhea. No abdominal pain or blood. GENITOURINARY: no burning with urination. NEUROLOGICAL:As above MUSCULOSKELETAL: No muscle, back pain, joint pain or stiffness. HEMATOLOGIC: No anemia, bleeding or bruising. LYMPHATICS: No enlarged nodes. No history of splenectomy. PSYCHIATRIC: No history of depression or anxiety. ENDOCRINOLOGIC: No reports of sweating, cold or heat intolerance. No polyuria or polydipsia. ALLERGIES: No history of asthma, hives, eczema or rhinitis. Objective:  
 
Vitals:  
 07/31/19 1726 08/01/19 0318 08/01/19 0752 08/01/19 1140 BP:  117/69 105/54 115/68 Pulse:  61 (!) 58 68 Resp:  18 18 16 Temp:  99 °F (37.2 °C) 98.6 °F (37 °C) 98.2 °F (36.8 °C) SpO2:  96% 95% 98% Weight: 175 lb (79.4 kg) Height: 5' 1\" (1.549 m) Physical Exam: 
General - Well developed, well nourished, in no apparent distress. Pleasant and conversant. HEENT - Normocephalic, atraumatic. NIHSS  
NIHSS Score: 0 
1a-Level of Consciousness 0 
1b-What is Month/Age 0 
1c-Open/Close Eyes&Hand 0 
2 -Best Gaze 0 
3 -Visual Fields 0 
4 -Facial Palsy 0 
5a-Motor-Left Arm 0 
5b-Motor-Right Arm 0 
6a-Motor-Left Leg 0 
6b-Motor-Right Leg 0 
7 -Limb Ataxia 0 
8 -Sensory 0 
9 -Best Language 0 
10-Dysarthria 0 
11-Extinction/Inattention 0 Lab Results Component Value Date/Time Cholesterol, total 57 08/01/2019 05:28 AM  
 HDL Cholesterol 30 (L) 08/01/2019 05:28 AM  
 LDL, calculated 8.2 08/01/2019 05:28 AM  
 VLDL, calculated 18.8 08/01/2019 05:28 AM  
 Triglyceride 94 08/01/2019 05:28 AM  
 CHOL/HDL Ratio 1.9 08/01/2019 05:28 AM  
  
 
Lab Results Component Value Date/Time Hemoglobin A1c 7.3 08/01/2019 05:28 AM  
 Hemoglobin A1c (POC) 8.7 (A) 07/25/2016 02:11 PM  
  
 
CT Results (most recent): Personally Reviewed Results from Hospital Encounter encounter on 04/13/19 CT HEAD WO CONT Narrative Noncontrast CT of the brain. COMPARISON: March 30, 2019 INDICATION: TIA 
 
TECHNIQUE: Contiguous axial images were obtained from the skull base through the 
vertex without IV contrast. Radiation dose reduction techniques were used for 
this study:  Our CT scanners use one or all of the following: Automated exposure 
control, adjustment of the mA and/or kVp according to patient's size, iterative 
reconstruction. FINDINGS: 
 
There is no acute intracranial hemorrhage or evidence for acute territorial 
infarction. There is no mass effect, midline shift or hydrocephalus. There is no 
extra-axial fluid collection. The cerebellum and brainstem are grossly 
unremarkable. Included globes appear intact. There is mucosal thickening of the right ethmoid 
air cells. Mastoid air cells are aerated. Surrounding bones are intact. Impression IMPRESSION: 
 
1. No acute intracranial hemorrhage or CT evidence of acute territorial 
infarction. Note that MRI is more sensitive for detection of acute/subacute 
infarct. Results for orders placed or performed during the hospital encounter of 04/13/19 EKG, 12 LEAD, INITIAL Result Value Ref Range Ventricular Rate 54 BPM  
 Atrial Rate 91 BPM  
 QRS Duration 114 ms Q-T Interval 468 ms QTC Calculation (Bezet) 443 ms Calculated P Axis 71 degrees Calculated R Axis -7 degrees Calculated T Axis 46 degrees Diagnosis Sinus rhythm with probale third degree AV Block. Incomplete right bundle branch block Abnormal ECG When compared with ECG of 09-MAR-2019 21:56, Fusion complexes are no longer Present Premature ventricular complexes are no longer Present Nonspecific T wave abnormality no longer evident in Lateral leads Confirmed by VANESSA SALDIVAR (), Ruben Carrillo ([id number]) on 4/14/2019 3:00:02 PM 
  
  
 
MRI Results (most recent): Personally Reviewed Results from Hospital Encounter encounter on 07/31/19 MRI BRAIN W WO CONT Narrative History: Diplopia and ataxia. History of breast cancer. EXAM: MRI brain with and without contrast 
 
TECHNIQUE: Multiplanar multisequence imaging is performed both before and after 
the uneventful administration of 15 cc Dotarem No comparison FINDINGS: Diffusion-weighted imaging demonstrates a question of tiny focus of 
restricted diffusion within the right cerebellum. Normal flow voids present 
within the major intracranial vessels. There is no mass or mass effect. There is 
no midline shift. Punctate foci of T2 signal prolongation seen in the corona 
radiata and centrum semiovale. There is no extra-axial fluid collection, mass, 
or mass effect. There is no midline shift. The basilar cisterns are patent. There is mucosal thickening of the right maxillary sinus. Gradient echo sequence 
imaging demonstrates no blooming artifact to suggest retained intracranial blood 
products. Postcontrast imaging demonstrates no abnormal parenchymal or meningeal 
enhancement. Impression IMPRESSION: 
1. Questionable tiny punctate focus of restricted diffusion within the right 
cerebellum. Cannot exclude tiny lacunar infarct at this site. 2. Evidence of chronic microvascular ischemic change. 3. Mucosal thickening of the right maxillary sinus. Most recent Echo Results for orders placed or performed during the hospital encounter of 07/31/19  
2D ECHO COMPLETE ADULT (TTE) W OR WO CONTR  
 Narrative Ismael [address]  [address] [place], [address] [facility] 
[phone] Transthoracic Echocardiogram 
2D, M-mode, Doppler, and Color Doppler Patient: Jose Luis Vidal 
MR #: [id number] : 1955 Age: 59 years Gender: Female Study date: 01-Aug-2019 Account #: [de-identified] Height: 61 in 
Weight: 174.7 lb 
BSA: 1.78 mï¾² Status:Routine Location: 355 BP: 105/ 54 Allergies: OTHER FOOD, STING, BEE, CANAGLIFLOZIN, OTHER PLANT, ANIMAL, 
ENVIRONMENTAL Sonographer:  Monica Cabezas [facility] Group:  [place] [facility] Referring Physician:  Schuyler Cardenas. Chun Mercedes MD 
Reading Physician:  Camillia Aschoff, MD 
 
INDICATIONS: CVA. PROCEDURE: This was a routine study. A transthoracic echocardiogram was 
performed. The study included complete 2D imaging, M-mode, complete spectral 
Doppler, and color Doppler. Intravenous contrast (agitated saline) was 
administered. LEFT VENTRICLE: Size was normal. Systolic function was normal. Ejection 
fraction was estimated in the range of 55 % to 60 %. There were no regional 
wall motion abnormalities. Wall thickness was normal. Avg. E/e'=8. Left 
ventricular diastolic function parameters were normal. 
 
RIGHT VENTRICLE: The size was normal. Systolic function was normal. The 
tricuspid jet envelope definition was inadequate for estimation of RV  
systolic 
pressure. LEFT ATRIUM: Size was normal. 
 
ATRIAL SEPTUM: Contrast injection was performed. There was no right-to-left 
shunt, with provocative maneuvers to increase right atrial pressure. RIGHT ATRIUM: Size was normal. 
 
SYSTEMIC VEINS: IVC: The inferior vena cava was normal in size and course. AORTIC VALVE: The valve was trileaflet. There was no evidence for stenosis. There was no insufficiency. MITRAL VALVE: Valve structure was normal. There was no evidence for stenosis. There was trivial regurgitation. TRICUSPID VALVE: The valve structure was normal. There was no evidence for 
stenosis. There was trivial regurgitation. PULMONIC VALVE: Not well visualized. There was no evidence for stenosis. There 
was no insufficiency. PERICARDIUM: There was no pericardial effusion. AORTA: The root exhibited normal size. SUMMARY: 
 
-  Left ventricle: Systolic function was normal. Ejection fraction was 
estimated in the range of 55 % to 60 %. There were no regional wall motion 
abnormalities. -  Atrial septum: Contrast injection was performed. There was no  
right-to-left 
shunt, with provocative maneuvers to increase right atrial pressure. SYSTEM MEASUREMENT TABLES 
 
2D mode AoR Diam (2D): 3.5 cm 
LA Dimension (2D): 3.1 cm Left Atrium Systolic Volume Index; Method of Disks, Biplane; 2D mode;: 28.7 
ml/m2 IVS/LVPW (2D): 0.9 IVSd (2D): 0.8 cm LVIDd (2D): 3.4 cm LVIDs (2D): 2.4 cm 
LVOT Area (2D): 2.8 cm2 LVPWd (2D): 0.9 cm RVIDd (2D): 3.2 cm Unspecified Scan Mode Peak Grad; Mean; Antegrade Flow: 9 mm[Hg] Vmax; Antegrade Flow: 159 cm/s LVOT Diam: 1.9 cm Prepared and signed by Nuris Chance MD 
Signed 01-Aug-2019 10:59:55 Assessment:  
 
Stroke: Likely secondary to small vessel ischemic disease. Unfortunately, the patient had a stroke while on maximum medical therapy. I would continue aspirin and Plavix. Continue statin. She is doing well in regards to blood pressure control. Her heart, carotid arteries, and large intracranial vessels all appear in reasonable condition. Cerebellar infarcts can be caused by embolism; however, the location of her cerebellar infarct is in the distribution that is commonly seen in small vessel ischemic disease. Prolonged cardiac monitoring can be considered. The patient has had prolonged cardiac monitoring in the past.  Something like a loop recorder may be more sensitive.   She follows with cardiology as an outpatient. This can be considered at that time. For right now, I do not have any changes to make. Plan: As above Signed By: Jacinta Miranda DO August 1, 2019

## 2019-08-01 NOTE — DISCHARGE SUMMARY
Hospitalist Discharge Summary Admit Date:  2019  5:29 PM  
Name:  Antionette Ugarte Age:  59 y.o. 
:  1955 MRN:  [id number] PCP:  Petrona Gonzáles MD 
Treatment Team: Attending Provider: Miryam Pearl MD; Consulting Provider: Star Sethi MD; Consulting Provider: Brittany Villegas DO 
 
Problem List for this Hospitalization: 
Hospital Problems as of 2019 Date Reviewed: 2019 Codes Class Noted - Resolved POA * (Principal) [facility]cere[facility] accident) [facility] ICD-10-CM: I63.9 ICD-9-CM: 434.91  2019 - Present Yes Morbid obesity ([facility] (Chronic) ICD-10-CM: E66.01 
ICD-9-CM: 278.01  2019 - Present Yes Essential hypertension (Chronic) ICD-10-CM: I10 
ICD-9-CM: 401.9  2019 - Present Yes Type 2 diabetes mellitus without complication, with long-term current use of insulin (HCC) (Chronic) ICD-10-CM: E11.9, Z79.4 ICD-9-CM: 250.00, V58.67  2019 - Present Yes Hyperglycemia due to type 2 diabetes mellitus (HCC) (Chronic) ICD-10-CM: E11.65 ICD-9-CM: 250.00  Unknown - Present Yes Hospital Course: 
Mrs. Ugarte is a nice 58 y/o WF with a h/o TIA 3/2019, DM, breast cancer, HTN who was admitted to our service on  for progressive blurred vision. MRI at the time of admission showed a questionable punctate acute infarct in the right cerebellum, possibly a lacunar infarct. TTE was normal now and was back in March. She has worn an event monitor in the past that did not show any arrhythmias. Telemetry showed Mobitz type 1. Carotid US showed <50% stenosis bilaterally. A1C 7.3. LDL 8. Magnesium was replaced IV. BPs well controlled. She has no major deficits aside from some occasional word finding difficulty and unsteadiness with walking. She was seen by PT/OT/ST. Neurology was consulted and her medications and BP are optimized. She should continue with good blood sugar control.  She could be considered for ILR at some point but it was felt her location of CVA is less consistent with a cardioembolic source and more suggestive of chronic small vessel disease. She was scheduled to see Neurology as an outpatient today and that will be rescheduled to next week. She should follow up with her PCP as well. We will set up a referral for vestibular PT. Her hospital course was otherwise unremarkable and she is stable at the time of discharge. Disposition: Home or Self Care Activity: Activity as tolerated Diet: DIET DIABETIC WITH OPTIONS Consistent Carb 2000kcal; Regular Follow up instructions, discharge meds at bottom of this note. Plan was discussed with patient and her , nursing. All questions answered. Patient was stable at time of discharge. Patient will call a physician or return if any concerns. Diagnostic Imaging/Tests:  
Mri Brain W Wo Cont Result Date: 7/31/2019 History: Diplopia and ataxia. History of breast cancer. EXAM: MRI brain with and without contrast TECHNIQUE: Multiplanar multisequence imaging is performed both before and after the uneventful administration of 15 cc Dotarem No comparison FINDINGS: Diffusion-weighted imaging demonstrates a question of tiny focus of restricted diffusion within the right cerebellum. Normal flow voids present within the major intracranial vessels. There is no mass or mass effect. There is no midline shift. Punctate foci of T2 signal prolongation seen in the corona radiata and centrum semiovale. There is no extra-axial fluid collection, mass, or mass effect. There is no midline shift. The basilar cisterns are patent. There is mucosal thickening of the right maxillary sinus. Gradient echo sequence imaging demonstrates no blooming artifact to suggest retained intracranial blood products. Postcontrast imaging demonstrates no abnormal parenchymal or meningeal enhancement. IMPRESSION: 1. Questionable tiny punctate focus of restricted diffusion within the right cerebellum. Cannot exclude tiny lacunar infarct at this site. 2. Evidence of chronic microvascular ischemic change. 3. Mucosal thickening of the right maxillary sinus. Duplex Carotid Bilateral 
 
Result Date: 2019 History: Stroke Sonographic evaluation of the carotid arteries was performed bilaterally. PSV and EDV criteria utilized for carotid artery stenosis measurements Grayscale imaging on the right demonstrates mild plaque disease at the carotid bulb. The velocities and ratios are unremarkable. The right vertebral artery demonstrates antegrade flow without evidence of steal. Grayscale imaging on the left demonstrates mild plaque disease at the carotid bulb. The velocities and ratios are unremarkable. The left vertebral artery demonstrates antegrade flow without evidence of steal.  
 
Impression: 1. Less than 50% stenosis of the right internal carotid artery. 2. Less than 50% stenosis of the left internal carotid artery. Echocardiogram results: 
Results for orders placed or performed during the hospital encounter of 19  
2D ECHO COMPLETE ADULT (TTE) W OR WO CONTR Narrative April Marcella Amrit [address] [place], [address] [facility] 
[phone] Transthoracic Echocardiogram 
2D, M-mode, Doppler, and Color Doppler Patient: Gianni Ruelas 
MR #: [id number] : 1955 Age: 59 years Gender: Female Study date: 01-Aug-2019 Account #: [de-identified] Height: 61 in 
Weight: 174.7 lb 
BSA: 1.78 mï¾² Status:Routine Location: [facility] BP: 105/ 54 Allergies: OTHER FOOD, STING, BEE, CANAGLIFLOZIN, OTHER PLANT, ANIMAL, 
ENVIRONMENTAL Sonographer:  Charls Boeck, RDCS Group:  [facility] Referring Physician:  Corry Tineo. Marek Jamison MD 
Reading Physician:  Rafa Florentino MD 
 
INDICATIONS: CVA. PROCEDURE: This was a routine study. A transthoracic echocardiogram was 
performed. The study included complete 2D imaging, M-mode, complete spectral 
Doppler, and color Doppler. Intravenous contrast (agitated saline) was 
administered. LEFT VENTRICLE: Size was normal. Systolic function was normal. Ejection 
fraction was estimated in the range of 55 % to 60 %. There were no regional 
wall motion abnormalities. Wall thickness was normal. Avg. E/e'=8. Left 
ventricular diastolic function parameters were normal. 
 
RIGHT VENTRICLE: The size was normal. Systolic function was normal. The 
tricuspid jet envelope definition was inadequate for estimation of RV  
systolic 
pressure. LEFT ATRIUM: Size was normal. 
 
ATRIAL SEPTUM: Contrast injection was performed. There was no right-to-left 
shunt, with provocative maneuvers to increase right atrial pressure. RIGHT ATRIUM: Size was normal. 
 
SYSTEMIC VEINS: IVC: The inferior vena cava was normal in size and course. AORTIC VALVE: The valve was trileaflet. There was no evidence for stenosis. There was no insufficiency. MITRAL VALVE: Valve structure was normal. There was no evidence for stenosis. There was trivial regurgitation. TRICUSPID VALVE: The valve structure was normal. There was no evidence for 
stenosis. There was trivial regurgitation. PULMONIC VALVE: Not well visualized. There was no evidence for stenosis. There 
was no insufficiency. PERICARDIUM: There was no pericardial effusion. AORTA: The root exhibited normal size. SUMMARY: 
 
-  Left ventricle: Systolic function was normal. Ejection fraction was 
estimated in the range of 55 % to 60 %. There were no regional wall motion 
abnormalities. -  Atrial septum: Contrast injection was performed. There was no  
right-to-left 
shunt, with provocative maneuvers to increase right atrial pressure. SYSTEM MEASUREMENT TABLES 
 
2D mode AoR Diam (2D): 3.5 cm 
LA Dimension (2D): 3.1 cm 
 Left Atrium Systolic Volume Index; Method of Disks, Biplane; 2D mode;: 28.7 
ml/m2 IVS/LVPW (2D): 0.9 IVSd (2D): 0.8 cm LVIDd (2D): 3.4 cm LVIDs (2D): 2.4 cm 
LVOT Area (2D): 2.8 cm2 LVPWd (2D): 0.9 cm RVIDd (2D): 3.2 cm Unspecified Scan Mode Peak Grad; Mean; Antegrade Flow: 9 mm[Hg] Vmax; Antegrade Flow: 159 cm/s LVOT Diam: 1.9 cm Prepared and signed by Nuris Chance MD 
Signed 01-Aug-2019 10:59:55 Procedures done this admission: * No surgery found * All Micro Results None Labs: Results:  
   
BMP, Mg, Phos Recent Labs 08/01/19 0528 07/31/19 
1747  136  
K 3.6 4.7 * 106 CO2 23 21 AGAP 8 9 BUN 15 22 CREA 0.65 0.89 CA 8.9 10.0 * 133* MG 1.2*  --   
  
CBC Recent Labs 08/01/19 0528 07/31/19 
1747 WBC 6.6 8.4  
RBC 3.49* 3.84* HGB 11.3* 12.6 HCT 33.9* 37.3  190 GRANS  --  48  
LYMPH  --  37 EOS  --  5 MONOS  --  9  
BASOS  --  1 IG  --  0 ANEU  --  4.0 ABL  --  3.1 ERA  --  0.4 ABM  --  0.7 ABB  --  0.1 AIG  --  0.0 LFT Recent Labs  
  07/31/19 
1747 SGOT 48* ALT 23   
TP 8.0 ALB 3.7 GLOB 4.3* AGRAT 0.9* Cardiac Testing Lab Results Component Value Date/Time Troponin-I, Qt. <0.02 (L) 03/09/2019 09:45 PM  
  
Coagulation Tests Lab Results Component Value Date/Time Prothrombin time 12.8 04/13/2019 10:20 PM  
 Prothrombin time 10.0 12/06/2016 07:40 AM  
 Prothrombin time 18.7 (H) 05/23/2008 04:32 AM  
 INR 1.0 04/13/2019 10:20 PM  
 INR 0.9 12/06/2016 07:40 AM  
 INR  05/23/2008 04:32 AM  
  1.8 Suggested therapeutic INR range: 
Venous thrombosis and embolus            2.0-3.0 Prosthetic heart valve                   2.5-3.5  
 aPTT 26.3 04/13/2019 10:20 PM  
 aPTT 22.9 (L) 12/06/2016 07:40 AM  
 aPTT 26.4 04/28/2008 11:00 AM  
  
A1c Lab Results Component Value Date/Time  Hemoglobin A1c 7.3 08/01/2019 05:28 AM  
 Hemoglobin A1c 7.1 (H) 05/02/2019 10:50 AM  
 Hemoglobin A1c 8.4 (H) 03/10/2019 05:21 AM  
  
Lipid Panel Lab Results Component Value Date/Time Cholesterol, total 57 08/01/2019 05:28 AM  
 HDL Cholesterol 30 (L) 08/01/2019 05:28 AM  
 LDL, calculated 8.2 08/01/2019 05:28 AM  
 VLDL, calculated 18.8 08/01/2019 05:28 AM  
 Triglyceride 94 08/01/2019 05:28 AM  
 CHOL/HDL Ratio 1.9 08/01/2019 05:28 AM  
  
Thyroid Panel Lab Results Component Value Date/Time TSH 0.721 08/01/2019 05:28 AM  
 TSH 0.452 05/30/2019 01:54 PM  
 T4, Free 1.4 08/01/2019 05:28 AM  
    
Most Recent UA Lab Results Component Value Date/Time Color YELLOW 01/31/2018 02:24 PM  
 Appearance CLEAR 01/31/2018 02:24 PM  
 Specific gravity 1.017 01/31/2018 02:24 PM  
 pH (UA) 6.0 01/31/2018 02:24 PM  
 Protein NEGATIVE  01/31/2018 02:24 PM  
 Glucose NEGATIVE  01/31/2018 02:24 PM  
 Ketone NEGATIVE  01/31/2018 02:24 PM  
 Bilirubin NEGATIVE  01/31/2018 02:24 PM  
 Blood NEGATIVE  01/31/2018 02:24 PM  
 Urobilinogen 0.2 01/31/2018 02:24 PM  
 Nitrites NEGATIVE  01/31/2018 02:24 PM  
 Leukocyte Esterase NEGATIVE  01/31/2018 02:24 PM  
 WBC 10-20 03/30/2019 08:51 AM  
 RBC 0-3 03/30/2019 08:51 AM  
 Epithelial cells 0-3 03/30/2019 08:51 AM  
 Bacteria 0 03/30/2019 08:51 AM  
 Casts 0-3 03/30/2019 08:51 AM  
 Crystals, urine 0 04/28/2008 11:40 AM  
 Mucus 0 04/28/2008 11:40 AM  
  
 
Allergies Allergen Reactions  Other Food Swelling Jalapeno. -- tongue and lip swells  Bee Sting [Sting, Bee] Swelling Local swelling  Invokana [Canagliflozin] Other (comments) Caused frequent Urinary Tract Infections  Other Plant, Animal, Environmental Other (comments) Trees, grass, dust, mold---- congestion Immunization History Administered Date(s) Administered  Pneumococcal Polysaccharide (PPSV-23) 03/02/2015  TB Skin Test (PPD) Intradermal 12/22/2016, 03/10/2019 All Labs from Last 24 Hrs: Recent Results (from the past 24 hour(s)) GLUCOSE, POC Collection Time: 07/31/19  9:36 PM  
Result Value Ref Range Glucose (POC) 110 (H) 65 - 100 mg/dL CBC W/O DIFF Collection Time: 08/01/19  5:28 AM  
Result Value Ref Range WBC 6.6 4.3 - 11.1 K/uL  
 RBC 3.49 (L) 4.05 - 5.2 M/uL  
 HGB 11.3 (L) 11.7 - 15.4 g/dL HCT 33.9 (L) 35.8 - 46.3 % MCV 97.1 79.6 - 97.8 FL  
 MCH 32.4 26.1 - 32.9 PG  
 MCHC 33.3 31.4 - 35.0 g/dL  
 RDW 12.5 11.9 - 14.6 % PLATELET 988 787 - 342 K/uL MPV 12.0 9.4 - 12.3 FL ABSOLUTE NRBC 0.00 0.0 - 0.2 K/uL LIPID PANEL Collection Time: 08/01/19  5:28 AM  
Result Value Ref Range LIPID PROFILE Cholesterol, total 57 MG/DL Triglyceride 94 35 - 150 MG/DL  
 HDL Cholesterol 30 (L) 40 - 60 MG/DL  
 LDL, calculated 8.2 <100 MG/DL VLDL, calculated 18.8 6.0 - 23.0 MG/DL  
 CHOL/HDL Ratio 1.9 <200 HEMOGLOBIN A1C WITH EAG Collection Time: 08/01/19  5:28 AM  
Result Value Ref Range Hemoglobin A1c 7.3 % Est. average glucose 163 mg/dL TSH 3RD GENERATION Collection Time: 08/01/19  5:28 AM  
Result Value Ref Range TSH 0.721 uIU/mL T4, FREE Collection Time: 08/01/19  5:28 AM  
Result Value Ref Range T4, Free 1.4 0.78 - 1.28 NG/DL  
METABOLIC PANEL, BASIC Collection Time: 08/01/19  5:28 AM  
Result Value Ref Range Sodium 139 136 - 145 mmol/L Potassium 3.6 3.5 - 5.1 mmol/L Chloride 108 (H) 98 - 107 mmol/L  
 CO2 23 21 - 32 mmol/L Anion gap 8 7 - 16 mmol/L Glucose 159 (H) 65 - 100 mg/dL BUN 15 8 - 23 MG/DL Creatinine 0.65 0.6 - 1.0 MG/DL  
 GFR est AA >60 >60 ml/min/1.73m2 GFR est non-AA >60 >60 ml/min/1.73m2 Calcium 8.9 8.3 - 10.4 MG/DL MAGNESIUM Collection Time: 08/01/19  5:28 AM  
Result Value Ref Range Magnesium 1.2 (LL) 1.8 - 2.4 mg/dL GLUCOSE, POC Collection Time: 08/01/19  7:49 AM  
Result Value Ref Range Glucose (POC) 171 (H) 65 - 100 mg/dL GLUCOSE, POC  
 Collection Time: 08/01/19 11:32 AM  
Result Value Ref Range Glucose (POC) 237 (H) 65 - 100 mg/dL EKG, 12 LEAD, SUBSEQUENT Collection Time: 08/01/19  2:16 PM  
Result Value Ref Range Ventricular Rate 53 BPM  
 Atrial Rate 94 BPM  
 QRS Duration 124 ms Q-T Interval 482 ms QTC Calculation (Bezet) 452 ms Calculated P Axis 62 degrees Calculated R Axis -2 degrees Calculated T Axis 35 degrees Diagnosis    
  !!! Poor data quality, interpretation may be adversely affected Sinus rhythm with 2nd degree A-V block (Mobitz I) with 2:1 A-V conduction Right bundle branch block Abnormal ECG When compared with ECG of 13-APR-2019 22:20, Sinus rhythm is now with 2nd degree A-V block (Mobitz I) Right bundle branch block has replaced Incomplete right bundle branch block Confirmed by Maribell Bryant MD (), YEN TOVAR ([id number]) on 8/1/2019 2:40:52 PM 
  
GLUCOSE, POC Collection Time: 08/01/19  5:07 PM  
Result Value Ref Range Glucose (POC) 201 (H) 65 - 100 mg/dL Current Med List in Hospital:  
Current Facility-Administered Medications Medication Dose Route Frequency  magnesium oxide (MAG-OX) tablet 400 mg  400 mg Oral BID  aspirin tablet 325 mg  325 mg Oral DAILY  atorvastatin (LIPITOR) tablet 80 mg  80 mg Oral QHS  calcium carbonate (TUMS) chewable tablet 200 mg [elemental]  200 mg Oral DAILY  calcium-vitamin D (OS-TEOFILO) 500 mg-200 unit tablet  1 Tab Oral BID WITH MEALS  clopidogrel (PLAVIX) tablet 75 mg  75 mg Oral DAILY  fluticasone propionate (FLONASE) 50 mcg/actuation nasal spray 2 Spray  2 Spray Both Nostrils DAILY  glipiZIDE (GLUCOTROL) tablet 10 mg  10 mg Oral ACB&D  
 insulin glargine (LANTUS) injection 28 Units  28 Units SubCUTAneous QHS  levothyroxine (SYNTHROID) tablet 100 mcg  100 mcg Oral ACB  lisinopril (PRINIVIL, ZESTRIL) tablet 30 mg  30 mg Oral DAILY  loratadine (CLARITIN) tablet 10 mg  10 mg Oral DAILY  sodium chloride (NS) flush 5-40 mL  5-40 mL IntraVENous Q8H  
 sodium chloride (NS) flush 5-40 mL  5-40 mL IntraVENous PRN  
 ondansetron (ZOFRAN) injection 4 mg  4 mg IntraVENous Q6H PRN  
 acetaminophen (TYLENOL) tablet 650 mg  650 mg Oral Q4H PRN  
 bisacodyl (DULCOLAX) tablet 5 mg  5 mg Oral DAILY PRN  
 enoxaparin (LOVENOX) injection 40 mg  40 mg SubCUTAneous Q24H Discharge Exam: 
Patient Vitals for the past 24 hrs: 
 Temp Pulse Resp BP SpO2  
08/01/19 1742 98 °F (36.7 °C) (!) 58 16 137/82 99 % 08/01/19 1602 98.3 °F (36.8 °C) 60 16 125/65 97 % 08/01/19 1140 98.2 °F (36.8 °C) 68 16 115/68 98 % 08/01/19 0752 98.6 °F (37 °C) (!) 58 18 105/54 95 % 08/01/19 0318 99 °F (37.2 °C) 61 18 117/69 96 % 07/31/19 2334 98.2 °F (36.8 °C) (!) 57 17 117/79 97 % 07/31/19 2039 97.7 °F (36.5 °C) 74 18 135/82 100 % 07/31/19 2001  82 17 118/58 100 % Oxygen Therapy O2 Sat (%): 99 % (08/01/19 1742) Pulse via Oximetry: 56 beats per minute (07/31/19 2001) O2 Device: Room air (08/01/19 0926) No intake or output data in the 24 hours ending 08/01/19 1836 *Note that automatically entered I/Os may not be accurate; dependent on patient compliance with collection and accurate  by assistants. General:    Well nourished. Alert. Eyes:   Normal sclerae. Extraocular movements intact. ENT:  Normocephalic, atraumatic. Moist mucous membranes CV:   Regular rate and rhythm. No murmur, rub, or gallop. Lungs:  Clear to auscultation bilaterally. No wheezing, rhonchi, or rales. Abdomen: Soft, nontender, nondistended. Bowel sounds normal.  
Extremities: Warm and dry. No cyanosis or edema. Neurologic: CN II-XII grossly intact. Sensation intact. Skin:     No rashes or jaundice. Psych:  Normal mood and affect. Discharge Info:  
Current Discharge Medication List  
  
CONTINUE these medications which have NOT CHANGED Details metFORMIN (GLUCOPHAGE) 1,000 mg tablet Take 1 Tab by mouth two (2) times daily (with meals). Qty: 180 Tab, Refills: 1 Associated Diagnoses: Type 2 diabetes mellitus without complication, with long-term current use of insulin ([facility])  
  
loratadine (CLARITIN) 10 mg tablet Take 1 Tab by mouth daily. Qty: 90 Tab, Refills: 1  
  
lisinopril (PRINIVIL, ZESTRIL) 30 mg tablet Take 1 Tab by mouth daily. Qty: 90 Tab, Refills: 1 Associated Diagnoses: Essential hypertension  
  
levothyroxine (SYNTHROID) 100 mcg tablet Take 1 Tab by mouth Daily (before breakfast). Qty: 90 Tab, Refills: 1 Associated Diagnoses: Hypothyroidism, unspecified type  
  
glipiZIDE (GLUCOTROL) 10 mg tablet TAKE ONE TABLET BY MOUTH TWICE DAILY  Indications: type 2 diabetes mellitus Qty: 180 Tab, Refills: 1 Associated Diagnoses: Type 2 diabetes mellitus without complication, with long-term current use of insulin ([facility])  
  
fluticasone propionate (FLONASE ALLERGY RELIEF) 50 mcg/actuation nasal spray 2 Sprays by Both Nostrils route daily. Qty: 3 Bottle, Refills: 1  
  
insulin detemir U-100 (LEVEMIR FLEXTOUCH U-100 INSULN) 100 unit/mL (3 mL) inpn Usually takes 10-15 units in am and then 60 units at hs--- BUT TOTAL FOR 24 HOURS IS NOT OVER 75 UNITS 
(STARTS COUNTING UNITS T NIGHT DOSE)  Indications: type 2 diabetes mellitus Qty: 2 Units, Refills: 3 Associated Diagnoses: Type 2 diabetes mellitus without complication, with long-term current use of insulin ([address] glucose blood VI test strips (ACCU-CHEK KALEB PLUS TEST STRP) strip Check BG three to four times daily. Please dispense strips based on glucometer, possibly Accu-chek Stephanie. Qty: 200 Strip, Refills: 5 Associated Diagnoses: Type 2 diabetes mellitus without complication, with long-term current use of insulin ([facility])  
  
krill/om-3/dha/epa/phospho/ast (MEGARED OMEGA-3 KRILL OIL PO) Take 1 Cap by mouth daily. atorvastatin (LIPITOR) 80 mg tablet Take 1 Tab by mouth nightly. Qty: 30 Tab, Refills: 2 Associated Diagnoses: Hyperlipidemia, unspecified hyperlipidemia type  
  
clopidogrel (PLAVIX) 75 mg tab Take 1 Tab by mouth daily. Qty: 30 Tab, Refills: 2  
  
aspirin (ASPIRIN) 325 mg tablet Take 1 Tab by mouth daily. Qty: 30 Tab, Refills: 0  
  
cholecalciferol (VITAMIN D3) 1,000 unit cap Take 1,000 Units by mouth daily. Indications: Prevention of Vitamin D Deficiency GLUCOSAMINE HCL/CHONDR MERAZ A NA (OSTEO BI-FLEX PO) Take 1 Tab by mouth two (2) times a day. calcium-cholecalciferol, d3, (CALCIUM 600 + D) 600-125 mg-unit tab Take 1 Tab by mouth two (2) times a day. Indications: post-menopausal osteoporosis prevention  
  
calcium carbonate (TUMS) 200 mg calcium (500 mg) chew Take 1 Tab by mouth as needed. Indications: heartburn NOVOFINE 32 32 gauge x 1/4\" ndle Use TID Qty: 100 Pen Needle, Refills: 3 Associated Diagnoses: Type 2 diabetes mellitus without complication, with long-term current use of insulin ([facility] Follow Up Orders: Follow-up Appointments Procedures  FOLLOW UP VISIT Appointment in: Other (Specify) Neurology -- next week PCP -- within 1 week Neurology -- next week PCP -- within 1 week Standing Status:   Standing Number of Occurrences:   1 Order Specific Question:   Appointment in Answer: Other (Specify) Follow-up Information Follow up With Specialties Details Why Contact Info Teja Blank MD Family Practice In 1 week Hospital follow up. Stroke. [address]  [place] 
[phone] [address] Neurology Schedule an appointment as soon as possible for a visit Hospital follow up. Stroke. [address] [place] [address] [phone] Time spent in patient discharge planning and coordination 35 minutes.  
 
Signed: 
Jacob Cruz MD

## 2019-08-01 NOTE — PROGRESS NOTES
Hospitalist Note Admit Date:  2019  5:29 PM  
Name:  Antionette Ugarte Age:  59 y.o. 
:  1955 MRN:  [id number] PCP:  Petrona Gonzáles MD 
Treatment Team: Attending Provider: Ashleigh Willard MD; Consulting Provider: Star Sethi MD; Consulting Provider: Brittany Villegas, DO 
 
HPI/Subjective:  
60 y/o WF with h/o bradycardia seen by [place] [facility]ology, TIA 3/2019, breast cancer, HTN, DM admitted on  with progressive blurry vision and concern for acute CVA. MRI shows questionable tiny punctate abnormality in right cerebellum, possibly tiny lacunar infarct. Rest of w/u pending. : Feeling better, waiting to speak with neuro on monitor. No current issues. No paresthesias or limb weakness. Able to lift and hold LEs to gravity which she apparently had some limitations with previously. ROS neg otherwise. Objective:  
 
Patient Vitals for the past 24 hrs: 
 Temp Pulse Resp BP SpO2  
19 0752 98.6 °F (37 °C) (!) 58 18 105/54 95 % 19 0318 99 °F (37.2 °C) 61 18 117/69 96 % 19 2334 98.2 °F (36.8 °C) (!) 57 17 117/79 97 % 19 2039 97.7 °F (36.5 °C) 74 18 135/82 100 % 19  82 17 118/58 100 % 19 1749     99 % 19 1726 98.5 °F (36.9 °C) 86 16 127/77 99 % Oxygen Therapy O2 Sat (%): 95 % (19 0752) Pulse via Oximetry: 56 beats per minute (19) O2 Device: Room air (19 09) No intake or output data in the 24 hours ending 19 1100 *Note that automatically entered I/Os may not be accurate; dependent on patient compliance with collection and accurate  by techs. General:    Well nourished. Alert. CV:   RRR. No murmur, rub, or gallop. Lungs:   CTAB. No wheezing, rhonchi, or rales. Abdomen:   Soft, nontender, nondistended. Extremities: Warm and dry. No cyanosis or edema. Skin:     No rashes or jaundice. Neuro:  No gross focal deficits Data Review: I have reviewed all labs, meds, and studies from the last 24 hours: 
 
Recent Results (from the past 24 hour(s)) CBC WITH AUTOMATED DIFF Collection Time: 07/31/19  5:47 PM  
Result Value Ref Range WBC 8.4 4.3 - 11.1 K/uL  
 RBC 3.84 (L) 4.05 - 5.2 M/uL  
 HGB 12.6 11.7 - 15.4 g/dL HCT 37.3 35.8 - 46.3 % MCV 97.1 79.6 - 97.8 FL  
 MCH 32.8 26.1 - 32.9 PG  
 MCHC 33.8 31.4 - 35.0 g/dL  
 RDW 12.7 11.9 - 14.6 % PLATELET 890 155 - 886 K/uL MPV 12.0 9.4 - 12.3 FL ABSOLUTE NRBC 0.00 0.0 - 0.2 K/uL  
 DF AUTOMATED NEUTROPHILS 48 43 - 78 % LYMPHOCYTES 37 13 - 44 % MONOCYTES 9 4.0 - 12.0 % EOSINOPHILS 5 0.5 - 7.8 % BASOPHILS 1 0.0 - 2.0 % IMMATURE GRANULOCYTES 0 0.0 - 5.0 %  
 ABS. NEUTROPHILS 4.0 1.7 - 8.2 K/UL  
 ABS. LYMPHOCYTES 3.1 0.5 - 4.6 K/UL  
 ABS. MONOCYTES 0.7 0.1 - 1.3 K/UL  
 ABS. EOSINOPHILS 0.4 0.0 - 0.8 K/UL  
 ABS. BASOPHILS 0.1 0.0 - 0.2 K/UL  
 ABS. IMM. GRANS. 0.0 0.0 - 0.5 K/UL METABOLIC PANEL, COMPREHENSIVE Collection Time: 07/31/19  5:47 PM  
Result Value Ref Range Sodium 136 136 - 145 mmol/L Potassium 4.7 3.5 - 5.1 mmol/L Chloride 106 98 - 107 mmol/L  
 CO2 21 21 - 32 mmol/L Anion gap 9 7 - 16 mmol/L Glucose 133 (H) 65 - 100 mg/dL BUN 22 8 - 23 MG/DL Creatinine 0.89 0.6 - 1.0 MG/DL  
 GFR est AA >60 >60 ml/min/1.73m2 GFR est non-AA >60 >60 ml/min/1.73m2 Calcium 10.0 8.3 - 10.4 MG/DL Bilirubin, total 0.9 0.2 - 1.1 MG/DL  
 ALT (SGPT) 23 12 - 65 U/L  
 AST (SGOT) 48 (H) 15 - 37 U/L Alk. phosphatase 100 50 - 136 U/L Protein, total 8.0 6.3 - 8.2 g/dL Albumin 3.7 3.2 - 4.6 g/dL Globulin 4.3 (H) 2.3 - 3.5 g/dL A-G Ratio 0.9 (L) 1.2 - 3.5 GLUCOSE, POC Collection Time: 07/31/19  9:36 PM  
Result Value Ref Range Glucose (POC) 110 (H) 65 - 100 mg/dL CBC W/O DIFF Collection Time: 08/01/19  5:28 AM  
Result Value Ref Range WBC 6.6 4.3 - 11.1 K/uL  
 RBC 3.49 (L) 4.05 - 5.2 M/uL HGB 11.3 (L) 11.7 - 15.4 g/dL HCT 33.9 (L) 35.8 - 46.3 % MCV 97.1 79.6 - 97.8 FL  
 MCH 32.4 26.1 - 32.9 PG  
 MCHC 33.3 31.4 - 35.0 g/dL  
 RDW 12.5 11.9 - 14.6 % PLATELET 606 254 - 204 K/uL MPV 12.0 9.4 - 12.3 FL ABSOLUTE NRBC 0.00 0.0 - 0.2 K/uL LIPID PANEL Collection Time: 08/01/19  5:28 AM  
Result Value Ref Range LIPID PROFILE Cholesterol, total 57 MG/DL Triglyceride 94 35 - 150 MG/DL  
 HDL Cholesterol 30 (L) 40 - 60 MG/DL  
 LDL, calculated 8.2 <100 MG/DL VLDL, calculated 18.8 6.0 - 23.0 MG/DL  
 CHOL/HDL Ratio 1.9 <200 HEMOGLOBIN A1C WITH EAG Collection Time: 08/01/19  5:28 AM  
Result Value Ref Range Hemoglobin A1c 7.3 % Est. average glucose 163 mg/dL TSH 3RD GENERATION Collection Time: 08/01/19  5:28 AM  
Result Value Ref Range TSH 0.721 uIU/mL T4, FREE Collection Time: 08/01/19  5:28 AM  
Result Value Ref Range T4, Free 1.4 0.78 - 0.03 NG/DL  
METABOLIC PANEL, BASIC Collection Time: 08/01/19  5:28 AM  
Result Value Ref Range Sodium 139 136 - 145 mmol/L Potassium 3.6 3.5 - 5.1 mmol/L Chloride 108 (H) 98 - 107 mmol/L  
 CO2 23 21 - 32 mmol/L Anion gap 8 7 - 16 mmol/L Glucose 159 (H) 65 - 100 mg/dL BUN 15 8 - 23 MG/DL Creatinine 0.65 0.6 - 1.0 MG/DL  
 GFR est AA >60 >60 ml/min/1.73m2 GFR est non-AA >60 >60 ml/min/1.73m2 Calcium 8.9 8.3 - 10.4 MG/DL MAGNESIUM Collection Time: 08/01/19  5:28 AM  
Result Value Ref Range Magnesium 1.2 (LL) 1.8 - 2.4 mg/dL GLUCOSE, POC Collection Time: 08/01/19  7:49 AM  
Result Value Ref Range Glucose (POC) 171 (H) 65 - 100 mg/dL All Micro Results None Results for orders placed or performed during the hospital encounter of 07/31/19  
2D ECHO COMPLETE ADULT (TTE) W OR WO CONTR Narrative [facility] [address] [place], [address] [facility] 
[phone] Transthoracic Echocardiogram 
 2D, M-mode, Doppler, and Color Doppler Patient: Lu Quiroz 
MR #: [id number] : 1955 Age: 59 years Gender: Female Study date: 01-Aug-2019 Account #: [de-identified] Height: 61 in 
Weight: 174.7 lb 
BSA: 1.78 mï¾² Status:Routine Location: 355 BP: 105/ 54 Allergies: OTHER FOOD, STING, BEE, CANAGLIFLOZIN, OTHER PLANT, ANIMAL, 
ENVIRONMENTAL Sonographer:  Cassie Card RDCS Group:  [facility] Referring Physician:  Lon Seth MD 
Reading Physician:  Jake Herron MD 
 
INDICATIONS: CVA. PROCEDURE: This was a routine study. A transthoracic echocardiogram was 
performed. The study included complete 2D imaging, M-mode, complete spectral 
Doppler, and color Doppler. Intravenous contrast (agitated saline) was 
administered. LEFT VENTRICLE: Size was normal. Systolic function was normal. Ejection 
fraction was estimated in the range of 55 % to 60 %. There were no regional 
wall motion abnormalities. Wall thickness was normal. Avg. E/e'=8. Left 
ventricular diastolic function parameters were normal. 
 
RIGHT VENTRICLE: The size was normal. Systolic function was normal. The 
tricuspid jet envelope definition was inadequate for estimation of RV  
systolic 
pressure. LEFT ATRIUM: Size was normal. 
 
ATRIAL SEPTUM: Contrast injection was performed. There was no right-to-left 
shunt, with provocative maneuvers to increase right atrial pressure. RIGHT ATRIUM: Size was normal. 
 
SYSTEMIC VEINS: IVC: The inferior vena cava was normal in size and course. AORTIC VALVE: The valve was trileaflet. There was no evidence for stenosis. There was no insufficiency. MITRAL VALVE: Valve structure was normal. There was no evidence for stenosis. There was trivial regurgitation. TRICUSPID VALVE: The valve structure was normal. There was no evidence for 
stenosis. There was trivial regurgitation. PULMONIC VALVE: Not well visualized. There was no evidence for stenosis. There 
was no insufficiency. PERICARDIUM: There was no pericardial effusion. AORTA: The root exhibited normal size. SUMMARY: 
 
-  Left ventricle: Systolic function was normal. Ejection fraction was 
estimated in the range of 55 % to 60 %. There were no regional wall motion 
abnormalities. -  Atrial septum: Contrast injection was performed. There was no  
right-to-left 
shunt, with provocative maneuvers to increase right atrial pressure. SYSTEM MEASUREMENT TABLES 
 
2D mode AoR Diam (2D): 3.5 cm 
LA Dimension (2D): 3.1 cm Left Atrium Systolic Volume Index; Method of Disks, Biplane; 2D mode;: 28.7 
ml/m2 IVS/LVPW (2D): 0.9 IVSd (2D): 0.8 cm LVIDd (2D): 3.4 cm LVIDs (2D): 2.4 cm 
LVOT Area (2D): 2.8 cm2 LVPWd (2D): 0.9 cm RVIDd (2D): 3.2 cm Unspecified Scan Mode Peak Grad; Mean; Antegrade Flow: 9 mm[Hg] Vmax; Antegrade Flow: 159 cm/s LVOT Diam: 1.9 cm Prepared and signed by Leeroy Montgomery MD 
Signed 01-Aug-2019 10:59:55 Current Meds: 
Current Facility-Administered Medications Medication Dose Route Frequency  magnesium oxide (MAG-OX) tablet 400 mg  400 mg Oral BID  magnesium sulfate 4 g/100 mL IVPB  4 g IntraVENous ONCE  
 aspirin tablet 325 mg  325 mg Oral DAILY  atorvastatin (LIPITOR) tablet 80 mg  80 mg Oral QHS  calcium carbonate (TUMS) chewable tablet 200 mg [elemental]  200 mg Oral DAILY  calcium-vitamin D (OS-TEOFILO) 500 mg-200 unit tablet  1 Tab Oral BID WITH MEALS  clopidogrel (PLAVIX) tablet 75 mg  75 mg Oral DAILY  fluticasone propionate (FLONASE) 50 mcg/actuation nasal spray 2 Spray  2 Spray Both Nostrils DAILY  glipiZIDE (GLUCOTROL) tablet 10 mg  10 mg Oral ACB&D  
 insulin glargine (LANTUS) injection 28 Units  28 Units SubCUTAneous QHS  levothyroxine (SYNTHROID) tablet 100 mcg  100 mcg Oral ACB  lisinopril (PRINIVIL, ZESTRIL) tablet 30 mg  30 mg Oral DAILY  loratadine (CLARITIN) tablet 10 mg  10 mg Oral DAILY  sodium chloride (NS) flush 5-40 mL  5-40 mL IntraVENous Q8H  
 sodium chloride (NS) flush 5-40 mL  5-40 mL IntraVENous PRN  
 ondansetron (ZOFRAN) injection 4 mg  4 mg IntraVENous Q6H PRN  
 acetaminophen (TYLENOL) tablet 650 mg  650 mg Oral Q4H PRN  
 bisacodyl (DULCOLAX) tablet 5 mg  5 mg Oral DAILY PRN  
 enoxaparin (LOVENOX) injection 40 mg  40 mg SubCUTAneous Q24H Other Studies (last 24 hours): 
Mri Brain W Wo Cont Result Date: 7/31/2019 History: Diplopia and ataxia. History of breast cancer. EXAM: MRI brain with and without contrast TECHNIQUE: Multiplanar multisequence imaging is performed both before and after the uneventful administration of 15 cc Dotarem No comparison FINDINGS: Diffusion-weighted imaging demonstrates a question of tiny focus of restricted diffusion within the right cerebellum. Normal flow voids present within the major intracranial vessels. There is no mass or mass effect. There is no midline shift. Punctate foci of T2 signal prolongation seen in the corona radiata and centrum semiovale. There is no extra-axial fluid collection, mass, or mass effect. There is no midline shift. The basilar cisterns are patent. There is mucosal thickening of the right maxillary sinus. Gradient echo sequence imaging demonstrates no blooming artifact to suggest retained intracranial blood products. Postcontrast imaging demonstrates no abnormal parenchymal or meningeal enhancement. IMPRESSION: 1. Questionable tiny punctate focus of restricted diffusion within the right cerebellum. Cannot exclude tiny lacunar infarct at this site. 2. Evidence of chronic microvascular ischemic change. 3. Mucosal thickening of the right maxillary sinus. Assessment and Plan:  
 
Hospital Problems as of 8/1/2019 Date Reviewed: 7/25/2019 Codes Class Noted - Resolved POA * (Principal) [facility]) ([facility] ICD-10-CM: I63.9 ICD-9-CM: 434.91  7/31/2019 - Present Yes Morbid obesity ([facility] (Chronic) ICD-10-CM: E66.01 
ICD-9-CM: 278.01  7/31/2019 - Present Yes Essential hypertension (Chronic) ICD-10-CM: I10 
ICD-9-CM: 401.9  7/25/2019 - Present Yes Type 2 diabetes mellitus without complication, with long-term current use of insulin (HCC) (Chronic) ICD-10-CM: E11.9, Z79.4 ICD-9-CM: 250.00, V58.67  5/16/2019 - Present Yes Hyperglycemia due to type 2 diabetes mellitus (HCC) (Chronic) ICD-10-CM: E11.65 ICD-9-CM: 250.00  Unknown - Present Yes Plan: # Possible right cerebellar lacunar infarct 
 - Neuro eval pending.  
 - Patient already on ASA, Plavix, statin. Has had heart monitor with cardiology for bradycardia, no other arrhythmias reported. - TTE 3/2019 normal, repeat pending. TTE report pending. # Chronic bradycardia - Followed by cardiology outpatient, no intervention planned. Asymptomatic. Spoke with cardiologist on call this morning since I don't think she has any acute needs from their perspective. Will call back if this changes. # DM2 
 - home meds - A1C 7.3 # HTN 
 - Meds held this AM  
 
# HLD 
 - atorvastatin. LDL level 8 this admission? # Hypothyroid - Synthroid DC planning/Dispo: Home when able. Neuro recs pending. Diet:  DIET DIABETIC WITH OPTIONS 
DVT ppx: Lovenox Signed: 
Jacob Cruz MD

## 2019-08-01 NOTE — PROGRESS NOTES
TRANSFER - IN REPORT: 
 
Verbal report received from [facility]) on Anthony Ugarte  being received from ER(unit) for routine progression of care Report consisted of patients Situation, Background, Assessment and  
Recommendations(SBAR). Information from the following report(s) SBAR was reviewed with the receiving nurse. Opportunity for questions and clarification was provided. Assessment completed upon patients arrival to unit and care assumed.

## 2019-08-01 NOTE — PROGRESS NOTES
Discharge instructions given to and reviewed with pt and pts -all verbalize understanding of teaching. Pt to call tomorrow morning to make follow up appts. PIV removed. Pt to get dressed and call when ready to be discharged.

## 2019-08-01 NOTE — PROGRESS NOTES
Admission assessment done. Pt AAO x4. Respirations even and unlabored. Abdomen soft with active bowel sounds. Surgical wounds noted on L breast. NIH done. STAND done. Oriented to the room. Instructed to call for assistance when needed. Bed alarm on. Bed low and locked. Call light in reach. Continue to monitor.

## 2019-08-02 ENCOUNTER — PATIENT OUTREACH (OUTPATIENT)
Dept: CASE MANAGEMENT | Age: 64
End: 2019-08-02

## 2019-08-02 LAB — T3FREE SERPL-MCNC: 2.4 PG/ML (ref 2–4.4)

## 2019-08-02 NOTE — PROGRESS NOTES
8/2/2019 Left voice mail for patient to call back. I was going to inform her that her Oncotype score was low and she will not need chemotherapy. I left number for her to call back and I have notified radiation that she will not need chemotherapy.

## 2019-08-08 ENCOUNTER — HOSPITAL ENCOUNTER (OUTPATIENT)
Dept: RADIATION ONCOLOGY | Age: 64
Discharge: HOME | End: 2019-08-08
Payer: COMMERCIAL

## 2019-08-08 ENCOUNTER — DOCUMENTATION ONLY (OUTPATIENT)
Dept: HEMATOLOGY | Age: 64
End: 2019-08-08

## 2019-08-08 VITALS
HEART RATE: 72 BPM | TEMPERATURE: 97.6 F | DIASTOLIC BLOOD PRESSURE: 87 MMHG | OXYGEN SATURATION: 99 % | SYSTOLIC BLOOD PRESSURE: 133 MMHG | RESPIRATION RATE: 16 BRPM | BODY MASS INDEX: 32.71 KG/M2 | WEIGHT: 173.1 LBS

## 2019-08-08 PROCEDURE — 77332 RADIATION TREATMENT AID(S): CPT

## 2019-08-08 PROCEDURE — 77290 THER RAD SIMULAJ FIELD CPLX: CPT

## 2019-08-08 NOTE — PROGRESS NOTES
I spoke with Ms. Ugarte regarding her insurance coverage, potential oral medication authorizations, enrollment in the [address] [place] [address] ([facility]) and the [address] [place] [address] ([address]), and assistance organization resource sheets. At this time, Ms. Ugarte has [employer] [place] plan. She has a deductible of $3,000 and an OOP of $3,000 both of which she has met. Her insurance will cover 100% until her plan starts again. Next, I spoke with Ms. Ugarte regarding potential oral medication authorizations should her treatment plan change. I told her that if she ever had any problems getting any oral medications filled to give the dedicated [facility]  a call. Most of the time, it is simply an authorization that needs to be done with the insurance company. I also told Ms. Ugarte about the Radiation Oncology Scheduling /Nurse's line and informed her that if she needed to reschedule her appointments or had any issues regarding scheduling her radiation oncology appointments to give that number a call. Next, I spoke with Ms. Ugarte regarding enrolling with [facility] and [facility]. I went over some of the services that [facility] and [facility] offers and the enrollment process. Next, I gave Ms. Ugarte flyers about the free Yoga classes for cancer survivors and the Oncology Massage program.  I let her know that she can get a free 30 minute massage. Lastly, I gave Ms. Ugarte a form with various resources including [employer], Music [employer] and the organizations that could assist with specific needs (example:  transportation, lodging, preparing meals, home cleaning)      Faxed Ms. Bruno referral form to The [address] [place] [address] (F) [phone] (P) [phone] *Form scanned into chart. Faxed physician's statement to [address] [place] [address] (F) [phone] (P) [phone] *Form scanned into chart.

## 2019-08-08 NOTE — PROGRESS NOTES
Patient: Eugene Dhaliwal MRN: [id number]  SSN: [id number] YOB: 1955  Age: 59 y.o. Sex: female Other Providers:  Melinda Warren MD 
 
CHIEF COMPLAINT: Breast cancer DIAGNOSIS: Left breast stage IA invasive ductal carcinoma, [id number], ER 95%, ND negative, HER2 negative. ER 95%, ND 0%, HER2 negative PREVIOUS TREATMENT: 
1) Left breast lumpectomy HISTORY OF PRESENT ILLNESS:  Eugene Dhaliwal is a 59 y.o. female who I am seeing at the request of Dr. Kristen Lunsford back in follow up after original consultation for breast cancer. She underwent screening mammogram in June 2019 which showed a new left breast mass prompting further evaluation with diagnostic views and ultrasound. Ultrasound was negative but the mass persisted on mammogram, prompting stereotactic biopsy. This showed IDC, low grade, ER 95%, ND negative, HER2 negative. She underwent MRI which showed a small area of reactive change with biopsy clip and artifact, no residual measurable lesion was noted. She was referred to surgery and underwent lumpectomy which showed the tumor to be 1.2 cm with close but negative anterior margins. Her single sentinel node was negative. Following surgery she was presented in conference and referrals were made to medical and radiation oncology. She met with Dr. Kristen Lunsford 7/22/19 who recommended Oncotype for assistance with chemotherapy decision making. She was seen in our office prior to this returning 7/29/19. She had prior CVA and TIA which limits her PS, ECOG 2. She had plans to see Dr. Kristen Lunsford back in follow up for the Oncotype which returned low and therefore chemotherapy was not recommended but unfortunately she had another small stroke 8/1/19 with MRI showing an acute infarct in the right cerebellum after experiencing blurred vision. Nonetheless she had no residual deficits and returned to our office 8/8/2019 in anticipation of radiation planning.    
 
OBSTETRIC HISTORY:   
 Menarche at the age of 16-14. G5,P5. Oral Contraceptive Pills: In 1980's Hormone Replacement Therapy:  None Menopause at [age] PAST MEDICAL HISTORY:   
Past Medical History:  
Diagnosis Date  Actinic keratosis   
 uses Solaraze gel when needed  Arthritis   
 rheumatoid. Orencia infusion every 4 weeks  Asthma   
 none in yrs per pt--- no inhalers per pt  Cancer ([address] melanoma  DJD (degenerative joint disease)  Environmental allergies  Environmental and seasonal allergies 7/25/2019  GERD (gastroesophageal reflux disease)   
 prn med  History of malignant melanoma 1980's  
 on back  History of TIA (transient ischemic attack)  Hypertension   
 controlled with medication  Hypothyroidism  Insulin dependent diabetes mellitus ([address] 1990's Type 2. sqbs avg am--100---- hypo at 73- last A1C= 8.1 on 12/19/17  Kidney stones 4th one at present  Mobitz type 1 second degree AV block EKG today 9/22/16- Dr Lulú Baker reviewed----- per pt-- \" been that way my whole life\"-- does not see a cardiologist  
 Obese   
 bmi =39  
 Osteoarthritis 12/22/2016  Osteopenia  PONV (postoperative nausea and vomiting) POV- responds to IV antiemetic  RA (rheumatoid arthritis) ([address] dx--2013  Rheumatoid arthritis involving right knee with negative rheumatoid factor ([address] 5/16/2019  Rosacea  S/P total knee arthroplasty 12/23/2016  Stroke ([address] followed by neurologist    
 Unspecified hypothyroidism   
 hypothyroid. Controlled with Levoxyl PAST SURGICAL HISTORY:  
Past Surgical History:  
Procedure Laterality Date  ABDOMEN SURGERY PROC UNLISTED    
 fatty tumor removed- from abd--benign  FRAGMENT KIDNEY STONE/ ESWL    
 HX BREAST BIOPSY Left 06/21/2019  
 stereo bx  HX BREAST LUMPECTOMY Left 7/5/2019  LEFT BREAST LUMPECTOMY WITH NEEDLE  AND SENTINAL NODE/ performed by Kylie Peña MD at [facility]  
 [facility]  HX COLONOSCOPY  2008; 2010  HX DILATION AND CURETTAGE  1999  
 and hysteroscopy  HX GYN  1978  
 vaginal cryocautery [facility] Oral Surgery--per pt  still has metal in mouth since 1980's  HX KNEE ARTHROSCOPY Right 2007  HX KNEE REPLACEMENT Left 2008  HX KNEE REPLACEMENT Right 2016 [address], 2003; 2016; 2/6/18  HX MENISCECTOMY Right 2011 Right knee  HX MOHS PROCEDURES Right 2004  HX OTHER SURGICAL  1999  
 lipoma of abdomen  HX OTHER SURGICAL  1984  
 melanoma of back [address] [facility] MEDICATIONS:  
 
Current Outpatient Medications:  
  clopidogrel (PLAVIX) 75 mg tab, Take 1 Tab by mouth daily. , Disp: 30 Tab, Rfl: 2 
  atorvastatin (LIPITOR) 80 mg tablet, Take 1 Tab by mouth nightly., Disp: 30 Tab, Rfl: 2 
  metFORMIN (GLUCOPHAGE) 1,000 mg tablet, Take 1 Tab by mouth two (2) times daily (with meals). , Disp: 180 Tab, Rfl: 1 
  loratadine (CLARITIN) 10 mg tablet, Take 1 Tab by mouth daily. , Disp: 90 Tab, Rfl: 1 
  lisinopril (PRINIVIL, ZESTRIL) 30 mg tablet, Take 1 Tab by mouth daily. , Disp: 90 Tab, Rfl: 1 
  levothyroxine (SYNTHROID) 100 mcg tablet, Take 1 Tab by mouth Daily (before breakfast). , Disp: 90 Tab, Rfl: 1 
  glipiZIDE (GLUCOTROL) 10 mg tablet, TAKE ONE TABLET BY MOUTH TWICE DAILY  Indications: type 2 diabetes mellitus, Disp: 180 Tab, Rfl: 1 
  fluticasone propionate (FLONASE ALLERGY RELIEF) 50 mcg/actuation nasal spray, 2 Sprays by Both Nostrils route daily. , Disp: 3 Bottle, Rfl: 1   calcium carbonate (TUMS) 200 mg calcium (500 mg) chew, Take 1 Tab by mouth as needed.  Indications: heartburn, Disp: , Rfl:  
  insulin detemir U-100 (LEVEMIR FLEXTOUCH U-100 INSULN) 100 unit/mL (3 mL) inpn, Usually takes 10-15 units in am and then 60 units at hs--- BUT TOTAL FOR 24 HOURS IS NOT OVER 75 UNITS (STARTS COUNTING UNITS T NIGHT DOSE)  Indications: type 2 diabetes mellitus (Patient taking differently: 28 Units by SubCUTAneous route nightly. Indications: type 2 diabetes mellitus), Disp: 2 Units, Rfl: 3 
  NOVOFINE 32 32 gauge x 1/4\" ndle, Use TID, Disp: 100 Pen Needle, Rfl: 3 
  glucose blood VI test strips (ACCU-CHEK KALEB PLUS TEST STRP) strip, Check BG three to four times daily. Please dispense strips based on glucometer, possibly Accu-chek Stephanie., Disp: 200 Strip, Rfl: 5 
  krill/om-3/dha/epa/phospho/ast (MEGARED OMEGA-3 KRILL OIL PO), Take 1 Cap by mouth daily. , Disp: , Rfl:  
  aspirin (ASPIRIN) 325 mg tablet, Take 1 Tab by mouth daily. (Patient taking differently: Take 325 mg by mouth daily. Indications: treatment to prevent a heart attack), Disp: 30 Tab, Rfl: 0 
  cholecalciferol (VITAMIN D3) 1,000 unit cap, Take 1,000 Units by mouth daily. Indications: Prevention of Vitamin D Deficiency, Disp: , Rfl:  
  GLUCOSAMINE HCL/CHONDR MERAZ A NA (OSTEO BI-FLEX PO), Take 1 Tab by mouth two (2) times a day., Disp: , Rfl:  
  calcium-cholecalciferol, d3, (CALCIUM 600 + D) 600-125 mg-unit tab, Take 1 Tab by mouth two (2) times a day. Indications: post-menopausal osteoporosis prevention, Disp: , Rfl: ALLERGIES:  
Allergies Allergen Reactions  Other Food Swelling Jalapeno. -- tongue and lip swells  Bee Sting [Sting, Bee] Swelling Local swelling  Invokana [Canagliflozin] Other (comments) Caused frequent Urinary Tract Infections  Other Plant, Animal, Environmental Other (comments) Trees, grass, dust, mold---- congestion SOCIAL HISTORY:  
Social History Socioeconomic History  Marital status:  Spouse name: Not on file  Number of children: Not on file  Years of education: Not on file  Highest education level: Not on file Occupational History  Not on file Social Needs  Financial resource strain: Not on file  Food insecurity:  
  Worry: Not on file Inability: Not on file  Transportation needs:  
  Medical: Not on file Non-medical: Not on file Tobacco Use  Smoking status: Never Smoker  Smokeless tobacco: Never Used Substance and Sexual Activity  Alcohol use: Yes Alcohol/week: 1.0 standard drinks Types: 1 Glasses of wine per week Comment: occasionally  Drug use: No  
 Sexual activity: Not on file Lifestyle  Physical activity:  
  Days per week: Not on file Minutes per session: Not on file  Stress: Not on file Relationships  Social connections:  
  Talks on phone: Not on file Gets together: Not on file Attends Mosque service: Not on file Active member of club or organization: Not on file Attends meetings of clubs or organizations: Not on file Relationship status: Not on file  Intimate partner violence:  
  Fear of current or ex partner: Not on file Emotionally abused: Not on file Physically abused: Not on file Forced sexual activity: Not on file Other Topics Concern [address] Service Not Asked  Blood Transfusions Not Asked  Caffeine Concern Not Asked  Occupational Exposure Not Asked Percilla Sprang Hazards Not Asked  Sleep Concern Not Asked  Stress Concern Not Asked  Weight Concern Not Asked  Special Diet Not Asked  Back Care Not Asked  Exercise Not Asked  Bike Helmet Not Asked  Seat Belt Not Asked  Self-Exams Not Asked Social History Narrative  Not on file FAMILY HISTORY:  
Family History Problem Relation Age of Onset  Cancer Mother Lung  Arthritis-rheumatoid Mother  Thyroid Disease Mother  Cancer Father   
     Lung; Liver  Diabetes Father  Heart Disease Father  Hypertension Father  Diabetes Brother  SLE Sister  Breast Cancer Paternal Aunt  Breast Cancer Other   
     cousins PHYSICAL EXAMINATION:  
ECOG Performance status 2 VITAL SIGNS:  
Visit Vitals /87 Pulse 72 Temp 97.6 °F (36.4 °C) Resp 16 Wt 78.5 kg (173 lb 1.6 oz) SpO2 99% BMI 32.71 kg/m² GENERAL: The patient is well-developed, ambulatory, alert and in no acute distress. CARDIOVASCULAR: Heart is regular rate and rhythm. There are no murmurs rubs or gallups. Radial pulses are 2+ RESPIRATORY: Lungs are clear to auscultation and percussion. There is normal respiratory effort. GASTROINTESTINAL: The abdomen is soft, non-tender, nondistended with no hepatospelnomagaly. Digital rectal examination: deferred LYMPHATIC: There is no cervical, supraclavicular or axillary lymphadenopathy bilaterally. MUSCULOSKELETAL: Extremities reveal no cyanosis, clubbing or edema.  is 5+/5. BREASTS: COPIED FROM PRIOR - Examination of the unaffected breast reveals no dominant nodules or masses. The lumpectomy cavity appears well healed with good aesthetics and symmetry. Well healed surgical incision. PATHOLOGY:   
Pathology: 
 
 
  
 
LABORATORY:  
Lab Results Component Value Date/Time Sodium 141 08/02/2019 10:16 AM  
 Potassium 4.1 08/02/2019 10:16 AM  
 Chloride 104 08/02/2019 10:16 AM  
 CO2 21 08/02/2019 10:16 AM  
 Anion gap 8 08/01/2019 05:28 AM  
 Glucose 118 (H) 08/02/2019 10:16 AM  
 BUN 9 08/02/2019 10:16 AM  
 Creatinine 0.74 08/02/2019 10:16 AM  
 GFR est AA 99 08/02/2019 10:16 AM  
 GFR est non-AA 86 08/02/2019 10:16 AM  
 Calcium 9.6 08/02/2019 10:16 AM  
 Magnesium 1.2 (LL) 08/01/2019 05:28 AM  
 Albumin 4.2 08/02/2019 10:16 AM  
 Protein, total 7.0 08/02/2019 10:16 AM  
 Globulin 4.3 (H) 07/31/2019 05:47 PM  
 A-G Ratio 1.5 08/02/2019 10:16 AM  
 AST (SGOT) 15 08/02/2019 10:16 AM  
 ALT (SGPT) 13 08/02/2019 10:16 AM  
 
Lab Results Component Value Date/Time WBC 7.0 08/02/2019 10:16 AM  
 HGB 12.6 08/02/2019 10:16 AM  
 HCT 37.0 08/02/2019 10:16 AM  
 PLATELET 889 00/09/1418 10:16 AM  
 
 
RADIOLOGY:   
Mri Breast Bi W Wo Cont Result Date: 6/26/2019 MRI of the Breasts with and without contrast CLINICAL INDICATION:  New diagnosis of left 6:00 infiltrating duct carcinoma with lobular features low-grade undergoing evaluation for extent of disease, staging, therapy planning; remote personal history of melanoma. Patient's paternal aunt had breast cancer at an unknown age. COMPARISON: Mammography 6/17/2019, 6/21/2019, 6/10/2019 TECHNIQUE: Standard MRI sequences were obtained through the breasts in multiple planes. Images were obtained before and after intravenous infusion of 17 mL of Dotarem contrast. Images were reviewed with PACS and with [employer] CAD software. FINDINGS: The breasts demonstrate mild background glandularity and minimal enhancement. The left posterior 6:00 site of biopsy demonstrates artifact from the clip and a small hematoma measuring about 1.5 cm. Minimal linear enhancement surrounding the biopsy cavity and tract is likely reactive change. No residual measurable enhancing lesion is identified. There is no other evidence of suspicious enhancing mass or nonmass enhancement to suggest malignancy elsewhere in either breast. There is no evidence of axillary or internal mammary lymphadenopathy. A few small lymph nodes are noted in each axilla no obvious dysmorphology. Elsewhere, limited visualization of the partially included thorax and upper abdomen shows no concerning findings. IMPRESSION: 1. Left 6:00 site of cancer diagnosis demonstrates a small hematoma, with no residual measurable lesion. 2. No additional abnormality evident in either breast, no obvious lymphadenopathy. Recommend continued management of left breast cancer as directed clinically. BI-RADS Assessment Category 6: Known Biopsy Proven Malignancy Amadou Mammo Bi Screening Incl Cad Result Date: 6/11/2019 STUDY:  Bilateral digital screening mammogram  INDICATION:  Screening.  COMPARISON:  10/20/2014, 07/07/2011 FINDINGS: Bilateral digital mammography was performed, and is interpreted in conjunction with a computer assisted detection (CAD) system. The are scattered fibroglandular elements. There is a mass within the inferior left breast posterior depth, not visualized on previous imaging which may be due to its far posterior location versus interval change. Spot compression views in the CC and MLO projection as well as a full 90 degree mediolateral view are recommended for further evaluation. If this finding persists an ultrasound should be considered. There is no skin thickening or nipple retraction. There is no architectural distortion. Few benign-appearing calcifications are present bilaterally. IMPRESSION: Left breast mass. BI-RADS Assessment Category 0: Incomplete: Needs additional imaging evaluation. We will attempt to contact the patient and arrange for this additional imaging. A reminder letter will be sent to the patient at the appropriate time pending additional views. BD2 Amadou Mammo Lt Dx Incl Cad Result Date: 6/17/2019 DIAGNOSTIC DIGITAL left MAMMOGRAPHY AND left BREAST ULTRASOUND CLINICAL INDICATION: Further evaluation of left posterior inferior possible mass; patient reports she has lost about 20-25 pounds over the last year related to medication and diet changes, with several recent episodes of TIA; personal history of melanoma, no prior breast surgery. Paternal aunt had breast cancer. COMPARISON: 6/10/2019, others back to 6/18/2009 FINDINGS: Mammogram: Digital diagnostic mammography was performed, and is interpreted in conjunction with a computer assisted detection (CAD) system. Additional left mediolateral and compression magnification views demonstrate persistence of a focal asymmetry versus mass in the posterior inferior breast at 5:00-6:00, which was not seen on the older mammograms, for which ultrasound is recommended.  Ultrasound: Real time targeted sonography was performed of the left inferior breast by the radiologist and sonographer, demonstrating no abnormality. IMPRESSION: Left posterior 6:00 mammographic density, with no sonographic correlate. Recommend stereotactic biopsy. This was discussed in full with the patient at the time of interpretation. BI-RADS Assessment Category 4: Suspicious Finding- Biopsy should be considered. Us Breast Lt Limited=<3 Quad Result Date: 6/17/2019 DIAGNOSTIC DIGITAL left MAMMOGRAPHY AND left BREAST ULTRASOUND CLINICAL INDICATION: Further evaluation of left posterior inferior possible mass; patient reports she has lost about 20-25 pounds over the last year related to medication and diet changes, with several recent episodes of TIA; personal history of melanoma, no prior breast surgery. Paternal aunt had breast cancer. COMPARISON: 6/10/2019, others back to 6/18/2009 FINDINGS: Mammogram: Digital diagnostic mammography was performed, and is interpreted in conjunction with a computer assisted detection (CAD) system. Additional left mediolateral and compression magnification views demonstrate persistence of a focal asymmetry versus mass in the posterior inferior breast at 5:00-6:00, which was not seen on the older mammograms, for which ultrasound is recommended. Ultrasound: Real time targeted sonography was performed of the left inferior breast by the radiologist and sonographer, demonstrating no abnormality. IMPRESSION: Left posterior 6:00 mammographic density, with no sonographic correlate. Recommend stereotactic biopsy. This was discussed in full with the patient at the time of interpretation. BI-RADS Assessment Category 4: Suspicious Finding- Biopsy should be considered. [facility] Breast Lt Surg Spec Result Date: 7/5/2019 Mammographic guided needle localization Surgical specimen radiograph CLINICAL INDICATION: Left posterior 6:00 breast cancer undergoing surgical treatment. COMPARISON: Previous mammography 6/21/2019 and others back to 6/10/2019, also breast MRI 6/26/2019 PROCEDURE: After discussing the risks and benefits, including but not limited to bleeding and infection, written and verbal informed consent were obtained. The overlying skin was prepped in sterile fashion. Total of 6 mL of 1% Lidocaine was used for local anesthesia. Under mammographic guidance with caudal-cranial approach, a 5 cm Kopans wire was placed adjacent to the clip and the small residual hematoma. Postprocedural mammographic films were then obtained and marked for the surgeon. The patient tolerated the procedure well without immediate complications and was transported to the operating room. IMPRESSION: Successful mammographic guided needle localization of left posterior 6:00 breast cancer. Pathology results are pending. Submitted specimen radiograph demonstrates intact localization wire and biopsy clip. This was reported to Dr. Lani Chavarria in the operating room at the time of acquisition. IMPRESSION:  Casie Tuttle is a 59 y.o. female with Stage IA breast cancer. The natural history of breast cancer was again reviewed with the patient. Prognostic features including stage, performance status and presence or absence of lymph node involvement were reviewed with the patient. Various treatment options including lumpectomy alone, breast conservation therapy, and mastectomy were compared and contrasted with regard to outcome and quality of life. As previously outlined, I have recommended a course of radiation therapy to the breast as a standard portion of breast conservation therapy. As outlined by Dr. Masood Montaño, he was awaiting results of the Oncotype to make chemotherapy recommendations, which ultimately retruned low risk and chemo was not recommended. We will therefore moved forward with simulation now. Consent was obtained. Plan: 1. Genetic testing-not indicated 2. Smoking cessation-not indicated 3. Patient will be simulated with radiotherapy to begin shortly thereafter. A dose of 42.56 Gy in 16 fractions with 10 Gy boost.  
 
Portions of this note were copied from prior encounters and reviewed for accuracy, currency, and represent documentation and tasks completed during this encounter. I verify and attest these portions to be unchanged from prior visits. Judith Marquez MD 
08/08/19

## 2019-08-14 ENCOUNTER — HOSPITAL ENCOUNTER (OUTPATIENT)
Dept: RADIATION ONCOLOGY | Age: 64
Discharge: HOME | End: 2019-08-14
Payer: COMMERCIAL

## 2019-08-14 PROCEDURE — 77295 3-D RADIOTHERAPY PLAN: CPT

## 2019-08-14 PROCEDURE — 77334 RADIATION TREATMENT AID(S): CPT

## 2019-08-14 PROCEDURE — 77300 RADIATION THERAPY DOSE PLAN: CPT

## 2019-08-20 ENCOUNTER — HOSPITAL ENCOUNTER (OUTPATIENT)
Dept: RADIATION ONCOLOGY | Age: 64
Discharge: HOME | End: 2019-08-20
Payer: COMMERCIAL

## 2019-08-20 PROCEDURE — 77280 THER RAD SIMULAJ FIELD SMPL: CPT

## 2019-08-20 PROCEDURE — 77412 RADIATION TX DELIVERY LVL 3: CPT

## 2019-08-21 ENCOUNTER — HOSPITAL ENCOUNTER (OUTPATIENT)
Dept: RADIATION ONCOLOGY | Age: 64
Discharge: HOME | End: 2019-08-21
Payer: COMMERCIAL

## 2019-08-21 PROCEDURE — 77412 RADIATION TX DELIVERY LVL 3: CPT

## 2019-08-22 PROBLEM — E83.42 HYPOMAGNESEMIA: Status: ACTIVE | Noted: 2019-08-22

## 2019-08-23 ENCOUNTER — HOSPITAL ENCOUNTER (OUTPATIENT)
Dept: RADIATION ONCOLOGY | Age: 64
Discharge: HOME | End: 2019-08-23
Payer: COMMERCIAL

## 2019-08-23 PROCEDURE — 77412 RADIATION TX DELIVERY LVL 3: CPT

## 2019-08-26 ENCOUNTER — HOSPITAL ENCOUNTER (OUTPATIENT)
Dept: RADIATION ONCOLOGY | Age: 64
Discharge: HOME | End: 2019-08-26
Payer: COMMERCIAL

## 2019-08-26 PROCEDURE — 77412 RADIATION TX DELIVERY LVL 3: CPT

## 2019-08-26 NOTE — PROGRESS NOTES
Patient: Lashell Cerna MRN: [id number]  SSN: [id number]    YOB: 1955  Age: 59 y.o. Sex: female        DIAGNOSIS: Left breast stage IA invasive ductal carcinoma, [id number], ER 95%, OK negative, HER2 negative. ER 95%, OK 0%, HER2 negative    PREVIOUS TREATMENT:  1) Left breast lumpectomy      TREATMENT SITE:  Left breast    DOSE and FRACTIONATION:  5/16 fractions, 1330 cGy of 4256 cGy planned, 0/4 boost, 0 cGy of 1000 cGy planned. INTERVAL HISTORY:  Lashell Cerna is a 59 y.o. female being treated for breast cancer. She was doing well without complaints week 1. OBJECTIVE:  No findings week 1. There were no vitals taken for this visit. Lab Results   Component Value Date/Time    Sodium 141 08/02/2019 10:16 AM    Potassium 4.1 08/02/2019 10:16 AM    Chloride 104 08/02/2019 10:16 AM    CO2 21 08/02/2019 10:16 AM    Anion gap 8 08/01/2019 05:28 AM    Glucose 118 (H) 08/02/2019 10:16 AM    BUN 9 08/02/2019 10:16 AM    Creatinine 0.74 08/02/2019 10:16 AM    GFR est AA 99 08/02/2019 10:16 AM    GFR est non-AA 86 08/02/2019 10:16 AM    Calcium 9.6 08/02/2019 10:16 AM    Magnesium 1.3 (L) 08/13/2019 02:58 PM    Albumin 4.2 08/02/2019 10:16 AM    Protein, total 7.0 08/02/2019 10:16 AM    Globulin 4.3 (H) 07/31/2019 05:47 PM    A-G Ratio 1.5 08/02/2019 10:16 AM    AST (SGOT) 15 08/02/2019 10:16 AM    ALT (SGPT) 13 08/02/2019 10:16 AM     Lab Results   Component Value Date/Time    WBC 7.0 08/02/2019 10:16 AM    HGB 12.6 08/02/2019 10:16 AM    HCT 37.0 08/02/2019 10:16 AM    PLATELET 912 99/16/1896 10:16 AM       ASSESSMENT and PLAN:  Steven Ortegaippo is tolerating radiation as anticipated for the current dose and fraction. We will continue on as planned with another treatment visit anticipated next week.         Raul Justice MD   August 26, 2019

## 2019-08-27 ENCOUNTER — HOSPITAL ENCOUNTER (OUTPATIENT)
Dept: RADIATION ONCOLOGY | Age: 64
Discharge: HOME | End: 2019-08-27
Payer: COMMERCIAL

## 2019-08-27 PROCEDURE — 77336 RADIATION PHYSICS CONSULT: CPT

## 2019-08-27 PROCEDURE — 77412 RADIATION TX DELIVERY LVL 3: CPT

## 2019-08-27 PROCEDURE — 77417 THER RADIOLOGY PORT IMAGE(S): CPT

## 2019-08-28 ENCOUNTER — HOSPITAL ENCOUNTER (OUTPATIENT)
Dept: RADIATION ONCOLOGY | Age: 64
Discharge: HOME | End: 2019-08-28
Payer: COMMERCIAL

## 2019-08-28 PROCEDURE — 77412 RADIATION TX DELIVERY LVL 3: CPT

## 2019-08-29 ENCOUNTER — HOSPITAL ENCOUNTER (OUTPATIENT)
Dept: RADIATION ONCOLOGY | Age: 64
Discharge: HOME | End: 2019-08-29
Payer: COMMERCIAL

## 2019-08-29 PROCEDURE — 77412 RADIATION TX DELIVERY LVL 3: CPT

## 2019-08-30 ENCOUNTER — HOSPITAL ENCOUNTER (OUTPATIENT)
Dept: RADIATION ONCOLOGY | Age: 64
Discharge: HOME | End: 2019-08-30
Payer: COMMERCIAL

## 2019-08-30 PROCEDURE — 77334 RADIATION TREATMENT AID(S): CPT

## 2019-08-30 PROCEDURE — 77321 SPECIAL TELETX PORT PLAN: CPT

## 2019-08-30 PROCEDURE — 77412 RADIATION TX DELIVERY LVL 3: CPT

## 2019-09-03 ENCOUNTER — HOSPITAL ENCOUNTER (OUTPATIENT)
Dept: RADIATION ONCOLOGY | Age: 64
Discharge: HOME | End: 2019-09-03
Payer: COMMERCIAL

## 2019-09-03 PROCEDURE — 77412 RADIATION TX DELIVERY LVL 3: CPT

## 2019-09-04 ENCOUNTER — HOSPITAL ENCOUNTER (OUTPATIENT)
Dept: RADIATION ONCOLOGY | Age: 64
Discharge: HOME | End: 2019-09-04
Payer: COMMERCIAL

## 2019-09-04 PROCEDURE — 77412 RADIATION TX DELIVERY LVL 3: CPT

## 2019-09-04 PROCEDURE — 77417 THER RADIOLOGY PORT IMAGE(S): CPT

## 2019-09-04 PROCEDURE — 77336 RADIATION PHYSICS CONSULT: CPT

## 2019-09-04 NOTE — PROGRESS NOTES
Patient: Nate Contreras MRN: [id number]  SSN: [id number]    YOB: 1955  Age: 59 y.o. Sex: female        DIAGNOSIS: Left breast stage IA invasive ductal carcinoma, [id number], ER 95%, LA negative, HER2 negative. ER 95%, LA 0%, HER2 negative    PREVIOUS TREATMENT:  1) Left breast lumpectomy      TREATMENT SITE:  Left breast    DOSE and FRACTIONATION:  10/16 fractions, 2660 cGy of 4256 cGy planned, 0/4 boost, 0 cGy of 1000 cGy planned. INTERVAL HISTORY:  Nate Contreras is a 59 y.o. female being treated for breast cancer. She was doing well without complaints weeks 1-2. Seen prior to treatment week 2. OBJECTIVE:  No findings week 1. There were no vitals taken for this visit. Lab Results   Component Value Date/Time    Sodium 141 08/02/2019 10:16 AM    Potassium 4.1 08/02/2019 10:16 AM    Chloride 104 08/02/2019 10:16 AM    CO2 21 08/02/2019 10:16 AM    Anion gap 8 08/01/2019 05:28 AM    Glucose 118 (H) 08/02/2019 10:16 AM    BUN 9 08/02/2019 10:16 AM    Creatinine 0.74 08/02/2019 10:16 AM    GFR est AA 99 08/02/2019 10:16 AM    GFR est non-AA 86 08/02/2019 10:16 AM    Calcium 9.6 08/02/2019 10:16 AM    Magnesium 1.6 08/26/2019 03:14 PM    Albumin 4.2 08/02/2019 10:16 AM    Protein, total 7.0 08/02/2019 10:16 AM    Globulin 4.3 (H) 07/31/2019 05:47 PM    A-G Ratio 1.5 08/02/2019 10:16 AM    AST (SGOT) 15 08/02/2019 10:16 AM    ALT (SGPT) 13 08/02/2019 10:16 AM     Lab Results   Component Value Date/Time    WBC 7.0 08/02/2019 10:16 AM    HGB 12.6 08/02/2019 10:16 AM    HCT 37.0 08/02/2019 10:16 AM    PLATELET 403 51/28/6720 10:16 AM       ASSESSMENT and PLAN:  Edgardo Ugarte is tolerating radiation as anticipated for the current dose and fraction. We will continue on as planned with another treatment visit anticipated next week.         Rosanne Osgood, MD   September 4, 2019

## 2019-09-05 ENCOUNTER — HOSPITAL ENCOUNTER (OUTPATIENT)
Dept: RADIATION ONCOLOGY | Age: 64
Discharge: HOME | End: 2019-09-05
Payer: COMMERCIAL

## 2019-09-05 PROCEDURE — 77412 RADIATION TX DELIVERY LVL 3: CPT

## 2019-09-06 ENCOUNTER — HOSPITAL ENCOUNTER (OUTPATIENT)
Dept: RADIATION ONCOLOGY | Age: 64
Discharge: HOME | End: 2019-09-06
Payer: COMMERCIAL

## 2019-09-06 PROCEDURE — 77412 RADIATION TX DELIVERY LVL 3: CPT

## 2019-09-06 PROCEDURE — 77331 SPECIAL RADIATION DOSIMETRY: CPT

## 2019-09-09 ENCOUNTER — HOSPITAL ENCOUNTER (OUTPATIENT)
Dept: RADIATION ONCOLOGY | Age: 64
Discharge: HOME | End: 2019-09-09
Payer: COMMERCIAL

## 2019-09-09 PROCEDURE — 77412 RADIATION TX DELIVERY LVL 3: CPT

## 2019-09-09 NOTE — PROGRESS NOTES
Patient: Joshua Jackson MRN: [id number]  SSN: [id number]    YOB: 1955  Age: 59 y.o. Sex: female        DIAGNOSIS: Left breast stage IA invasive ductal carcinoma, [id number], ER 95%, LA negative, HER2 negative. ER 95%, LA 0%, HER2 negative    PREVIOUS TREATMENT:  1) Left breast lumpectomy      TREATMENT SITE:  Left breast    DOSE and FRACTIONATION:  14/16 fractions, 3724 cGy of 4256 cGy planned, 0/4 boost, 0 cGy of 1000 cGy planned. INTERVAL HISTORY:  Joshua Jackson is a 59 y.o. female being treated for breast cancer. She was doing well without complaints weeks 1-2. Seen prior to treatment week 2. No new issues week 3. OBJECTIVE:  No findings week 1. There were no vitals taken for this visit. Lab Results   Component Value Date/Time    Sodium 141 08/02/2019 10:16 AM    Potassium 4.1 08/02/2019 10:16 AM    Chloride 104 08/02/2019 10:16 AM    CO2 21 08/02/2019 10:16 AM    Anion gap 8 08/01/2019 05:28 AM    Glucose 118 (H) 08/02/2019 10:16 AM    BUN 9 08/02/2019 10:16 AM    Creatinine 0.74 08/02/2019 10:16 AM    GFR est AA 99 08/02/2019 10:16 AM    GFR est non-AA 86 08/02/2019 10:16 AM    Calcium 9.6 08/02/2019 10:16 AM    Magnesium 1.6 08/26/2019 03:14 PM    Albumin 4.2 08/02/2019 10:16 AM    Protein, total 7.0 08/02/2019 10:16 AM    Globulin 4.3 (H) 07/31/2019 05:47 PM    A-G Ratio 1.5 08/02/2019 10:16 AM    AST (SGOT) 15 08/02/2019 10:16 AM    ALT (SGPT) 13 08/02/2019 10:16 AM     Lab Results   Component Value Date/Time    WBC 7.0 08/02/2019 10:16 AM    HGB 12.6 08/02/2019 10:16 AM    HCT 37.0 08/02/2019 10:16 AM    PLATELET 476 80/47/8290 10:16 AM       ASSESSMENT and PLAN:  Marlene Ugarte is tolerating radiation as anticipated for the current dose and fraction. We will continue on as planned with another treatment visit anticipated next week.         Lucie Ghosh MD   September 9, 2019

## 2019-09-10 ENCOUNTER — HOSPITAL ENCOUNTER (OUTPATIENT)
Dept: RADIATION ONCOLOGY | Age: 64
Discharge: HOME | End: 2019-09-10
Payer: COMMERCIAL

## 2019-09-10 PROCEDURE — 77412 RADIATION TX DELIVERY LVL 3: CPT

## 2019-09-11 ENCOUNTER — HOSPITAL ENCOUNTER (OUTPATIENT)
Dept: RADIATION ONCOLOGY | Age: 64
Discharge: HOME | End: 2019-09-11
Payer: COMMERCIAL

## 2019-09-11 PROCEDURE — 77280 THER RAD SIMULAJ FIELD SMPL: CPT

## 2019-09-11 PROCEDURE — 77412 RADIATION TX DELIVERY LVL 3: CPT

## 2019-09-11 PROCEDURE — 77336 RADIATION PHYSICS CONSULT: CPT

## 2019-09-12 ENCOUNTER — HOSPITAL ENCOUNTER (OUTPATIENT)
Dept: RADIATION ONCOLOGY | Age: 64
Discharge: HOME | End: 2019-09-12
Payer: COMMERCIAL

## 2019-09-12 PROCEDURE — 77412 RADIATION TX DELIVERY LVL 3: CPT

## 2019-09-13 ENCOUNTER — HOSPITAL ENCOUNTER (OUTPATIENT)
Dept: RADIATION ONCOLOGY | Age: 64
Discharge: HOME | End: 2019-09-13
Payer: COMMERCIAL

## 2019-09-13 PROCEDURE — 77412 RADIATION TX DELIVERY LVL 3: CPT

## 2019-09-16 ENCOUNTER — PATIENT OUTREACH (OUTPATIENT)
Dept: CASE MANAGEMENT | Age: 64
End: 2019-09-16

## 2019-09-16 ENCOUNTER — HOSPITAL ENCOUNTER (OUTPATIENT)
Dept: RADIATION ONCOLOGY | Age: 64
Discharge: HOME | End: 2019-09-16
Payer: COMMERCIAL

## 2019-09-16 PROCEDURE — 77412 RADIATION TX DELIVERY LVL 3: CPT

## 2019-09-16 NOTE — PROGRESS NOTES
9/16/2019 Saw the patient with Dr Vikki Burger. She will complete radiation tomorrow. I have sent Arimidex to her pharmacy and given her printed information regarding the medication. We will update her bone density. I discussed the treatment summary with her and will mail this to her. Navigation will sign off.

## 2019-09-17 ENCOUNTER — HOSPITAL ENCOUNTER (OUTPATIENT)
Dept: RADIATION ONCOLOGY | Age: 64
Discharge: HOME | End: 2019-09-17
Payer: COMMERCIAL

## 2019-09-17 PROCEDURE — 77336 RADIATION PHYSICS CONSULT: CPT

## 2019-09-17 PROCEDURE — 77412 RADIATION TX DELIVERY LVL 3: CPT

## 2019-09-17 NOTE — DISCHARGE SUMMARY
Patient: Get Palafox MRN: [id number]  SSN: [id number]    YOB: 1955  Age: 59 y.o. Sex: female      Get Palafox is a 59 y.o. female who was seen by radiation oncology at the request of Dr. Rene Galvez back in follow up after original consultation for breast cancer. She underwent screening mammogram in June 2019 which showed a new left breast mass prompting further evaluation with diagnostic views and ultrasound. Ultrasound was negative but the mass persisted on mammogram, prompting stereotactic biopsy.  This showed IDC, low grade, ER 95%, GA negative, HER2 negative.  She underwent MRI which showed a small area of reactive change with biopsy clip and artifact, no residual measurable lesion was noted.  She was referred to surgery and underwent lumpectomy which showed the tumor to be 1.2 cm with close but negative anterior margins.  Her single sentinel node was negative. Following surgery she was presented in conference and referrals were made to medical and radiation oncology. Elvis Byrne met with Dr. Rene Galvez 7/22/19 who recommended Oncotype for assistance with chemotherapy decision making. She was seen in our office by Kerry Canas prior to this returning 7/29/19. She had prior CVA and TIA which limits her PS, ECOG 2.       She had plans to see Dr. Rene Galvez back in follow up for the Oncotype which returned low and therefore chemotherapy was not recommended but unfortunately she had another small stroke 8/1/19 with MRI showing an acute infarct in the right cerebellum after experiencing blurred vision. Nonetheless she had no residual deficits and returned to our office to meet with Dr. Grace Georges 8/8/2019 in anticipation of radiation planning. The recommendation was for adjuvant radiation at a dose of 42.56 cGY in 16 fractions with 10 cGy boost in 4 fractions    Please see the details of her treatment below as well as my plans for future care and surveillance.   Please do not hesitate to call with questions or concerns at any time. DIAGNOSIS:  Left breast stage IA invasive ductal carcinoma, [id number], ER 95%, VT negative, HER2 negative. ER 95%, VT 0%, HER2 negative    PREVIOUS TREATMENT:    1) Left breast lumpectomy     TREATMENT DATES:    1) BH left breast: 8/20/2019 - 9/11/2019  2) Left breast boost:  9/12/2019 - 9/17/2019    ANATOMIC SITE:  Left breast    DOSE:    1) BH left breast: 4256 cGy in 16 fractions  2) Left breast boost:  1000 cGy in 4 fractions    BEAM ARRANGEMENT:    1) BH left breast: 3D conformal - 10MV  2) Left breast boost:  3D Conformal - 15E    CHEMOTHERAPY: None     TREATMENT COURSE: Viatly Ugarte did well without complaints weeks 1 through 2. She was seen prior to treatment week 2. No new issues week 3. Excited about treatment completion. There were no immediate complications. Treatment was successfully completed. PLAN:  The patient will be seen in follow up in 4 weeks to assess acute and sub acute side effects.       KARUNA Wong MD

## 2019-09-19 PROBLEM — Z00.00 PHYSICAL EXAM, ANNUAL: Status: ACTIVE | Noted: 2019-09-19

## 2019-09-19 PROBLEM — Z00.00 PHYSICAL EXAM, ANNUAL: Status: RESOLVED | Noted: 2019-09-19 | Resolved: 2019-09-19

## 2019-09-19 PROBLEM — Z23 ENCOUNTER FOR IMMUNIZATION: Status: ACTIVE | Noted: 2019-09-19

## 2019-09-19 PROBLEM — Z23 ENCOUNTER FOR IMMUNIZATION: Status: RESOLVED | Noted: 2019-09-19 | Resolved: 2019-09-19

## 2019-09-19 PROBLEM — Z01.411 ENCNTR FOR GYN EXAM (GENERAL) (ROUTINE) W ABNORMAL FINDINGS: Status: ACTIVE | Noted: 2019-09-19

## 2019-10-11 ENCOUNTER — PATIENT OUTREACH (OUTPATIENT)
Dept: CASE MANAGEMENT | Age: 64
End: 2019-10-11

## 2019-10-11 NOTE — PROGRESS NOTES
10/11/2019 treatment summary care plan prepared and 2 copies mailed to pt. Navigation has signed off.

## 2019-10-18 ENCOUNTER — HOSPITAL ENCOUNTER (OUTPATIENT)
Dept: RADIATION ONCOLOGY | Age: 64
Discharge: HOME | End: 2019-10-18
Payer: COMMERCIAL

## 2019-10-18 VITALS
OXYGEN SATURATION: 99 % | HEART RATE: 80 BPM | TEMPERATURE: 97.7 F | RESPIRATION RATE: 16 BRPM | SYSTOLIC BLOOD PRESSURE: 117 MMHG | DIASTOLIC BLOOD PRESSURE: 67 MMHG | BODY MASS INDEX: 30.31 KG/M2 | WEIGHT: 160.4 LBS

## 2019-10-18 DIAGNOSIS — C50.912 MALIGNANT NEOPLASM OF LEFT FEMALE BREAST, UNSPECIFIED ESTROGEN RECEPTOR STATUS, UNSPECIFIED SITE OF BREAST (HCC): Primary | ICD-10-CM

## 2019-10-18 PROCEDURE — 99211 OFF/OP EST MAY X REQ PHY/QHP: CPT

## 2019-10-18 NOTE — PROGRESS NOTES
1 Month Follow Up Bone Density - 11/05/2019 Med Onc (NP) - 11/18/2019 Dr. Geoff Munoz ([facility]) - 02/18/2020 07- - Lumpectomy RT End: 09/17/2019 CVA - 08/01/2019 Adelaide Hernandez CMA

## 2019-10-18 NOTE — PROGRESS NOTES
Patient: Jose Marcano MRN: [id number]  SSN: [id number] YOB: 1955  Age: 59 y.o. Sex: female Other Providers:  Mak Wilder MD 
 
CHIEF COMPLAINT: Breast cancer DIAGNOSIS: Left breast stage IA invasive ductal carcinoma, [id number], ER 95%, CT negative, HER2 negative. ER 95%, CT 0%, HER2 negative PREVIOUS TREATMENT: 
1) Left breast lumpectomy HISTORY OF PRESENT ILLNESS:  Jose Marcano is a 59 y.o. female who I am seeing at the request of Dr. Ady Atkins back in follow up after original consultation for breast cancer. She underwent screening mammogram in June 2019 which showed a new left breast mass prompting further evaluation with diagnostic views and ultrasound. Ultrasound was negative but the mass persisted on mammogram, prompting stereotactic biopsy. This showed IDC, low grade, ER 95%, CT negative, HER2 negative. She underwent MRI which showed a small area of reactive change with biopsy clip and artifact, no residual measurable lesion was noted. She was referred to surgery and underwent lumpectomy which showed the tumor to be 1.2 cm with close but negative anterior margins. Her single sentinel node was negative. Following surgery she was presented in conference and referrals were made to medical and radiation oncology. She met with Dr. Ady Atkins 7/22/19 who recommended Oncotype for assistance with chemotherapy decision making. She was seen in our office prior to this returning 7/29/19. She had prior CVA and TIA which limits her PS, ECOG 2. She had plans to see Dr. Ady Atkins back in follow up for the Oncotype which returned low and therefore chemotherapy was not recommended but unfortunately she had another small stroke 8/1/19 with MRI showing an acute infarct in the right cerebellum after experiencing blurred vision. Nonetheless she had no residual deficits and returned to our office 8/8/2019 in anticipation of radiation planning. TREATMENT COURSE: Nereida Ugarte did well without complaints weeks 1 through 2.  She was seen prior to treatment week 2.  No new issues week 3.   Excited about treatment completion. There were no immediate complications. Treatment was successfully completed. INTERVAL HISTORY: 
 
10/18/19:  Ms Ugarte returns 1 month out from completion of radiation therapy. She tolerated radiation well. She reports no breast issues. She states that her challenge is recovering from recent stroke. She states that she continues to struggle. OBSTETRIC HISTORY:   
Menarche at the age of 16-14. G5,P5. Oral Contraceptive Pills: In 1980's Hormone Replacement Therapy:  None Menopause at [age] PAST MEDICAL HISTORY:   
Past Medical History:  
Diagnosis Date  Actinic keratosis   
 uses Solaraze gel when needed  Arthritis   
 rheumatoid. Orencia infusion every 4 weeks  Asthma   
 none in yrs per pt--- no inhalers per pt  Cancer ([address] melanoma  DJD (degenerative joint disease)  Environmental allergies  Environmental and seasonal allergies 7/25/2019  GERD (gastroesophageal reflux disease)   
 prn med  History of malignant melanoma 1980's  
 on back  History of TIA (transient ischemic attack)  Hypertension   
 controlled with medication  Hypothyroidism  Insulin dependent diabetes mellitus ([address] 1990's Type 2. sqbs avg am--100---- hypo at 73- last A1C= 8.1 on 12/19/17  Kidney stones 4th one at present  Mobitz type 1 second degree AV block EKG today 9/22/16- Dr Marco A Orourke reviewed----- per pt-- \" been that way my whole life\"-- does not see a cardiologist  
 Obese   
 bmi =39  
 Osteoarthritis 12/22/2016  Osteopenia  PONV (postoperative nausea and vomiting) POV- responds to IV antiemetic  RA (rheumatoid arthritis) ([address] dx--2013  Rheumatoid arthritis involving right knee with negative rheumatoid factor ([address] 5/16/2019  Rosacea  S/P total knee arthroplasty 12/23/2016  Stroke ([facility] followed by neurologist    
 Unspecified hypothyroidism   
 hypothyroid. Controlled with Levoxyl PAST SURGICAL HISTORY:  
Past Surgical History:  
Procedure Laterality Date  ABDOMEN SURGERY PROC UNLISTED    
 fatty tumor removed- from abd--benign  FRAGMENT KIDNEY STONE/ ESWL    
 HX BREAST BIOPSY Left 06/21/2019  
 stereo bx  HX BREAST LUMPECTOMY Left 7/5/2019 LEFT BREAST LUMPECTOMY WITH NEEDLE  AND SENTINAL NODE/ performed by Ilene Keen MD at [address] [place] [address]  HX COLONOSCOPY  2008; 2010  HX DILATION AND CURETTAGE  1999  
 and hysteroscopy  HX GYN  1978  
 vaginal cryocautery [facility] Oral Surgery--per pt  still has metal in mouth since 1980's  HX KNEE ARTHROSCOPY Right 2007  HX KNEE REPLACEMENT Left 2008  HX KNEE REPLACEMENT Right 2016 [address], 2003; 2016; 2/6/18  HX MENISCECTOMY Right 2011 Right knee  HX MOHS PROCEDURES Right 2004  HX OTHER SURGICAL  1999  
 lipoma of abdomen  HX OTHER SURGICAL  1984  
 melanoma of back [facility]  SURGERY  [address] [place] [address] MEDICATIONS:  
 
Current Outpatient Medications:  
  anastrozole (ARIMIDEX) 1 mg tablet, Take 1 mg by mouth daily. , Disp: 30 Tab, Rfl: 5 
  magnesium oxide (MAG-OX) 400 mg tablet, Take 1 Tab by mouth daily. , Disp: 30 Tab, Rfl: 0 
  atorvastatin (LIPITOR) 80 mg tablet, 40 mg., Disp: , Rfl:  
  clopidogrel (PLAVIX) 75 mg tab, Take 1 Tab by mouth daily. , Disp: 30 Tab, Rfl: 2 
  metFORMIN (GLUCOPHAGE) 1,000 mg tablet, Take 1 Tab by mouth two (2) times daily (with meals). , Disp: 180 Tab, Rfl: 1 
  loratadine (CLARITIN) 10 mg tablet, Take 1 Tab by mouth daily. , Disp: 90 Tab, Rfl: 1 
  lisinopril (PRINIVIL, ZESTRIL) 30 mg tablet, Take 1 Tab by mouth daily. , Disp: 90 Tab, Rfl: 1   levothyroxine (SYNTHROID) 100 mcg tablet, Take 1 Tab by mouth Daily (before breakfast). , Disp: 90 Tab, Rfl: 1 
  glipiZIDE (GLUCOTROL) 10 mg tablet, TAKE ONE TABLET BY MOUTH TWICE DAILY  Indications: type 2 diabetes mellitus, Disp: 180 Tab, Rfl: 1 
  fluticasone propionate (FLONASE ALLERGY RELIEF) 50 mcg/actuation nasal spray, 2 Sprays by Both Nostrils route daily. , Disp: 3 Bottle, Rfl: 1   calcium carbonate (TUMS) 200 mg calcium (500 mg) chew, Take 1 Tab by mouth as needed. Indications: heartburn, Disp: , Rfl:  
  insulin detemir U-100 (LEVEMIR FLEXTOUCH U-100 INSULN) 100 unit/mL (3 mL) inpn, Usually takes 10-15 units in am and then 60 units at hs--- BUT TOTAL FOR 24 HOURS IS NOT OVER 75 UNITS (STARTS COUNTING UNITS T NIGHT DOSE)  Indications: type 2 diabetes mellitus (Patient taking differently: 28 Units by SubCUTAneous route nightly. Indications: type 2 diabetes mellitus), Disp: 2 Units, Rfl: 3 
  NOVOFINE 32 32 gauge x 1/4\" ndle, Use TID, Disp: 100 Pen Needle, Rfl: 3 
  glucose blood VI test strips (ACCU-CHEK KALEB PLUS TEST STRP) strip, Check BG three to four times daily. Please dispense strips based on glucometer, possibly Accu-chek Stephanie., Disp: 200 Strip, Rfl: 5 
  krill/om-3/dha/epa/phospho/ast (MEGARED OMEGA-3 KRILL OIL PO), Take 1 Cap by mouth daily. , Disp: , Rfl:  
  cholecalciferol (VITAMIN D3) 1,000 unit cap, Take 1,000 Units by mouth daily. Indications: Prevention of Vitamin D Deficiency, Disp: , Rfl:  
  GLUCOSAMINE HCL/CHONDR MERAZ A NA (OSTEO BI-FLEX PO), Take 1 Tab by mouth two (2) times a day., Disp: , Rfl:  
  calcium-cholecalciferol, d3, (CALCIUM 600 + D) 600-125 mg-unit tab, Take 1 Tab by mouth two (2) times a day. Indications: post-menopausal osteoporosis prevention, Disp: , Rfl: ALLERGIES:  
Allergies Allergen Reactions  Other Food Swelling Jalapeno. -- tongue and lip swells  Bee Sting [Sting, Bee] Swelling Local swelling  Invokana [Canagliflozin] Other (comments) Caused frequent Urinary Tract Infections  Other Plant, Animal, Environmental Other (comments) Trees, grass, dust, mold---- congestion SOCIAL HISTORY:  
Social History Socioeconomic History  Marital status:  Spouse name: Not on file  Number of children: 5  
 Years of education: Not on file  Highest education level: Not on file Occupational History  Occupation: Clerical- retired Social Needs  Financial resource strain: Not on file  Food insecurity:  
  Worry: Not on file Inability: Not on file  Transportation needs:  
  Medical: Not on file Non-medical: Not on file Tobacco Use  Smoking status: Never Smoker  Smokeless tobacco: Never Used Substance and Sexual Activity  Alcohol use: Yes Alcohol/week: 1.0 standard drinks Types: 1 Glasses of wine per week Comment: occasionally  Drug use: No  
 Sexual activity: Yes  
  Partners: Male Lifestyle  Physical activity:  
  Days per week: 0 days Minutes per session: 0 min  Stress: Not on file Relationships  Social connections:  
  Talks on phone: Not on file Gets together: Not on file Attends [employer] service: Not on file Active member of club or organization: Not on file Attends meetings of clubs or organizations: Not on file Relationship status: Not on file  Intimate partner violence:  
  Fear of current or ex partner: Not on file Emotionally abused: Not on file Physically abused: Not on file Forced sexual activity: Not on file Other Topics Concern [address] Service Not Asked  Blood Transfusions Not Asked  Caffeine Concern Not Asked  Occupational Exposure Not Asked Agnohemi Whittaker Hazards Not Asked  Sleep Concern Not Asked  Stress Concern Not Asked  Weight Concern Not Asked  Special Diet Not Asked  Back Care Not Asked  Exercise Not Asked  Bike Helmet Not Asked  Seat Belt Not Asked  Self-Exams Not Asked Social History Narrative  Not on file FAMILY HISTORY:  
Family History Problem Relation Age of Onset  Cancer Mother Lung  Arthritis-rheumatoid Mother  Thyroid Disease Mother  Cancer Father   
     Lung; Liver  Diabetes Father  Heart Disease Father  Hypertension Father  Diabetes Brother  SLE Sister  Breast Cancer Other   
     cousins PHYSICAL EXAMINATION:  
ECOG Performance status 2 VITAL SIGNS: Blood pressure 111/67  Pulse 80  Temperature 97.7  Weight 160.4 GENERAL: The patient is well-developed, ambulatory, alert and in no acute distress. LYMPHATIC: There is no cervical, supraclavicular or axillary lymphadenopathy bilaterally. BREASTS:  Examination of the unaffected breast reveals no dominant nodules or masses. The lumpectomy cavity appears well healed with good aesthetics and symmetry. Well healed surgical incision. PATHOLOGY:   
Pathology: 
 
 
  
 
LABORATORY:  
Lab Results Component Value Date/Time Sodium 141 08/02/2019 10:16 AM  
 Potassium 4.1 08/02/2019 10:16 AM  
 Chloride 104 08/02/2019 10:16 AM  
 CO2 21 08/02/2019 10:16 AM  
 Anion gap 8 08/01/2019 05:28 AM  
 Glucose 118 (H) 08/02/2019 10:16 AM  
 BUN 9 08/02/2019 10:16 AM  
 Creatinine 0.74 08/02/2019 10:16 AM  
 GFR est AA 99 08/02/2019 10:16 AM  
 GFR est non-AA 86 08/02/2019 10:16 AM  
 Calcium 9.6 08/02/2019 10:16 AM  
 Magnesium 1.6 08/26/2019 03:14 PM  
 Albumin 4.2 08/02/2019 10:16 AM  
 Protein, total 7.0 08/02/2019 10:16 AM  
 Globulin 4.3 (H) 07/31/2019 05:47 PM  
 A-G Ratio 1.5 08/02/2019 10:16 AM  
 AST (SGOT) 15 08/02/2019 10:16 AM  
 ALT (SGPT) 13 08/02/2019 10:16 AM  
 
Lab Results Component Value Date/Time WBC 7.0 08/02/2019 10:16 AM  
 HGB 12.6 08/02/2019 10:16 AM  
 HCT 37.0 08/02/2019 10:16 AM  
 PLATELET 304 47/65/9237 10:16 AM  
 
 
RADIOLOGY:   
Mri Breast Bi W Wo Cont Result Date: 6/26/2019 MRI of the Breasts with and without contrast CLINICAL INDICATION:  New diagnosis of left 6:00 infiltrating duct carcinoma with lobular features low-grade undergoing evaluation for extent of disease, staging, therapy planning; remote personal history of melanoma. Patient's paternal aunt had breast cancer at an unknown age. COMPARISON: Mammography 6/17/2019, 6/21/2019, 6/10/2019 TECHNIQUE: Standard MRI sequences were obtained through the breasts in multiple planes. Images were obtained before and after intravenous infusion of 17 mL of Dotarem contrast. Images were reviewed with PACS and with [employer] CAD software. FINDINGS: The breasts demonstrate mild background glandularity and minimal enhancement. The left posterior 6:00 site of biopsy demonstrates artifact from the clip and a small hematoma measuring about 1.5 cm. Minimal linear enhancement surrounding the biopsy cavity and tract is likely reactive change. No residual measurable enhancing lesion is identified. There is no other evidence of suspicious enhancing mass or nonmass enhancement to suggest malignancy elsewhere in either breast. There is no evidence of axillary or internal mammary lymphadenopathy. A few small lymph nodes are noted in each axilla no obvious dysmorphology. Elsewhere, limited visualization of the partially included thorax and upper abdomen shows no concerning findings. IMPRESSION: 1. Left 6:00 site of cancer diagnosis demonstrates a small hematoma, with no residual measurable lesion. 2. No additional abnormality evident in either breast, no obvious lymphadenopathy. Recommend continued management of left breast cancer as directed clinically. BI-RADS Assessment Category 6: Known Biopsy Proven Malignancy Amadou Mammo Bi Screening Incl Cad Result Date: 6/11/2019 STUDY:  Bilateral digital screening mammogram  INDICATION:  Screening.  COMPARISON:  10/20/2014, 07/07/2011 FINDINGS: Bilateral digital mammography was performed, and is interpreted in conjunction with a computer assisted detection (CAD) system. The are scattered fibroglandular elements. There is a mass within the inferior left breast posterior depth, not visualized on previous imaging which may be due to its far posterior location versus interval change. Spot compression views in the CC and MLO projection as well as a full 90 degree mediolateral view are recommended for further evaluation. If this finding persists an ultrasound should be considered. There is no skin thickening or nipple retraction. There is no architectural distortion. Few benign-appearing calcifications are present bilaterally. IMPRESSION: Left breast mass. BI-RADS Assessment Category 0: Incomplete: Needs additional imaging evaluation. We will attempt to contact the patient and arrange for this additional imaging. A reminder letter will be sent to the patient at the appropriate time pending additional views. BD2 Amadou Mammo Lt Dx Incl Cad Result Date: 6/17/2019 DIAGNOSTIC DIGITAL left MAMMOGRAPHY AND left BREAST ULTRASOUND CLINICAL INDICATION: Further evaluation of left posterior inferior possible mass; patient reports she has lost about 20-25 pounds over the last year related to medication and diet changes, with several recent episodes of TIA; personal history of melanoma, no prior breast surgery. Paternal aunt had breast cancer. COMPARISON: 6/10/2019, others back to 6/18/2009 FINDINGS: Mammogram: Digital diagnostic mammography was performed, and is interpreted in conjunction with a computer assisted detection (CAD) system. Additional left mediolateral and compression magnification views demonstrate persistence of a focal asymmetry versus mass in the posterior inferior breast at 5:00-6:00, which was not seen on the older mammograms, for which ultrasound is recommended.  Ultrasound: Real time targeted sonography was performed of the left inferior breast by the radiologist and sonographer, demonstrating no abnormality. IMPRESSION: Left posterior 6:00 mammographic density, with no sonographic correlate. Recommend stereotactic biopsy. This was discussed in full with the patient at the time of interpretation. BI-RADS Assessment Category 4: Suspicious Finding- Biopsy should be considered. Us Breast Lt Limited=<3 Quad Result Date: 6/17/2019 DIAGNOSTIC DIGITAL left MAMMOGRAPHY AND left BREAST ULTRASOUND CLINICAL INDICATION: Further evaluation of left posterior inferior possible mass; patient reports she has lost about 20-25 pounds over the last year related to medication and diet changes, with several recent episodes of TIA; personal history of melanoma, no prior breast surgery. Paternal aunt had breast cancer. COMPARISON: 6/10/2019, others back to 6/18/2009 FINDINGS: Mammogram: Digital diagnostic mammography was performed, and is interpreted in conjunction with a computer assisted detection (CAD) system. Additional left mediolateral and compression magnification views demonstrate persistence of a focal asymmetry versus mass in the posterior inferior breast at 5:00-6:00, which was not seen on the older mammograms, for which ultrasound is recommended. Ultrasound: Real time targeted sonography was performed of the left inferior breast by the radiologist and sonographer, demonstrating no abnormality. IMPRESSION: Left posterior 6:00 mammographic density, with no sonographic correlate. Recommend stereotactic biopsy. This was discussed in full with the patient at the time of interpretation. BI-RADS Assessment Category 4: Suspicious Finding- Biopsy should be considered. [facility] Breast Lt Surg Spec Result Date: 7/5/2019 Mammographic guided needle localization Surgical specimen radiograph CLINICAL INDICATION: Left posterior 6:00 breast cancer undergoing surgical treatment. COMPARISON: Previous mammography 6/21/2019 and others back to 6/10/2019, also breast MRI 6/26/2019 PROCEDURE: After discussing the risks and benefits, including but not limited to bleeding and infection, written and verbal informed consent were obtained. The overlying skin was prepped in sterile fashion. Total of 6 mL of 1% Lidocaine was used for local anesthesia. Under mammographic guidance with caudal-cranial approach, a 5 cm Kopans wire was placed adjacent to the clip and the small residual hematoma. Postprocedural mammographic films were then obtained and marked for the surgeon. The patient tolerated the procedure well without immediate complications and was transported to the operating room. IMPRESSION: Successful mammographic guided needle localization of left posterior 6:00 breast cancer. Pathology results are pending. Submitted specimen radiograph demonstrates intact localization wire and biopsy clip. This was reported to Dr. Mamadou Ferro in the operating room at the time of acquisition. IMPRESSION:  Gabbi Kaminski is a 59 y.o. female with Left breast stage IA invasive ductal carcinoma, [id number], ER 95%, AK negative, HER2 negative.  ER 95%, AK 0%, HER2 negative s/p left breast lumpectomy. Her oncotype was low so it was determined by Dr. Daria Crowe, her oncologist, that she would not need chemotherapy. She was treated with adjuvant radiation to left breast completing on 9/17/2019. Started Arimidex shortly after completion of radiation therapy. Ms Ugarte returns 1 month out from completion of radiation therapy. She is recovering as anticipated from radiation therapy. Tolerating Arimidex at this time. PLAN: 
1) Will need mammogram  for June 2020. I will order and see her shortly thereafter. 2) We discussed side effects related to radiation therapy and ongoing surveillance for her breast cancer s/p radiation therapy to include follow up every 6 months 1-2 years. 3) Endocrine therapy under the direction of medical oncology 4) Continue to lotion and SBE 
5) I spent 25 minutes in the care of Ms Ugarte today, over 50% of which was in direct counseling and ongoing management of her IDC. All questions were answered to the best of my ability. Caleb Aleman NP 
10/18/19 Portions of this note were copied from prior encounters and reviewed for accuracy, currency, and represent documentation and tasks completed during this encounter. I verify and attest these portions to be unchanged from prior visits.

## 2019-11-05 ENCOUNTER — HOSPITAL ENCOUNTER (OUTPATIENT)
Dept: MAMMOGRAPHY | Age: 64
Discharge: HOME | End: 2019-11-05
Attending: INTERNAL MEDICINE
Payer: COMMERCIAL

## 2019-11-05 DIAGNOSIS — Z91.89 AT RISK FOR OSTEOPOROSIS: ICD-10-CM

## 2019-11-05 PROCEDURE — 77080 DXA BONE DENSITY AXIAL: CPT

## 2019-11-07 PROBLEM — R87.615 PAP SMEAR OF CERVIX UNSATISFACTORY: Status: ACTIVE | Noted: 2019-11-07

## 2019-11-18 ENCOUNTER — HOSPITAL ENCOUNTER (OUTPATIENT)
Dept: LAB | Age: 64
Discharge: HOME | End: 2019-11-18
Payer: COMMERCIAL

## 2019-11-18 DIAGNOSIS — Z17.0 MALIGNANT NEOPLASM OF LOWER-OUTER QUADRANT OF LEFT BREAST OF FEMALE, ESTROGEN RECEPTOR POSITIVE (HCC): Chronic | ICD-10-CM

## 2019-11-18 DIAGNOSIS — C50.512 MALIGNANT NEOPLASM OF LOWER-OUTER QUADRANT OF LEFT BREAST OF FEMALE, ESTROGEN RECEPTOR POSITIVE (HCC): Chronic | ICD-10-CM

## 2019-11-18 LAB
ALBUMIN SERPL-MCNC: 3.5 G/DL (ref 3.2–4.6)
ALBUMIN/GLOB SERPL: 0.9 {RATIO} (ref 1.2–3.5)
ALP SERPL-CCNC: 74 U/L (ref 50–136)
ALT SERPL-CCNC: 14 U/L (ref 12–65)
ANION GAP SERPL CALC-SCNC: 6 MMOL/L (ref 7–16)
AST SERPL-CCNC: 16 U/L (ref 15–37)
BASOPHILS # BLD: 0.1 K/UL (ref 0–0.2)
BASOPHILS NFR BLD: 1 % (ref 0–2)
BILIRUB SERPL-MCNC: 0.8 MG/DL (ref 0.2–1.1)
BUN SERPL-MCNC: 15 MG/DL (ref 8–23)
CALCIUM SERPL-MCNC: 9.5 MG/DL (ref 8.3–10.4)
CHLORIDE SERPL-SCNC: 107 MMOL/L (ref 98–107)
CO2 SERPL-SCNC: 27 MMOL/L (ref 21–32)
CREAT SERPL-MCNC: 0.74 MG/DL (ref 0.6–1)
DIFFERENTIAL METHOD BLD: ABNORMAL
EOSINOPHIL # BLD: 0.2 K/UL (ref 0–0.8)
EOSINOPHIL NFR BLD: 3 % (ref 0.5–7.8)
ERYTHROCYTE [DISTWIDTH] IN BLOOD BY AUTOMATED COUNT: 13 % (ref 11.9–14.6)
GLOBULIN SER CALC-MCNC: 3.7 G/DL (ref 2.3–3.5)
GLUCOSE SERPL-MCNC: 143 MG/DL (ref 65–100)
HCT VFR BLD AUTO: 34.5 % (ref 35.8–46.3)
HGB BLD-MCNC: 11.6 G/DL (ref 11.7–15.4)
IMM GRANULOCYTES # BLD AUTO: 0 K/UL (ref 0–0.5)
IMM GRANULOCYTES NFR BLD AUTO: 1 % (ref 0–5)
LYMPHOCYTES # BLD: 1.5 K/UL (ref 0.5–4.6)
LYMPHOCYTES NFR BLD: 25 % (ref 13–44)
MCH RBC QN AUTO: 34.2 PG (ref 26.1–32.9)
MCHC RBC AUTO-ENTMCNC: 33.6 G/DL (ref 31.4–35)
MCV RBC AUTO: 101.8 FL (ref 79.6–97.8)
MONOCYTES # BLD: 0.5 K/UL (ref 0.1–1.3)
MONOCYTES NFR BLD: 9 % (ref 4–12)
NEUTS SEG # BLD: 3.5 K/UL (ref 1.7–8.2)
NEUTS SEG NFR BLD: 61 % (ref 43–78)
NRBC # BLD: 0 K/UL (ref 0–0.2)
PLATELET # BLD AUTO: 173 K/UL (ref 150–450)
PMV BLD AUTO: 10.8 FL (ref 9.4–12.3)
POTASSIUM SERPL-SCNC: 4.3 MMOL/L (ref 3.5–5.1)
PROT SERPL-MCNC: 7.2 G/DL (ref 6.3–8.2)
RBC # BLD AUTO: 3.39 M/UL (ref 4.05–5.25)
SODIUM SERPL-SCNC: 140 MMOL/L (ref 136–145)
WBC # BLD AUTO: 5.7 K/UL (ref 4.3–11.1)

## 2019-11-18 PROCEDURE — 36415 COLL VENOUS BLD VENIPUNCTURE: CPT

## 2019-11-18 PROCEDURE — 85025 COMPLETE CBC W/AUTO DIFF WBC: CPT

## 2019-11-18 PROCEDURE — 80053 COMPREHEN METABOLIC PANEL: CPT

## 2020-01-01 ENCOUNTER — APPOINTMENT (OUTPATIENT)
Dept: CT IMAGING | Age: 65
DRG: 871 | End: 2020-01-01
Attending: INTERNAL MEDICINE
Payer: MEDICARE

## 2020-01-01 ENCOUNTER — APPOINTMENT (OUTPATIENT)
Dept: CT IMAGING | Age: 65
DRG: 871 | End: 2020-01-01
Attending: PSYCHIATRY & NEUROLOGY
Payer: MEDICARE

## 2020-01-01 ENCOUNTER — APPOINTMENT (OUTPATIENT)
Dept: CT IMAGING | Age: 65
End: 2020-01-01
Attending: EMERGENCY MEDICINE
Payer: COMMERCIAL

## 2020-01-01 ENCOUNTER — HOSPITAL ENCOUNTER (INPATIENT)
Age: 65
LOS: 9 days | Discharge: NURSING, REHAB OR LONG-TERM CARE | DRG: 871 | End: 2020-11-13
Attending: EMERGENCY MEDICINE | Admitting: INTERNAL MEDICINE
Payer: MEDICARE

## 2020-01-01 ENCOUNTER — APPOINTMENT (OUTPATIENT)
Dept: GENERAL RADIOLOGY | Age: 65
DRG: 871 | End: 2020-01-01
Attending: EMERGENCY MEDICINE
Payer: MEDICARE

## 2020-01-01 ENCOUNTER — APPOINTMENT (OUTPATIENT)
Dept: GENERAL RADIOLOGY | Age: 65
End: 2020-01-01
Attending: INTERNAL MEDICINE

## 2020-01-01 ENCOUNTER — HOSPITAL ENCOUNTER (OUTPATIENT)
Age: 65
Setting detail: OUTPATIENT SURGERY
Discharge: NURSING, REHAB OR LONG-TERM CARE | End: 2020-10-14
Attending: INTERNAL MEDICINE | Admitting: INTERNAL MEDICINE
Payer: MEDICARE

## 2020-01-01 ENCOUNTER — APPOINTMENT (OUTPATIENT)
Dept: MRI IMAGING | Age: 65
DRG: 871 | End: 2020-01-01
Attending: INTERNAL MEDICINE
Payer: MEDICARE

## 2020-01-01 ENCOUNTER — HOSPITAL ENCOUNTER (OUTPATIENT)
Dept: MAMMOGRAPHY | Age: 65
Discharge: HOME | End: 2020-06-11
Attending: NURSE PRACTITIONER
Payer: MEDICARE

## 2020-01-01 ENCOUNTER — HOSPITAL ENCOUNTER (EMERGENCY)
Age: 65
Discharge: HOME | End: 2020-02-22
Attending: EMERGENCY MEDICINE
Payer: COMMERCIAL

## 2020-01-01 ENCOUNTER — APPOINTMENT (OUTPATIENT)
Dept: GENERAL RADIOLOGY | Age: 65
End: 2020-01-01
Attending: NURSE PRACTITIONER
Payer: COMMERCIAL

## 2020-01-01 ENCOUNTER — PATIENT OUTREACH (OUTPATIENT)
Dept: CASE MANAGEMENT | Age: 65
End: 2020-01-01

## 2020-01-01 ENCOUNTER — APPOINTMENT (OUTPATIENT)
Dept: MRI IMAGING | Age: 65
DRG: 871 | End: 2020-01-01
Attending: NURSE PRACTITIONER
Payer: MEDICARE

## 2020-01-01 ENCOUNTER — APPOINTMENT (OUTPATIENT)
Dept: GENERAL RADIOLOGY | Age: 65
End: 2020-01-01
Attending: EMERGENCY MEDICINE
Payer: COMMERCIAL

## 2020-01-01 ENCOUNTER — ANESTHESIA (OUTPATIENT)
Dept: ENDOSCOPY | Age: 65
End: 2020-01-01
Payer: MEDICARE

## 2020-01-01 ENCOUNTER — HOSPITAL ENCOUNTER (OUTPATIENT)
Age: 65
Discharge: NURSING, REHAB OR LONG-TERM CARE | End: 2020-10-19
Attending: INTERNAL MEDICINE | Admitting: INTERNAL MEDICINE

## 2020-01-01 ENCOUNTER — ANESTHESIA EVENT (OUTPATIENT)
Dept: ENDOSCOPY | Age: 65
End: 2020-01-01
Payer: MEDICARE

## 2020-01-01 ENCOUNTER — HOSPITAL ENCOUNTER (OUTPATIENT)
Dept: GENERAL RADIOLOGY | Age: 65
Discharge: HOME | End: 2020-03-12

## 2020-01-01 ENCOUNTER — HOSPITAL ENCOUNTER (OUTPATIENT)
Age: 65
Discharge: NURSING, REHAB OR LONG-TERM CARE | End: 2020-11-30
Attending: INTERNAL MEDICINE | Admitting: INTERNAL MEDICINE

## 2020-01-01 ENCOUNTER — HOSPITAL ENCOUNTER (OUTPATIENT)
Dept: LAB | Age: 65
Discharge: HOME | End: 2020-05-18
Payer: MEDICARE

## 2020-01-01 ENCOUNTER — HOSPITAL ENCOUNTER (EMERGENCY)
Age: 65
Discharge: HOME | End: 2020-01-28
Attending: EMERGENCY MEDICINE
Payer: COMMERCIAL

## 2020-01-01 VITALS
RESPIRATION RATE: 15 BRPM | HEIGHT: 61 IN | HEART RATE: 67 BPM | DIASTOLIC BLOOD PRESSURE: 77 MMHG | OXYGEN SATURATION: 99 % | TEMPERATURE: 98.6 F | SYSTOLIC BLOOD PRESSURE: 124 MMHG | BODY MASS INDEX: 27 KG/M2 | WEIGHT: 143 LBS

## 2020-01-01 VITALS
TEMPERATURE: 98.8 F | HEIGHT: 61 IN | WEIGHT: 155 LBS | BODY MASS INDEX: 29.27 KG/M2 | HEART RATE: 82 BPM | OXYGEN SATURATION: 99 % | DIASTOLIC BLOOD PRESSURE: 67 MMHG | SYSTOLIC BLOOD PRESSURE: 122 MMHG | RESPIRATION RATE: 22 BRPM

## 2020-01-01 VITALS
HEART RATE: 68 BPM | OXYGEN SATURATION: 100 % | RESPIRATION RATE: 18 BRPM | BODY MASS INDEX: 27.4 KG/M2 | SYSTOLIC BLOOD PRESSURE: 148 MMHG | WEIGHT: 145 LBS | DIASTOLIC BLOOD PRESSURE: 82 MMHG | TEMPERATURE: 98.1 F

## 2020-01-01 VITALS
HEART RATE: 94 BPM | DIASTOLIC BLOOD PRESSURE: 78 MMHG | OXYGEN SATURATION: 98 % | TEMPERATURE: 98 F | SYSTOLIC BLOOD PRESSURE: 138 MMHG | RESPIRATION RATE: 16 BRPM

## 2020-01-01 DIAGNOSIS — A41.9 SEPTIC SHOCK (HCC): Primary | ICD-10-CM

## 2020-01-01 DIAGNOSIS — I63.10 CEREBROVASCULAR ACCIDENT (CVA) DUE TO EMBOLISM OF PRECEREBRAL ARTERY (HCC): ICD-10-CM

## 2020-01-01 DIAGNOSIS — C50.312 MALIGNANT NEOPLASM OF LOWER-INNER QUADRANT OF LEFT BREAST IN FEMALE, ESTROGEN RECEPTOR POSITIVE (HCC): ICD-10-CM

## 2020-01-01 DIAGNOSIS — N17.9 ACUTE KIDNEY INJURY (HCC): ICD-10-CM

## 2020-01-01 DIAGNOSIS — Z17.0 MALIGNANT NEOPLASM OF LOWER-OUTER QUADRANT OF LEFT BREAST OF FEMALE, ESTROGEN RECEPTOR POSITIVE (HCC): ICD-10-CM

## 2020-01-01 DIAGNOSIS — S22.41XA MULTIPLE FRACTURES OF RIBS, RIGHT SIDE, INITIAL ENCOUNTER FOR CLOSED FRACTURE: Primary | ICD-10-CM

## 2020-01-01 DIAGNOSIS — Z17.0 MALIGNANT NEOPLASM OF LOWER-INNER QUADRANT OF LEFT BREAST IN FEMALE, ESTROGEN RECEPTOR POSITIVE (HCC): ICD-10-CM

## 2020-01-01 DIAGNOSIS — C50.512 MALIGNANT NEOPLASM OF LOWER-OUTER QUADRANT OF LEFT BREAST OF FEMALE, ESTROGEN RECEPTOR POSITIVE (HCC): ICD-10-CM

## 2020-01-01 DIAGNOSIS — S22.41XD MULTIPLE FRACTURES OF RIBS, RIGHT SIDE, SUBSEQUENT ENCOUNTER FOR FRACTURE WITH ROUTINE HEALING: ICD-10-CM

## 2020-01-01 DIAGNOSIS — R53.83 FATIGUE, UNSPECIFIED TYPE: ICD-10-CM

## 2020-01-01 DIAGNOSIS — S09.90XA CLOSED HEAD INJURY, INITIAL ENCOUNTER: ICD-10-CM

## 2020-01-01 DIAGNOSIS — R52 PAIN, GENERALIZED: ICD-10-CM

## 2020-01-01 DIAGNOSIS — R13.10 DYSPHAGIA, UNSPECIFIED TYPE: ICD-10-CM

## 2020-01-01 DIAGNOSIS — R53.81 DEBILITY: ICD-10-CM
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
## 2020-01-01 DIAGNOSIS — C50.312 MALIGNANT NEOPLASM OF LOWER-INNER QUADRANT OF LEFT BREAST IN FEMALE, ESTROGEN RECEPTOR POSITIVE (HCC): Primary | ICD-10-CM

## 2020-01-01 DIAGNOSIS — Z51.5 ENCOUNTER FOR PALLIATIVE CARE: ICD-10-CM

## 2020-01-01 DIAGNOSIS — S93.401D SPRAIN OF RIGHT ANKLE, UNSPECIFIED LIGAMENT, SUBSEQUENT ENCOUNTER: ICD-10-CM

## 2020-01-01 DIAGNOSIS — T07.XXXA MULTIPLE CONTUSIONS: ICD-10-CM

## 2020-01-01 DIAGNOSIS — S93.401A SPRAIN OF RIGHT ANKLE, UNSPECIFIED LIGAMENT, INITIAL ENCOUNTER: ICD-10-CM

## 2020-01-01 DIAGNOSIS — I82.4Z9 DEEP VEIN THROMBOSIS (DVT) OF DISTAL VEIN OF LOWER EXTREMITY, UNSPECIFIED CHRONICITY, UNSPECIFIED LATERALITY (HCC): ICD-10-CM

## 2020-01-01 DIAGNOSIS — Z17.0 MALIGNANT NEOPLASM OF LOWER-INNER QUADRANT OF LEFT BREAST IN FEMALE, ESTROGEN RECEPTOR POSITIVE (HCC): Primary | ICD-10-CM

## 2020-01-01 DIAGNOSIS — I44.2 COMPLETE HEART BLOCK (HCC): ICD-10-CM

## 2020-01-01 DIAGNOSIS — Z71.89 ACP (ADVANCE CARE PLANNING): ICD-10-CM

## 2020-01-01 DIAGNOSIS — R29.90 STROKE-LIKE SYMPTOMS: ICD-10-CM

## 2020-01-01 DIAGNOSIS — R65.21 SEPTIC SHOCK (HCC): Primary | ICD-10-CM

## 2020-01-01 LAB
ALBUMIN SERPL-MCNC: 1.6 G/DL (ref 3.2–4.6)
ALBUMIN SERPL-MCNC: 1.7 G/DL (ref 3.2–4.6)
ALBUMIN SERPL-MCNC: 1.8 G/DL (ref 3.2–4.6)
ALBUMIN SERPL-MCNC: 1.8 G/DL (ref 3.2–4.6)
ALBUMIN SERPL-MCNC: 1.9 G/DL (ref 3.2–4.6)
ALBUMIN SERPL-MCNC: 1.9 G/DL (ref 3.2–4.6)
ALBUMIN SERPL-MCNC: 2.1 G/DL (ref 3.2–4.6)
ALBUMIN SERPL-MCNC: 2.1 G/DL (ref 3.2–4.6)
ALBUMIN SERPL-MCNC: 2.2 G/DL (ref 3.2–4.6)
ALBUMIN SERPL-MCNC: 2.2 G/DL (ref 3.2–4.6)
ALBUMIN SERPL-MCNC: 2.4 G/DL (ref 3.2–4.6)
ALBUMIN SERPL-MCNC: 2.4 G/DL (ref 3.2–4.6)
ALBUMIN SERPL-MCNC: 2.5 G/DL (ref 3.2–4.6)
ALBUMIN SERPL-MCNC: 3.5 G/DL (ref 3.2–4.6)
ALBUMIN SERPL-MCNC: 3.6 G/DL (ref 3.2–4.6)
ALBUMIN/GLOB SERPL: 0.3 {RATIO} (ref 1.2–3.5)
ALBUMIN/GLOB SERPL: 0.4 {RATIO} (ref 1.2–3.5)
ALBUMIN/GLOB SERPL: 0.5 {RATIO} (ref 1.2–3.5)
ALBUMIN/GLOB SERPL: 0.6 {RATIO} (ref 1.2–3.5)
ALBUMIN/GLOB SERPL: 0.9 {RATIO} (ref 1.2–3.5)
ALBUMIN/GLOB SERPL: 1 {RATIO} (ref 1.2–3.5)
ALP SERPL-CCNC: 102 U/L (ref 50–136)
ALP SERPL-CCNC: 104 U/L (ref 50–136)
ALP SERPL-CCNC: 107 U/L (ref 50–136)
ALP SERPL-CCNC: 108 U/L (ref 50–136)
ALP SERPL-CCNC: 108 U/L (ref 50–136)
ALP SERPL-CCNC: 112 U/L (ref 50–136)
ALP SERPL-CCNC: 113 U/L (ref 50–136)
ALP SERPL-CCNC: 113 U/L (ref 50–136)
ALP SERPL-CCNC: 117 U/L (ref 50–136)
ALP SERPL-CCNC: 86 U/L (ref 50–136)
ALP SERPL-CCNC: 87 U/L (ref 50–136)
ALP SERPL-CCNC: 90 U/L (ref 50–136)
ALP SERPL-CCNC: 93 U/L (ref 50–136)
ALT SERPL-CCNC: 10 U/L (ref 12–65)
ALT SERPL-CCNC: 12 U/L (ref 12–65)
ALT SERPL-CCNC: 13 U/L (ref 12–65)
ALT SERPL-CCNC: 14 U/L (ref 12–65)
ALT SERPL-CCNC: 14 U/L (ref 12–65)
ALT SERPL-CCNC: 15 U/L (ref 12–65)
ALT SERPL-CCNC: 16 U/L (ref 12–65)
ALT SERPL-CCNC: 17 U/L (ref 12–65)
ALT SERPL-CCNC: 17 U/L (ref 12–65)
ALT SERPL-CCNC: 18 U/L (ref 12–65)
ALT SERPL-CCNC: 21 U/L (ref 12–65)
ALT SERPL-CCNC: 23 U/L (ref 12–65)
ALT SERPL-CCNC: 26 U/L (ref 12–65)
AMMONIA PLAS-SCNC: 23 UMOL/L (ref 11–32)
ANION GAP SERPL CALC-SCNC: 3 MMOL/L (ref 7–16)
ANION GAP SERPL CALC-SCNC: 3 MMOL/L (ref 7–16)
ANION GAP SERPL CALC-SCNC: 4 MMOL/L (ref 7–16)
ANION GAP SERPL CALC-SCNC: 5 MMOL/L (ref 7–16)
ANION GAP SERPL CALC-SCNC: 6 MMOL/L (ref 7–16)
ANION GAP SERPL CALC-SCNC: 7 MMOL/L (ref 7–16)
ANION GAP SERPL CALC-SCNC: 8 MMOL/L (ref 7–16)
ANION GAP SERPL CALC-SCNC: 8 MMOL/L (ref 7–16)
ANION GAP SERPL CALC-SCNC: 9 MMOL/L (ref 7–16)
ANION GAP SERPL CALC-SCNC: 9 MMOL/L (ref 7–16)
APPEARANCE UR: ABNORMAL
ARTERIAL PATENCY WRIST A: YES
ARTERIAL PATENCY WRIST A: YES
AST SERPL-CCNC: 16 U/L (ref 15–37)
AST SERPL-CCNC: 17 U/L (ref 15–37)
AST SERPL-CCNC: 22 U/L (ref 15–37)
AST SERPL-CCNC: 23 U/L (ref 15–37)
AST SERPL-CCNC: 24 U/L (ref 15–37)
AST SERPL-CCNC: 25 U/L (ref 15–37)
AST SERPL-CCNC: 26 U/L (ref 15–37)
AST SERPL-CCNC: 30 U/L (ref 15–37)
AST SERPL-CCNC: 31 U/L (ref 15–37)
AST SERPL-CCNC: 32 U/L (ref 15–37)
AST SERPL-CCNC: 32 U/L (ref 15–37)
AST SERPL-CCNC: 35 U/L (ref 15–37)
AST SERPL-CCNC: 37 U/L (ref 15–37)
ATRIAL RATE: 114 BPM
BACTERIA SPEC CULT: ABNORMAL
BACTERIA SPEC CULT: NORMAL
BACTERIA URNS QL MICRO: ABNORMAL /HPF
BACTERIA URNS QL MICRO: ABNORMAL /HPF
BASE EXCESS BLD CALC-SCNC: 0 MMOL/L
BASE EXCESS BLD CALC-SCNC: 1 MMOL/L
BASOPHILS # BLD: 0 K/UL (ref 0–0.2)
BASOPHILS # BLD: 0.1 K/UL (ref 0–0.2)
BASOPHILS NFR BLD: 1 % (ref 0–2)
BDY SITE: ABNORMAL
BDY SITE: NORMAL
BILIRUB SERPL-MCNC: 0.4 MG/DL (ref 0.2–1.1)
BILIRUB SERPL-MCNC: 0.5 MG/DL (ref 0.2–1.1)
BILIRUB SERPL-MCNC: 0.6 MG/DL (ref 0.2–1.1)
BILIRUB SERPL-MCNC: 0.6 MG/DL (ref 0.2–1.1)
BILIRUB SERPL-MCNC: 0.7 MG/DL (ref 0.2–1.1)
BILIRUB SERPL-MCNC: 0.8 MG/DL (ref 0.2–1.1)
BILIRUB SERPL-MCNC: 1 MG/DL (ref 0.2–1.1)
BILIRUB SERPL-MCNC: 1.5 MG/DL (ref 0.2–1.1)
BILIRUB UR QL: NEGATIVE
BNP SERPL-MCNC: 2505 PG/ML (ref 5–125)
BUN SERPL-MCNC: 11 MG/DL (ref 8–23)
BUN SERPL-MCNC: 14 MG/DL (ref 8–23)
BUN SERPL-MCNC: 14 MG/DL (ref 8–23)
BUN SERPL-MCNC: 15 MG/DL (ref 8–23)
BUN SERPL-MCNC: 15 MG/DL (ref 8–23)
BUN SERPL-MCNC: 16 MG/DL (ref 8–23)
BUN SERPL-MCNC: 17 MG/DL (ref 8–23)
BUN SERPL-MCNC: 17 MG/DL (ref 8–23)
BUN SERPL-MCNC: 18 MG/DL (ref 8–23)
BUN SERPL-MCNC: 18 MG/DL (ref 8–23)
BUN SERPL-MCNC: 19 MG/DL (ref 8–23)
BUN SERPL-MCNC: 20 MG/DL (ref 8–23)
BUN SERPL-MCNC: 21 MG/DL (ref 8–23)
BUN SERPL-MCNC: 22 MG/DL (ref 8–23)
BUN SERPL-MCNC: 23 MG/DL (ref 8–23)
BUN SERPL-MCNC: 25 MG/DL (ref 8–23)
BUN SERPL-MCNC: 25 MG/DL (ref 8–23)
BUN SERPL-MCNC: 26 MG/DL (ref 8–23)
BUN SERPL-MCNC: 26 MG/DL (ref 8–23)
BUN SERPL-MCNC: 28 MG/DL (ref 8–23)
BUN SERPL-MCNC: 31 MG/DL (ref 8–23)
BUN SERPL-MCNC: 31 MG/DL (ref 8–23)
BUN SERPL-MCNC: 35 MG/DL (ref 8–23)
BUN SERPL-MCNC: 52 MG/DL (ref 8–23)
BUN SERPL-MCNC: 68 MG/DL (ref 8–23)
BUN SERPL-MCNC: 87 MG/DL (ref 8–23)
CALCIUM SERPL-MCNC: 10 MG/DL (ref 8.3–10.4)
CALCIUM SERPL-MCNC: 10.1 MG/DL (ref 8.3–10.4)
CALCIUM SERPL-MCNC: 10.3 MG/DL (ref 8.3–10.4)
CALCIUM SERPL-MCNC: 10.7 MG/DL (ref 8.3–10.4)
CALCIUM SERPL-MCNC: 8.2 MG/DL (ref 8.3–10.4)
CALCIUM SERPL-MCNC: 8.3 MG/DL (ref 8.3–10.4)
CALCIUM SERPL-MCNC: 8.4 MG/DL (ref 8.3–10.4)
CALCIUM SERPL-MCNC: 8.5 MG/DL (ref 8.3–10.4)
CALCIUM SERPL-MCNC: 8.6 MG/DL (ref 8.3–10.4)
CALCIUM SERPL-MCNC: 8.6 MG/DL (ref 8.3–10.4)
CALCIUM SERPL-MCNC: 8.7 MG/DL (ref 8.3–10.4)
CALCIUM SERPL-MCNC: 8.7 MG/DL (ref 8.3–10.4)
CALCIUM SERPL-MCNC: 8.8 MG/DL (ref 8.3–10.4)
CALCIUM SERPL-MCNC: 8.8 MG/DL (ref 8.3–10.4)
CALCIUM SERPL-MCNC: 9 MG/DL (ref 8.3–10.4)
CALCIUM SERPL-MCNC: 9.1 MG/DL (ref 8.3–10.4)
CALCIUM SERPL-MCNC: 9.2 MG/DL (ref 8.3–10.4)
CALCIUM SERPL-MCNC: 9.4 MG/DL (ref 8.3–10.4)
CALCIUM SERPL-MCNC: 9.4 MG/DL (ref 8.3–10.4)
CALCIUM SERPL-MCNC: 9.7 MG/DL (ref 8.3–10.4)
CALCULATED P AXIS, ECG09: 48 DEGREES
CALCULATED R AXIS, ECG10: -74 DEGREES
CALCULATED T AXIS, ECG11: 93 DEGREES
CANCER AG15-3 SERPL-ACNC: 21.4 U/ML (ref 1–35)
CANCER AG27-29 SERPL-ACNC: 22.6 U/ML (ref 0–38.6)
CASTS URNS QL MICRO: 0 /LPF
CHLORIDE SERPL-SCNC: 102 MMOL/L (ref 98–107)
CHLORIDE SERPL-SCNC: 104 MMOL/L (ref 98–107)
CHLORIDE SERPL-SCNC: 104 MMOL/L (ref 98–107)
CHLORIDE SERPL-SCNC: 105 MMOL/L (ref 98–107)
CHLORIDE SERPL-SCNC: 106 MMOL/L (ref 98–107)
CHLORIDE SERPL-SCNC: 106 MMOL/L (ref 98–107)
CHLORIDE SERPL-SCNC: 107 MMOL/L (ref 98–107)
CHLORIDE SERPL-SCNC: 108 MMOL/L (ref 98–107)
CHLORIDE SERPL-SCNC: 109 MMOL/L (ref 98–107)
CHLORIDE SERPL-SCNC: 110 MMOL/L (ref 98–107)
CHLORIDE SERPL-SCNC: 111 MMOL/L (ref 98–107)
CHLORIDE SERPL-SCNC: 111 MMOL/L (ref 98–107)
CHLORIDE SERPL-SCNC: 113 MMOL/L (ref 98–107)
CHLORIDE SERPL-SCNC: 118 MMOL/L (ref 98–107)
CHLORIDE SERPL-SCNC: 118 MMOL/L (ref 98–107)
CHLORIDE SERPL-SCNC: 119 MMOL/L (ref 98–107)
CO2 BLD-SCNC: 24 MMOL/L
CO2 BLD-SCNC: 27 MMOL/L
CO2 SERPL-SCNC: 24 MMOL/L (ref 21–32)
CO2 SERPL-SCNC: 26 MMOL/L (ref 21–32)
CO2 SERPL-SCNC: 27 MMOL/L (ref 21–32)
CO2 SERPL-SCNC: 28 MMOL/L (ref 21–32)
CO2 SERPL-SCNC: 29 MMOL/L (ref 21–32)
CO2 SERPL-SCNC: 30 MMOL/L (ref 21–32)
CO2 SERPL-SCNC: 30 MMOL/L (ref 21–32)
CO2 SERPL-SCNC: 31 MMOL/L (ref 21–32)
CO2 SERPL-SCNC: 31 MMOL/L (ref 21–32)
CO2 SERPL-SCNC: 32 MMOL/L (ref 21–32)
CO2 SERPL-SCNC: 32 MMOL/L (ref 21–32)
COLLECT TIME,HTIME: 1018
COLLECT TIME,HTIME: 837
COLOR UR: YELLOW
COVID-19 RAPID TEST, COVR: DETECTED
COVID-19 RAPID TEST, COVR: NOT DETECTED
CREAT SERPL-MCNC: 0.32 MG/DL (ref 0.6–1)
CREAT SERPL-MCNC: 0.35 MG/DL (ref 0.6–1)
CREAT SERPL-MCNC: 0.35 MG/DL (ref 0.6–1)
CREAT SERPL-MCNC: 0.37 MG/DL (ref 0.6–1)
CREAT SERPL-MCNC: 0.38 MG/DL (ref 0.6–1)
CREAT SERPL-MCNC: 0.39 MG/DL (ref 0.6–1)
CREAT SERPL-MCNC: 0.4 MG/DL (ref 0.6–1)
CREAT SERPL-MCNC: 0.41 MG/DL (ref 0.6–1)
CREAT SERPL-MCNC: 0.42 MG/DL (ref 0.6–1)
CREAT SERPL-MCNC: 0.43 MG/DL (ref 0.6–1)
CREAT SERPL-MCNC: 0.46 MG/DL (ref 0.6–1)
CREAT SERPL-MCNC: 0.47 MG/DL (ref 0.6–1)
CREAT SERPL-MCNC: 0.49 MG/DL (ref 0.6–1)
CREAT SERPL-MCNC: 0.57 MG/DL (ref 0.6–1)
CREAT SERPL-MCNC: 0.62 MG/DL (ref 0.6–1)
CREAT SERPL-MCNC: 0.63 MG/DL (ref 0.6–1)
CREAT SERPL-MCNC: 0.65 MG/DL (ref 0.6–1)
CREAT SERPL-MCNC: 0.67 MG/DL (ref 0.6–1)
CREAT SERPL-MCNC: 0.7 MG/DL (ref 0.6–1)
CREAT SERPL-MCNC: 0.71 MG/DL (ref 0.6–1)
CREAT SERPL-MCNC: 0.72 MG/DL (ref 0.6–1)
CREAT SERPL-MCNC: 0.75 MG/DL (ref 0.6–1)
CREAT SERPL-MCNC: 0.79 MG/DL (ref 0.6–1)
CREAT SERPL-MCNC: 0.96 MG/DL (ref 0.6–1)
CREAT SERPL-MCNC: 1.5 MG/DL (ref 0.6–1)
CREAT SERPL-MCNC: 2.66 MG/DL (ref 0.6–1)
CREAT UR-MCNC: <13 MG/DL
CRYSTALS URNS QL MICRO: 0 /LPF
DIAGNOSIS, 93000: NORMAL
DIFFERENTIAL METHOD BLD: ABNORMAL
EOSINOPHIL # BLD: 0 K/UL (ref 0–0.8)
EOSINOPHIL # BLD: 0.2 K/UL (ref 0–0.8)
EOSINOPHIL # BLD: 0.3 K/UL (ref 0–0.8)
EOSINOPHIL # BLD: 0.4 K/UL (ref 0–0.8)
EOSINOPHIL NFR BLD: 0 % (ref 0.5–7.8)
EOSINOPHIL NFR BLD: 2 % (ref 0.5–7.8)
EOSINOPHIL NFR BLD: 3 % (ref 0.5–7.8)
EOSINOPHIL NFR BLD: 4 % (ref 0.5–7.8)
EOSINOPHIL NFR BLD: 5 % (ref 0.5–7.8)
EOSINOPHIL NFR BLD: 5 % (ref 0.5–7.8)
EOSINOPHIL NFR BLD: 6 % (ref 0.5–7.8)
EPI CELLS #/AREA URNS HPF: ABNORMAL /HPF
ERYTHROCYTE [DISTWIDTH] IN BLOOD BY AUTOMATED COUNT: 12.5 % (ref 11.9–14.6)
ERYTHROCYTE [DISTWIDTH] IN BLOOD BY AUTOMATED COUNT: 13.6 % (ref 11.9–14.6)
ERYTHROCYTE [DISTWIDTH] IN BLOOD BY AUTOMATED COUNT: 16.9 % (ref 11.9–14.6)
ERYTHROCYTE [DISTWIDTH] IN BLOOD BY AUTOMATED COUNT: 16.9 % (ref 11.9–14.6)
ERYTHROCYTE [DISTWIDTH] IN BLOOD BY AUTOMATED COUNT: 17.1 % (ref 11.9–14.6)
ERYTHROCYTE [DISTWIDTH] IN BLOOD BY AUTOMATED COUNT: 17.2 % (ref 11.9–14.6)
ERYTHROCYTE [DISTWIDTH] IN BLOOD BY AUTOMATED COUNT: 17.3 % (ref 11.9–14.6)
ERYTHROCYTE [DISTWIDTH] IN BLOOD BY AUTOMATED COUNT: 17.3 % (ref 11.9–14.6)
ERYTHROCYTE [DISTWIDTH] IN BLOOD BY AUTOMATED COUNT: 17.4 % (ref 11.9–14.6)
ERYTHROCYTE [DISTWIDTH] IN BLOOD BY AUTOMATED COUNT: 17.4 % (ref 11.9–14.6)
ERYTHROCYTE [DISTWIDTH] IN BLOOD BY AUTOMATED COUNT: 17.5 % (ref 11.9–14.6)
ERYTHROCYTE [DISTWIDTH] IN BLOOD BY AUTOMATED COUNT: 17.5 % (ref 11.9–14.6)
ERYTHROCYTE [DISTWIDTH] IN BLOOD BY AUTOMATED COUNT: 17.7 % (ref 11.9–14.6)
ERYTHROCYTE [DISTWIDTH] IN BLOOD BY AUTOMATED COUNT: 17.8 % (ref 11.9–14.6)
ERYTHROCYTE [DISTWIDTH] IN BLOOD BY AUTOMATED COUNT: 17.8 % (ref 11.9–14.6)
ERYTHROCYTE [DISTWIDTH] IN BLOOD BY AUTOMATED COUNT: 17.9 % (ref 11.9–14.6)
ERYTHROCYTE [DISTWIDTH] IN BLOOD BY AUTOMATED COUNT: 18 % (ref 11.9–14.6)
ERYTHROCYTE [DISTWIDTH] IN BLOOD BY AUTOMATED COUNT: 18 % (ref 11.9–14.6)
ERYTHROCYTE [DISTWIDTH] IN BLOOD BY AUTOMATED COUNT: 18.1 % (ref 11.9–14.6)
ERYTHROCYTE [DISTWIDTH] IN BLOOD BY AUTOMATED COUNT: 18.1 % (ref 11.9–14.6)
ERYTHROCYTE [DISTWIDTH] IN BLOOD BY AUTOMATED COUNT: 18.2 % (ref 11.9–14.6)
ERYTHROCYTE [DISTWIDTH] IN BLOOD BY AUTOMATED COUNT: 18.6 % (ref 11.9–14.6)
ERYTHROCYTE [DISTWIDTH] IN BLOOD BY AUTOMATED COUNT: 19.3 % (ref 11.9–14.6)
EST. AVERAGE GLUCOSE BLD GHB EST-MCNC: 160 MG/DL
FLOW RATE ISTAT,IFRATE: 2 L/MIN
FLOW RATE ISTAT,IFRATE: 3 L/MIN
GAS FLOW.O2 O2 DELIVERY SYS: ABNORMAL L/MIN
GAS FLOW.O2 O2 DELIVERY SYS: NORMAL L/MIN
GLOBULIN SER CALC-MCNC: 3.7 G/DL (ref 2.3–3.5)
GLOBULIN SER CALC-MCNC: 3.8 G/DL (ref 2.3–3.5)
GLOBULIN SER CALC-MCNC: 4.3 G/DL (ref 2.3–3.5)
GLOBULIN SER CALC-MCNC: 4.4 G/DL (ref 2.3–3.5)
GLOBULIN SER CALC-MCNC: 4.4 G/DL (ref 2.3–3.5)
GLOBULIN SER CALC-MCNC: 4.5 G/DL (ref 2.3–3.5)
GLOBULIN SER CALC-MCNC: 4.5 G/DL (ref 2.3–3.5)
GLOBULIN SER CALC-MCNC: 4.6 G/DL (ref 2.3–3.5)
GLOBULIN SER CALC-MCNC: 4.7 G/DL (ref 2.3–3.5)
GLOBULIN SER CALC-MCNC: 4.9 G/DL (ref 2.3–3.5)
GLOBULIN SER CALC-MCNC: 5 G/DL (ref 2.3–3.5)
GLUCOSE BLD STRIP.AUTO-MCNC: 102 MG/DL (ref 65–100)
GLUCOSE BLD STRIP.AUTO-MCNC: 115 MG/DL (ref 65–100)
GLUCOSE BLD STRIP.AUTO-MCNC: 117 MG/DL (ref 65–100)
GLUCOSE BLD STRIP.AUTO-MCNC: 118 MG/DL (ref 65–100)
GLUCOSE BLD STRIP.AUTO-MCNC: 152 MG/DL (ref 65–100)
GLUCOSE BLD STRIP.AUTO-MCNC: 153 MG/DL (ref 65–100)
GLUCOSE BLD STRIP.AUTO-MCNC: 153 MG/DL (ref 65–100)
GLUCOSE BLD STRIP.AUTO-MCNC: 162 MG/DL (ref 65–100)
GLUCOSE BLD STRIP.AUTO-MCNC: 162 MG/DL (ref 65–100)
GLUCOSE BLD STRIP.AUTO-MCNC: 163 MG/DL (ref 65–100)
GLUCOSE BLD STRIP.AUTO-MCNC: 165 MG/DL (ref 65–100)
GLUCOSE BLD STRIP.AUTO-MCNC: 190 MG/DL (ref 65–100)
GLUCOSE BLD STRIP.AUTO-MCNC: 196 MG/DL (ref 65–100)
GLUCOSE BLD STRIP.AUTO-MCNC: 197 MG/DL (ref 65–100)
GLUCOSE BLD STRIP.AUTO-MCNC: 197 MG/DL (ref 65–100)
GLUCOSE BLD STRIP.AUTO-MCNC: 202 MG/DL (ref 65–100)
GLUCOSE BLD STRIP.AUTO-MCNC: 212 MG/DL (ref 65–100)
GLUCOSE BLD STRIP.AUTO-MCNC: 213 MG/DL (ref 65–100)
GLUCOSE BLD STRIP.AUTO-MCNC: 223 MG/DL (ref 65–100)
GLUCOSE BLD STRIP.AUTO-MCNC: 231 MG/DL (ref 65–100)
GLUCOSE BLD STRIP.AUTO-MCNC: 240 MG/DL (ref 65–100)
GLUCOSE BLD STRIP.AUTO-MCNC: 243 MG/DL (ref 65–100)
GLUCOSE BLD STRIP.AUTO-MCNC: 243 MG/DL (ref 65–100)
GLUCOSE BLD STRIP.AUTO-MCNC: 245 MG/DL (ref 65–100)
GLUCOSE BLD STRIP.AUTO-MCNC: 245 MG/DL (ref 65–100)
GLUCOSE BLD STRIP.AUTO-MCNC: 246 MG/DL (ref 65–100)
GLUCOSE BLD STRIP.AUTO-MCNC: 249 MG/DL (ref 65–100)
GLUCOSE BLD STRIP.AUTO-MCNC: 251 MG/DL (ref 65–100)
GLUCOSE BLD STRIP.AUTO-MCNC: 257 MG/DL (ref 65–100)
GLUCOSE BLD STRIP.AUTO-MCNC: 258 MG/DL (ref 65–100)
GLUCOSE BLD STRIP.AUTO-MCNC: 284 MG/DL (ref 65–100)
GLUCOSE BLD STRIP.AUTO-MCNC: 289 MG/DL (ref 65–100)
GLUCOSE BLD STRIP.AUTO-MCNC: 342 MG/DL (ref 65–100)
GLUCOSE BLD STRIP.AUTO-MCNC: 343 MG/DL (ref 65–100)
GLUCOSE BLD STRIP.AUTO-MCNC: 347 MG/DL (ref 65–100)
GLUCOSE BLD STRIP.AUTO-MCNC: 348 MG/DL (ref 65–100)
GLUCOSE BLD STRIP.AUTO-MCNC: 349 MG/DL (ref 65–100)
GLUCOSE BLD STRIP.AUTO-MCNC: 354 MG/DL (ref 65–100)
GLUCOSE BLD STRIP.AUTO-MCNC: 79 MG/DL (ref 65–100)
GLUCOSE BLD STRIP.AUTO-MCNC: 83 MG/DL (ref 65–100)
GLUCOSE BLD STRIP.AUTO-MCNC: 85 MG/DL (ref 65–100)
GLUCOSE BLD STRIP.AUTO-MCNC: 96 MG/DL (ref 65–100)
GLUCOSE BLD STRIP.AUTO-MCNC: 96 MG/DL (ref 65–100)
GLUCOSE SERPL-MCNC: 108 MG/DL (ref 65–100)
GLUCOSE SERPL-MCNC: 108 MG/DL (ref 65–100)
GLUCOSE SERPL-MCNC: 140 MG/DL (ref 65–100)
GLUCOSE SERPL-MCNC: 141 MG/DL (ref 65–100)
GLUCOSE SERPL-MCNC: 144 MG/DL (ref 65–100)
GLUCOSE SERPL-MCNC: 146 MG/DL (ref 65–100)
GLUCOSE SERPL-MCNC: 152 MG/DL (ref 65–100)
GLUCOSE SERPL-MCNC: 153 MG/DL (ref 65–100)
GLUCOSE SERPL-MCNC: 166 MG/DL (ref 65–100)
GLUCOSE SERPL-MCNC: 167 MG/DL (ref 65–100)
GLUCOSE SERPL-MCNC: 168 MG/DL (ref 65–100)
GLUCOSE SERPL-MCNC: 175 MG/DL (ref 65–100)
GLUCOSE SERPL-MCNC: 184 MG/DL (ref 65–100)
GLUCOSE SERPL-MCNC: 199 MG/DL (ref 65–100)
GLUCOSE SERPL-MCNC: 202 MG/DL (ref 65–100)
GLUCOSE SERPL-MCNC: 208 MG/DL (ref 65–100)
GLUCOSE SERPL-MCNC: 208 MG/DL (ref 65–100)
GLUCOSE SERPL-MCNC: 210 MG/DL (ref 65–100)
GLUCOSE SERPL-MCNC: 211 MG/DL (ref 65–100)
GLUCOSE SERPL-MCNC: 223 MG/DL (ref 65–100)
GLUCOSE SERPL-MCNC: 246 MG/DL (ref 65–100)
GLUCOSE SERPL-MCNC: 247 MG/DL (ref 65–100)
GLUCOSE SERPL-MCNC: 257 MG/DL (ref 65–100)
GLUCOSE SERPL-MCNC: 485 MG/DL (ref 65–100)
GLUCOSE SERPL-MCNC: 80 MG/DL (ref 65–100)
GLUCOSE SERPL-MCNC: 97 MG/DL (ref 65–100)
GLUCOSE UR STRIP.AUTO-MCNC: 100 MG/DL
GRAM STN SPEC: ABNORMAL
HBA1C MFR BLD: 7.2 % (ref 4.8–6)
HCO3 BLD-SCNC: 23 MMOL/L (ref 22–26)
HCO3 BLD-SCNC: 25.5 MMOL/L (ref 22–26)
HCT VFR BLD AUTO: 25.3 % (ref 35.8–46.3)
HCT VFR BLD AUTO: 25.5 % (ref 35.8–46.3)
HCT VFR BLD AUTO: 26.3 % (ref 35.8–46.3)
HCT VFR BLD AUTO: 26.8 % (ref 35.8–46.3)
HCT VFR BLD AUTO: 27.4 % (ref 35.8–46.3)
HCT VFR BLD AUTO: 28 % (ref 35.8–46.3)
HCT VFR BLD AUTO: 28.7 % (ref 35.8–46.3)
HCT VFR BLD AUTO: 29 % (ref 35.8–46.3)
HCT VFR BLD AUTO: 29.5 % (ref 35.8–46.3)
HCT VFR BLD AUTO: 29.8 % (ref 35.8–46.3)
HCT VFR BLD AUTO: 30 % (ref 35.8–46.3)
HCT VFR BLD AUTO: 30.2 % (ref 35.8–46.3)
HCT VFR BLD AUTO: 30.6 % (ref 35.8–46.3)
HCT VFR BLD AUTO: 30.8 % (ref 35.8–46.3)
HCT VFR BLD AUTO: 31.1 % (ref 35.8–46.3)
HCT VFR BLD AUTO: 31.1 % (ref 35.8–46.3)
HCT VFR BLD AUTO: 31.2 % (ref 35.8–46.3)
HCT VFR BLD AUTO: 31.4 % (ref 35.8–46.3)
HCT VFR BLD AUTO: 31.6 % (ref 35.8–46.3)
HCT VFR BLD AUTO: 31.9 % (ref 35.8–46.3)
HCT VFR BLD AUTO: 32 % (ref 35.8–46.3)
HCT VFR BLD AUTO: 32.8 % (ref 35.8–46.3)
HCT VFR BLD AUTO: 33.4 % (ref 35.8–46.3)
HCT VFR BLD AUTO: 35.6 % (ref 35.8–46.3)
HCT VFR BLD AUTO: 36.9 % (ref 35.8–46.3)
HCT VFR BLD AUTO: 38 % (ref 35.8–46.3)
HGB BLD-MCNC: 10.7 G/DL (ref 11.7–15.4)
HGB BLD-MCNC: 12.5 G/DL (ref 11.7–15.4)
HGB BLD-MCNC: 12.9 G/DL (ref 11.7–15.4)
HGB BLD-MCNC: 7.6 G/DL (ref 11.7–15.4)
HGB BLD-MCNC: 7.7 G/DL (ref 11.7–15.4)
HGB BLD-MCNC: 7.8 G/DL (ref 11.7–15.4)
HGB BLD-MCNC: 7.9 G/DL (ref 11.7–15.4)
HGB BLD-MCNC: 8 G/DL (ref 11.7–15.4)
HGB BLD-MCNC: 8.5 G/DL (ref 11.7–15.4)
HGB BLD-MCNC: 8.5 G/DL (ref 11.7–15.4)
HGB BLD-MCNC: 8.6 G/DL (ref 11.7–15.4)
HGB BLD-MCNC: 8.9 G/DL (ref 11.7–15.4)
HGB BLD-MCNC: 8.9 G/DL (ref 11.7–15.4)
HGB BLD-MCNC: 9 G/DL (ref 11.7–15.4)
HGB BLD-MCNC: 9.1 G/DL (ref 11.7–15.4)
HGB BLD-MCNC: 9.1 G/DL (ref 11.7–15.4)
HGB BLD-MCNC: 9.2 G/DL (ref 11.7–15.4)
HGB BLD-MCNC: 9.2 G/DL (ref 11.7–15.4)
HGB BLD-MCNC: 9.4 G/DL (ref 11.7–15.4)
HGB BLD-MCNC: 9.4 G/DL (ref 11.7–15.4)
HGB BLD-MCNC: 9.5 G/DL (ref 11.7–15.4)
HGB BLD-MCNC: 9.5 G/DL (ref 11.7–15.4)
HGB BLD-MCNC: 9.6 G/DL (ref 11.7–15.4)
HGB BLD-MCNC: 9.7 G/DL (ref 11.7–15.4)
HGB BLD-MCNC: 9.8 G/DL (ref 11.7–15.4)
HGB BLD-MCNC: 9.9 G/DL (ref 11.7–15.4)
HGB UR QL STRIP: ABNORMAL
IMM GRANULOCYTES # BLD AUTO: 0 K/UL (ref 0–0.5)
IMM GRANULOCYTES # BLD AUTO: 0.1 K/UL (ref 0–0.5)
IMM GRANULOCYTES NFR BLD AUTO: 0 % (ref 0–5)
IMM GRANULOCYTES NFR BLD AUTO: 1 % (ref 0–5)
INR PPP: 1.2
KETONES UR QL STRIP.AUTO: NEGATIVE MG/DL
LACTATE SERPL-SCNC: 1.6 MMOL/L (ref 0.4–2)
LACTATE SERPL-SCNC: 1.6 MMOL/L (ref 0.4–2)
LACTATE SERPL-SCNC: 1.8 MMOL/L (ref 0.4–2)
LACTATE SERPL-SCNC: 1.8 MMOL/L (ref 0.4–2)
LACTATE SERPL-SCNC: 2.8 MMOL/L (ref 0.4–2)
LACTATE SERPL-SCNC: 3.2 MMOL/L (ref 0.4–2)
LACTATE SERPL-SCNC: 4.5 MMOL/L (ref 0.4–2)
LEUKOCYTE ESTERASE UR QL STRIP.AUTO: ABNORMAL
LYMPHOCYTES # BLD: 1 K/UL (ref 0.5–4.6)
LYMPHOCYTES # BLD: 1.5 K/UL (ref 0.5–4.6)
LYMPHOCYTES # BLD: 1.7 K/UL (ref 0.5–4.6)
LYMPHOCYTES # BLD: 1.8 K/UL (ref 0.5–4.6)
LYMPHOCYTES # BLD: 2 K/UL (ref 0.5–4.6)
LYMPHOCYTES # BLD: 2.3 K/UL (ref 0.5–4.6)
LYMPHOCYTES # BLD: 2.3 K/UL (ref 0.5–4.6)
LYMPHOCYTES # BLD: 2.4 K/UL (ref 0.5–4.6)
LYMPHOCYTES # BLD: 2.4 K/UL (ref 0.5–4.6)
LYMPHOCYTES # BLD: 2.6 K/UL (ref 0.5–4.6)
LYMPHOCYTES # BLD: 2.6 K/UL (ref 0.5–4.6)
LYMPHOCYTES # BLD: 2.7 K/UL (ref 0.5–4.6)
LYMPHOCYTES # BLD: 3.1 K/UL (ref 0.5–4.6)
LYMPHOCYTES # BLD: 3.1 K/UL (ref 0.5–4.6)
LYMPHOCYTES NFR BLD: 19 % (ref 13–44)
LYMPHOCYTES NFR BLD: 22 % (ref 13–44)
LYMPHOCYTES NFR BLD: 26 % (ref 13–44)
LYMPHOCYTES NFR BLD: 27 % (ref 13–44)
LYMPHOCYTES NFR BLD: 28 % (ref 13–44)
LYMPHOCYTES NFR BLD: 29 % (ref 13–44)
LYMPHOCYTES NFR BLD: 29 % (ref 13–44)
LYMPHOCYTES NFR BLD: 31 % (ref 13–44)
LYMPHOCYTES NFR BLD: 36 % (ref 13–44)
LYMPHOCYTES NFR BLD: 37 % (ref 13–44)
LYMPHOCYTES NFR BLD: 37 % (ref 13–44)
LYMPHOCYTES NFR BLD: 39 % (ref 13–44)
LYMPHOCYTES NFR BLD: 42 % (ref 13–44)
LYMPHOCYTES NFR BLD: 7 % (ref 13–44)
MAGNESIUM SERPL-MCNC: 1.6 MG/DL (ref 1.8–2.4)
MAGNESIUM SERPL-MCNC: 1.6 MG/DL (ref 1.8–2.4)
MAGNESIUM SERPL-MCNC: 1.7 MG/DL (ref 1.8–2.4)
MAGNESIUM SERPL-MCNC: 1.7 MG/DL (ref 1.8–2.4)
MAGNESIUM SERPL-MCNC: 1.8 MG/DL (ref 1.8–2.4)
MAGNESIUM SERPL-MCNC: 1.8 MG/DL (ref 1.8–2.4)
MAGNESIUM SERPL-MCNC: 1.9 MG/DL (ref 1.8–2.4)
MAGNESIUM SERPL-MCNC: 2 MG/DL (ref 1.8–2.4)
MAGNESIUM SERPL-MCNC: 2 MG/DL (ref 1.8–2.4)
MAGNESIUM SERPL-MCNC: 2.1 MG/DL (ref 1.8–2.4)
MAGNESIUM SERPL-MCNC: 2.7 MG/DL (ref 1.8–2.4)
MCH RBC QN AUTO: 26.9 PG (ref 26.1–32.9)
MCH RBC QN AUTO: 27 PG (ref 26.1–32.9)
MCH RBC QN AUTO: 27.1 PG (ref 26.1–32.9)
MCH RBC QN AUTO: 27.1 PG (ref 26.1–32.9)
MCH RBC QN AUTO: 27.2 PG (ref 26.1–32.9)
MCH RBC QN AUTO: 27.3 PG (ref 26.1–32.9)
MCH RBC QN AUTO: 27.3 PG (ref 26.1–32.9)
MCH RBC QN AUTO: 27.4 PG (ref 26.1–32.9)
MCH RBC QN AUTO: 27.6 PG (ref 26.1–32.9)
MCH RBC QN AUTO: 27.6 PG (ref 26.1–32.9)
MCH RBC QN AUTO: 27.7 PG (ref 26.1–32.9)
MCH RBC QN AUTO: 27.7 PG (ref 26.1–32.9)
MCH RBC QN AUTO: 27.8 PG (ref 26.1–32.9)
MCH RBC QN AUTO: 27.9 PG (ref 26.1–32.9)
MCH RBC QN AUTO: 27.9 PG (ref 26.1–32.9)
MCH RBC QN AUTO: 28 PG (ref 26.1–32.9)
MCH RBC QN AUTO: 28.3 PG (ref 26.1–32.9)
MCH RBC QN AUTO: 28.7 PG (ref 26.1–32.9)
MCH RBC QN AUTO: 28.9 PG (ref 26.1–32.9)
MCH RBC QN AUTO: 33.1 PG (ref 26.1–32.9)
MCH RBC QN AUTO: 33.8 PG (ref 26.1–32.9)
MCHC RBC AUTO-ENTMCNC: 28.8 G/DL (ref 31.4–35)
MCHC RBC AUTO-ENTMCNC: 29.2 G/DL (ref 31.4–35)
MCHC RBC AUTO-ENTMCNC: 29.3 G/DL (ref 31.4–35)
MCHC RBC AUTO-ENTMCNC: 29.3 G/DL (ref 31.4–35)
MCHC RBC AUTO-ENTMCNC: 29.4 G/DL (ref 31.4–35)
MCHC RBC AUTO-ENTMCNC: 29.5 G/DL (ref 31.4–35)
MCHC RBC AUTO-ENTMCNC: 29.5 G/DL (ref 31.4–35)
MCHC RBC AUTO-ENTMCNC: 29.6 G/DL (ref 31.4–35)
MCHC RBC AUTO-ENTMCNC: 29.7 G/DL (ref 31.4–35)
MCHC RBC AUTO-ENTMCNC: 29.8 G/DL (ref 31.4–35)
MCHC RBC AUTO-ENTMCNC: 29.9 G/DL (ref 31.4–35)
MCHC RBC AUTO-ENTMCNC: 30 G/DL (ref 31.4–35)
MCHC RBC AUTO-ENTMCNC: 30.1 G/DL (ref 31.4–35)
MCHC RBC AUTO-ENTMCNC: 30.2 G/DL (ref 31.4–35)
MCHC RBC AUTO-ENTMCNC: 30.3 G/DL (ref 31.4–35)
MCHC RBC AUTO-ENTMCNC: 30.4 G/DL (ref 31.4–35)
MCHC RBC AUTO-ENTMCNC: 30.5 G/DL (ref 31.4–35)
MCHC RBC AUTO-ENTMCNC: 31 G/DL (ref 31.4–35)
MCHC RBC AUTO-ENTMCNC: 33.9 G/DL (ref 31.4–35)
MCHC RBC AUTO-ENTMCNC: 33.9 G/DL (ref 31.4–35)
MCV RBC AUTO: 89.6 FL (ref 79.6–97.8)
MCV RBC AUTO: 90 FL (ref 79.6–97.8)
MCV RBC AUTO: 90.5 FL (ref 79.6–97.8)
MCV RBC AUTO: 90.7 FL (ref 79.6–97.8)
MCV RBC AUTO: 91.1 FL (ref 79.6–97.8)
MCV RBC AUTO: 91.3 FL (ref 79.6–97.8)
MCV RBC AUTO: 91.7 FL (ref 79.6–97.8)
MCV RBC AUTO: 91.9 FL (ref 79.6–97.8)
MCV RBC AUTO: 92.4 FL (ref 79.6–97.8)
MCV RBC AUTO: 92.5 FL (ref 79.6–97.8)
MCV RBC AUTO: 92.5 FL (ref 79.6–97.8)
MCV RBC AUTO: 92.8 FL (ref 79.6–97.8)
MCV RBC AUTO: 92.8 FL (ref 79.6–97.8)
MCV RBC AUTO: 93.3 FL (ref 79.6–97.8)
MCV RBC AUTO: 93.7 FL (ref 79.6–97.8)
MCV RBC AUTO: 93.8 FL (ref 79.6–97.8)
MCV RBC AUTO: 93.8 FL (ref 79.6–97.8)
MCV RBC AUTO: 94.6 FL (ref 79.6–97.8)
MCV RBC AUTO: 94.7 FL (ref 79.6–97.8)
MCV RBC AUTO: 94.9 FL (ref 79.6–97.8)
MCV RBC AUTO: 95.1 FL (ref 79.6–97.8)
MCV RBC AUTO: 95.3 FL (ref 79.6–97.8)
MCV RBC AUTO: 96.2 FL (ref 79.6–97.8)
MCV RBC AUTO: 96.8 FL (ref 79.6–97.8)
MCV RBC AUTO: 97.6 FL (ref 79.6–97.8)
MCV RBC AUTO: 99.5 FL (ref 79.6–97.8)
MM INDURATION POC: 0 MM (ref 0–5)
MONOCYTES # BLD: 0.5 K/UL (ref 0.1–1.3)
MONOCYTES # BLD: 0.6 K/UL (ref 0.1–1.3)
MONOCYTES # BLD: 0.6 K/UL (ref 0.1–1.3)
MONOCYTES # BLD: 0.7 K/UL (ref 0.1–1.3)
MONOCYTES # BLD: 0.7 K/UL (ref 0.1–1.3)
MONOCYTES # BLD: 1 K/UL (ref 0.1–1.3)
MONOCYTES NFR BLD: 6 % (ref 4–12)
MONOCYTES NFR BLD: 7 % (ref 4–12)
MONOCYTES NFR BLD: 8 % (ref 4–12)
MONOCYTES NFR BLD: 9 % (ref 4–12)
MUCOUS THREADS URNS QL MICRO: 0 /LPF
NEUTS SEG # BLD: 11.6 K/UL (ref 1.7–8.2)
NEUTS SEG # BLD: 3 K/UL (ref 1.7–8.2)
NEUTS SEG # BLD: 3.3 K/UL (ref 1.7–8.2)
NEUTS SEG # BLD: 3.5 K/UL (ref 1.7–8.2)
NEUTS SEG # BLD: 3.6 K/UL (ref 1.7–8.2)
NEUTS SEG # BLD: 3.7 K/UL (ref 1.7–8.2)
NEUTS SEG # BLD: 4.1 K/UL (ref 1.7–8.2)
NEUTS SEG # BLD: 4.2 K/UL (ref 1.7–8.2)
NEUTS SEG # BLD: 4.3 K/UL (ref 1.7–8.2)
NEUTS SEG # BLD: 5.1 K/UL (ref 1.7–8.2)
NEUTS SEG # BLD: 5.3 K/UL (ref 1.7–8.2)
NEUTS SEG # BLD: 5.3 K/UL (ref 1.7–8.2)
NEUTS SEG # BLD: 5.4 K/UL (ref 1.7–8.2)
NEUTS SEG # BLD: 5.6 K/UL (ref 1.7–8.2)
NEUTS SEG NFR BLD: 45 % (ref 43–78)
NEUTS SEG NFR BLD: 46 % (ref 43–78)
NEUTS SEG NFR BLD: 49 % (ref 43–78)
NEUTS SEG NFR BLD: 50 % (ref 43–78)
NEUTS SEG NFR BLD: 50 % (ref 43–78)
NEUTS SEG NFR BLD: 55 % (ref 43–78)
NEUTS SEG NFR BLD: 60 % (ref 43–78)
NEUTS SEG NFR BLD: 61 % (ref 43–78)
NEUTS SEG NFR BLD: 62 % (ref 43–78)
NEUTS SEG NFR BLD: 67 % (ref 43–78)
NEUTS SEG NFR BLD: 70 % (ref 43–78)
NEUTS SEG NFR BLD: 84 % (ref 43–78)
NITRITE UR QL STRIP.AUTO: POSITIVE
NRBC # BLD: 0 K/UL (ref 0–0.2)
NRBC # BLD: 0.02 K/UL (ref 0–0.2)
O2/TOTAL GAS SETTING VFR VENT: 32 %
OTHER OBSERVATIONS,UCOM: ABNORMAL
OTHER OBSERVATIONS,UCOM: ABNORMAL
P-R INTERVAL, ECG05: 134 MS
PCO2 BLD: 32.8 MMHG (ref 35–45)
PCO2 BLD: 40 MMHG (ref 35–45)
PH BLD: 7.41 [PH] (ref 7.35–7.45)
PH BLD: 7.45 [PH] (ref 7.35–7.45)
PH UR STRIP: 7 [PH] (ref 5–9)
PHOSPHATE SERPL-MCNC: 2 MG/DL (ref 2.3–3.7)
PHOSPHATE SERPL-MCNC: 2.7 MG/DL (ref 2.3–3.7)
PHOSPHATE SERPL-MCNC: 2.9 MG/DL (ref 2.3–3.7)
PHOSPHATE SERPL-MCNC: 3 MG/DL (ref 2.3–3.7)
PHOSPHATE SERPL-MCNC: 3.6 MG/DL (ref 2.3–3.7)
PHOSPHATE SERPL-MCNC: 3.6 MG/DL (ref 2.3–3.7)
PHOSPHATE SERPL-MCNC: 4.1 MG/DL (ref 2.3–3.7)
PHOSPHATE SERPL-MCNC: 4.2 MG/DL (ref 2.3–3.7)
PHOSPHATE SERPL-MCNC: 4.4 MG/DL (ref 2.3–3.7)
PHOSPHATE SERPL-MCNC: 4.8 MG/DL (ref 2.3–3.7)
PHOSPHATE SERPL-MCNC: 5 MG/DL (ref 2.3–3.7)
PLATELET # BLD AUTO: 163 K/UL (ref 150–450)
PLATELET # BLD AUTO: 182 K/UL (ref 150–450)
PLATELET # BLD AUTO: 185 K/UL (ref 150–450)
PLATELET # BLD AUTO: 190 K/UL (ref 150–450)
PLATELET # BLD AUTO: 192 K/UL (ref 150–450)
PLATELET # BLD AUTO: 194 K/UL (ref 150–450)
PLATELET # BLD AUTO: 202 K/UL (ref 150–450)
PLATELET # BLD AUTO: 213 K/UL (ref 150–450)
PLATELET # BLD AUTO: 219 K/UL (ref 150–450)
PLATELET # BLD AUTO: 223 K/UL (ref 150–450)
PLATELET # BLD AUTO: 224 K/UL (ref 150–450)
PLATELET # BLD AUTO: 242 K/UL (ref 150–450)
PLATELET # BLD AUTO: 250 K/UL (ref 150–450)
PLATELET # BLD AUTO: 254 K/UL (ref 150–450)
PLATELET # BLD AUTO: 255 K/UL (ref 150–450)
PLATELET # BLD AUTO: 258 K/UL (ref 150–450)
PLATELET # BLD AUTO: 268 K/UL (ref 150–450)
PLATELET # BLD AUTO: 269 K/UL (ref 150–450)
PLATELET # BLD AUTO: 273 K/UL (ref 150–450)
PLATELET # BLD AUTO: 276 K/UL (ref 150–450)
PLATELET # BLD AUTO: 279 K/UL (ref 150–450)
PLATELET # BLD AUTO: 280 K/UL (ref 150–450)
PLATELET # BLD AUTO: 287 K/UL (ref 150–450)
PLATELET # BLD AUTO: 294 K/UL (ref 150–450)
PLATELET # BLD AUTO: 305 K/UL (ref 150–450)
PLATELET # BLD AUTO: 322 K/UL (ref 150–450)
PLATELET COMMENTS,PCOM: ADEQUATE
PMV BLD AUTO: 10.3 FL (ref 9.4–12.3)
PMV BLD AUTO: 10.5 FL (ref 9.4–12.3)
PMV BLD AUTO: 10.5 FL (ref 9.4–12.3)
PMV BLD AUTO: 10.6 FL (ref 9.4–12.3)
PMV BLD AUTO: 10.7 FL (ref 9.4–12.3)
PMV BLD AUTO: 10.8 FL (ref 9.4–12.3)
PMV BLD AUTO: 10.9 FL (ref 9.4–12.3)
PMV BLD AUTO: 10.9 FL (ref 9.4–12.3)
PMV BLD AUTO: 11.1 FL (ref 9.4–12.3)
PMV BLD AUTO: 11.2 FL (ref 9.4–12.3)
PMV BLD AUTO: 11.4 FL (ref 9.4–12.3)
PMV BLD AUTO: 11.9 FL (ref 9.4–12.3)
PMV BLD AUTO: 12.1 FL (ref 9.4–12.3)
PO2 BLD: 114 MMHG (ref 75–100)
PO2 BLD: 83 MMHG (ref 75–100)
POTASSIUM SERPL-SCNC: 3.3 MMOL/L (ref 3.5–5.1)
POTASSIUM SERPL-SCNC: 3.4 MMOL/L (ref 3.5–5.1)
POTASSIUM SERPL-SCNC: 3.6 MMOL/L (ref 3.5–5.1)
POTASSIUM SERPL-SCNC: 3.7 MMOL/L (ref 3.5–5.1)
POTASSIUM SERPL-SCNC: 3.8 MMOL/L (ref 3.5–5.1)
POTASSIUM SERPL-SCNC: 3.9 MMOL/L (ref 3.5–5.1)
POTASSIUM SERPL-SCNC: 3.9 MMOL/L (ref 3.5–5.1)
POTASSIUM SERPL-SCNC: 4 MMOL/L (ref 3.5–5.1)
POTASSIUM SERPL-SCNC: 4 MMOL/L (ref 3.5–5.1)
POTASSIUM SERPL-SCNC: 4.1 MMOL/L (ref 3.5–5.1)
POTASSIUM SERPL-SCNC: 4.2 MMOL/L (ref 3.5–5.1)
POTASSIUM SERPL-SCNC: 4.2 MMOL/L (ref 3.5–5.1)
POTASSIUM SERPL-SCNC: 4.3 MMOL/L (ref 3.5–5.1)
POTASSIUM SERPL-SCNC: 4.4 MMOL/L (ref 3.5–5.1)
POTASSIUM SERPL-SCNC: 5.6 MMOL/L (ref 3.5–5.1)
PPD POC: NEGATIVE NEGATIVE
PROCALCITONIN SERPL-MCNC: 0.95 NG/ML
PROT SERPL-MCNC: 6.1 G/DL (ref 6.3–8.2)
PROT SERPL-MCNC: 6.2 G/DL (ref 6.3–8.2)
PROT SERPL-MCNC: 6.6 G/DL (ref 6.3–8.2)
PROT SERPL-MCNC: 6.6 G/DL (ref 6.3–8.2)
PROT SERPL-MCNC: 6.7 G/DL (ref 6.3–8.2)
PROT SERPL-MCNC: 6.8 G/DL (ref 6.3–8.2)
PROT SERPL-MCNC: 6.9 G/DL (ref 6.3–8.2)
PROT SERPL-MCNC: 7.1 G/DL (ref 6.3–8.2)
PROT SERPL-MCNC: 7.2 G/DL (ref 6.3–8.2)
PROT SERPL-MCNC: 7.3 G/DL (ref 6.3–8.2)
PROT SERPL-MCNC: 7.3 G/DL (ref 6.3–8.2)
PROT UR STRIP-MCNC: 100 MG/DL
PROT UR-MCNC: 170 MG/DL
PROT/CREAT UR-RTO: ABNORMAL
PROTHROMBIN TIME: 15.6 SEC (ref 12–14.7)
Q-T INTERVAL, ECG07: 374 MS
QRS DURATION, ECG06: 154 MS
QTC CALCULATION (BEZET), ECG08: 515 MS
RBC # BLD AUTO: 2.65 M/UL (ref 4.05–5.2)
RBC # BLD AUTO: 2.66 M/UL (ref 4.05–5.2)
RBC # BLD AUTO: 2.76 M/UL (ref 4.05–5.2)
RBC # BLD AUTO: 2.83 M/UL (ref 4.05–5.2)
RBC # BLD AUTO: 2.83 M/UL (ref 4.05–5.2)
RBC # BLD AUTO: 2.96 M/UL (ref 4.05–5.2)
RBC # BLD AUTO: 3.14 M/UL (ref 4.05–5.2)
RBC # BLD AUTO: 3.15 M/UL (ref 4.05–5.2)
RBC # BLD AUTO: 3.21 M/UL (ref 4.05–5.2)
RBC # BLD AUTO: 3.22 M/UL (ref 4.05–5.2)
RBC # BLD AUTO: 3.26 M/UL (ref 4.05–5.2)
RBC # BLD AUTO: 3.28 M/UL (ref 4.05–5.2)
RBC # BLD AUTO: 3.31 M/UL (ref 4.05–5.2)
RBC # BLD AUTO: 3.32 M/UL (ref 4.05–5.2)
RBC # BLD AUTO: 3.32 M/UL (ref 4.05–5.2)
RBC # BLD AUTO: 3.35 M/UL (ref 4.05–5.2)
RBC # BLD AUTO: 3.39 M/UL (ref 4.05–5.2)
RBC # BLD AUTO: 3.4 M/UL (ref 4.05–5.2)
RBC # BLD AUTO: 3.44 M/UL (ref 4.05–5.2)
RBC # BLD AUTO: 3.46 M/UL (ref 4.05–5.2)
RBC # BLD AUTO: 3.51 M/UL (ref 4.05–5.2)
RBC # BLD AUTO: 3.57 M/UL (ref 4.05–5.2)
RBC # BLD AUTO: 3.66 M/UL (ref 4.05–5.2)
RBC # BLD AUTO: 3.78 M/UL (ref 4.05–5.25)
RBC # BLD AUTO: 3.82 M/UL (ref 4.05–5.2)
RBC # BLD AUTO: 3.85 M/UL (ref 4.05–5.2)
RBC #/AREA URNS HPF: >100 /HPF
RBC #/AREA URNS HPF: ABNORMAL /HPF
RBC MORPH BLD: ABNORMAL
SAO2 % BLD: 96 % (ref 95–98)
SAO2 % BLD: 99 % (ref 95–98)
SARS COV-2, XPGCVT: NEGATIVE
SARS COV-2, XPGCVT: NEGATIVE
SERVICE CMNT-IMP: ABNORMAL
SERVICE CMNT-IMP: NORMAL
SODIUM SERPL-SCNC: 136 MMOL/L (ref 136–145)
SODIUM SERPL-SCNC: 137 MMOL/L (ref 136–145)
SODIUM SERPL-SCNC: 138 MMOL/L (ref 136–145)
SODIUM SERPL-SCNC: 138 MMOL/L (ref 136–145)
SODIUM SERPL-SCNC: 139 MMOL/L (ref 136–145)
SODIUM SERPL-SCNC: 139 MMOL/L (ref 136–145)
SODIUM SERPL-SCNC: 139 MMOL/L (ref 138–145)
SODIUM SERPL-SCNC: 140 MMOL/L (ref 136–145)
SODIUM SERPL-SCNC: 141 MMOL/L (ref 136–145)
SODIUM SERPL-SCNC: 141 MMOL/L (ref 138–145)
SODIUM SERPL-SCNC: 142 MMOL/L (ref 136–145)
SODIUM SERPL-SCNC: 142 MMOL/L (ref 138–145)
SODIUM SERPL-SCNC: 143 MMOL/L (ref 136–145)
SODIUM SERPL-SCNC: 143 MMOL/L (ref 138–145)
SODIUM SERPL-SCNC: 144 MMOL/L (ref 136–145)
SODIUM SERPL-SCNC: 144 MMOL/L (ref 136–145)
SODIUM SERPL-SCNC: 145 MMOL/L (ref 136–145)
SODIUM SERPL-SCNC: 146 MMOL/L (ref 136–145)
SODIUM SERPL-SCNC: 146 MMOL/L (ref 136–145)
SODIUM SERPL-SCNC: 150 MMOL/L (ref 136–145)
SODIUM SERPL-SCNC: 150 MMOL/L (ref 138–145)
SODIUM SERPL-SCNC: 151 MMOL/L (ref 136–145)
SODIUM UR-SCNC: 99 MMOL/L
SOURCE, COVRS: ABNORMAL
SOURCE, COVRS: NORMAL
SOURCE, COVRS: NORMAL
SP GR UR REFRACTOMETRY: 1.01 (ref 1–1.02)
SPECIMEN TYPE: ABNORMAL
SPECIMEN TYPE: NORMAL
T4 FREE SERPL-MCNC: 1 NG/DL (ref 0.78–1.46)
T4 FREE SERPL-MCNC: 1.1 NG/DL (ref 0.78–1.46)
TSH SERPL DL<=0.005 MIU/L-ACNC: 5.46 UIU/ML (ref 0.36–3.74)
TSH SERPL DL<=0.005 MIU/L-ACNC: 7.86 UIU/ML (ref 0.36–3.74)
UROBILINOGEN UR QL STRIP.AUTO: 0.2 EU/DL (ref 0.2–1)
VENTRICULAR RATE, ECG03: 114 BPM
WBC # BLD AUTO: 11.3 K/UL (ref 4.3–11.1)
WBC # BLD AUTO: 13.8 K/UL (ref 4.3–11.1)
WBC # BLD AUTO: 6.1 K/UL (ref 4.3–11.1)
WBC # BLD AUTO: 6.4 K/UL (ref 4.3–11.1)
WBC # BLD AUTO: 6.5 K/UL (ref 4.3–11.1)
WBC # BLD AUTO: 6.8 K/UL (ref 4.3–11.1)
WBC # BLD AUTO: 6.8 K/UL (ref 4.3–11.1)
WBC # BLD AUTO: 7 K/UL (ref 4.3–11.1)
WBC # BLD AUTO: 7 K/UL (ref 4.3–11.1)
WBC # BLD AUTO: 7.1 K/UL (ref 4.3–11.1)
WBC # BLD AUTO: 7.3 K/UL (ref 4.3–11.1)
WBC # BLD AUTO: 7.6 K/UL (ref 4.3–11.1)
WBC # BLD AUTO: 7.7 K/UL (ref 4.3–11.1)
WBC # BLD AUTO: 7.9 K/UL (ref 4.3–11.1)
WBC # BLD AUTO: 7.9 K/UL (ref 4.3–11.1)
WBC # BLD AUTO: 8 K/UL (ref 4.3–11.1)
WBC # BLD AUTO: 8.4 K/UL (ref 4.3–11.1)
WBC # BLD AUTO: 8.6 K/UL (ref 4.3–11.1)
WBC # BLD AUTO: 8.7 K/UL (ref 4.3–11.1)
WBC # BLD AUTO: 9 K/UL (ref 4.3–11.1)
WBC # BLD AUTO: 9.1 K/UL (ref 4.3–11.1)
WBC # BLD AUTO: 9.3 K/UL (ref 4.3–11.1)
WBC MORPH BLD: ABNORMAL
WBC URNS QL MICRO: >100 /HPF
WBC URNS QL MICRO: >100 /HPF
YEAST URNS QL MICRO: ABNORMAL
YEAST URNS QL MICRO: ABNORMAL

## 2020-01-01 PROCEDURE — 85025 COMPLETE CBC W/AUTO DIFF WBC: CPT

## 2020-01-01 PROCEDURE — 99232 SBSQ HOSP IP/OBS MODERATE 35: CPT | Performed by: NURSE PRACTITIONER

## 2020-01-01 PROCEDURE — 77010033678 HC OXYGEN DAILY

## 2020-01-01 PROCEDURE — 82140 ASSAY OF AMMONIA: CPT

## 2020-01-01 PROCEDURE — 82962 GLUCOSE BLOOD TEST: CPT

## 2020-01-01 PROCEDURE — 36415 COLL VENOUS BLD VENIPUNCTURE: CPT

## 2020-01-01 PROCEDURE — 83036 HEMOGLOBIN GLYCOSYLATED A1C: CPT

## 2020-01-01 PROCEDURE — 83735 ASSAY OF MAGNESIUM: CPT

## 2020-01-01 PROCEDURE — 74011636637 HC RX REV CODE- 636/637: Performed by: INTERNAL MEDICINE

## 2020-01-01 PROCEDURE — 74011000250 HC RX REV CODE- 250: Performed by: NURSE ANESTHETIST, CERTIFIED REGISTERED

## 2020-01-01 PROCEDURE — 80069 RENAL FUNCTION PANEL: CPT

## 2020-01-01 PROCEDURE — 87186 SC STD MICRODIL/AGAR DIL: CPT

## 2020-01-01 PROCEDURE — 65270000029 HC RM PRIVATE

## 2020-01-01 PROCEDURE — 70551 MRI BRAIN STEM W/O DYE: CPT

## 2020-01-01 PROCEDURE — 74011000258 HC RX REV CODE- 258: Performed by: INTERNAL MEDICINE

## 2020-01-01 PROCEDURE — 80048 BASIC METABOLIC PNL TOTAL CA: CPT

## 2020-01-01 PROCEDURE — 77030040832 HC IRR TRAY MDII -A

## 2020-01-01 PROCEDURE — 99497 ADVNCD CARE PLAN 30 MIN: CPT | Performed by: NURSE PRACTITIONER

## 2020-01-01 PROCEDURE — 80053 COMPREHEN METABOLIC PANEL: CPT

## 2020-01-01 PROCEDURE — 87077 CULTURE AEROBIC IDENTIFY: CPT

## 2020-01-01 PROCEDURE — 97112 NEUROMUSCULAR REEDUCATION: CPT

## 2020-01-01 PROCEDURE — 87040 BLOOD CULTURE FOR BACTERIA: CPT

## 2020-01-01 PROCEDURE — 36600 WITHDRAWAL OF ARTERIAL BLOOD: CPT

## 2020-01-01 PROCEDURE — 74011000255 HC RX REV CODE- 255

## 2020-01-01 PROCEDURE — 82803 BLOOD GASES ANY COMBINATION: CPT

## 2020-01-01 PROCEDURE — 74011250636 HC RX REV CODE- 250/636: Performed by: INTERNAL MEDICINE

## 2020-01-01 PROCEDURE — 74011250637 HC RX REV CODE- 250/637: Performed by: INTERNAL MEDICINE

## 2020-01-01 PROCEDURE — 74011250636 HC RX REV CODE- 250/636: Performed by: NURSE PRACTITIONER

## 2020-01-01 PROCEDURE — 74011000250 HC RX REV CODE- 250: Performed by: INTERNAL MEDICINE

## 2020-01-01 PROCEDURE — 74011250637 HC RX REV CODE- 250/637: Performed by: EMERGENCY MEDICINE

## 2020-01-01 PROCEDURE — 71045 X-RAY EXAM CHEST 1 VIEW: CPT

## 2020-01-01 PROCEDURE — 77030038269 HC DRN EXT URIN PURWCK BARD -A

## 2020-01-01 PROCEDURE — 65660000000 HC RM CCU STEPDOWN

## 2020-01-01 PROCEDURE — 73610 X-RAY EXAM OF ANKLE: CPT

## 2020-01-01 PROCEDURE — 84145 PROCALCITONIN (PCT): CPT

## 2020-01-01 PROCEDURE — 87635 SARS-COV-2 COVID-19 AMP PRB: CPT

## 2020-01-01 PROCEDURE — 74011000255 HC RX REV CODE- 255: Performed by: RADIOLOGY

## 2020-01-01 PROCEDURE — 2709999900 HC NON-CHARGEABLE SUPPLY

## 2020-01-01 PROCEDURE — 72070 X-RAY EXAM THORAC SPINE 2VWS: CPT

## 2020-01-01 PROCEDURE — 74011000302 HC RX REV CODE- 302: Performed by: INTERNAL MEDICINE

## 2020-01-01 PROCEDURE — 86300 IMMUNOASSAY TUMOR CA 15-3: CPT

## 2020-01-01 PROCEDURE — 85027 COMPLETE CBC AUTOMATED: CPT

## 2020-01-01 PROCEDURE — 94760 N-INVAS EAR/PLS OXIMETRY 1: CPT

## 2020-01-01 PROCEDURE — 84439 ASSAY OF FREE THYROXINE: CPT

## 2020-01-01 PROCEDURE — 74011000250 HC RX REV CODE- 250: Performed by: NURSE PRACTITIONER

## 2020-01-01 PROCEDURE — 84443 ASSAY THYROID STIM HORMONE: CPT

## 2020-01-01 PROCEDURE — 74011250637 HC RX REV CODE- 250/637: Performed by: NURSE PRACTITIONER

## 2020-01-01 PROCEDURE — 74018 RADEX ABDOMEN 1 VIEW: CPT

## 2020-01-01 PROCEDURE — 74011636637 HC RX REV CODE- 636/637: Performed by: EMERGENCY MEDICINE

## 2020-01-01 PROCEDURE — 87070 CULTURE OTHR SPECIMN AEROBIC: CPT

## 2020-01-01 PROCEDURE — 74011250637 HC RX REV CODE- 250/637: Performed by: FAMILY MEDICINE

## 2020-01-01 PROCEDURE — 83605 ASSAY OF LACTIC ACID: CPT

## 2020-01-01 PROCEDURE — 99233 SBSQ HOSP IP/OBS HIGH 50: CPT | Performed by: INTERNAL MEDICINE

## 2020-01-01 PROCEDURE — 84156 ASSAY OF PROTEIN URINE: CPT

## 2020-01-01 PROCEDURE — 81015 MICROSCOPIC EXAM OF URINE: CPT

## 2020-01-01 PROCEDURE — 74011250636 HC RX REV CODE- 250/636: Performed by: NURSE ANESTHETIST, CERTIFIED REGISTERED

## 2020-01-01 PROCEDURE — 93005 ELECTROCARDIOGRAM TRACING: CPT | Performed by: EMERGENCY MEDICINE

## 2020-01-01 PROCEDURE — 2709999900 HC NON-CHARGEABLE SUPPLY: Performed by: INTERNAL MEDICINE

## 2020-01-01 PROCEDURE — 77030005122 HC CATH GASTMY PEG BSC -B: Performed by: INTERNAL MEDICINE

## 2020-01-01 PROCEDURE — 70496 CT ANGIOGRAPHY HEAD: CPT

## 2020-01-01 PROCEDURE — 74230 X-RAY XM SWLNG FUNCJ C+: CPT

## 2020-01-01 PROCEDURE — 99233 SBSQ HOSP IP/OBS HIGH 50: CPT | Performed by: PSYCHIATRY & NEUROLOGY

## 2020-01-01 PROCEDURE — 92523 SPEECH SOUND LANG COMPREHEN: CPT

## 2020-01-01 PROCEDURE — 77066 DX MAMMO INCL CAD BI: CPT

## 2020-01-01 PROCEDURE — 72125 CT NECK SPINE W/O DYE: CPT

## 2020-01-01 PROCEDURE — 87106 FUNGI IDENTIFICATION YEAST: CPT

## 2020-01-01 PROCEDURE — 77030040393 HC DRSG OPTIFOAM GENT MDII -B

## 2020-01-01 PROCEDURE — 97163 PT EVAL HIGH COMPLEX 45 MIN: CPT

## 2020-01-01 PROCEDURE — 99285 EMERGENCY DEPT VISIT HI MDM: CPT

## 2020-01-01 PROCEDURE — 99284 EMERGENCY DEPT VISIT MOD MDM: CPT

## 2020-01-01 PROCEDURE — 76060000032 HC ANESTHESIA 0.5 TO 1 HR: Performed by: INTERNAL MEDICINE

## 2020-01-01 PROCEDURE — 99223 1ST HOSP IP/OBS HIGH 75: CPT | Performed by: NURSE PRACTITIONER

## 2020-01-01 PROCEDURE — 74011250636 HC RX REV CODE- 250/636: Performed by: EMERGENCY MEDICINE

## 2020-01-01 PROCEDURE — 86580 TB INTRADERMAL TEST: CPT | Performed by: INTERNAL MEDICINE

## 2020-01-01 PROCEDURE — 84300 ASSAY OF URINE SODIUM: CPT

## 2020-01-01 PROCEDURE — 70450 CT HEAD/BRAIN W/O DYE: CPT

## 2020-01-01 PROCEDURE — 84100 ASSAY OF PHOSPHORUS: CPT

## 2020-01-01 PROCEDURE — 74011250636 HC RX REV CODE- 250/636: Performed by: ANESTHESIOLOGY

## 2020-01-01 PROCEDURE — 85610 PROTHROMBIN TIME: CPT

## 2020-01-01 PROCEDURE — 74011000636 HC RX REV CODE- 636: Performed by: INTERNAL MEDICINE

## 2020-01-01 PROCEDURE — 93005 ELECTROCARDIOGRAM TRACING: CPT | Performed by: INTERNAL MEDICINE

## 2020-01-01 PROCEDURE — 76450000000

## 2020-01-01 PROCEDURE — 96374 THER/PROPH/DIAG INJ IV PUSH: CPT

## 2020-01-01 PROCEDURE — 76040000025: Performed by: INTERNAL MEDICINE

## 2020-01-01 PROCEDURE — 93306 TTE W/DOPPLER COMPLETE: CPT

## 2020-01-01 PROCEDURE — 92610 EVALUATE SWALLOWING FUNCTION: CPT

## 2020-01-01 PROCEDURE — 87086 URINE CULTURE/COLONY COUNT: CPT

## 2020-01-01 PROCEDURE — 99232 SBSQ HOSP IP/OBS MODERATE 35: CPT | Performed by: PSYCHIATRY & NEUROLOGY

## 2020-01-01 PROCEDURE — 81003 URINALYSIS AUTO W/O SCOPE: CPT

## 2020-01-01 PROCEDURE — 97530 THERAPEUTIC ACTIVITIES: CPT

## 2020-01-01 PROCEDURE — 81001 URINALYSIS AUTO W/SCOPE: CPT

## 2020-01-01 PROCEDURE — 71250 CT THORAX DX C-: CPT

## 2020-01-01 PROCEDURE — 99222 1ST HOSP IP/OBS MODERATE 55: CPT | Performed by: INTERNAL MEDICINE

## 2020-01-01 PROCEDURE — 51702 INSERT TEMP BLADDER CATH: CPT

## 2020-01-01 PROCEDURE — 97167 OT EVAL HIGH COMPLEX 60 MIN: CPT

## 2020-01-01 PROCEDURE — 83880 ASSAY OF NATRIURETIC PEPTIDE: CPT

## 2020-01-01 PROCEDURE — 74011000258 HC RX REV CODE- 258: Performed by: EMERGENCY MEDICINE

## 2020-01-01 PROCEDURE — 97110 THERAPEUTIC EXERCISES: CPT

## 2020-01-01 PROCEDURE — 73630 X-RAY EXAM OF FOOT: CPT

## 2020-01-01 PROCEDURE — 97535 SELF CARE MNGMENT TRAINING: CPT

## 2020-01-01 RX ORDER — CLOPIDOGREL BISULFATE 75 MG/1
75 TABLET ORAL DAILY
Status: DISCONTINUED | OUTPATIENT
Start: 2020-01-01 | End: 2020-01-01

## 2020-01-01 RX ORDER — MAGNESIUM SULFATE HEPTAHYDRATE 40 MG/ML
2 INJECTION, SOLUTION INTRAVENOUS ONCE
Status: COMPLETED | OUTPATIENT
Start: 2020-01-01 | End: 2020-01-01

## 2020-01-01 RX ORDER — LEVOTHYROXINE SODIUM 112 UG/1
112 TABLET ORAL
Status: DISCONTINUED | OUTPATIENT
Start: 2020-01-01 | End: 2020-01-01

## 2020-01-01 RX ORDER — SODIUM CHLORIDE 0.9 % (FLUSH) 0.9 %
10 SYRINGE (ML) INJECTION
Status: COMPLETED | OUTPATIENT
Start: 2020-01-01 | End: 2020-01-01

## 2020-01-01 RX ORDER — SODIUM CHLORIDE 0.9 % (FLUSH) 0.9 %
5-40 SYRINGE (ML) INJECTION EVERY 8 HOURS
Status: DISCONTINUED | OUTPATIENT
Start: 2020-01-01 | End: 2020-01-01 | Stop reason: HOSPADM

## 2020-01-01 RX ORDER — PROPOFOL 10 MG/ML
INJECTION, EMULSION INTRAVENOUS
Status: DISCONTINUED | OUTPATIENT
Start: 2020-01-01 | End: 2020-01-01 | Stop reason: HOSPADM

## 2020-01-01 RX ORDER — GUAIFENESIN 100 MG/5ML
81 LIQUID (ML) ORAL DAILY
Qty: 30 TAB | Refills: 0 | Status: SHIPPED | OUTPATIENT
Start: 2020-01-01 | End: 2020-01-01

## 2020-01-01 RX ORDER — INSULIN GLARGINE 100 [IU]/ML
30 INJECTION, SOLUTION SUBCUTANEOUS
Status: DISCONTINUED | OUTPATIENT
Start: 2020-01-01 | End: 2020-01-01 | Stop reason: HOSPADM

## 2020-01-01 RX ORDER — CYCLOBENZAPRINE HCL 10 MG
10 TABLET ORAL
Status: COMPLETED | OUTPATIENT
Start: 2020-01-01 | End: 2020-01-01

## 2020-01-01 RX ORDER — DOXYCYCLINE 100 MG/1
100 CAPSULE ORAL EVERY 12 HOURS
Status: DISCONTINUED | OUTPATIENT
Start: 2020-01-01 | End: 2020-01-01

## 2020-01-01 RX ORDER — DEXTROSE MONOHYDRATE AND SODIUM CHLORIDE 5; .45 G/100ML; G/100ML
100 INJECTION, SOLUTION INTRAVENOUS CONTINUOUS
Status: DISCONTINUED | OUTPATIENT
Start: 2020-01-01 | End: 2020-01-01

## 2020-01-01 RX ORDER — LIDOCAINE 50 MG/G
PATCH TOPICAL
Qty: 10 EACH | Refills: 0 | Status: SHIPPED | OUTPATIENT
Start: 2020-01-01 | End: 2020-01-01

## 2020-01-01 RX ORDER — GUAIFENESIN 100 MG/5ML
81 LIQUID (ML) ORAL DAILY
Status: DISCONTINUED | OUTPATIENT
Start: 2020-01-01 | End: 2020-01-01 | Stop reason: HOSPADM

## 2020-01-01 RX ORDER — VANCOMYCIN HYDROCHLORIDE 1 G/20ML
INJECTION, POWDER, LYOPHILIZED, FOR SOLUTION INTRAVENOUS ONCE
Status: DISCONTINUED | OUTPATIENT
Start: 2020-01-01 | End: 2020-01-01 | Stop reason: DRUGHIGH

## 2020-01-01 RX ORDER — FAMOTIDINE 20 MG/1
40 TABLET, FILM COATED ORAL EVERY 24 HOURS
Status: DISCONTINUED | OUTPATIENT
Start: 2020-01-01 | End: 2020-01-01

## 2020-01-01 RX ORDER — INSULIN LISPRO 100 [IU]/ML
INJECTION, SOLUTION INTRAVENOUS; SUBCUTANEOUS
Qty: 1 VIAL | Refills: 0 | Status: SHIPPED
Start: 2020-01-01

## 2020-01-01 RX ORDER — MIDODRINE HYDROCHLORIDE 5 MG/1
5 TABLET ORAL
Qty: 90 TAB | Refills: 0 | Status: SHIPPED | OUTPATIENT
Start: 2020-01-01 | End: 2020-01-01

## 2020-01-01 RX ORDER — VANCOMYCIN 1.75 GRAM/500 ML IN 0.9 % SODIUM CHLORIDE INTRAVENOUS
1750 ONCE
Status: DISCONTINUED | OUTPATIENT
Start: 2020-01-01 | End: 2020-01-01

## 2020-01-01 RX ORDER — POLYETHYLENE GLYCOL 3350 17 G/17G
17 POWDER, FOR SOLUTION ORAL DAILY
Qty: 30 PACKET | Refills: 0 | Status: SHIPPED
Start: 2020-01-01 | End: 2020-01-01

## 2020-01-01 RX ORDER — LIDOCAINE 4 G/100G
1 PATCH TOPICAL EVERY 24 HOURS
Status: DISCONTINUED | OUTPATIENT
Start: 2020-01-01 | End: 2020-01-01 | Stop reason: HOSPADM

## 2020-01-01 RX ORDER — ACETAMINOPHEN 325 MG/1
650 TABLET ORAL
Status: DISCONTINUED | OUTPATIENT
Start: 2020-01-01 | End: 2020-01-01 | Stop reason: HOSPADM

## 2020-01-01 RX ORDER — ANASTROZOLE 1 MG/1
1 TABLET ORAL DAILY
Status: DISCONTINUED | OUTPATIENT
Start: 2020-01-01 | End: 2020-01-01

## 2020-01-01 RX ORDER — SODIUM CHLORIDE 9 MG/ML
150 INJECTION, SOLUTION INTRAVENOUS CONTINUOUS
Status: DISCONTINUED | OUTPATIENT
Start: 2020-01-01 | End: 2020-01-01

## 2020-01-01 RX ORDER — ENOXAPARIN SODIUM 100 MG/ML
INJECTION SUBCUTANEOUS
COMMUNITY
End: 2020-01-01

## 2020-01-01 RX ORDER — HYDROCODONE BITARTRATE AND ACETAMINOPHEN 5; 325 MG/1; MG/1
1 TABLET ORAL
Status: DISCONTINUED | OUTPATIENT
Start: 2020-01-01 | End: 2020-01-01

## 2020-01-01 RX ORDER — FAMOTIDINE 40 MG/5ML
20 POWDER, FOR SUSPENSION ORAL EVERY 24 HOURS
Status: DISCONTINUED | OUTPATIENT
Start: 2020-01-01 | End: 2020-01-01 | Stop reason: HOSPADM

## 2020-01-01 RX ORDER — POLYETHYLENE GLYCOL 3350 17 G/17G
17 POWDER, FOR SOLUTION ORAL DAILY
Status: DISCONTINUED | OUTPATIENT
Start: 2020-01-01 | End: 2020-01-01 | Stop reason: HOSPADM

## 2020-01-01 RX ORDER — INSULIN LISPRO 100 [IU]/ML
INJECTION, SOLUTION INTRAVENOUS; SUBCUTANEOUS
Status: DISCONTINUED | OUTPATIENT
Start: 2020-01-01 | End: 2020-01-01 | Stop reason: HOSPADM

## 2020-01-01 RX ORDER — HYDROCODONE BITARTRATE AND ACETAMINOPHEN 5; 325 MG/1; MG/1
1 TABLET ORAL
Status: COMPLETED | OUTPATIENT
Start: 2020-01-01 | End: 2020-01-01

## 2020-01-01 RX ORDER — HYDROCODONE BITARTRATE AND ACETAMINOPHEN 5; 325 MG/1; MG/1
1 TABLET ORAL
Status: DISCONTINUED | OUTPATIENT
Start: 2020-01-01 | End: 2020-01-01 | Stop reason: HOSPADM

## 2020-01-01 RX ORDER — ATORVASTATIN CALCIUM 40 MG/1
40 TABLET, FILM COATED ORAL
Status: DISCONTINUED | OUTPATIENT
Start: 2020-01-01 | End: 2020-01-01 | Stop reason: HOSPADM

## 2020-01-01 RX ORDER — INSULIN GLARGINE 100 [IU]/ML
30 INJECTION, SOLUTION SUBCUTANEOUS
Qty: 1 VIAL | Refills: 0 | Status: SHIPPED | OUTPATIENT
Start: 2020-01-01 | End: 2020-01-01

## 2020-01-01 RX ORDER — MIDODRINE HYDROCHLORIDE 5 MG/1
5 TABLET ORAL
Status: DISCONTINUED | OUTPATIENT
Start: 2020-01-01 | End: 2020-01-01 | Stop reason: HOSPADM

## 2020-01-01 RX ORDER — ATORVASTATIN CALCIUM 40 MG/1
40 TABLET, FILM COATED ORAL
Status: DISCONTINUED | OUTPATIENT
Start: 2020-01-01 | End: 2020-01-01

## 2020-01-01 RX ORDER — SODIUM CHLORIDE 0.9 % (FLUSH) 0.9 %
5-40 SYRINGE (ML) INJECTION AS NEEDED
Status: DISCONTINUED | OUTPATIENT
Start: 2020-01-01 | End: 2020-01-01 | Stop reason: HOSPADM

## 2020-01-01 RX ORDER — PROPOFOL 10 MG/ML
INJECTION, EMULSION INTRAVENOUS AS NEEDED
Status: DISCONTINUED | OUTPATIENT
Start: 2020-01-01 | End: 2020-01-01 | Stop reason: HOSPADM

## 2020-01-01 RX ORDER — ASPIRIN 81 MG/1
81 TABLET ORAL DAILY
Status: DISCONTINUED | OUTPATIENT
Start: 2020-01-01 | End: 2020-01-01

## 2020-01-01 RX ORDER — LEVOTHYROXINE SODIUM 112 UG/1
112 TABLET ORAL
Status: DISCONTINUED | OUTPATIENT
Start: 2020-01-01 | End: 2020-01-01 | Stop reason: HOSPADM

## 2020-01-01 RX ORDER — MORPHINE SULFATE 15 MG/1
7.5-15 TABLET ORAL
Qty: 13 TAB | Refills: 0 | Status: SHIPPED | OUTPATIENT
Start: 2020-01-01 | End: 2020-01-01

## 2020-01-01 RX ORDER — FAMOTIDINE 40 MG/5ML
20 POWDER, FOR SUSPENSION ORAL EVERY 24 HOURS
Qty: 50 ML | Refills: 0 | Status: SHIPPED
Start: 2020-01-01 | End: 2020-01-01

## 2020-01-01 RX ORDER — DOXYCYCLINE 100 MG/1
100 CAPSULE ORAL EVERY 12 HOURS
Status: DISCONTINUED | OUTPATIENT
Start: 2020-01-01 | End: 2020-01-01 | Stop reason: HOSPADM

## 2020-01-01 RX ORDER — SODIUM CHLORIDE, SODIUM LACTATE, POTASSIUM CHLORIDE, CALCIUM CHLORIDE 600; 310; 30; 20 MG/100ML; MG/100ML; MG/100ML; MG/100ML
1000 INJECTION, SOLUTION INTRAVENOUS CONTINUOUS
Status: DISCONTINUED | OUTPATIENT
Start: 2020-01-01 | End: 2020-01-01 | Stop reason: HOSPADM

## 2020-01-01 RX ORDER — ACETAMINOPHEN 325 MG/1
650 TABLET ORAL
Status: DISCONTINUED | OUTPATIENT
Start: 2020-01-01 | End: 2020-01-01

## 2020-01-01 RX ORDER — ANASTROZOLE 1 MG/1
1 TABLET ORAL DAILY
Qty: 30 TAB | Refills: 0 | Status: SHIPPED
Start: 2020-01-01 | End: 2020-01-01

## 2020-01-01 RX ORDER — MIDODRINE HYDROCHLORIDE 5 MG/1
5 TABLET ORAL
Status: DISCONTINUED | OUTPATIENT
Start: 2020-01-01 | End: 2020-01-01

## 2020-01-01 RX ORDER — ANASTROZOLE 1 MG/1
1 TABLET ORAL DAILY
Status: DISCONTINUED | OUTPATIENT
Start: 2020-01-01 | End: 2020-01-01 | Stop reason: HOSPADM

## 2020-01-01 RX ORDER — CEFAZOLIN SODIUM/WATER 2 G/20 ML
2 SYRINGE (ML) INTRAVENOUS
Status: COMPLETED | OUTPATIENT
Start: 2020-01-01 | End: 2020-01-01

## 2020-01-01 RX ADMIN — INSULIN LISPRO 2 UNITS: 100 INJECTION, SOLUTION INTRAVENOUS; SUBCUTANEOUS at 07:50

## 2020-01-01 RX ADMIN — MAGNESIUM SULFATE HEPTAHYDRATE 2 G: 40 INJECTION, SOLUTION INTRAVENOUS at 09:01

## 2020-01-01 RX ADMIN — APIXABAN 5 MG: 5 TABLET, FILM COATED ORAL at 21:29

## 2020-01-01 RX ADMIN — MAGNESIUM SULFATE HEPTAHYDRATE 2 G: 40 INJECTION, SOLUTION INTRAVENOUS at 09:29

## 2020-01-01 RX ADMIN — Medication 10 ML: at 21:34

## 2020-01-01 RX ADMIN — AMPICILLIN SODIUM AND SULBACTAM SODIUM 3 G: 2; 1 INJECTION, POWDER, FOR SOLUTION INTRAMUSCULAR; INTRAVENOUS at 06:38

## 2020-01-01 RX ADMIN — PIPERACILLIN SODIUM AND TAZOBACTAM SODIUM 3.38 G: 3; .375 INJECTION, POWDER, LYOPHILIZED, FOR SOLUTION INTRAVENOUS at 09:06

## 2020-01-01 RX ADMIN — INSULIN LISPRO 6 UNITS: 100 INJECTION, SOLUTION INTRAVENOUS; SUBCUTANEOUS at 12:10

## 2020-01-01 RX ADMIN — INSULIN LISPRO 4 UNITS: 100 INJECTION, SOLUTION INTRAVENOUS; SUBCUTANEOUS at 21:40

## 2020-01-01 RX ADMIN — BARIUM SULFATE 15 ML: 400 SUSPENSION ORAL at 11:19

## 2020-01-01 RX ADMIN — SODIUM CHLORIDE 150 ML/HR: 9 INJECTION, SOLUTION INTRAVENOUS at 12:00

## 2020-01-01 RX ADMIN — DOXYCYCLINE HYCLATE 100 MG: 100 CAPSULE ORAL at 22:05

## 2020-01-01 RX ADMIN — INSULIN LISPRO 10 UNITS: 100 INJECTION, SOLUTION INTRAVENOUS; SUBCUTANEOUS at 12:25

## 2020-01-01 RX ADMIN — DOXYCYCLINE HYCLATE 100 MG: 100 CAPSULE ORAL at 09:29

## 2020-01-01 RX ADMIN — DOXYCYCLINE HYCLATE 100 MG: 100 CAPSULE ORAL at 21:16

## 2020-01-01 RX ADMIN — INSULIN LISPRO 4 UNITS: 100 INJECTION, SOLUTION INTRAVENOUS; SUBCUTANEOUS at 22:21

## 2020-01-01 RX ADMIN — Medication 10 ML: at 14:40

## 2020-01-01 RX ADMIN — APIXABAN 5 MG: 5 TABLET, FILM COATED ORAL at 21:34

## 2020-01-01 RX ADMIN — ANASTROZOLE 1 MG: 1 TABLET, COATED ORAL at 09:03

## 2020-01-01 RX ADMIN — INSULIN LISPRO 4 UNITS: 100 INJECTION, SOLUTION INTRAVENOUS; SUBCUTANEOUS at 08:16

## 2020-01-01 RX ADMIN — INSULIN GLARGINE 30 UNITS: 100 INJECTION, SOLUTION SUBCUTANEOUS at 22:20

## 2020-01-01 RX ADMIN — Medication 10 ML: at 07:45

## 2020-01-01 RX ADMIN — ANASTROZOLE 1 MG: 1 TABLET, COATED ORAL at 08:47

## 2020-01-01 RX ADMIN — ASPIRIN 81 MG: 81 TABLET, CHEWABLE ORAL at 09:02

## 2020-01-01 RX ADMIN — Medication 10 ML: at 06:04

## 2020-01-01 RX ADMIN — ATORVASTATIN CALCIUM 40 MG: 40 TABLET, FILM COATED ORAL at 22:00

## 2020-01-01 RX ADMIN — APIXABAN 5 MG: 5 TABLET, FILM COATED ORAL at 21:26

## 2020-01-01 RX ADMIN — INSULIN GLARGINE 30 UNITS: 100 INJECTION, SOLUTION SUBCUTANEOUS at 21:34

## 2020-01-01 RX ADMIN — APIXABAN 5 MG: 5 TABLET, FILM COATED ORAL at 21:16

## 2020-01-01 RX ADMIN — MIDODRINE HYDROCHLORIDE 5 MG: 5 TABLET ORAL at 17:19

## 2020-01-01 RX ADMIN — AMPICILLIN SODIUM AND SULBACTAM SODIUM 3 G: 2; 1 INJECTION, POWDER, FOR SOLUTION INTRAMUSCULAR; INTRAVENOUS at 00:22

## 2020-01-01 RX ADMIN — INSULIN LISPRO 6 UNITS: 100 INJECTION, SOLUTION INTRAVENOUS; SUBCUTANEOUS at 09:01

## 2020-01-01 RX ADMIN — ANASTROZOLE 1 MG: 1 TABLET, COATED ORAL at 09:17

## 2020-01-01 RX ADMIN — INSULIN GLARGINE 30 UNITS: 100 INJECTION, SOLUTION SUBCUTANEOUS at 21:40

## 2020-01-01 RX ADMIN — ASPIRIN 81 MG: 81 TABLET, COATED ORAL at 09:14

## 2020-01-01 RX ADMIN — DOXYCYCLINE HYCLATE 100 MG: 100 CAPSULE ORAL at 08:47

## 2020-01-01 RX ADMIN — MIDODRINE HYDROCHLORIDE 5 MG: 5 TABLET ORAL at 09:29

## 2020-01-01 RX ADMIN — ANASTROZOLE 1 MG: 1 TABLET, COATED ORAL at 10:03

## 2020-01-01 RX ADMIN — ATORVASTATIN CALCIUM 40 MG: 40 TABLET, FILM COATED ORAL at 21:34

## 2020-01-01 RX ADMIN — AMPICILLIN SODIUM AND SULBACTAM SODIUM 3 G: 2; 1 INJECTION, POWDER, FOR SOLUTION INTRAMUSCULAR; INTRAVENOUS at 12:19

## 2020-01-01 RX ADMIN — DOXYCYCLINE HYCLATE 100 MG: 100 CAPSULE ORAL at 09:22

## 2020-01-01 RX ADMIN — ASPIRIN 81 MG: 81 TABLET, CHEWABLE ORAL at 09:18

## 2020-01-01 RX ADMIN — INSULIN LISPRO 8 UNITS: 100 INJECTION, SOLUTION INTRAVENOUS; SUBCUTANEOUS at 12:22

## 2020-01-01 RX ADMIN — MIDODRINE HYDROCHLORIDE 5 MG: 5 TABLET ORAL at 17:14

## 2020-01-01 RX ADMIN — Medication 10 ML: at 13:47

## 2020-01-01 RX ADMIN — INSULIN LISPRO 4 UNITS: 100 INJECTION, SOLUTION INTRAVENOUS; SUBCUTANEOUS at 08:40

## 2020-01-01 RX ADMIN — ANASTROZOLE 1 MG: 1 TABLET, COATED ORAL at 09:29

## 2020-01-01 RX ADMIN — POTASSIUM BICARBONATE 40 MEQ: 782 TABLET, EFFERVESCENT ORAL at 17:15

## 2020-01-01 RX ADMIN — MIDODRINE HYDROCHLORIDE 5 MG: 5 TABLET ORAL at 17:21

## 2020-01-01 RX ADMIN — ANASTROZOLE 1 MG: 1 TABLET, COATED ORAL at 09:20

## 2020-01-01 RX ADMIN — BARIUM SULFATE 30 ML: 400 SUSPENSION ORAL at 11:56

## 2020-01-01 RX ADMIN — Medication 10 ML: at 16:00

## 2020-01-01 RX ADMIN — APIXABAN 5 MG: 5 TABLET, FILM COATED ORAL at 22:12

## 2020-01-01 RX ADMIN — INSULIN LISPRO 4 UNITS: 100 INJECTION, SOLUTION INTRAVENOUS; SUBCUTANEOUS at 08:20

## 2020-01-01 RX ADMIN — Medication 10 ML: at 21:22

## 2020-01-01 RX ADMIN — APIXABAN 5 MG: 5 TABLET, FILM COATED ORAL at 09:22

## 2020-01-01 RX ADMIN — INSULIN LISPRO 2 UNITS: 100 INJECTION, SOLUTION INTRAVENOUS; SUBCUTANEOUS at 21:16

## 2020-01-01 RX ADMIN — ASPIRIN 81 MG: 81 TABLET, CHEWABLE ORAL at 10:04

## 2020-01-01 RX ADMIN — Medication 10 ML: at 05:58

## 2020-01-01 RX ADMIN — CLOPIDOGREL BISULFATE 75 MG: 75 TABLET ORAL at 08:47

## 2020-01-01 RX ADMIN — AMPICILLIN SODIUM AND SULBACTAM SODIUM 3 G: 2; 1 INJECTION, POWDER, FOR SOLUTION INTRAMUSCULAR; INTRAVENOUS at 17:19

## 2020-01-01 RX ADMIN — MAGNESIUM SULFATE HEPTAHYDRATE 2 G: 40 INJECTION, SOLUTION INTRAVENOUS at 11:03

## 2020-01-01 RX ADMIN — INSULIN LISPRO 4 UNITS: 100 INJECTION, SOLUTION INTRAVENOUS; SUBCUTANEOUS at 22:20

## 2020-01-01 RX ADMIN — AMPICILLIN SODIUM AND SULBACTAM SODIUM 3 G: 2; 1 INJECTION, POWDER, FOR SOLUTION INTRAMUSCULAR; INTRAVENOUS at 11:19

## 2020-01-01 RX ADMIN — DOXYCYCLINE HYCLATE 100 MG: 100 CAPSULE ORAL at 21:26

## 2020-01-01 RX ADMIN — INSULIN GLARGINE 30 UNITS: 100 INJECTION, SOLUTION SUBCUTANEOUS at 21:30

## 2020-01-01 RX ADMIN — BARIUM SULFATE 15 ML: 980 POWDER, FOR SUSPENSION ORAL at 11:55

## 2020-01-01 RX ADMIN — MIDODRINE HYDROCHLORIDE 5 MG: 5 TABLET ORAL at 09:22

## 2020-01-01 RX ADMIN — DOXYCYCLINE HYCLATE 100 MG: 100 CAPSULE ORAL at 09:02

## 2020-01-01 RX ADMIN — APIXABAN 5 MG: 5 TABLET, FILM COATED ORAL at 08:47

## 2020-01-01 RX ADMIN — MIDODRINE HYDROCHLORIDE 5 MG: 5 TABLET ORAL at 17:15

## 2020-01-01 RX ADMIN — Medication 10 ML: at 05:47

## 2020-01-01 RX ADMIN — INSULIN LISPRO 2 UNITS: 100 INJECTION, SOLUTION INTRAVENOUS; SUBCUTANEOUS at 17:06

## 2020-01-01 RX ADMIN — Medication 10 ML: at 11:16

## 2020-01-01 RX ADMIN — APIXABAN 5 MG: 5 TABLET, FILM COATED ORAL at 22:01

## 2020-01-01 RX ADMIN — INSULIN LISPRO 2 UNITS: 100 INJECTION, SOLUTION INTRAVENOUS; SUBCUTANEOUS at 08:24

## 2020-01-01 RX ADMIN — AMPICILLIN SODIUM AND SULBACTAM SODIUM 3 G: 2; 1 INJECTION, POWDER, FOR SOLUTION INTRAMUSCULAR; INTRAVENOUS at 17:14

## 2020-01-01 RX ADMIN — Medication 10 ML: at 14:41

## 2020-01-01 RX ADMIN — DOXYCYCLINE HYCLATE 100 MG: 100 CAPSULE ORAL at 22:12

## 2020-01-01 RX ADMIN — Medication 10 ML: at 13:19

## 2020-01-01 RX ADMIN — Medication 10 ML: at 22:03

## 2020-01-01 RX ADMIN — SODIUM CHLORIDE, SODIUM LACTATE, POTASSIUM CHLORIDE, AND CALCIUM CHLORIDE: 600; 310; 30; 20 INJECTION, SOLUTION INTRAVENOUS at 11:55

## 2020-01-01 RX ADMIN — POTASSIUM BICARBONATE 40 MEQ: 782 TABLET, EFFERVESCENT ORAL at 11:41

## 2020-01-01 RX ADMIN — Medication 10 ML: at 14:00

## 2020-01-01 RX ADMIN — INSULIN LISPRO 6 UNITS: 100 INJECTION, SOLUTION INTRAVENOUS; SUBCUTANEOUS at 11:03

## 2020-01-01 RX ADMIN — CEFAZOLIN SODIUM 2 G: 100 INJECTION, POWDER, LYOPHILIZED, FOR SOLUTION INTRAVENOUS at 11:49

## 2020-01-01 RX ADMIN — LEVOTHYROXINE SODIUM 112 MCG: 0.11 TABLET ORAL at 05:07

## 2020-01-01 RX ADMIN — Medication 10 ML: at 21:37

## 2020-01-01 RX ADMIN — HYDROCODONE BITARTRATE AND ACETAMINOPHEN 1 TABLET: 5; 325 TABLET ORAL at 11:41

## 2020-01-01 RX ADMIN — MIDODRINE HYDROCHLORIDE 5 MG: 5 TABLET ORAL at 17:24

## 2020-01-01 RX ADMIN — ATORVASTATIN CALCIUM 40 MG: 40 TABLET, FILM COATED ORAL at 22:12

## 2020-01-01 RX ADMIN — AMPICILLIN SODIUM AND SULBACTAM SODIUM 3 G: 2; 1 INJECTION, POWDER, FOR SOLUTION INTRAMUSCULAR; INTRAVENOUS at 06:04

## 2020-01-01 RX ADMIN — ATORVASTATIN CALCIUM 40 MG: 40 TABLET, FILM COATED ORAL at 22:01

## 2020-01-01 RX ADMIN — INSULIN LISPRO 2 UNITS: 100 INJECTION, SOLUTION INTRAVENOUS; SUBCUTANEOUS at 21:30

## 2020-01-01 RX ADMIN — AMPICILLIN SODIUM AND SULBACTAM SODIUM 3 G: 2; 1 INJECTION, POWDER, FOR SOLUTION INTRAMUSCULAR; INTRAVENOUS at 11:40

## 2020-01-01 RX ADMIN — MIDODRINE HYDROCHLORIDE 5 MG: 5 TABLET ORAL at 11:55

## 2020-01-01 RX ADMIN — INSULIN GLARGINE 30 UNITS: 100 INJECTION, SOLUTION SUBCUTANEOUS at 21:16

## 2020-01-01 RX ADMIN — APIXABAN 5 MG: 5 TABLET, FILM COATED ORAL at 09:02

## 2020-01-01 RX ADMIN — MAGNESIUM SULFATE HEPTAHYDRATE 2 G: 40 INJECTION, SOLUTION INTRAVENOUS at 09:47

## 2020-01-01 RX ADMIN — AMPICILLIN SODIUM AND SULBACTAM SODIUM 3 G: 2; 1 INJECTION, POWDER, FOR SOLUTION INTRAMUSCULAR; INTRAVENOUS at 17:21

## 2020-01-01 RX ADMIN — AMPICILLIN SODIUM AND SULBACTAM SODIUM 3 G: 2; 1 INJECTION, POWDER, FOR SOLUTION INTRAMUSCULAR; INTRAVENOUS at 18:04

## 2020-01-01 RX ADMIN — INSULIN LISPRO 4 UNITS: 100 INJECTION, SOLUTION INTRAVENOUS; SUBCUTANEOUS at 21:23

## 2020-01-01 RX ADMIN — MIDODRINE HYDROCHLORIDE 5 MG: 5 TABLET ORAL at 12:44

## 2020-01-01 RX ADMIN — LEVOTHYROXINE SODIUM 112 MCG: 0.11 TABLET ORAL at 05:47

## 2020-01-01 RX ADMIN — INSULIN GLARGINE 30 UNITS: 100 INJECTION, SOLUTION SUBCUTANEOUS at 21:23

## 2020-01-01 RX ADMIN — FAMOTIDINE 20 MG: 40 POWDER, FOR SUSPENSION ORAL at 12:14

## 2020-01-01 RX ADMIN — AMPICILLIN SODIUM AND SULBACTAM SODIUM 3 G: 2; 1 INJECTION, POWDER, FOR SOLUTION INTRAMUSCULAR; INTRAVENOUS at 17:57

## 2020-01-01 RX ADMIN — CLOPIDOGREL BISULFATE 75 MG: 75 TABLET ORAL at 09:14

## 2020-01-01 RX ADMIN — INSULIN LISPRO 4 UNITS: 100 INJECTION, SOLUTION INTRAVENOUS; SUBCUTANEOUS at 12:19

## 2020-01-01 RX ADMIN — AMPICILLIN SODIUM AND SULBACTAM SODIUM 3 G: 2; 1 INJECTION, POWDER, FOR SOLUTION INTRAMUSCULAR; INTRAVENOUS at 00:00

## 2020-01-01 RX ADMIN — AMPICILLIN SODIUM AND SULBACTAM SODIUM 3 G: 2; 1 INJECTION, POWDER, FOR SOLUTION INTRAMUSCULAR; INTRAVENOUS at 12:44

## 2020-01-01 RX ADMIN — Medication 10 ML: at 14:13

## 2020-01-01 RX ADMIN — AMPICILLIN SODIUM AND SULBACTAM SODIUM 3 G: 2; 1 INJECTION, POWDER, FOR SOLUTION INTRAMUSCULAR; INTRAVENOUS at 17:15

## 2020-01-01 RX ADMIN — AMPICILLIN SODIUM AND SULBACTAM SODIUM 3 G: 2; 1 INJECTION, POWDER, FOR SOLUTION INTRAMUSCULAR; INTRAVENOUS at 23:30

## 2020-01-01 RX ADMIN — INSULIN LISPRO 6 UNITS: 100 INJECTION, SOLUTION INTRAVENOUS; SUBCUTANEOUS at 11:44

## 2020-01-01 RX ADMIN — TUBERCULIN PURIFIED PROTEIN DERIVATIVE 5 UNITS: 5 INJECTION, SOLUTION INTRADERMAL at 14:12

## 2020-01-01 RX ADMIN — DEXTROSE MONOHYDRATE AND SODIUM CHLORIDE 100 ML/HR: 5; .45 INJECTION, SOLUTION INTRAVENOUS at 00:02

## 2020-01-01 RX ADMIN — DOXYCYCLINE HYCLATE 100 MG: 100 CAPSULE ORAL at 09:14

## 2020-01-01 RX ADMIN — AMPICILLIN SODIUM AND SULBACTAM SODIUM 3 G: 2; 1 INJECTION, POWDER, FOR SOLUTION INTRAMUSCULAR; INTRAVENOUS at 17:24

## 2020-01-01 RX ADMIN — BARIUM SULFATE 45 ML: 980 POWDER, FOR SUSPENSION ORAL at 11:20

## 2020-01-01 RX ADMIN — POLYETHYLENE GLYCOL 3350 17 G: 17 POWDER, FOR SOLUTION ORAL at 09:22

## 2020-01-01 RX ADMIN — AMPICILLIN SODIUM AND SULBACTAM SODIUM 3 G: 2; 1 INJECTION, POWDER, FOR SOLUTION INTRAMUSCULAR; INTRAVENOUS at 05:58

## 2020-01-01 RX ADMIN — ASPIRIN 81 MG: 81 TABLET, CHEWABLE ORAL at 09:22

## 2020-01-01 RX ADMIN — ANASTROZOLE 1 MG: 1 TABLET, COATED ORAL at 09:22

## 2020-01-01 RX ADMIN — Medication 10 ML: at 21:17

## 2020-01-01 RX ADMIN — Medication 10 ML: at 22:20

## 2020-01-01 RX ADMIN — AMPICILLIN SODIUM AND SULBACTAM SODIUM 3 G: 2; 1 INJECTION, POWDER, FOR SOLUTION INTRAMUSCULAR; INTRAVENOUS at 11:56

## 2020-01-01 RX ADMIN — Medication 10 ML: at 14:08

## 2020-01-01 RX ADMIN — MIDODRINE HYDROCHLORIDE 5 MG: 5 TABLET ORAL at 17:57

## 2020-01-01 RX ADMIN — FAMOTIDINE 20 MG: 40 POWDER, FOR SUSPENSION ORAL at 12:44

## 2020-01-01 RX ADMIN — MIDODRINE HYDROCHLORIDE 5 MG: 5 TABLET ORAL at 11:40

## 2020-01-01 RX ADMIN — FAMOTIDINE 20 MG: 40 POWDER, FOR SUSPENSION ORAL at 11:44

## 2020-01-01 RX ADMIN — Medication 10 ML: at 13:54

## 2020-01-01 RX ADMIN — FAMOTIDINE 20 MG: 40 POWDER, FOR SUSPENSION ORAL at 11:02

## 2020-01-01 RX ADMIN — SODIUM CHLORIDE 500 ML: 900 INJECTION, SOLUTION INTRAVENOUS at 08:30

## 2020-01-01 RX ADMIN — AMPICILLIN SODIUM AND SULBACTAM SODIUM 3 G: 2; 1 INJECTION, POWDER, FOR SOLUTION INTRAMUSCULAR; INTRAVENOUS at 05:46

## 2020-01-01 RX ADMIN — AMPICILLIN SODIUM AND SULBACTAM SODIUM 3 G: 2; 1 INJECTION, POWDER, FOR SOLUTION INTRAMUSCULAR; INTRAVENOUS at 14:16

## 2020-01-01 RX ADMIN — AMPICILLIN SODIUM AND SULBACTAM SODIUM 3 G: 2; 1 INJECTION, POWDER, FOR SOLUTION INTRAMUSCULAR; INTRAVENOUS at 05:38

## 2020-01-01 RX ADMIN — APIXABAN 5 MG: 5 TABLET, FILM COATED ORAL at 09:14

## 2020-01-01 RX ADMIN — DOXYCYCLINE HYCLATE 100 MG: 100 CAPSULE ORAL at 21:29

## 2020-01-01 RX ADMIN — Medication 10 ML: at 07:01

## 2020-01-01 RX ADMIN — LEVOTHYROXINE SODIUM 112 MCG: 0.11 TABLET ORAL at 05:33

## 2020-01-01 RX ADMIN — INSULIN LISPRO 2 UNITS: 100 INJECTION, SOLUTION INTRAVENOUS; SUBCUTANEOUS at 22:10

## 2020-01-01 RX ADMIN — INSULIN LISPRO 2 UNITS: 100 INJECTION, SOLUTION INTRAVENOUS; SUBCUTANEOUS at 16:16

## 2020-01-01 RX ADMIN — DOXYCYCLINE HYCLATE 100 MG: 100 CAPSULE ORAL at 21:00

## 2020-01-01 RX ADMIN — POLYETHYLENE GLYCOL 3350 17 G: 17 POWDER, FOR SOLUTION ORAL at 17:00

## 2020-01-01 RX ADMIN — AMPICILLIN SODIUM AND SULBACTAM SODIUM 3 G: 2; 1 INJECTION, POWDER, FOR SOLUTION INTRAMUSCULAR; INTRAVENOUS at 12:09

## 2020-01-01 RX ADMIN — AMPICILLIN SODIUM AND SULBACTAM SODIUM 3 G: 2; 1 INJECTION, POWDER, FOR SOLUTION INTRAMUSCULAR; INTRAVENOUS at 12:07

## 2020-01-01 RX ADMIN — LEVOTHYROXINE SODIUM 112 MCG: 0.11 TABLET ORAL at 05:58

## 2020-01-01 RX ADMIN — MIDODRINE HYDROCHLORIDE 5 MG: 5 TABLET ORAL at 12:20

## 2020-01-01 RX ADMIN — IOPAMIDOL 50 ML: 755 INJECTION, SOLUTION INTRAVENOUS at 11:16

## 2020-01-01 RX ADMIN — INSULIN LISPRO 2 UNITS: 100 INJECTION, SOLUTION INTRAVENOUS; SUBCUTANEOUS at 16:20

## 2020-01-01 RX ADMIN — MIDODRINE HYDROCHLORIDE 5 MG: 5 TABLET ORAL at 11:03

## 2020-01-01 RX ADMIN — DOXYCYCLINE HYCLATE 100 MG: 100 CAPSULE ORAL at 21:34

## 2020-01-01 RX ADMIN — FAMOTIDINE 20 MG: 40 POWDER, FOR SUSPENSION ORAL at 11:56

## 2020-01-01 RX ADMIN — Medication 10 ML: at 13:26

## 2020-01-01 RX ADMIN — SODIUM CHLORIDE 1000 ML: 900 INJECTION, SOLUTION INTRAVENOUS at 09:06

## 2020-01-01 RX ADMIN — PHENYLEPHRINE HYDROCHLORIDE 100 MCG: 10 INJECTION INTRAVENOUS at 12:13

## 2020-01-01 RX ADMIN — Medication 10 ML: at 05:45

## 2020-01-01 RX ADMIN — AMPICILLIN SODIUM AND SULBACTAM SODIUM 3 G: 2; 1 INJECTION, POWDER, FOR SOLUTION INTRAMUSCULAR; INTRAVENOUS at 00:44

## 2020-01-01 RX ADMIN — AMPICILLIN SODIUM AND SULBACTAM SODIUM 3 G: 2; 1 INJECTION, POWDER, FOR SOLUTION INTRAMUSCULAR; INTRAVENOUS at 11:21

## 2020-01-01 RX ADMIN — BARIUM SULFATE 15 ML: 400 PASTE ORAL at 11:18

## 2020-01-01 RX ADMIN — AMPICILLIN SODIUM AND SULBACTAM SODIUM 3 G: 2; 1 INJECTION, POWDER, FOR SOLUTION INTRAMUSCULAR; INTRAVENOUS at 23:47

## 2020-01-01 RX ADMIN — PROPOFOL 40 MG: 10 INJECTION, EMULSION INTRAVENOUS at 12:01

## 2020-01-01 RX ADMIN — MIDODRINE HYDROCHLORIDE 5 MG: 5 TABLET ORAL at 10:04

## 2020-01-01 RX ADMIN — ATORVASTATIN CALCIUM 40 MG: 40 TABLET, FILM COATED ORAL at 22:05

## 2020-01-01 RX ADMIN — ASPIRIN 81 MG: 81 TABLET, CHEWABLE ORAL at 08:47

## 2020-01-01 RX ADMIN — ASPIRIN 81 MG: 81 TABLET, CHEWABLE ORAL at 09:29

## 2020-01-01 RX ADMIN — INSULIN LISPRO 8 UNITS: 100 INJECTION, SOLUTION INTRAVENOUS; SUBCUTANEOUS at 15:48

## 2020-01-01 RX ADMIN — ATORVASTATIN CALCIUM 40 MG: 40 TABLET, FILM COATED ORAL at 21:29

## 2020-01-01 RX ADMIN — APIXABAN 5 MG: 5 TABLET, FILM COATED ORAL at 09:19

## 2020-01-01 RX ADMIN — INSULIN LISPRO 6 UNITS: 100 INJECTION, SOLUTION INTRAVENOUS; SUBCUTANEOUS at 16:52

## 2020-01-01 RX ADMIN — LEVOTHYROXINE SODIUM 112 MCG: 0.11 TABLET ORAL at 06:04

## 2020-01-01 RX ADMIN — INSULIN HUMAN 5 UNITS: 100 INJECTION, SOLUTION PARENTERAL at 09:52

## 2020-01-01 RX ADMIN — AMPICILLIN SODIUM AND SULBACTAM SODIUM 3 G: 2; 1 INJECTION, POWDER, FOR SOLUTION INTRAMUSCULAR; INTRAVENOUS at 23:37

## 2020-01-01 RX ADMIN — DOXYCYCLINE HYCLATE 100 MG: 100 CAPSULE ORAL at 10:04

## 2020-01-01 RX ADMIN — DOXYCYCLINE HYCLATE 100 MG: 100 CAPSULE ORAL at 22:01

## 2020-01-01 RX ADMIN — Medication 10 ML: at 22:05

## 2020-01-01 RX ADMIN — PROPOFOL 140 MCG/KG/MIN: 10 INJECTION, EMULSION INTRAVENOUS at 12:01

## 2020-01-01 RX ADMIN — SODIUM CHLORIDE 500 ML: 0.9 INJECTION, SOLUTION INTRAVENOUS at 07:41

## 2020-01-01 RX ADMIN — AMPICILLIN SODIUM AND SULBACTAM SODIUM 3 G: 2; 1 INJECTION, POWDER, FOR SOLUTION INTRAMUSCULAR; INTRAVENOUS at 23:46

## 2020-01-01 RX ADMIN — MIDODRINE HYDROCHLORIDE 5 MG: 5 TABLET ORAL at 11:20

## 2020-01-01 RX ADMIN — APIXABAN 5 MG: 5 TABLET, FILM COATED ORAL at 10:04

## 2020-01-01 RX ADMIN — CLOPIDOGREL BISULFATE 75 MG: 75 TABLET ORAL at 09:29

## 2020-01-01 RX ADMIN — SODIUM CHLORIDE 100 ML: 900 INJECTION, SOLUTION INTRAVENOUS at 11:16

## 2020-01-01 RX ADMIN — AMPICILLIN SODIUM AND SULBACTAM SODIUM 3 G: 2; 1 INJECTION, POWDER, FOR SOLUTION INTRAMUSCULAR; INTRAVENOUS at 05:34

## 2020-01-01 RX ADMIN — DOXYCYCLINE HYCLATE 100 MG: 100 CAPSULE ORAL at 09:18

## 2020-01-01 RX ADMIN — FAMOTIDINE 40 MG: 20 TABLET, FILM COATED ORAL at 11:21

## 2020-01-01 RX ADMIN — BARIUM SULFATE 15 ML: 400 PASTE ORAL at 11:56

## 2020-01-01 RX ADMIN — AMPICILLIN SODIUM AND SULBACTAM SODIUM 3 G: 2; 1 INJECTION, POWDER, FOR SOLUTION INTRAMUSCULAR; INTRAVENOUS at 05:07

## 2020-01-01 RX ADMIN — Medication 10 ML: at 05:07

## 2020-01-01 RX ADMIN — ANASTROZOLE 1 MG: 1 TABLET, COATED ORAL at 09:19

## 2020-01-01 RX ADMIN — AMPICILLIN SODIUM AND SULBACTAM SODIUM 3 G: 2; 1 INJECTION, POWDER, FOR SOLUTION INTRAMUSCULAR; INTRAVENOUS at 11:02

## 2020-01-01 RX ADMIN — INSULIN LISPRO 2 UNITS: 100 INJECTION, SOLUTION INTRAVENOUS; SUBCUTANEOUS at 17:57

## 2020-01-01 RX ADMIN — INSULIN GLARGINE 30 UNITS: 100 INJECTION, SOLUTION SUBCUTANEOUS at 22:05

## 2020-01-01 RX ADMIN — FAMOTIDINE 20 MG: 40 POWDER, FOR SUSPENSION ORAL at 12:20

## 2020-01-01 RX ADMIN — AMPICILLIN SODIUM AND SULBACTAM SODIUM 3 G: 2; 1 INJECTION, POWDER, FOR SOLUTION INTRAMUSCULAR; INTRAVENOUS at 00:23

## 2020-01-01 RX ADMIN — AMPICILLIN SODIUM AND SULBACTAM SODIUM 3 G: 2; 1 INJECTION, POWDER, FOR SOLUTION INTRAMUSCULAR; INTRAVENOUS at 00:21

## 2020-01-01 RX ADMIN — Medication 10 ML: at 21:30

## 2020-01-01 RX ADMIN — CYCLOBENZAPRINE 10 MG: 10 TABLET, FILM COATED ORAL at 21:33

## 2020-01-01 RX ADMIN — POLYETHYLENE GLYCOL 3350 17 G: 17 POWDER, FOR SOLUTION ORAL at 09:18

## 2020-01-01 RX ADMIN — POLYETHYLENE GLYCOL 3350 17 G: 17 POWDER, FOR SOLUTION ORAL at 09:01

## 2020-01-01 RX ADMIN — POTASSIUM PHOSPHATE, MONOBASIC AND POTASSIUM PHOSPHATE, DIBASIC: 224; 236 INJECTION, SOLUTION, CONCENTRATE INTRAVENOUS at 14:04

## 2020-01-01 RX ADMIN — APIXABAN 5 MG: 5 TABLET, FILM COATED ORAL at 21:00

## 2020-01-01 RX ADMIN — Medication 10 ML: at 05:28

## 2020-01-01 RX ADMIN — INSULIN LISPRO 4 UNITS: 100 INJECTION, SOLUTION INTRAVENOUS; SUBCUTANEOUS at 11:21

## 2020-01-01 RX ADMIN — INSULIN LISPRO 8 UNITS: 100 INJECTION, SOLUTION INTRAVENOUS; SUBCUTANEOUS at 11:56

## 2020-01-01 RX ADMIN — BARIUM SULFATE 30 ML: 400 SUSPENSION ORAL at 11:19

## 2020-01-01 RX ADMIN — APIXABAN 5 MG: 5 TABLET, FILM COATED ORAL at 22:05

## 2020-01-01 RX ADMIN — FAMOTIDINE 20 MG: 40 POWDER, FOR SUSPENSION ORAL at 11:20

## 2020-01-01 RX ADMIN — APIXABAN 5 MG: 5 TABLET, FILM COATED ORAL at 09:29

## 2020-01-01 RX ADMIN — ATORVASTATIN CALCIUM 40 MG: 40 TABLET, FILM COATED ORAL at 21:26

## 2020-01-28 NOTE — ED TRIAGE NOTES
Patient reports falling on Sunday and has right side and back pain. States March 9 has had TIA's and a stroke. States her speech has been slurred x 2 months.

## 2020-01-29 NOTE — ED PROVIDER NOTES
57-year-old lady presents with concerns about a fall that happened this past Sunday and now having some pain in her right ribs. She also says that her speech has been slurred. Family says that is been that way for 2 months and that does not seem to be new. She says she is not sure if she hit her head or not. She says that she did get wedged down by the toilet. No other symptoms. Elements of this note were created using speech recognition software. As such, errors of speech recognition may be present. Past Medical History:  
Diagnosis Date  Actinic keratosis   
 uses Solaraze gel when needed  Arthritis   
 rheumatoid. Orencia infusion every 4 weeks  Asthma   
 none in yrs per pt--- no inhalers per pt  Cancer ([address] melanoma  DJD (degenerative joint disease)  Environmental allergies  Environmental and seasonal allergies 7/25/2019  GERD (gastroesophageal reflux disease)   
 prn med  History of malignant melanoma 1980's  
 on back  History of TIA (transient ischemic attack)  Hypertension   
 controlled with medication  Hypothyroidism  Insulin dependent diabetes mellitus ([address] 1990's Type 2. sqbs avg am--100---- hypo at 73- last A1C= 8.1 on 12/19/17  Kidney stones 4th one at present  Mobitz type 1 second degree AV block EKG today 9/22/16- Dr Penny Sen reviewed----- per pt-- \" been that way my whole life\"-- does not see a cardiologist  
 Obese   
 bmi =39  
 Osteoarthritis 12/22/2016  Osteopenia  PONV (postoperative nausea and vomiting) POV- responds to IV antiemetic  RA (rheumatoid arthritis) ([address] dx--2013  Rheumatoid arthritis involving right knee with negative rheumatoid factor ([address] 5/16/2019  Rosacea  S/P total knee arthroplasty 12/23/2016  Stroke ([address] followed by neurologist    
 Unspecified hypothyroidism   
 hypothyroid. Controlled with Levoxyl Past Surgical History: Procedure Laterality Date  ABDOMEN SURGERY PROC UNLISTED    
 fatty tumor removed- from abd--benign  FRAGMENT KIDNEY STONE/ ESWL    
 HX BREAST BIOPSY Left 06/21/2019  
 stereo bx  HX BREAST LUMPECTOMY Left 7/5/2019 LEFT BREAST LUMPECTOMY WITH NEEDLE  AND SENTINAL NODE/ performed by Blanka Reyes MD at [address] [place] [address]  HX COLONOSCOPY  2008; 2010  HX DILATION AND CURETTAGE  1999  
 and hysteroscopy  HX GYN  1978  
 vaginal cryocautery [facility] Oral Surgery--per pt  still has metal in mouth since 1980's  HX KNEE ARTHROSCOPY Right 2007  HX KNEE REPLACEMENT Left 2008  HX KNEE REPLACEMENT Right 2016 [address], 2003; 2016; 2/6/18  HX MENISCECTOMY Right 2011 Right knee  HX MOHS PROCEDURES Right 2004  HX OTHER SURGICAL  1999  
 lipoma of abdomen  HX OTHER SURGICAL  1984  
 melanoma of back [address] SURGERY  2002 [facility] [place] Family History:  
Problem Relation Age of Onset  Cancer Mother Lung  Arthritis-rheumatoid Mother  Thyroid Disease Mother  Cancer Father   
     Lung; Liver  Diabetes Father  Heart Disease Father  Hypertension Father  Diabetes Brother  SLE Sister  Breast Cancer Other   
     cousins Social History Socioeconomic History  Marital status:  Spouse name: Not on file  Number of children: 5  
 Years of education: Not on file  Highest education level: Not on file Occupational History  Occupation: Clerical- retired Social Needs  Financial resource strain: Not on file  Food insecurity:  
  Worry: Not on file Inability: Not on file  Transportation needs:  
  Medical: Not on file Non-medical: Not on file Tobacco Use  Smoking status: Never Smoker  Smokeless tobacco: Never Used Substance and Sexual Activity  Alcohol use:  Yes  
 Alcohol/week: 1.0 standard drinks Types: 1 Glasses of wine per week Comment: occasionally  Drug use: No  
 Sexual activity: Yes  
  Partners: Male Lifestyle  Physical activity:  
  Days per week: 0 days Minutes per session: 0 min  Stress: Not on file Relationships  Social connections:  
  Talks on phone: Not on file Gets together: Not on file Attends [employer] service: Not on file Active member of club or organization: Not on file Attends meetings of clubs or organizations: Not on file Relationship status: Not on file  Intimate partner violence:  
  Fear of current or ex partner: Not on file Emotionally abused: Not on file Physically abused: Not on file Forced sexual activity: Not on file Other Topics Concern [address] Service Not Asked  Blood Transfusions Not Asked  Caffeine Concern Not Asked  Occupational Exposure Not Asked [employer] Hazards Not Asked  Sleep Concern Not Asked  Stress Concern Not Asked  Weight Concern Not Asked  Special Diet Not Asked  Back Care Not Asked  Exercise Not Asked  Bike Helmet Not Asked  Seat Belt Not Asked  Self-Exams Not Asked Social History Narrative  Not on file ALLERGIES: Other food; Bee sting [sting, bee]; Invokana [canagliflozin]; and Other plant, animal, environmental 
 
Review of Systems Constitutional: Negative for chills, diaphoresis and fever. HENT: Negative for congestion, rhinorrhea and sore throat. Eyes: Negative for redness and visual disturbance. Respiratory: Negative for cough, chest tightness, shortness of breath and wheezing. Cardiovascular: Negative for chest pain and palpitations. Gastrointestinal: Negative for abdominal pain, blood in stool, diarrhea, nausea and vomiting. Endocrine: Negative for polydipsia and polyuria. Genitourinary: Negative for dysuria and hematuria. Musculoskeletal: Negative for arthralgias, myalgias and neck stiffness. Right flank pain Skin: Negative for rash. Allergic/Immunologic: Negative for environmental allergies and food allergies. Neurological: Negative for dizziness, weakness and headaches. Hematological: Negative for adenopathy. Does not bruise/bleed easily. Psychiatric/Behavioral: Negative for confusion and sleep disturbance. The patient is not nervous/anxious. Vitals:  
 01/28/20 1707 01/1955 BP: 105/68 104/57 Pulse: 64 74 Resp: 16 20 Temp: 98.5 °F (36.9 °C) SpO2: 99% 98% Weight: 64.9 kg (143 lb) Height: 5' 1\" (1.549 m) Physical Exam 
Vitals signs and nursing note reviewed. Constitutional:   
   General: She is not in acute distress. Appearance: She is well-developed. She is not toxic-appearing. HENT:  
   Head: Normocephalic and atraumatic. Eyes:  
   General: No scleral icterus. Right eye: No discharge. Left eye: No discharge. Conjunctiva/sclera: Conjunctivae normal.  
   Pupils: Pupils are equal, round, and reactive to light. Neck: Musculoskeletal: Normal range of motion. No neck rigidity. Cardiovascular:  
   Rate and Rhythm: Normal rate and regular rhythm. Heart sounds: Normal heart sounds. Pulmonary:  
   Effort: Pulmonary effort is normal. No respiratory distress. Breath sounds: Normal breath sounds. No wheezing or rales. Comments: Her posterior lateral right ribs with no crepitus Chest:  
   Chest wall: No tenderness. Abdominal:  
   General: Bowel sounds are normal. There is no distension. Palpations: Abdomen is soft. Tenderness: There is no guarding or rebound. Musculoskeletal: Normal range of motion. General: No tenderness. Lymphadenopathy:  
   Cervical: No cervical adenopathy. Skin: 
   General: Skin is warm and dry. Neurological:  
   General: No focal deficit present. Mental Status: She is alert and oriented to person, place, and time. Psychiatric:     
   Mood and Affect: Mood normal.     
   Behavior: Behavior normal.  
 
  
 
MDM Number of Diagnoses or Management Options Diagnosis management comments: I will get a CT scan of her head and her ribs. CT shows 2 rib fractures. I will discharge her home with pain medicine and an incentive spirometer. She says she is having some spasms in her right leg and she requested a muscle relaxer while in the ER. Procedures

## 2020-01-29 NOTE — ED NOTES
I have reviewed discharge instructions with the patient. The patient verbalized understanding. Patient left ED via Discharge Method: wheelchair to Home with , driving pt home. Opportunity for questions and clarification provided. Patient given 1 scripts. To continue your aftercare when you leave the hospital, you may receive an automated call from our care team to check in on how you are doing. This is a free service and part of our promise to provide the best care and service to meet your aftercare needs.  If you have questions, or wish to unsubscribe from this service please call [phone]. Thank you for Choosing our [facility] Emergency Department.

## 2020-01-29 NOTE — DISCHARGE INSTRUCTIONS
Return with any fevers, difficulty breathing, worsening symptoms, or additional concerns. As we discussed you should use the incentive spirometer once or twice an hour to help your lung function. Follow-up with your regular doctor in 2 or 3 days for reevaluation.

## 2020-02-22 NOTE — ED PROVIDER NOTES
66-year-old female who appears much older than her stated age presents for evaluation after a fall yesterday. She is wheelchair-bound after suffering a stroke in March of this past year. Since that time she has had as she tells me several strokes as well as a couple of TIAs. She is also a breast cancer survivor. She states that she was in her wheelchair folding laundry, and she dropped an item. She reached forward in her unlocked wheelchair, lost her balance, and fell forward to the floor. She fell face first, striking her right forehead. She had no loss of consciousness. However she has developed hematoma to the right forehead, small abrasion to the right eyelid, she has occipital pain on the left, she has a large contusion to the right anterior chest, as well she has midline thoracic pain posteriorly, right ankle and foot pain. She takes Plavix daily. She is alert and oriented. Past Medical History:  
Diagnosis Date  Actinic keratosis   
 uses Solaraze gel when needed  Arthritis   
 rheumatoid. Orencia infusion every 4 weeks  Asthma   
 none in yrs per pt--- no inhalers per pt  Cancer ([address] melanoma  DJD (degenerative joint disease)  Environmental allergies  Environmental and seasonal allergies 7/25/2019  GERD (gastroesophageal reflux disease)   
 prn med  History of malignant melanoma 1980's  
 on back  History of TIA (transient ischemic attack)  Hypertension   
 controlled with medication  Hypothyroidism  Insulin dependent diabetes mellitus ([address] 1990's Type 2. sqbs avg am--100---- hypo at 73- last A1C= 8.1 on 12/19/17  Kidney stones 4th one at present  Mobitz type 1 second degree AV block EKG today 9/22/16- Dr Wilber Wilson reviewed----- per pt-- \" been that way my whole life\"-- does not see a cardiologist  
 Obese   
 bmi =39  
 Osteoarthritis 12/22/2016  Osteopenia  PONV (postoperative nausea and vomiting) POV- responds to IV antiemetic  RA (rheumatoid arthritis) ([facility] dx--2013  Rheumatoid arthritis involving right knee with negative rheumatoid factor ([facility] 5/16/2019  Rosacea  S/P total knee arthroplasty 12/23/2016  Stroke ([facility] followed by neurologist    
 Unspecified hypothyroidism   
 hypothyroid. Controlled with Levoxyl  UTI (urinary tract infection) Past Surgical History:  
Procedure Laterality Date  ABDOMEN SURGERY PROC UNLISTED    
 fatty tumor removed- from abd--benign  FRAGMENT KIDNEY STONE/ ESWL    
 HX BREAST BIOPSY Left 06/21/2019  
 stereo bx  HX BREAST LUMPECTOMY Left 7/5/2019 LEFT BREAST LUMPECTOMY WITH NEEDLE  AND SENTINAL NODE/ performed by Gladys Cassidy MD at [address]  HX COLONOSCOPY  2008; 2010  HX DILATION AND CURETTAGE  1999  
 and hysteroscopy  HX GYN  1978  
 vaginal cryocautery [address] Oral Surgery--per pt  still has metal in mouth since 1980's  HX KNEE ARTHROSCOPY Right 2007  HX KNEE REPLACEMENT Left 2008  HX KNEE REPLACEMENT Right 2016 [address], 2003; 2016; 2/6/18  HX MENISCECTOMY Right 2011 Right knee  HX MOHS PROCEDURES Right 2004  HX OTHER SURGICAL  1999  
 lipoma of abdomen  HX OTHER SURGICAL  1984  
 melanoma of back [address] SURGERY  2002 [facility] Family History:  
Problem Relation Age of Onset  Cancer Mother Lung  Arthritis-rheumatoid Mother  Thyroid Disease Mother  Cancer Father   
     Lung; Liver  Diabetes Father  Heart Disease Father  Hypertension Father  Diabetes Brother  SLE Sister  Breast Cancer Other   
     cousins Social History Socioeconomic History  Marital status:  Spouse name: Not on file  Number of children: 5  
 Years of education: Not on file  Highest education level: Not on file Occupational History  Occupation: Clerical- retired Social Needs  Financial resource strain: Not on file  Food insecurity:  
  Worry: Not on file Inability: Not on file  Transportation needs:  
  Medical: Not on file Non-medical: Not on file Tobacco Use  Smoking status: Never Smoker  Smokeless tobacco: Never Used Substance and Sexual Activity  Alcohol use: Yes Alcohol/week: 1.0 standard drinks Types: 1 Glasses of wine per week Comment: occasionally  Drug use: No  
 Sexual activity: Yes  
  Partners: Male Lifestyle  Physical activity:  
  Days per week: 0 days Minutes per session: 0 min  Stress: Not on file Relationships  Social connections:  
  Talks on phone: Not on file Gets together: Not on file Attends [employer] service: Not on file Active member of club or organization: Not on file Attends meetings of clubs or organizations: Not on file Relationship status: Not on file  Intimate partner violence:  
  Fear of current or ex partner: Not on file Emotionally abused: Not on file Physically abused: Not on file Forced sexual activity: Not on file Other Topics Concern [address] Service Not Asked  Blood Transfusions Not Asked  Caffeine Concern Not Asked  Occupational Exposure Not Asked [place] Hazards Not Asked  Sleep Concern Not Asked  Stress Concern Not Asked  Weight Concern Not Asked  Special Diet Not Asked  Back Care Not Asked  Exercise Not Asked  Bike Helmet Not Asked  Seat Belt Not Asked  Self-Exams Not Asked Social History Narrative  Not on file ALLERGIES: Other food; Bee sting [sting, bee]; Invokana [canagliflozin]; and Other plant, animal, environmental 
 
Review of Systems Constitutional: Negative for chills and fever. HENT: Positive for facial swelling. Negative for mouth sores. Eyes: Positive for visual disturbance (chronic double vision since cva in march). Negative for discharge and redness. Respiratory: Negative for shortness of breath and stridor. Cardiovascular: Negative for chest pain and palpitations. Gastrointestinal: Negative for nausea and vomiting. Genitourinary: Negative for difficulty urinating and flank pain. Musculoskeletal: Positive for back pain, gait problem, myalgias and neck pain. Skin: Positive for color change. Negative for rash. Neurological: Positive for facial asymmetry (chronic), weakness (chronic on right secondary to cva) and headaches (ongoing this past week, worse now after fall). Negative for syncope. Psychiatric/Behavioral: Negative for confusion and decreased concentration. Vitals:  
 02/22/20 1335 Pulse: 66 Resp: 16 Temp: 98.1 °F (36.7 °C) SpO2: 100% Weight: 65.8 kg (145 lb) Physical Exam 
Vitals signs and nursing note reviewed. Constitutional:   
   General: She is not in acute distress. HENT:  
   Head: Abrasion present. No raccoon eyes or Mayfield's sign. Jaw: There is normal jaw occlusion. No trismus. Right Ear: External ear normal.  
   Left Ear: External ear normal.  
   Nose: Nose normal.  
   Mouth/Throat:  
   Mouth: Mucous membranes are moist.  
Eyes:  
   Extraocular Movements: Extraocular movements intact. Conjunctiva/sclera: Conjunctivae normal.  
   Pupils: Pupils are equal, round, and reactive to light. Neck: Musculoskeletal: Normal range of motion. Normal range of motion. Muscular tenderness present. No neck rigidity. Cardiovascular:  
   Rate and Rhythm: Regular rhythm. Heart sounds: Normal heart sounds. Pulmonary:  
   Effort: Pulmonary effort is normal.  
   Breath sounds: Normal breath sounds. Chest:  
   Chest wall: Tenderness present. Abdominal:  
   Palpations: Abdomen is soft. Comments: No pain with pelvic rock Musculoskeletal: General: Swelling and tenderness present. Back: 
 
     Feet: 
 
Skin: 
   General: Skin is warm and dry. Capillary Refill: Capillary refill takes less than 2 seconds. Neurological:  
   Mental Status: She is alert and oriented to person, place, and time. Mental status is at baseline. Comments: Margot, eoms intact. No nystagmus. Mild slurred speech w/facial asymmetry with left lip droop she states chronic from her old cva.  very strong and equal bilaterally. Right leg weak but maintains gross motor function.  states this is chronic change as well. Follows commands with ease. Purposeful mvt all extremities. Psychiatric:     
   Mood and Affect: Mood normal.     
   Behavior: Behavior normal.     
   Thought Content: Thought content normal.     
   Judgment: Judgment normal.  
 
  
 
MDM Number of Diagnoses or Management Options Diagnosis management comments: Will xray and ct as indicated 3:51 PM  No fractures noted on xray. I have reviewed w pt and . She has Voltaren gel at home. Sill rsx for Lidoderm patches, ace wrap. Follow with family md Monday, Amount and/or Complexity of Data Reviewed Tests in the radiology section of CPT®: ordered and reviewed Risk of Complications, Morbidity, and/or Mortality Presenting problems: minimal 
Diagnostic procedures: minimal 
Management options: minimal 
 
Patient Progress Patient progress: stable Procedures

## 2020-02-22 NOTE — DISCHARGE INSTRUCTIONS
Patient Education        Learning About a Closed Head Injury  What is a closed head injury? A closed head injury happens when your head gets hit hard. The strong force of the blow causes your brain to shake in your skull. This movement can cause the brain to bruise, swell, or tear. Sometimes nerves or blood vessels also get damaged. This can cause bleeding in or around the brain. A concussion is a type of closed head injury. What are the symptoms? If you have a mild concussion, you may have a mild headache or feel \"not quite right. \" These symptoms are common. They usually go away over a few days to 4 weeks. But sometimes after a concussion, you feel like you can't function as well as before the injury. And you have new symptoms. This is called postconcussive syndrome. You may:  · Find it harder to solve problems, think, concentrate, or remember. · Have headaches. · Have changes in your sleep patterns, such as not being able to sleep or sleeping all the time. · Have changes in your personality. · Not be interested in your usual activities. · Feel angry or anxious without a clear reason. · Lose your sense of taste or smell. · Be dizzy, lightheaded, or unsteady. It may be hard to stand or walk. How is a closed head injury treated? Any person who may have a concussion needs to see a doctor. Some people have to stay in the hospital to be watched. Others can go home safely. If you go home, follow your doctor's instructions. He or she will tell you if you need someone to watch you closely for the next 24 hours or longer. Rest is the best treatment. Get plenty of sleep at night. And try to rest during the day. · Avoid activities that are physically or mentally demanding. These include housework, exercise, and schoolwork. And don't play video games, send text messages, or use the computer. You may need to change your school or work schedule to be able to avoid these activities.   · Ask your doctor when it's okay to drive, ride a bike, or operate machinery. · Take an over-the-counter pain medicine, such as acetaminophen (Tylenol), ibuprofen (Advil, Motrin), or naproxen (Aleve). Be safe with medicines. Read and follow all instructions on the label. · Check with your doctor before you use any other medicines for pain. · Do not drink alcohol or use illegal drugs. They can slow recovery. They can also increase your risk of getting a second head injury. Follow-up care is a key part of your treatment and safety. Be sure to make and go to all appointments, and call your doctor if you are having problems. It's also a good idea to know your test results and keep a list of the medicines you take. Where can you learn more? Go to http://suri-kristan.info/. Enter E235 in the search box to learn more about \"Learning About a Closed Head Injury. \"  Current as of: March 28, 2019  Content Version: 12.2  © 9733-6306 [employer]. Care instructions adapted under license by [employer] (which disclaims liability or warranty for this information). If you have questions about a medical condition or this instruction, always ask your healthcare professional. [place] [address] any warranty or liability for your use of this information. Patient Education        Learning About RICE (Rest, Ice, Compression, and Elevation)  What is RICE? RICE is a way to care for an injury. RICE helps relieve pain and swelling. It may also help with healing and flexibility. RICE stands for:  · Rest and protect the injured or sore area. · Ice or a cold pack used as soon as possible. · Compression, or wrapping the injured or sore area with an elastic bandage. · Elevation (propping up) the injured or sore area. How do you do RICE? You can use RICE for home treatment when you have general aches and pains or after an injury or surgery.   Rest  · Do not put weight on the injury for at least 24 to 48 hours. · Use crutches for a badly sprained knee or ankle. · Support a sprained wrist, elbow, or shoulder with a sling. Ice  · Put ice or a cold pack on the injury right away to reduce pain and swelling. Frozen vegetables will also work as an ice pack. Put a thin cloth between the ice or cold pack and your skin. The cloth protects the injured area from getting too cold. · Use ice for 10 to 15 minutes at a time for the first 48 to 72 hours. Compression  · Use compression for sprains, strains, and surgeries of the arms and legs. · Wrap the injured area with an elastic bandage or compression sleeve to reduce swelling. · Don't wrap it too tightly. If the area below it feels numb, tingles, or feels cool, loosen the wrap. Elevation  · Use elevation for areas of the body that can be propped up, such as arms and legs. · Prop up the injured area on pillows whenever you use ice. Keep it propped up anytime you sit or lie down. · Try to keep the injured area at or above the level of your heart. This will help reduce swelling and bruising. Where can you learn more? Go to http://suri-kristan.info/. Enter U822 in the search box to learn more about \"Learning About RICE (Rest, Ice, Compression, and Elevation). \"  Current as of: June 26, 2019  Content Version: 12.2  © 3751-6434 [employer]. Care instructions adapted under license by [employer] (which disclaims liability or warranty for this information). If you have questions about a medical condition or this instruction, always ask your healthcare professional. [place] [address] any warranty or liability for your use of this information. Home with family. Get lots of rest, drink lots of fluids. Be sure to follow-up with your family doctor on Monday. Use the Ace wrap for support of your right ankle and foot.   Remember to use ice packs before bed and every few hours throughout the day to ease your pain.  Use your Voltaren gel every 6 hours throughout the day. At night you may use the lidocaine patches. Use 1 patch to the most affected area at night for pain relief to assist in sleeping. Follow-up with your doctor on Monday unless symptoms worsen then return to the emergency department.

## 2020-02-22 NOTE — ED NOTES
I have reviewed discharge instructions with the patient. The patient verbalized understanding. Patient left ED via Discharge Method: wheelchair to Home with ). Opportunity for questions and clarification provided. Patient given 1 scripts. To continue your aftercare when you leave the hospital, you may receive an automated call from our care team to check in on how you are doing. This is a free service and part of our promise to provide the best care and service to meet your aftercare needs.  If you have questions, or wish to unsubscribe from this service please call [phone]. Thank you for Choosing our [facility] Emergency Department.

## 2020-02-24 PROBLEM — N39.41 URGE INCONTINENCE: Status: ACTIVE | Noted: 2020-01-01

## 2020-05-19 PROBLEM — E66.01 MORBID OBESITY (HCC): Chronic | Status: RESOLVED | Noted: 2019-07-31 | Resolved: 2020-01-01

## 2020-05-19 PROBLEM — R11.11 INTRACTABLE VOMITING WITHOUT NAUSEA: Status: ACTIVE | Noted: 2020-01-01

## 2020-05-19 PROBLEM — R63.4 WEIGHT LOSS: Status: ACTIVE | Noted: 2020-01-01

## 2020-06-29 PROBLEM — R00.1 BRADYCARDIA: Status: ACTIVE | Noted: 2020-01-01

## 2020-07-07 PROBLEM — E78.2 MIXED HYPERLIPIDEMIA: Status: ACTIVE | Noted: 2020-01-01

## 2020-07-07 PROBLEM — Z86.73 HISTORY OF STROKE: Status: ACTIVE | Noted: 2020-01-01

## 2020-07-07 PROBLEM — J30.1 NON-SEASONAL ALLERGIC RHINITIS DUE TO POLLEN: Status: ACTIVE | Noted: 2020-01-01

## 2020-07-22 PROBLEM — D75.89 MACROCYTOSIS WITHOUT ANEMIA: Status: ACTIVE | Noted: 2020-01-01

## 2020-09-30 NOTE — PROGRESS NOTES
Charting on behalf of  Rinagrayson Reed who prayed with patient and gave the blessing of the sick. 0935 
9.30.20 Tao Villegas MDiv, [facility]

## 2020-10-07 NOTE — PROGRESS NOTES
[facility] SPEECH LANGUAGE PATHOLOGY: MODIFIED BARIUM SWALLOW 
 
ICD-10: Treatment Diagnosis: Oropharyngeal dysphagia (R13.12) DATE: 2020 REFERRING PHYSICIAN: Dr. Kenney Valdes MD Orders: Modifed Barium Swallow PAST MEDICAL HISTORY:  
Ms. Maribell Singh is a 72 y.o. female who  has a past medical history of Actinic keratosis, Arthritis, Asthma, Breast cancer ([address] (), Cancer ([address] DJD (degenerative joint disease), Environmental allergies, Environmental and seasonal allergies (2019), GERD (gastroesophageal reflux disease), History of malignant melanoma (), History of TIA (transient ischemic attack), Hypertension, Hypothyroidism, Insulin dependent diabetes mellitus (), Kidney stones, Mobitz type 1 second degree AV block, Obese, Osteoarthritis (2016), Osteopenia, PONV (postoperative nausea and vomiting), RA (rheumatoid arthritis) ([address] (--), Rheumatoid arthritis involving right knee with negative rheumatoid factor ([address] (2019), Rosacea, S/P total knee arthroplasty (2016), Stroke ([address] Unspecified hypothyroidism, and UTI (urinary tract infection). She also has no past medical history of Adverse effect of anesthesia, Aneurysm ([address] CAD (coronary artery disease), Chronic kidney disease, Chronic obstructive pulmonary disease (HCC), Chronic pain, Coagulation disorder ([address] Difficult intubation, Endocarditis, Heart failure ([address] Liver disease, Malignant hyperthermia due to anesthesia, Nicotine vapor product user, Non-nicotine vapor product user, Pseudocholinesterase deficiency, Psychiatric disorder, PUD (peptic ulcer disease), Rheumatic fever, Seizures ([address] Sleep apnea, or Thromboembolus ([address]  
She also  has a past surgical history that includes hx refractive surgery (); hx meniscectomy (Right, ); hx colonoscopy (; ); hx mohs procedure (Right, ); hx lap cholecystectomy (); pr abdomen surgery proc unlisted; hx dilation and curettage (); hx  section (1989); hx tubal ligation (1989); hx gyn (1978); hx other surgical (1999); hx other surgical (1984); hx knee arthroscopy (Right, 2007); hx knee replacement (Left, 2008); hx knee replacement (Right, 2016); hx heent (1986); fragment kidney stone/ eswl; hx lithotripsy (1998, 2003; 2016; 2/6/18); hx breast biopsy (Left, 06/21/2019); and hx breast lumpectomy (Left, 7/5/2019). RADIOLOGIST:  Dr. Tierra Mcgill MEDICAL/REFERRING DIAGNOSIS: Dysphagia PRECAUTIONS/ALLERGIES: Other food; Bee sting [sting, bee]; Invokana [canagliflozin]; and Other plant, animal, environmental  
ASSESSMENT/PLAN OF CARE:Based on the objective data described above, Ms. Ugarte presents with moderate oropharyngeal dysphagia characterized by deep laryngeal penetration of thin liquids during swallow with no cough response resulting in possible silent aspiration of residue after swallow. Initially, no laryngeal penetration or aspiration observed with nectar liquids presented by spoon or cup sip. Nonclearing laryngeal penetration observed without aspiration with nectar presented by straw. No new laryngeal penetration or aspiration observed with trials of pudding. Increased oral prep and piecemeal deglutition with deep laryngeal penetration during swallow and moderate vallecular residue after swallow with mixed consistency. Oral holding of cracker for over 45 seconds. Patient presented with nectar liquids by cup to use as liquid rinse with swallow triggered at valleculae after vallecular pooling for 8 seconds. Second presentation of nectar by cup resulted in [address] during the swallow. Subsequent trials of honey-thick liquids were swallowed without difficulty - no laryngeal penetration or aspiration and no pharyngeal residue after swallow. There was adequate laryngeal elevation and hyolaryngeal excursion during all swallows but no epiglottal inversion, possibly due to presence of NGT. Recommend initiate pureed diet and honey-thick liquids. Crush meds in puree. Factors increasing risk for aspiration: debilitation, decreased cognitive function and history of aspiration RECOMMENDATIONS AND PLANNED INTERVENTIONS 
DIET:  
? PO diet texture:  Pureed ? Liquids:  honey MEDICATIONS: Crushed in puree COMPENSATORY STRATEGIES/MODIFICATIONS INCLUDING: 
· Fully awake/alert · Upright for all PO 
· Supervision with all PO 
· Small bites and sips · No straws · Remain upright for 20-30 min after any PO · Slow rate of PO intake · Check mouth for pocketed PO 
· Meticulous oral care OTHER RECOMMENDATIONS (including follow up treatment recommendations): · Treatment to improve/facilitate oral/pharyngeal skills · Training in laryngeal strengthening and coordination exercises · Training in use of compensatory safe swallowing strategies/feeding guidelines · Patient/family education · Follow-up MBS as deemed necessary following therapy Thank you for this referral, 
Monisha Campos MA, CCC-SLP SUBJECTIVE:Patient pleasant and cooperative. She is accompanied by treating SLP. Current dietary status prior to evaluation today:  NPO with NGT Previous Dysphagia: Failed MBS at [facility]; has had liquids thickened to nectar prior to recent hospitalization OBJECTIVE:Orientation:  
? Person Oral Assessment:  Labial: Decreased rate Dentition: Natural and Intact Oral Hygiene: Adequate Lingual: Decreased rate Vocal Quality: Low volume Speech Inteligiblity: Mildly reduced Modified barium swallow study was performed in the radiology suite with Ms. Ugarte in the lateral plane upright 90° in a stretcher and using C-arm. To evaluate her swallow function, barium coated liquid and food was administered in the form of thin liquids (by spoon), nectar-thick liquids (by spoon, cup sip and straw sip), honey-thick liquids (by spoon and cup sip), pudding, mixed consistency and cracker. Oral phase of swallow:  
? prolonged bolus manipulation 
? delayed oral transit 
? reduced posterior propulsion skills ? premature spillage to pharynx pre-swallow ? piecemeal deglutition Pharyngeal phase of swallow: ? Swallows of thin liquids were triggered at pyriform sinuses. There was deep laryngeal penetration observed during the swallow from thin liquids by spoon that did not trigger a cough or throat clear response. There was possible silent aspiration of laryngeal residue after the swallow. ? Swallows of nectar-thick liquids were triggered at pyriform sinuses. No laryngeal penetration or aspiration observed with initial presentations. Final presentation of nectar by cup after solids resulted in silent aspiration during the swallow. No pharyngeal residue after the swallow. ? Swallows of honey-thick liquids were timely. No laryngeal penetration or aspiration was observed. No pharyngeal residue after swallow. ? Swallows of pudding were triggered at valleculae. No laryngeal penetration or aspiration was observed. No pharyngeal residue after swallow. ? Swallows of mixed consistencies were delayed and piecemealed. There was deep laryngeal penetration observed during the swallow from mixed consistency that reached the vocal folds and did not trigger a cough or throat clear response. There was moderate vallecular residue after the swallow. ? Swallows of cracker were delayed and required liquid rinse to elicit swallow. . 
Pharyngeal characteristics: 
? adequate retraction of base of tongue 
? adequate hyolaryngeal elevation/excursion 
? decreased epiglottic inversion 
? adequate constriction of posterior pharyngeal wall 
? decreased laryngeal closure Attempted strategies:  
? clear throat and dry swallow Effective strategies:  
? none Aspiration/Penetration Scale: 8 (Silent aspiration. Contrast passes below the folds/glottis with no effort to eject.) Cervical esophageal phase of swallow: ? adequate and timely clearance of all boluses through cervical esophagus **Distal esophagus not assessed due to limitations of MBS study. ** Assessment only; no treatment provided today. Objective Measure: Tool Used: National Outcomes Measurement System: Functional Communication Measures: SWALLOWING Score:  Initial: 3   
? Interpretation of Tool: This measure describes the change in functional communication status subsequent to speech-language pathology treatment of patients with dysphagia. ? Level 1:  Individual is not able to swallow anything safely by mouth. All nutrition and hydration is received through non-oral means (e.g., nasogastric tube, PEG). ? Level 2: Individual is not able to swallow safely by mouth for nutrition and hydration, but may take some consistency with consistent maximal cues in therapy only. Alternative method of feeding required. ? Level 3:  Alternative method of feeding required as individual takes less than 50% of nutrition and hydration by mouth, and/or swallowing is safe with consistent use of moderate cues to use compensatory strategies and/or requires maximum diet restriction. ? Level 4:  Swallowing is safe, but usually requires moderate cues to use compensatory strategies, and/or the individual has moderate diet restrictions and/or still requires tube feeding and/or oral supplements. ? Level 5:  Swallowing is safe with minimal diet restriction and/or occasionally requires minimal cueing to use compensatory strategies. The individual may occasionally self-cue. All nutrition and hydration needs are met by mouth at mealtime. ? Level 6:  Swallowing is safe, and the individual eats and drinks independently and may rarely require minimal cueing. The individual usually self-cues when difficulty occurs. May need to avoid specific food items (e.g., popcorn and nuts), or require additional time (due to dysphagia). ? Level 7: The individuals ability to eat independently is not limited by swallow function. Swallowing would be safe and efficient for all consistencies. Compensatory strategies are effectively used when needed. Recommendations for treatment: diet tolerance, trials of nectar in isolation, laryngeal exercises Patient/Family education:  
? Ms. Ugarte was educated on the following topics: anatomy and physiology of the swallowing mechanism and results and recommendations from this assessment. All questions were answered and comprehension of education was expressed. Total Treatment Duration: 
Time In: 1100 Time Out: 1130 Shonna Driver MA, CCC-SLP

## 2020-10-14 NOTE — PROGRESS NOTES
TRANSFER - IN REPORT: 
 
Verbal report received from Deanne Russo RN(name) on [address],  [address]  being received from 4th floor(unit) for ordered procedure Report consisted of patients Situation, Background, Assessment and  
Recommendations(SBAR). Information from the following report(s) SBAR, Kardex, Intake/Output, MAR and Recent Results was reviewed with the receiving nurse. Opportunity for questions and clarification was provided. Assessment completed upon patients arrival to unit and care assumed. Patient to be transported to the GI lab for EGD/PEG insertion scheduled for 1100

## 2020-10-14 NOTE — ANESTHESIA PREPROCEDURE EVALUATION
Relevant Problems No relevant active problems Anesthetic History PONV Review of Systems / Medical History Pertinent labs reviewed Pulmonary Asthma Neuro/Psych CVA TIA (mult, full w/u by neurologist- unclear etiology. Last TIA one week ago. Gets right sided weakness,.) Cardiovascular Hypertension Dysrhythmias (Mobitz 1 AV block) Pacemaker Exercise tolerance: <4 METS Comments: Unremarkable echo 3/19 and stress test in 2018. GI/Hepatic/Renal 
  
GERD: well controlled Renal disease: stones Endo/Other Diabetes: type 2, using insulin Hypothyroidism Obesity and arthritis (RA) Other Findings Comments: Hypoxia, positive COVID test in August, required intubation, vasopressors, subsequent MSSA pneumonia Physical Exam 
 
Airway Mallampati: II 
TM Distance: 4 - 6 cm Neck ROM: normal range of motion Mouth opening: Normal 
 
 Cardiovascular Rhythm: irregular Rate: normal 
 
 
 
 Dental 
No notable dental hx Pulmonary Breath sounds clear to auscultation Abdominal 
GI exam deferred Other Findings Anesthetic Plan ASA: 4 Anesthesia type: total IV anesthesia Induction: Intravenous Anesthetic plan and risks discussed with: Patient

## 2020-10-14 NOTE — PROGRESS NOTES
20F peg placed at 3cm. Sterile technique observed. Dry gauze and paper tape placed over insertion site.

## 2020-10-14 NOTE — OP NOTES
Esophagogastroduodenoscopy DATE of PROCEDURE: 10/14/2020 INDICATION: Feeding Difficulties POSTPROCEDURE DIAGNOSIS: PEG placement MEDICATIONS ADMINISTERED: MAC anesthesia (see anesthesia report) INSTRUMENT:  [id number] PROCEDURE:  After obtaining informed consent and administering antibiotics, the patient was placed in the left lateral position and sedated. The endoscope was advanced under direct vision without difficulty. The esophagus, stomach (including retroflexed views) and duodenum were evaluated. The endoscope was directed toward toward the anterior abd wall. Adequate transillumination is noted in the LUQ. The surgical site is prepped in a sterile manner. Cutaneous and subcutaneous tissues were anesthetized in with 1% lidocaine. The trocar is introduced into the gastric lumen under direct endoscopic guidance. The 20Fr PEG is pulled into position with the external bolster secured at 3cm. The internal bolster is photo documented in the gastric lumen. The patient was taken to the recovery area in stable condition. FINDINGS: 
ESOPHAGUS: Normal 
STOMACH: Normal, 20Fr PEG placed as above DUODENUM: Normal 
 
Estimated blood loss: 0-minimal  
Specimens obtained during procedure: none PLAN: May use PEG for meds and flushes only till re-evaluated in am

## 2020-10-14 NOTE — ROUTINE PROCESS
TRANSFER - OUT REPORT: 
 
Verbal report given to ***(name) on Brand Myles  being transferred to ***(unit) for {TRANSFER CARE:40289} Report consisted of patients Situation, Background, Assessment and  
Recommendations(SBAR). Information from the following report(s) {SBAR REPORTS OSKZ:[id number]} was reviewed with the receiving nurse. Lines:  
Peripheral IV 10/14/20 Posterior;Right Wrist (Active) Site Assessment Clean, dry, & intact 10/14/20 1139 Phlebitis Assessment 0 10/14/20 1139 Infiltration Assessment 0 10/14/20 1139 Dressing Status Clean, dry, & intact 10/14/20 1139 Dressing Type Transparent 10/14/20 1139 Opportunity for questions and clarification was provided. Patient transported with: 
 {TRANSPORTDETAILS:[id number]}

## 2020-10-14 NOTE — ROUTINE PROCESS
TRANSFER - OUT REPORT: 
 
Verbal report given to MINI Gonzales on [address],  [address]  being transferred to [address] [place] [address] for routine progression of care Report consisted of patients Situation, Background, Assessment and  
Recommendations(SBAR). Information from the following report(s) SBAR, Procedure Summary, Intake/Output and MAR was reviewed with the receiving nurse. Lines:  
Peripheral IV 10/14/20 Posterior;Right Wrist (Active) Site Assessment Clean, dry, & intact 10/14/20 1139 Phlebitis Assessment 0 10/14/20 1139 Infiltration Assessment 0 10/14/20 1139 Dressing Status Clean, dry, & intact 10/14/20 1139 Dressing Type Transparent 10/14/20 1139 Opportunity for questions and clarification was provided. Patient transported with: 
 Stir

## 2020-10-14 NOTE — H&P
[facility] H&P (Admission) Date:  10/14/2020 Chief Complaint:  Feeding difficulties Subjective:  
 
History of Present Illness:  Patient is a 72 y.o. female with PMH of COVID pneumonia with respiratory failure , who is being admitted for PEG placement following failed MBS. Her lovenox was held this am 
 
PMH: 
Past Medical History:  
Diagnosis Date  Actinic keratosis   
 uses Solaraze gel when needed  Arthritis   
 rheumatoid. Orencia infusion every 4 weeks  Asthma   
 none in yrs per pt--- no inhalers per pt  Breast cancer ([address] 2019  Cancer ([address] melanoma  DJD (degenerative joint disease)  Environmental allergies  Environmental and seasonal allergies 7/25/2019  GERD (gastroesophageal reflux disease)   
 prn med  History of malignant melanoma 1980's  
 on back  History of TIA (transient ischemic attack)  Hypertension   
 controlled with medication  Hypothyroidism  Insulin dependent diabetes mellitus 1990's Type 2. sqbs avg am--100---- hypo at 73- last A1C= 8.1 on 12/19/17  Kidney stones 4th one at present  Mobitz type 1 second degree AV block EKG today 9/22/16- Dr Zunilda Mukherjee reviewed----- per pt-- \" been that way my whole life\"-- does not see a cardiologist  
 Obese   
 bmi =39  
 Osteoarthritis 12/22/2016  Osteopenia  PONV (postoperative nausea and vomiting) POV- responds to IV antiemetic  RA (rheumatoid arthritis) ([address] dx--2013  Rheumatoid arthritis involving right knee with negative rheumatoid factor ([address] 5/16/2019  Rosacea  S/P total knee arthroplasty 12/23/2016  Stroke ([address] followed by neurologist    
 Unspecified hypothyroidism   
 hypothyroid. Controlled with Levoxyl  UTI (urinary tract infection) PSH: 
Past Surgical History:  
Procedure Laterality Date  ABDOMEN SURGERY PROC UNLISTED    
 fatty tumor removed- from abd--benign  FRAGMENT KIDNEY STONE/ ESWL    
 HX BREAST BIOPSY Left 06/21/2019  
 stereo bx  HX BREAST LUMPECTOMY Left 7/5/2019 LEFT BREAST LUMPECTOMY WITH NEEDLE  AND SENTINAL NODE/ performed by Benji Diego MD at [address] [place] [address]  HX COLONOSCOPY  2008; 2010  HX DILATION AND CURETTAGE  1999  
 and hysteroscopy  HX GYN  1978  
 vaginal cryocautery [facility] Oral Surgery--per pt  still has metal in mouth since 1980's  HX KNEE ARTHROSCOPY Right 2007  HX KNEE REPLACEMENT Left 2008  HX KNEE REPLACEMENT Right 2016 [address], 2003; 2016; 2/6/18  HX MENISCECTOMY Right 2011 Right knee  HX MOHS PROCEDURES Right 2004  HX OTHER SURGICAL  1999  
 lipoma of abdomen  HX OTHER SURGICAL  1984  
 melanoma of back [address] SURGERY  2002 [facility] Allergies: Allergies Allergen Reactions  Other Food Swelling Jalapeno. -- tongue and lip swells  Bee Sting [Sting, Bee] Swelling Local swelling  Invokana [Canagliflozin] Other (comments) Caused frequent Urinary Tract Infections  Other Plant, Animal, Environmental Other (comments) Trees, grass, dust, mold---- congestion Home Medications: 
Prior to Admission medications Medication Sig Start Date End Date Taking? Authorizing Provider  
glucose blood VI test strips (blood glucose test) strip Use as directed- check sugars 3 times a day  Dx- Diabetes E11.9 7/27/20   Jazzy Marino MD  
levothyroxine (SYNTHROID) 112 mcg tablet Take 1 Tab by mouth Daily (before breakfast). 7/20/20   Jazzy Marino MD  
metFORMIN (GLUCOPHAGE) 1,000 mg tablet Take 1 Tab by mouth two (2) times daily (with meals). 7/7/20   Jazzy Marino MD  
lisinopriL (PRINIVIL, ZESTRIL) 10 mg tablet Take 1 Tab by mouth daily.  7/7/20   Jazzy Marino MD  
 pantoprazole (PROTONIX) 40 mg tablet TAKE ONE TABLET BY MOUTH ONCE DAILY. 7/7/20   Alonzo Arnold MD  
famotidine (PEPCID) 40 mg tablet Take 1 Tab by mouth two (2) times a day. 7/7/20   Alonzo Arnold MD  
atorvastatin (LIPITOR) 40 mg tablet Take 1 Tab by mouth daily. 7/7/20   Alonzo Arnold MD  
mirabegron ER (Myrbetriq) 50 mg ER tablet Take 1 Tab by mouth daily. 7/7/20   Alonzo Arnold MD  
clopidogreL (PLAVIX) 75 mg tab TAKE ONE TABLET BY MOUTH ONCE DAILY. 7/7/20   Alonzo Arnold MD  
magnesium oxide (MAG-OX) 400 mg tablet Take 1 Tab by mouth daily. 7/7/20   Alonzo Arnold MD  
loratadine (Claritin) 10 mg tablet Take 1 Tab by mouth daily. 7/7/20   Alonzo Arnold MD  
glipiZIDE (GLUCOTROL) 10 mg tablet TAKE ONE TABLET BY MOUTH TWICE DAILY  Indications: type 2 diabetes mellitus 7/7/20   Alonzo Arnold MD  
aspirin delayed-release 81 mg tablet Take  by mouth daily. Provider, Historical  
ondansetron (ZOFRAN ODT) 8 mg disintegrating tablet Take 1 Tab by mouth every eight (8) hours as needed for Nausea. 5/20/20   Alonzo Arnold MD  
anastrozole (Arimidex) 1 mg tablet Take 1 mg by mouth daily. 5/19/20   Rio Mack MD  
insulin detemir U-100 (LEVEMIR FLEXTOUCH U-100 INSULN) 100 unit/mL (3 mL) inpn INJECT UNDER THE SKIN AS DIRECTED 10-15 UNITS IN THE MORNING AND 60 UNITS AT BEDTIME-- MAX IS 75U IN 24HR 1/16/20   Melanie Stinson NP  
fluticasone propionate (FLONASE ALLERGY RELIEF) 50 mcg/actuation nasal spray 2 Sprays by Both Nostrils route daily. 1/16/20   Melanie Stinson NP  
NOVOFINE 32 32 gauge x 1/4\" ndle Use TID 1/16/20   Melanie Stinson NP  
calcium carbonate (TUMS) 200 mg calcium (500 mg) chew Take 1 Tab by mouth as needed. Indications: heartburn    Provider, Historical  
krill/om-3/dha/epa/phospho/ast (MEGARED OMEGA-3 KRILL OIL PO) Take 1 Cap by mouth daily.     Provider, Historical  
cholecalciferol (VITAMIN D3) 1,000 unit cap Take 1,000 Units by mouth daily. Indications: Prevention of Vitamin D Deficiency    Provider, Historical  
GLUCOSAMINE HCL/CHONDR MERAZ A NA (OSTEO BI-FLEX PO) Take 1 Tab by mouth two (2) times a day. Provider, Historical  
calcium-cholecalciferol, d3, (CALCIUM 600 + D) 600-125 mg-unit tab Take 1 Tab by mouth two (2) times a day. Indications: post-menopausal osteoporosis prevention    Provider, Historical  
 
 
Hospital Medications: 
Current Facility-Administered Medications Medication Dose Route Frequency  ceFAZolin (ANCEF) 2 g/20 mL in sterile water IV syringe  2 g IntraVENous ENDO ONCE Social History: 
Social History Tobacco Use  Smoking status: Never Smoker  Smokeless tobacco: Never Used Substance Use Topics  Alcohol use: Yes Alcohol/week: 1.0 standard drinks Types: 1 Glasses of wine per week Comment: occasionally Pt denies any history of drug use, tattoos, or blood transfusions. Family History: 
Family History Problem Relation Age of Onset  Cancer Mother Lung  Arthritis-rheumatoid Mother  Thyroid Disease Mother  Cancer Father   
     Lung; Liver  Diabetes Father  Heart Disease Father  Hypertension Father  Diabetes Brother  SLE Sister  Breast Cancer Other   
     cousins Review of Systems: A detailed 10 system ROS is obtained, with pertinent positives as listed above. All others are negative. Objective:  
 
Physical Exam: 
Vitals: 
Visit Vitals LMP 06/30/2001 Gen:  Pt is alert, cooperative, no acute distress Skin:  Extremities and face reveal no rashes. HEENT: Sclerae anicteric. Extra-occular muscles are intact. No oral ulcers. No abnormal pigmentation of the lips. The neck is supple. Cardiovascular: Regular rate and rhythm. No murmurs, gallops, or rubs. Respiratory:  Comfortable breathing with no accessory muscle use. Clear breath sounds with no wheezes, rales, or rhonchi. GI:  Abdomen nondistended, soft, and nontender. Normal active bowel sounds. No enlargement of the liver or spleen. No masses palpable. Rectal:  Deferred Musculoskeletal:  No pitting edema of the lower legs. Neurological:  Gross memory appears intact. Patient is alert and oriented. Psychiatric:  Mood appears appropriate with judgement intact. Lymphatic:  No cervical or supraclavicular adenopathy. Laboratory:   
Recent Labs 10/14/20 
0931 10/12/20 
0715 WBC 6.1 7.3 HGB 9.4* 9.7* HCT 31.1* 32.0*  
 294 MCV 93.7 92.5  138  
K 3.6 3.7  102 CO2 31 31 BUN 14 16 CREA 0.35* 0.39* CA 9.2 9.0 MG  --  1.8 * 184* AP 93 112 ALT 14 18 TBILI 0.7 0.7 ALB 2.2* 2.4*  
TP 6.6 6.7 PTP 15.6*  --   
INR 1.2  --   
 
 
Assessment: 
  
 
Active Problems: * No active hospital problems. * 
 
 
Plan: 
  
 
Feeding difficulties - Plan PEG placement

## 2020-10-14 NOTE — ANESTHESIA POSTPROCEDURE EVALUATION
Procedure(s): ESOPHAGOGASTRODUODENOSCOPY (EGD) PERCUTANEOUS ENDOSCOPIC GASTROSTOMY TUBE INSERTION/ BMI 27 ROOM [facility]. 
 
total IV anesthesia Anesthesia Post Evaluation Multimodal analgesia: multimodal analgesia used between 6 hours prior to anesthesia start to PACU discharge Patient location during evaluation: PACU Patient participation: complete - patient participated Level of consciousness: awake and alert Pain management: adequate Airway patency: patent Anesthetic complications: no 
Cardiovascular status: acceptable Respiratory status: acceptable Hydration status: acceptable Post anesthesia nausea and vomiting:  none Final Post Anesthesia Temperature Assessment:  Normothermia (36.0-37.5 degrees C) INITIAL Post-op Vital signs:  
Vitals Value Taken Time /69 10/14/2020 12:45 PM  
Temp Pulse 93 10/14/2020 12:48 PM  
Resp 16 10/14/2020 12:26 PM  
SpO2 98 % 10/14/2020 12:48 PM  
Vitals shown include unvalidated device data.

## 2020-10-14 NOTE — ROUTINE PROCESS
Patient leaving floor via stretcher. VSS and patient awake. She is clean and unsoiled when leaving GI.

## 2020-11-04 PROBLEM — N39.0 UTI (URINARY TRACT INFECTION): Status: ACTIVE | Noted: 2020-01-01

## 2020-11-04 PROBLEM — N17.9 ACUTE RENAL FAILURE (ARF) (HCC): Status: ACTIVE | Noted: 2020-01-01

## 2020-11-04 PROBLEM — E86.0 DEHYDRATION, SEVERE: Status: ACTIVE | Noted: 2020-01-01

## 2020-11-04 PROBLEM — Z93.1 S/P PERCUTANEOUS ENDOSCOPIC GASTROSTOMY (PEG) TUBE PLACEMENT (HCC): Status: ACTIVE | Noted: 2020-01-01

## 2020-11-04 PROBLEM — G93.41 SEPTIC ENCEPHALOPATHY: Status: ACTIVE | Noted: 2020-01-01

## 2020-11-04 PROBLEM — Z95.0 CARDIAC PACEMAKER: Status: ACTIVE | Noted: 2020-01-01

## 2020-11-04 PROBLEM — E87.20 LACTIC ACIDOSIS: Status: ACTIVE | Noted: 2020-01-01

## 2020-11-04 PROBLEM — A41.9 SEVERE SEPSIS WITH ACUTE ORGAN DYSFUNCTION (HCC): Status: ACTIVE | Noted: 2020-01-01

## 2020-11-04 PROBLEM — F01.50 VASCULAR DEMENTIA (HCC): Status: ACTIVE | Noted: 2020-01-01

## 2020-11-04 PROBLEM — J18.9 PNEUMONIA: Status: ACTIVE | Noted: 2020-01-01

## 2020-11-04 PROBLEM — R65.20 SEVERE SEPSIS WITH ACUTE ORGAN DYSFUNCTION (HCC): Status: ACTIVE | Noted: 2020-01-01

## 2020-11-04 PROBLEM — E87.5 HYPERKALEMIA: Status: ACTIVE | Noted: 2020-01-01

## 2020-11-04 PROBLEM — I44.2 COMPLETE HEART BLOCK (HCC): Status: ACTIVE | Noted: 2018-03-28

## 2020-11-04 NOTE — PROGRESS NOTES
11/04/20 1115 Dual Skin Pressure Injury Assessment Dual Skin Pressure Injury Assessment X Second Care Provider (Based on [address] [place] [address]) David Jeffers RN Sacrum  Mid Overall skin is dry & intact. Mid sacral wound upon admission. Wound care consulted. No other skin issues.

## 2020-11-04 NOTE — H&P
HOSPITALIST H&P      NAME:  Jeremiah Ugarte   Age:  72 y.o.  :   1955   MRN:   [id number]    PCP: Mindi Gupta MD    Attending MD: Tim Quintana DO    Treatment Team: Attending Provider: Sabine Cano DO; Primary Nurse: Kaylin Christianson    HPI:     Oziel Bueno is a 72year old CF with a PMH of breath CA, HTN, hypothyroidism, IDDM2, h/o strokes/TIAs with vascular dementia, RA, breast cancer, completed CHB with CPM placement (in 2020), and COVID in 2020 requiring intubation presented to the ER on 20 from [facility] after she was found to be confused and have \"abnormal vital signs. \" HPI taken from previous notes and discussion with ER providers. Apparently the patient was at [facility] in Aug/Sept 2020 with COVID requiring intubation. She recovered but then developed acute GIB with anemia which required admission. She was then sent to [facility] where she was improving until the morning of 20 when she was found confused. Currently the patient is confused and mumbling so will not answer questions appropriately. Denies pain. Denies CP/SOB. Complete ROS done and is as stated in HPI or otherwise negative    Past Medical History:   Diagnosis Date    Actinic keratosis     uses Solaraze gel when needed    Arthritis     rheumatoid.   Orencia infusion every 4 weeks    Asthma     none in yrs per pt--- no inhalers per pt    Breast cancer ([address] 2019    Cancer ([address]     melanoma    DJD (degenerative joint disease)     Environmental allergies     Environmental and seasonal allergies 2019    GERD (gastroesophageal reflux disease)     prn med    History of malignant melanoma 's    on back    History of TIA (transient ischemic attack)     Hypertension     controlled with medication    Hypothyroidism     Insulin dependent diabetes mellitus 's    Type 2. sqbs avg am--100---- hypo at 73- last A1C= 8.1 on 17    Kidney stones     4th one at present    Mobitz type 1 second degree AV block     EKG today 9/22/16- Dr Cynthia Wu reviewed----- per pt-- \" been that way my whole life\"-- does not see a cardiologist    Obese     bmi =39    Osteoarthritis 12/22/2016    Osteopenia     PONV (postoperative nausea and vomiting)     POV- responds to IV antiemetic    RA (rheumatoid arthritis) ([address] dx--2013    Rheumatoid arthritis involving right knee with negative rheumatoid factor ([address] 5/16/2019    Rosacea     S/P total knee arthroplasty 12/23/2016    Stroke [facility])     followed by neurologist      Unspecified hypothyroidism     hypothyroid. Controlled with Levoxyl    UTI (urinary tract infection)         Past Surgical History:   Procedure Laterality Date    ABDOMEN SURGERY PROC UNLISTED      fatty tumor removed- from abd--benign    FRAGMENT KIDNEY STONE/ ESWL      HX BREAST BIOPSY Left 06/21/2019    stereo bx    HX BREAST LUMPECTOMY Left 7/5/2019    LEFT BREAST LUMPECTOMY WITH NEEDLE  AND SENTINAL NODE/ performed by Qasim Desai MD at [address] [facility] [place] [facility] Final HX COLONOSCOPY  2008; 2010    HX [address]    and hysteroscopy    HX GYN  1978    vaginal cryocautery     HX HEENT  1986    Oral Surgery--per pt  still has metal in mouth since 1980's    HX KNEE ARTHROSCOPY Right 2007    HX KNEE REPLACEMENT Left 2008    HX KNEE REPLACEMENT Right 2016    HX LAP CHOLECYSTECTOMY  1993    HX LITHOTRIPSY  1998, 2003; 2016; 2/6/18    HX MENISCECTOMY Right 2011    Right knee    HX MOHS PROCEDURES Right 2004    HX OTHER SURGICAL  1999    lipoma of abdomen    HX OTHER SURGICAL  1984    melanoma of back    HX REFRACTIVE SURGERY  2002    LASIK    HX TUBAL LIGATION  1989        Prior to Admission Medications   Prescriptions Last Dose Informant Patient Reported? Taking? GLUCOSAMINE HCL/CHONDR MERAZ A NA (OSTEO BI-FLEX PO)   Yes No   Sig: Take 1 Tab by mouth two (2) times a day.    NOVOFINE 32 32 gauge x \" ndle   No No   Sig: Use TID   anastrozole (Arimidex) 1 mg tablet   No No   Sig: Take 1 mg by mouth daily. apixaban (ELIQUIS PO)   Yes No   Sig: Take  by mouth. Unknown dose   aspirin delayed-release 81 mg tablet   Yes No   Sig: Take  by mouth daily. atorvastatin (LIPITOR) 40 mg tablet   No No   Sig: Take 1 Tab by mouth daily. calcium carbonate (TUMS) 200 mg calcium (500 mg) chew   Yes No   Sig: Take 1 Tab by mouth as needed. Indications: heartburn   calcium-cholecalciferol, d3, (CALCIUM 600 + D) 600-125 mg-unit tab   Yes No   Sig: Take 1 Tab by mouth two (2) times a day. Indications: post-menopausal osteoporosis prevention   cholecalciferol (VITAMIN D3) 1,000 unit cap   Yes No   Sig: Take 1,000 Units by mouth daily. Indications: Prevention of Vitamin D Deficiency   clopidogreL (PLAVIX) 75 mg tab   No No   Sig: TAKE ONE TABLET BY MOUTH ONCE DAILY. enoxaparin (Lovenox) 80 mg/0.8 mL injection   Yes No   Sig: by SubCUTAneous route. famotidine (PEPCID) 40 mg tablet   No No   Sig: Take 1 Tab by mouth two (2) times a day. fluticasone propionate (FLONASE ALLERGY RELIEF) 50 mcg/actuation nasal spray   No No   Si Sprays by Both Nostrils route daily. glipiZIDE (GLUCOTROL) 10 mg tablet   No No   Sig: TAKE ONE TABLET BY MOUTH TWICE DAILY  Indications: type 2 diabetes mellitus   glucose blood VI test strips (blood glucose test) strip   No No   Sig: Use as directed- check sugars 3 times a day  Dx- Diabetes E11.9   insulin detemir U-100 (LEVEMIR FLEXTOUCH U-100 INSULN) 100 unit/mL (3 mL) inpn   No No   Sig: INJECT UNDER THE SKIN AS DIRECTED 10-15 UNITS IN THE MORNING AND 60 UNITS AT BEDTIME-- MAX IS 75U IN 24HR   krill/om-3/dha/epa/phospho/ast (MEGARED OMEGA-3 KRILL OIL PO)   Yes No   Sig: Take 1 Cap by mouth daily. levothyroxine (SYNTHROID) 112 mcg tablet   No No   Sig: Take 1 Tab by mouth Daily (before breakfast).    lisinopriL (PRINIVIL, ZESTRIL) 10 mg tablet   No No   Sig: Take 1 Tab by mouth daily.   loratadine (Claritin) 10 mg tablet   No No   Sig: Take 1 Tab by mouth daily. magnesium oxide (MAG-OX) 400 mg tablet   No No   Sig: Take 1 Tab by mouth daily. metFORMIN (GLUCOPHAGE) 1,000 mg tablet   No No   Sig: Take 1 Tab by mouth two (2) times daily (with meals). mirabegron ER (Myrbetriq) 50 mg ER tablet   No No   Sig: Take 1 Tab by mouth daily. ondansetron (ZOFRAN ODT) 8 mg disintegrating tablet   No No   Sig: Take 1 Tab by mouth every eight (8) hours as needed for Nausea. pantoprazole (PROTONIX) 40 mg tablet   No No   Sig: TAKE ONE TABLET BY MOUTH ONCE DAILY. Facility-Administered Medications: None       Allergies   Allergen Reactions    Other Food Swelling     Jalapeno. -- tongue and lip swells    Bee Sting [Sting, Bee] Swelling     Local swelling    Invokana [Canagliflozin] Other (comments)     Caused frequent Urinary Tract Infections    Other Plant, Animal, Environmental Other (comments)     Trees, grass, dust, mold---- congestion        Social History     Tobacco Use    Smoking status: Never Smoker    Smokeless tobacco: Never Used   Substance Use Topics    Alcohol use:  Yes     Alcohol/week: 1.0 standard drinks     Types: 1 Glasses of wine per week     Comment: occasionally        Family History   Problem Relation Age of Onset    Cancer Mother         Lung    Arthritis-rheumatoid Mother     Thyroid Disease Mother     Cancer Father         Lung; Liver    Diabetes Father     Heart Disease Father     Hypertension Father     Diabetes Brother     SLE Sister     Breast Cancer Other         cousins        Objective:       Visit Vitals  /74   Pulse (!) 113   Temp 97.6 °F (36.4 °C)   Resp (!) 38   Ht 5' 1\" (1.549 m)   Wt 65.8 kg (145 lb)   LMP 2001   SpO2 95%   BMI 27.40 kg/m²        Temp (24hrs), Av.6 °F (36.4 °C), Min:97.6 °F (36.4 °C), Max:97.6 °F (36.4 °C)      Oxygen Therapy  O2 Sat (%): 95 % (20 0950)  Pulse via Oximetry: 116 beats per minute (11/04/20 0950)  O2 Device: Nasal cannula (11/04/20 0730)  O2 Flow Rate (L/min): 4 l/min (11/04/20 0730)      Physical Exam:    General:    Alert, confused, squirming around bed, picking at lines and gown. Eyes:   PERRLA EOMI Anicteric  Head:   Normocephalic, without obvious abnormality, atraumatic. ENT:  Nares normal. Mouth/lips with dried blood. Lungs:   Scattered rhonchi, no wheezing  Heart:   Tachy, reg rhythm,  no murmur, rub or gallop. No JVD. Abdomen:   Soft, non-tender. Not distended. Bowel sounds normal. PEG tube clean  MSK:  No edema. No clubbing or cyanosis. No deformities/lesions. Skin:     Texture, turgor normal. No rashes or lesions. No Jaundice. Neurologic: Confused, mumbling, no focal deficits   Psychiatry:      Confused, mumbling  Heme/Lymph/Immune: No echymoses or overt signs of bleeding. Lab/Data Review:  Recent Results (from the past 24 hour(s))   CBC WITH AUTOMATED DIFF    Collection Time: 11/04/20  7:40 AM   Result Value Ref Range    WBC 13.8 (H) 4.3 - 11.1 K/uL    RBC 3.85 (L) 4.05 - 5.2 M/uL    HGB 10.7 (L) 11.7 - 15.4 g/dL    HCT 35.6 (L) 35.8 - 46.3 %    MCV 92.5 79.6 - 97.8 FL    MCH 27.8 26.1 - 32.9 PG    MCHC 30.1 (L) 31.4 - 35.0 g/dL    RDW 18.0 (H) 11.9 - 14.6 %    PLATELET 972 997 - 012 K/uL    MPV 11.1 9.4 - 12.3 FL    ABSOLUTE NRBC 0.00 0.0 - 0.2 K/uL    NEUTROPHILS 84 (H) 43 - 78 %    LYMPHOCYTES 7 (L) 13 - 44 %    MONOCYTES 7 4.0 - 12.0 %    EOSINOPHILS 0 (L) 0.5 - 7.8 %    BASOPHILS 1 0.0 - 2.0 %    IMMATURE GRANULOCYTES 1 0.0 - 5.0 %    ABS. NEUTROPHILS 11.6 (H) 1.7 - 8.2 K/UL    ABS. LYMPHOCYTES 1.0 0.5 - 4.6 K/UL    ABS. MONOCYTES 1.0 0.1 - 1.3 K/UL    ABS. EOSINOPHILS 0.0 0.0 - 0.8 K/UL    ABS. BASOPHILS 0.1 0.0 - 0.2 K/UL    ABS. IMM.  GRANS. 0.1 0.0 - 0.5 K/UL    RBC COMMENTS SLIGHT  ANISOCYTOSIS + POIKILOCYTOSIS        WBC COMMENTS Result Confirmed By Smear      PLATELET COMMENTS ADEQUATE      DF AUTOMATED     METABOLIC PANEL, COMPREHENSIVE    Collection Time: 11/04/20  7:40 AM   Result Value Ref Range    Sodium 141 136 - 145 mmol/L    Potassium 5.6 (H) 3.5 - 5.1 mmol/L    Chloride 108 (H) 98 - 107 mmol/L    CO2 24 21 - 32 mmol/L    Anion gap 9 7 - 16 mmol/L    Glucose 485 (HH) 65 - 100 mg/dL    BUN 87 (H) 8 - 23 MG/DL    Creatinine 2.66 (H) 0.6 - 1.0 MG/DL    GFR est AA 23 (L) >60 ml/min/1.73m2    GFR est non-AA 19 (L) >60 ml/min/1.73m2    Calcium 9.7 8.3 - 10.4 MG/DL    Bilirubin, total 0.6 0.2 - 1.1 MG/DL    ALT (SGPT) 10 (L) 12 - 65 U/L    AST (SGOT) 22 15 - 37 U/L    Alk.  phosphatase 117 50 - 136 U/L    Protein, total 7.1 6.3 - 8.2 g/dL    Albumin 2.2 (L) 3.2 - 4.6 g/dL    Globulin 4.9 (H) 2.3 - 3.5 g/dL    A-G Ratio 0.4 (L) 1.2 - 3.5     PROCALCITONIN    Collection Time: 11/04/20  7:40 AM   Result Value Ref Range    Procalcitonin 0.95 ng/mL   MAGNESIUM    Collection Time: 11/04/20  7:40 AM   Result Value Ref Range    Magnesium 2.7 (H) 1.8 - 2.4 mg/dL   LACTIC ACID    Collection Time: 11/04/20  7:40 AM   Result Value Ref Range    Lactic acid 4.5 (HH) 0.4 - 2.0 MMOL/L   CULTURE, BLOOD    Collection Time: 11/04/20  7:55 AM    Specimen: Blood   Result Value Ref Range    Special Requests: RIGHT  Antecubital        Culture result: PENDING    POC G3    Collection Time: 11/04/20  8:42 AM   Result Value Ref Range    Device: NASAL CANNULA      FIO2 (POC) 32 %    pH (POC) 7.45 7.35 - 7.45      pCO2 (POC) 32.8 (L) 35 - 45 MMHG    pO2 (POC) 114 (H) 75 - 100 MMHG    HCO3 (POC) 23.0 22 - 26 MMOL/L    sO2 (POC) 99 (H) 95 - 98 %    Base excess (POC) 0 mmol/L    Allens test (POC) YES      Site LEFT RADIAL      Specimen type (POC) ARTERIAL      Performed by [email or web address]     CO2, POC 24 MMOL/L    Flow rate (POC) 3.000 L/min    COLLECT TIME 837     URINE MICROSCOPIC    Collection Time: 11/04/20  8:49 AM   Result Value Ref Range    WBC >100 0 /hpf    RBC >100 0 /hpf    Epithelial cells 0-3 0 /hpf    Bacteria 3+ (H) 0 /hpf    Casts 0 0 /lpf    Crystals, urine 0 0 /LPF    Mucus 0 0 /lpf    Yeast OCCASIONAL      Other observations RESULTS VERIFIED MANUALLY     GLUCOSE, POC    Collection Time: 11/04/20  9:47 AM   Result Value Ref Range    Glucose (POC) 349 (H) 65 - 100 mg/dL       Imaging:  Xr Chest Port    Result Date: 11/4/2020  IMPRESSION:  Persistent lung infiltrates without significant change. Cultures:   All Micro Results     Procedure Component Value Units Date/Time    CULTURE, URINE [[id number]]     Order Status:  Sent Specimen:  Urine from Clean catch     CULTURE, BLOOD [[id number]] Collected:  11/04/20 0755    Order Status:  Completed Specimen:  Blood Updated:  11/04/20 0815     Special Requests: --        RIGHT  Antecubital       Culture result: PENDING    CULTURE, BLOOD [[id number]]     Order Status:  Sent Specimen:  Blood           Assessment/Plan:     Principal Problem:    Severe sepsis with acute organ dysfunction ([facility] (11/4/2020)  - In ER WBC 13.8 +  + RR 38 + UTI/pneumonia + ISRAEL + encephalopathy + lactic acid 4.5  - Likely due to UTI  - Given Vanc + Zosyn in ER --> will change to Unasyn + Doxycycline  - Start normal saline @ 150 ml/hr  - Blood cultures sent in ER  - Check UA + urine culture  - CXR with stable infiltrates  - Procalcitonin 0.95  - Lactic Acid 4.5 --> will trend    Active Problems:    Acute renal failure (ARF) ([facility] (11/4/2020)  - Likely due to profound dehydration  - Creatinine 2.66 (baseline 0.40)  - BUN/Cr >20 indicating prerenal  - Start normal saline @ 150 ml/hr  - Check UA  - Check urine sodium  - Check urine prot/creat  - Strict I/O      UTI (urinary tract infection) (11/4/2020)  - Urine dipstick leukocyte +  - Start Unasyn  - Check full UA  - Check urine culture      Dehydration, severe (11/4/2020)  - UA with SG >1.030 + ISRAEL  - Likely due to sepsis + hyperglycemia + decreased PO intake  - Start normal saline @ 150 ml/hr  - Strict I/O      Pneumonia (11/4/2020)  - Had COVID in 8/2020 --> since tested negative  - CXR unchanged with RUL and LLL infiltrate  - Was on Cipro + Doxy as outpatient  - Will change to Unasyn to cover aspiration  - Continue Doxycycline  - No SOB or cough per patient      Lactic acidosis (11/4/2020)  - Due to #1  - Lactic Acid 4.5  - Start normal saline @ 150 ml/hr  - Repeat lactic in 4 hours      Hyperkalemia (11/4/2020)  - Due to ISRAEL  - Start normal saline @ 150 ml/hr  - EKG paced, no overt changes  - Repeat BMP this afternoon  - Check BMP daily      Septic encephalopathy (11/4/2020)  - Very confused and hard to understand speech  - No focal neurological deficits  - Likely due to UTI and sepsis + uremia  - Start Unasyn  - Continue Doxycycline  - Monitor for improvement      Type 2 diabetes mellitus without complication, with long-term current use of insulin ([address] (5/16/2019)  - Hyperglycemic on presentation  - Takes Lantus 35U --> decrease to 30U until eating better  - Add Humalog SSI  - Hold PO meds      S/P percutaneous endoscopic gastrostomy (PEG) tube placement ([address] (11/4/2020)  - PEG placed 10/14/2020  - Site looks clean and normal      Hypothyroidism ()  - Continue Levothyroxine  - Check TFTs      RA (rheumatoid arthritis) (HCC) ()      Gastroesophageal reflux disease without esophagitis ()  - Continue Pepcid      Complete heart block ([address] (3/28/2018)  - S/P CPM in 9/2020      Malignant neoplasm of lower-outer quadrant of left breast of female, estrogen receptor positive ([address] (7/1/2019)  - Continue Anastrozole      Vascular dementia ([address] (11/4/2020)  - Continue ASA/Plavix      Cardiac pacemaker (11/4/2020)  - S/P CPM in 9/2020      Mixed hyperlipidemia (7/7/2020)  - Continue Lipitor      History of stroke (7/7/2020)  - Continue ASA/Plavix      Code Status: FULL CODE  DVT Prophylaxis: Eliquis    Anticipated discharge: 3-4 days    Elsie Ache, DO  10:28 AM right normal/left normal

## 2020-11-04 NOTE — PROGRESS NOTES
Charting on behalf of Fr. Izabel Dupont who prayed with patient and gave the blessing of the sick. Pt is currently in the emergency department.

## 2020-11-04 NOTE — ED PROVIDER NOTES
44-year-old lady presents with concerns from the nursing home about altered mental status and confusion. Nursing home reported to EMS that she is normally more conversant and alert than what she is currently displaying. They are unaware of any specific fevers no known vomiting or diarrhea. I am unable to get any direct history from the patient due to her mental status changes. Review of her MAR indicates that she may have recently had a GI bleed but currently she appears to be on Eliquis. She is on Rocephin and doxycycline daily for previous pneumonia. Elements of this note were created using speech recognition software. As such, errors of speech recognition may be present. Past Medical History:   Diagnosis Date    Actinic keratosis     uses Solaraze gel when needed    Arthritis     rheumatoid.   Orencia infusion every 4 weeks    Asthma     none in yrs per pt--- no inhalers per pt    Breast cancer ([address] 2019    Cancer ([address]     melanoma    DJD (degenerative joint disease)     Environmental allergies     Environmental and seasonal allergies 7/25/2019    GERD (gastroesophageal reflux disease)     prn med    History of malignant melanoma 1980's    on back    History of TIA (transient ischemic attack)     Hypertension     controlled with medication    Hypothyroidism     Insulin dependent diabetes mellitus 1990's    Type 2. sqbs avg am--100---- hypo at 73- last A1C= 8.1 on 12/19/17    Kidney stones     4th one at present    Mobitz type 1 second degree AV block     EKG today 9/22/16- Dr Seth Fitzgerald reviewed----- per pt-- \" been that way my whole life\"-- does not see a cardiologist    Obese     bmi =39    Osteoarthritis 12/22/2016    Osteopenia     PONV (postoperative nausea and vomiting)     POV- responds to IV antiemetic    RA (rheumatoid arthritis) ([address] dx--2013    Rheumatoid arthritis involving right knee with negative rheumatoid factor ([address] 5/16/2019    Rosacea     S/P total knee arthroplasty 12/23/2016    Stroke [facility])     followed by neurologist      Unspecified hypothyroidism     hypothyroid.   Controlled with Levoxyl    UTI (urinary tract infection)        Past Surgical History:   Procedure Laterality Date    ABDOMEN SURGERY PROC UNLISTED      fatty tumor removed- from abd--benign    FRAGMENT KIDNEY STONE/ ESWL      HX BREAST BIOPSY Left 06/21/2019    stereo bx    HX BREAST LUMPECTOMY Left 7/5/2019    LEFT BREAST LUMPECTOMY WITH NEEDLE  AND SENTINAL NODE/ performed by Julio César Last MD at [address] [facility] [place]. [address] [place] Final HX COLONOSCOPY  2008; 2010    HX DILATION AND CURETTAGE  1999    and hysteroscopy    HX GYN  1978    vaginal cryocautery     HX HEENT  1986    Oral Surgery--per pt  still has metal in mouth since 18's    HX KNEE ARTHROSCOPY Right 2007    HX KNEE REPLACEMENT Left 2008    HX KNEE REPLACEMENT Right 2016    HX LAP CHOLECYSTECTOMY  1993    HX LITHOTRIPSY  1998, 2003; 2016; 2/6/18    HX MENISCECTOMY Right 2011    Right knee    HX MOHS PROCEDURES Right 2004    HX OTHER SURGICAL  1999    lipoma of abdomen    HX OTHER SURGICAL  1984    melanoma of back    HX REFRACTIVE SURGERY  2002    LASIK    HX TUBAL LIGATION  1989         Family History:   Problem Relation Age of Onset    Cancer Mother         Lung    Arthritis-rheumatoid Mother     Thyroid Disease Mother     Cancer Father         Lung; Liver    Diabetes Father     Heart Disease Father     Hypertension Father     Diabetes Brother     SLE Sister     Breast Cancer Other         cousins       Social History     Socioeconomic History    Marital status:      Spouse name: Not on file    Number of children: 11    Years of education: Not on file    Highest education level: Not on file   Occupational History    Occupation: Clerical- retired   Social Needs    Financial resource strain: Not on file    Food insecurity     Worry: Not on file     Inability: Not on file   [facility] needs     Medical: Not on file     Non-medical: Not on file   Tobacco Use    Smoking status: Never Smoker    Smokeless tobacco: Never Used   Substance and Sexual Activity    Alcohol use: Yes     Alcohol/week: 1.0 standard drinks     Types: 1 Glasses of wine per week     Comment: occasionally    Drug use: No    Sexual activity: Yes     Partners: Male   Lifestyle    Physical activity     Days per week: 0 days     Minutes per session: 0 min    Stress: Not on file   Relationships    Social connections     Talks on phone: Not on file     Gets together: Not on file     Attends Anabaptism service: Not on file     Active member of club or organization: Not on file     Attends meetings of clubs or organizations: Not on file     Relationship status: Not on file    Intimate partner violence     Fear of current or ex partner: Not on file     Emotionally abused: Not on file     Physically abused: Not on file     Forced sexual activity: Not on file   Other Topics Concern     Service Not Asked    Blood Transfusions Not Asked    Caffeine Concern Not Asked    Occupational Exposure Not Asked   Preet Cutting Hazards Not Asked    Sleep Concern Not Asked    Stress Concern Not Asked    Weight Concern Not Asked    Special Diet Not Asked    Back Care Not Asked    Exercise Not Asked    Bike Helmet Not Asked    Seat Belt Not Asked    Self-Exams Not Asked   Social History Narrative    Not on file         ALLERGIES: Other food; Bee sting [sting, bee]; Invokana [canagliflozin]; and Other plant, animal, environmental    Review of Systems   Unable to perform ROS: Mental status change       Vitals:    11/04/20 0730   BP: (!) 153/83   Pulse: (!) 118   Resp: (!) 38   SpO2: 98%   Weight: 65.8 kg (145 lb)   Height: 5' 1\" (1.549 m)            Physical Exam  Vitals signs and nursing note reviewed. Constitutional:       Comments: Patient is awake but she is not able to communicate coherently.   Not sure how much of that is her baseline and how much of it is acute altered mental status. HENT:      Head: Normocephalic and atraumatic. Eyes:      General:         Right eye: No discharge. Left eye: No discharge. Cardiovascular:      Comments: Tachycardic with a regular rhythm  Pulmonary:      Comments: Apnea with a moderately increased work of breathing    No obvious rhonchi or rales  Abdominal:      General: Bowel sounds are normal.      Palpations: Abdomen is soft. Musculoskeletal:         General: No swelling or signs of injury. Skin:     General: Skin is warm. Findings: No rash. MDM  Number of Diagnoses or Management Options  Acute kidney injury [facility]):   Septic shock [facility]):   Diagnosis management comments: I will initiate a sepsis evaluation. Potentially she could also be very dehydrated as there was very little urine in her [place]. Received 700 mL bolus from EMS. CRITICAL CARE Documentation: This patient is critically ill and there is a high probability of of imminent or life threatening deterioration in the patient's condition without immediate management. The nature of the patient's clinical problem is: Septic shock, acute kidney injury. I have spent 30 minutes in direct patient care, documentation, review of labs/xrays/old records, discussion with patient and hospitalist.     The time involved in the performance of separately reportable procedures was not counted toward critical care time. Ermelinda Ferreira MD; 11/4/2020 @9:36 AM        ED Course as of Nov 04 0935   Wed Nov 04, 2020   1704 Patient's blood work is significantly abnormal with elevated glucose, BUN, creatinine, and white count. Her hemoglobin is up from prior but some of that may be concentration from dehydration. Given the possibility of sepsis I will empirically treat with a dose of Zosyn and given her acute kidney injury I will treat with IV fluids.     [AC]   [id number] Even her significantly abnormal blood work and her likely sepsis I think she would benefit from admission to the hospital.    [AC]      ED Course User Index  [AC] Rula Hernandez MD       Procedures

## 2020-11-04 NOTE — ED TRIAGE NOTES
Pt arrivces per EMS from [address] [facility] on April Kam[address] [facility] 912. EMS called for \"out of wack vitals\". Pt with dementia. NH told EMS \"shes worse then normal\". BGL at [facility] 558. Given 12 units Humalog. BGL 30 mins later 541. EMS found . Pt is type 2. -120 paced, RR 30-40, Sats 96% on 4L by NC, clear lung sounds. Pt has peg tub which she gets meds through as well as can take meds orally. Pt also arrives with indwelling villalba. EMS gave 4mg Zofran when pt began to dry heeve.

## 2020-11-04 NOTE — INTERDISCIPLINARY ROUNDS
Interdisciplinary team rounds were held 11/4/2020 with the following team members:Care Management, Nursing, Occupational Therapy and Physician and the patient and family. Plan of care discussed. See clinical pathway and/or care plan for interventions and desired outcomes. Pt admitted this AM with UTI/Sepsis/AMS/PNA. CXR completed. Covid R/O. On isolation.  Pt was covid + in august.

## 2020-11-04 NOTE — PROGRESS NOTES
TRANSFER - IN REPORT:    Verbal report received from [facility] on [facility]  being received from ED for routine progression of care      Report consisted of patients Situation, Background, Assessment and   Recommendations(SBAR). Information from the following report(s) SBAR was reviewed with the receiving nurse. Opportunity for questions and clarification was provided. Assessment completed upon patients arrival to unit and care assumed.

## 2020-11-04 NOTE — PROGRESS NOTES
SPEECH PATHOLOGY NOTE:    Attempted to see patient for bedside swallowing evaluation. Patient briefly opened eyes and stated name at low volume. Attempted to present puree via spoon, however patient sealing lips shut and closing eyes despite max cueing and multiple attempts. She would briefly open eyes, but would not accept bolus. Per chart, patient with history of significant dysphagia with PEG and po diet of puree/honey thick liquids. Recommend holding po intake until SLP can assess tolerance given altered mental status. Discussed with RN. Will follow up at later time/date.       José Manuel [address] CCC-SLP

## 2020-11-05 NOTE — INTERDISCIPLINARY ROUNDS
Interdisciplinary team rounds were held 11/5/2020 with the following team members:Care Management, Nursing, Occupational Therapy and Physician and the patient and family. Plan of care discussed. See clinical pathway and/or care plan for interventions and desired outcomes. Rapid response this am for hypotension. Covid negative. CT head to be completed today. Pt on eliquis. Silvino to remain at this time.

## 2020-11-05 NOTE — PROGRESS NOTES
Hourly rounds completed with bed in low/locked position and call light in reach. Patient has slept throughout shift, still too lethargic to evaluate oral intake, all oral medications held per speech. Patient does have PEG in place, c/d/i that has not been accessed. Patient is currently resting in bed with no changes this shift will continue to monitor and give report to oncoming RN.

## 2020-11-05 NOTE — PROGRESS NOTES
Physical Therapy Note:    Physical therapy evaluation orders received and chart reviewed. Patient had RR called this AM; apparently less responsive and hypotensive. CT head pending. Will hold for now, continue to follow and re-attempt at a later time/date as schedule permits pending pt medical status.     Thank you,  Bhavya Painting, PT, DPT

## 2020-11-05 NOTE — PROGRESS NOTES
SPEECH PATHOLOGY NOTE:    Rapid response called earlier this AM. Patient sleeping soundly upon arrival. Not appropriate for po trials at this time. Patient with known history of significant oropharyngeal dysphagia with PEG. Recommend patient remain NPO for now. Will follow up at later time/date for bedside swallow evaluation as patient is appropriate.          Nicky Argueta MS, CCC-SLP

## 2020-11-05 NOTE — PROGRESS NOTES
CM contacted patient's daughter August Medeiros, to complete assessment. Demographic information confirmed by the patient's daughter Milagros Irving [phone] and spouse Debbie Ko [phone]. The patient lives with her spouse in two story home with ramp at the entrance. Patient requires assistance with ADL's, per patient's daughter. According the patient's daughter Milagros Irving, the patient appears to be wheelchair bound    DME: Wheelchair. Patient receives her medications from [facility] in [facility]. Spouse voiced no difficulty with obtaining medications in the community. Discharge planning: PT/OT/Speech has been consulted. Per daughter, the patient has a history of STR and [facility] services. Patient also has a history of [employer]. Dispo TBD at this time. Spouse does not wish for the patient to return to [facility]. CM will continue to monitor plan of care and therapy's recommendations. Please consult or notify CM if any needs shall arise. Care Management Interventions  PCP Verified by CM: Yes  Mode of Transport at Discharge: Other (see comment)(TBD: based upon need. )  Transition of Care Consult (CM Consult): Discharge Planning  Discharge Durable Medical Equipment: No(Per daughter, the patient has a wheelchair. )  Physical Therapy Consult: Yes  Occupational Therapy Consult: Yes  Speech Therapy Consult: Yes  Current Support Network: Lives with Spouse, Own Home  Confirm Follow Up Transport: Family  The Plan for Transition of Care is Related to the Following Treatment Goals : Patient to obtain care to become medically stable. Name of the Patient Representative Who was Provided with a Choice of Provider and Agrees with the Discharge Plan: Spouse/Patient's Daughter Milagros Irving.     Resource Information Provided?: No  Discharge Location  Discharge Placement: Unable to determine at this time

## 2020-11-05 NOTE — PROGRESS NOTES
Hospitalist Progress Note    Patient: Katie Oliveira MRN: [id number]  SSN: [id number]    YOB: 1955  Age: 72 y.o. Sex: female      Admit Date: 11/4/2020    LOS: 1 day     Subjective:     72year old CF with a PMH of breath CA, HTN, hypothyroidism, IDDM2, h/o strokes/TIAs with vascular dementia, RA, breast cancer, completed CHB with CPM placement (in 9/2020), and COVID in 8/2020 requiring intubation presented to the ER on 11/4/20 from [facility] after she was found to be confused and have \"abnormal vital signs. \" She was found to have severe sepsis due to a UTI. 11/5 - She was a rapid response this AM due to hypotension. Improved with fluid bolus. She is less responsive this AM, only shaking head and refusing to open eyes. Review of systems negative except stated above. Objective:     Visit Vitals  BP (!) 86/51 Comment: bolus given   Pulse (!) 106   Temp 98 °F (36.7 °C)   Resp 24   Ht 5' 1\" (1.549 m)   Wt 65.8 kg (145 lb)   LMP 06/30/2001   SpO2 100%   BMI 27.40 kg/m²      Oxygen Therapy  O2 Sat (%): 100 % (11/05/20 0814)  Pulse via Oximetry: 100 beats per minute (11/05/20 0814)  O2 Device: Nasal cannula (11/05/20 0814)  O2 Flow Rate (L/min): 2 l/min (11/05/20 0814)      Intake and Output:     Intake/Output Summary (Last 24 hours) at 11/5/2020 0839  Last data filed at 11/5/2020 0457  Gross per 24 hour   Intake 500 ml   Output 3500 ml   Net -3000 ml         Physical Exam:   GENERAL: sleepy, appears comfortable  EYE: conjunctivae/corneas clear. PERRL. THROAT & NECK: normal and no erythema or exudates noted. LUNG: scattered rhonchi  HEART: tachy, reg rhythm, S1S2, no murmur, no JVD  ABDOMEN: soft, non-tender, non-distended. Bowel sounds normal.   EXTREMITIES:  No edema, 2+ pedal/radial pulses bilaterally  SKIN: no rash or abnormalities  NEUROLOGIC: Sleepy. Cranial nerves 2-12 grossly intact.     Lab/Data Review:  Recent Results (from the past 24 hour(s))   POC G3    Collection Time: 11/04/20  8:42 AM   Result Value Ref Range    Device: NASAL CANNULA      FIO2 (POC) 32 %    pH (POC) 7.45 7.35 - 7.45      pCO2 (POC) 32.8 (L) 35 - 45 MMHG    pO2 (POC) 114 (H) 75 - 100 MMHG    HCO3 (POC) 23.0 22 - 26 MMOL/L    sO2 (POC) 99 (H) 95 - 98 %    Base excess (POC) 0 mmol/L    Allens test (POC) YES      Site LEFT RADIAL      Specimen type (POC) ARTERIAL      Performed by [email or web address]     CO2, POC 24 MMOL/L    Flow rate (POC) 3.000 L/min    COLLECT TIME 837     URINE MICROSCOPIC    Collection Time: 11/04/20  8:49 AM   Result Value Ref Range    WBC >100 0 /hpf    RBC >100 0 /hpf    Epithelial cells 0-3 0 /hpf    Bacteria 3+ (H) 0 /hpf    Casts 0 0 /lpf    Crystals, urine 0 0 /LPF    Mucus 0 0 /lpf    Yeast OCCASIONAL      Other observations RESULTS VERIFIED MANUALLY     GLUCOSE, POC    Collection Time: 11/04/20  9:47 AM   Result Value Ref Range    Glucose (POC) 349 (H) 65 - 100 mg/dL   LACTIC ACID    Collection Time: 11/04/20  9:56 AM   Result Value Ref Range    Lactic acid 2.8 (H) 0.4 - 2.0 MMOL/L   URINALYSIS W/ RFLX MICROSCOPIC    Collection Time: 11/04/20 10:30 AM   Result Value Ref Range    Color YELLOW      Appearance TURBID      Specific gravity 1.014 1.001 - 1.023      pH (UA) 7.0 5.0 - 9.0      Protein 100 (A) NEG mg/dL    Glucose 100 mg/dL    Ketone Negative NEG mg/dL    Bilirubin Negative NEG      Blood LARGE (A) NEG      Urobilinogen 0.2 0.2 - 1.0 EU/dL    Nitrites Positive (A) NEG      Leukocyte Esterase LARGE (A) NEG      WBC >100 0 /hpf    RBC 20-50 0 /hpf    Bacteria 2+ (H) 0 /hpf    Yeast OCCASIONAL      Other observations RESULTS VERIFIED MANUALLY     PROTEIN/CREATININE RATIO, URINE    Collection Time: 11/04/20 10:30 AM   Result Value Ref Range    Protein, urine random 170 (H) <11.9 mg/dL    Creatinine, urine <13.00 mg/dL    Protein/Creat. urine Ratio        Unable to perform calculation due to low spot urine.    SODIUM, UR, RANDOM    Collection Time: 11/04/20 10:30 AM   Result Value Ref Range    Sodium,urine random 99 MMOL/L   LACTIC ACID    Collection Time: 11/04/20 11:23 AM   Result Value Ref Range    Lactic acid 3.2 (H) 0.4 - 2.0 MMOL/L   PHOSPHORUS    Collection Time: 11/04/20 11:23 AM   Result Value Ref Range    Phosphorus 4.8 (H) 2.3 - 3.7 MG/DL   TSH 3RD GENERATION    Collection Time: 11/04/20 11:23 AM   Result Value Ref Range    TSH 7.860 (H) 0.358 - 3.740 uIU/mL   T4, FREE    Collection Time: 11/04/20 11:23 AM   Result Value Ref Range    T4, Free 1.1 0.78 - 1.46 NG/DL   SARS-COV-2    Collection Time: 11/04/20 11:46 AM   Result Value Ref Range    Specimen source Nasopharyngeal      COVID-19 rapid test Not detected NOTD      SARS CoV-2 PENDING    CULTURE, URINE    Collection Time: 11/04/20 11:48 AM    Specimen: [place] Specimen; Urine   Result Value Ref Range    Special Requests: NO SPECIAL REQUESTS      Culture result: ORGANISM IN STUDY     GLUCOSE, POC    Collection Time: 11/04/20 11:51 AM   Result Value Ref Range    Glucose (POC) 354 (H) 65 - 100 mg/dL   GLUCOSE, POC    Collection Time: 11/04/20  4:30 PM   Result Value Ref Range    Glucose (POC) 197 (H) 65 - 454 mg/dL   METABOLIC PANEL, BASIC    Collection Time: 11/04/20  6:49 PM   Result Value Ref Range    Sodium 150 (H) 136 - 145 mmol/L    Potassium 4.3 3.5 - 5.1 mmol/L    Chloride 118 (H) 98 - 107 mmol/L    CO2 26 21 - 32 mmol/L    Anion gap 6 (L) 7 - 16 mmol/L    Glucose 167 (H) 65 - 100 mg/dL    BUN 68 (H) 8 - 23 MG/DL    Creatinine 1.50 (H) 0.6 - 1.0 MG/DL    GFR est AA 45 (L) >60 ml/min/1.73m2    GFR est non-AA 37 (L) >60 ml/min/1.73m2    Calcium 9.4 8.3 - 10.4 MG/DL   GLUCOSE, POC    Collection Time: 11/04/20  8:43 PM   Result Value Ref Range    Glucose (POC) 245 (H) 65 - 100 mg/dL   RENAL FUNCTION PANEL    Collection Time: 11/05/20  6:32 AM   Result Value Ref Range    Sodium 151 (H) 136 - 145 mmol/L    Potassium 3.9 3.5 - 5.1 mmol/L    Chloride 119 (H) 98 - 107 mmol/L    CO2 29 21 - 32 mmol/L    Anion gap 3 (L) 7 - 16 mmol/L Glucose 80 65 - 100 mg/dL    BUN 52 (H) 8 - 23 MG/DL    Creatinine 0.96 0.6 - 1.0 MG/DL    GFR est AA >60 >60 ml/min/1.73m2    GFR est non-AA >60 >60 ml/min/1.73m2    Calcium 9.4 8.3 - 10.4 MG/DL    Phosphorus 4.2 (H) 2.3 - 3.7 MG/DL    Albumin 1.9 (L) 3.2 - 4.6 g/dL   CBC W/O DIFF    Collection Time: 11/05/20  6:32 AM   Result Value Ref Range    WBC 8.7 4.3 - 11.1 K/uL    RBC 3.22 (L) 4.05 - 5.2 M/uL    HGB 8.9 (L) 11.7 - 15.4 g/dL    HCT 30.2 (L) 35.8 - 46.3 %    MCV 93.8 79.6 - 97.8 FL    MCH 27.6 26.1 - 32.9 PG    MCHC 29.5 (L) 31.4 - 35.0 g/dL    RDW 17.7 (H) 11.9 - 14.6 %    PLATELET 750 787 - 537 K/uL    MPV 10.6 9.4 - 12.3 FL    ABSOLUTE NRBC 0.00 0.0 - 0.2 K/uL   GLUCOSE, POC    Collection Time: 11/05/20  7:15 AM   Result Value Ref Range    Glucose (POC) 85 65 - 100 mg/dL   GLUCOSE, POC    Collection Time: 11/05/20  8:10 AM   Result Value Ref Range    Glucose (POC) 83 65 - 100 mg/dL       SARS-CoV-2 Lab Results  \"Novel Coronavirus\" Test: No results found for: COV2NT   \"Emergent Disease\" Test: No results found for: EDPR  \"SARS-COV-2\" Test: No results found for: XGCOVT  \"Precision Labs\" Test: No results found for: RSLT  Rapid Test:   Lab Results   Component Value Date/Time    COVR Not detected 11/04/2020 11:46 AM           Imaging:  Xr Chest Sngl V    Result Date: 10/16/2020  Clinical History: The Female patient is 72years old  presenting with symptoms of deminished breath sounds on left. Comparison:  Chest x-ray 9/29/2020 Findings:  Frontal view of the chest was obtained. There is improving aeration over prior exam with resolving perihilar infiltrates. Coarse interstitial lung markings persist particularly at the left lung base. The cardiomediastinal silhouette is within normal limits. There are no acute osseous abnormalities. A left subclavian pacing device is demonstrated. The feeding tube has been removed over the interval.     Impression:  1.  Resolved pulmonary edema, however with persistent coarse interstitial lung markings particularly at left lung base. CPT code(s) 37303     Xr Swallow Func Video    Result Date: 10/7/2020  Exam: Modified Barium Swallow INDICATION: Oropharyngeal dysphagia. Fluoro time: 2 minutes 19 seconds. Spot films:  0 Fluoroscopy was used to evaluate pharyngeal function while the patient was given barium solutions and barium coated solids. FINDINGS: Patient with aspiration with thickened liquids and laryngeal penetration with thin liquids. IMPRESSION: Patient with aspiration with thickened liquids. Please see full report from speech language pathologist.     Xr Chest Port    Result Date: 11/4/2020  CHEST X-RAY, one view. HISTORY:  Altered mental status. TECHNIQUE:  AP portable semiupright view. COMPARISON: 16 October. FINDINGS: Lungs: Right upper lung and left lower lung zone infiltrates. Costophrenic angles: Fairly sharp. Heart size: Normal for portable technique. Pulmonary vasculature: is unremarkable. Aorta: Unremarkable. Included portion of the upper abdomen: is unremarkable. Bones: No gross bony lesions. Other: Left-sided cardiac pacemaker is present     IMPRESSION:  Persistent lung infiltrates without significant change. No results found for this visit on 11/04/20. Cultures:   All Micro Results     Procedure Component Value Units Date/Time    CULTURE, BLOOD [[id number]] Collected:  11/04/20 0755    Order Status:  Completed Specimen:  Blood Updated:  11/05/20 0767     Special Requests: --        RIGHT  Antecubital       Culture result: NO GROWTH AFTER 23 HOURS       CULTURE, URINE [[id number]] Collected:  11/04/20 1148    Order Status:  Completed Specimen:  Urine from [place] Specimen Updated:  11/05/20 0658     Special Requests: NO SPECIAL REQUESTS        Culture result: ORGANISM IN STUDY       CULTURE, BLOOD [[id number]] Collected:  11/04/20 0745    Order Status:  Canceled Specimen:  Blood           Assessment/Plan:     Principal Problem:    Severe sepsis with acute organ dysfunction ([facility] (11/4/2020)  - In ER WBC 13.8 +  + RR 38 + UTI/pneumonia + ISRAEL + encephalopathy + lactic acid 4.5  - Resolving  - Likely due to UTI  - Continue Unasyn + Doxycycline  - Stop normal saline @ 150 ml/hr  - Blood cultures sent in ER - NGTD  - UA consistent with UTI  - Urine culture pending  - CXR with stable infiltrates  - Procalcitonin 0.95  - Lactic Acid 4.5 --> 3.2 --> trend until <2     Active Problems:    Acute renal failure (ARF) ([facility] (11/4/2020)  - Likely due to profound dehydration  - Creatinine 2.66 --> 0.96 (baseline 0.40)  - BUN/Cr >20 indicating prerenal  - Stop normal saline @ 150 ml/hr  - UA consistent with UTI  - Urine sodium 99  - Urine prot/creat high  - Strict I/O       UTI (urinary tract infection) (11/4/2020)  - Urine dipstick leukocyte +  - Start Unasyn  - Full UA consistent with UTI  - Urine culture with GPC       Dehydration, severe (11/4/2020)  - Improving  - UA with SG >1.030 + ISRAEL  - Likely due to sepsis + hyperglycemia + decreased PO intake  - Stop normal saline @ 150 ml/hr  - Strict I/O       Pneumonia (11/4/2020)  - Had COVID in 8/2020 --> since tested negative  - CXR unchanged with RUL and LLL infiltrate  - Was on Cipro + Doxy as outpatient  - Continue Unasyn to cover aspiration  - Continue Doxycycline  - No SOB or cough per patient       Lactic acidosis (11/4/2020)  - Due to #1  - Lactic Acid 4.5 --> 3.2  - Stop normal saline @ 150 ml/hr  - Repeat lactic every 6 hours       Hyperkalemia (11/4/2020)  - Due to ISRAEL  - Resolved  - Stop normal saline @ 150 ml/hr  - EKG paced, no overt changes  - Repeat BMP daily       Septic encephalopathy (11/4/2020)  - Very confused and hard to understand speech on admission  - No drowsy and won't wake up  - No focal neurological deficits  - Likely due to UTI and sepsis + uremia  - Start Unasyn  - Continue Doxycycline  - Monitor for improvement  - Check CT Head       Type 2 diabetes mellitus without complication, with long-term current use of insulin ([email or web address] (5/16/2019)  - Hyperglycemic on presentation  - Takes Lantus 35U --> decrease to 30U until eating better  - Continue Humalog SSI  - Hold PO meds       S/P percutaneous endoscopic gastrostomy (PEG) tube placement ([facility] (11/4/2020)  - PEG placed 10/14/2020  - Site looks clean and normal       Hypothyroidism ()  - Continue Levothyroxine  - THS high, FT4 normal       RA (rheumatoid arthritis) (HCC) ()       Gastroesophageal reflux disease without esophagitis ()  - Continue Pepcid       Complete heart block (HCC) (3/28/2018)  - S/P CPM in 9/2020       Malignant neoplasm of lower-outer quadrant of left breast of female, estrogen receptor positive ([facility] (7/1/2019)  - Continue Anastrozole       Vascular dementia ([facility] (11/4/2020)  - Continue ASA/Plavix       Cardiac pacemaker (11/4/2020)  - S/P CPM in 9/2020       Mixed hyperlipidemia (7/7/2020)  - Continue Lipitor       History of stroke (7/7/2020)  - Continue ASA/Plavix    Today's Plan: Check ABG + Ammonia. Check CT Head.     DIET NPO    DVT Prophylaxis: Eliquis    Discharge Plan: TBD      Signed By: Dimitri Ocampo,      November 5, 2020

## 2020-11-05 NOTE — PROGRESS NOTES
Night shift note:    Potassium normalized, noted increase in sodium to 150. Will switch from NS to D5-1/2NS infusion.

## 2020-11-05 NOTE — PROGRESS NOTES
Problem: Falls - Risk of  Goal: *Absence of Falls  Description: Document Rochele Roula Fall Risk and appropriate interventions in the flowsheet. Outcome: Progressing Towards Goal  Note: Fall Risk Interventions:       Mentation Interventions: Bed/chair exit alarm, Door open when patient unattended         Elimination Interventions: Call light in reach              Problem: Pressure Injury - Risk of  Goal: *Prevention of pressure injury  Description: Document Benjamín Scale and appropriate interventions in the flowsheet.   Outcome: Progressing Towards Goal  Note: Pressure Injury Interventions:  Sensory Interventions: Assess changes in LOC, Assess need for specialty bed, Minimize linen layers    Moisture Interventions: Absorbent underpads, Check for incontinence Q2 hours and as needed, Internal/External urinary devices    Activity Interventions: PT/OT evaluation    Mobility Interventions: HOB 30 degrees or less, PT/OT evaluation    Nutrition Interventions: Document food/fluid/supplement intake    Friction and Shear Interventions: Apply protective barrier, creams and emollients, Feet elevated on foot rest, Lift sheet

## 2020-11-05 NOTE — CONSULTS
Comprehensive Nutrition Assessment    Type and Reason for Visit: Initial, Consult  Tube Feeding Management (Hospitalist)    Nutrition Recommendations/Plan:   Enteral Nutrition  Initiate Osmolite 1.2 bolus via PEG one carton times 5 per day. 75ml water flush before and after each bolus feeding. At goal will provide 1425 kcal (100% estimated calorie needs), 66 grams protein (100% estimated protein needs) and ~1645 ml free fluid (25ml/kg actual body weight for hypernatremia). IV Fluids: per MD  EN labs: BMP daily, Mg and Phos MWF. Vitamin and Mineral Supplement Therapy:  Nutrition support orders for electrolyte management replacement activated on MAR. Anticipate continued correction of Na and Phos with TF and hydration, if they remain elevated enteral feeding can be changed to further decrease total Na and phos provided daily. Current regimen is less than baseline regimen. Potential for additional needs associated with wound healing will be addressed dependent on renal function. Malnutrition Assessment:  Malnutrition Status: At risk for malnutrition (specify)  Context: Chronic illness  Findings of clinical characteristics of malnutrition:   Energy Intake:  (Nocturnal TF at facility provided ~50% needs)  Weight Loss:  Unable to assess(current wt is not from validated source and bed scale is not functioning)    Body Fat Loss:  No significant body fat loss,     Muscle Mass Loss:  1 - Mild muscle mass loss, Calf (gastrocnemius), Hand (interosseous)  Fluid Accumulation:  Unable to assess,     Strength:  Not performed     Nutrition Assesment: Admitted with sepsis, ARF, UTI, dehydration, PNA, septic encephalopathy. PMH remarkable for breast ca, HTN, hypothyroidism, DM, CVAs/TIAs, vascular dementia, RA, Covid 8/20, PEG placed 10/14/2020.    Nutrition regimen per facility records Jevity 1.5 patricia @ 60 ml/hr 8p-5a, 100 ml x 4/day oral intake per SLP during the day of unknown nutritional signifigance Pt is somnolent at my visit, no family at bedside. Nutritional history per EMR review. Her weight appears stable since July however unable to validate without a current measured weight, bed scale not functioning at my visit. She potentially lost 11% wt (based on EMR records between (163#) Nov 2019 and (145#) Jul 2020. PEG placement appears related to dysphagia as SLP records from 10/7/2020 indicates failed MBS at outside facility and plan for Puree Honey thick on that date. She is currently NPO per SLP. Nutrition Related Findings:   Wound Type: Stage III(sacral per WC)  Abdominal Status (last documented): Semi-soft abdomen with Active  bowel sounds. Last BM unknonwn  Pertinent Medications: pepcid, lantus, SSI, synthroid, D5 1/2 NS @ 100ml/hr (IVF changed overnight for Na)  Lab Results   Component Value Date/Time    Sodium 151 (H) 11/05/2020 06:32 AM    Potassium 3.9 11/05/2020 06:32 AM    Chloride 119 (H) 11/05/2020 06:32 AM    CO2 29 11/05/2020 06:32 AM    Anion gap 3 (L) 11/05/2020 06:32 AM    Glucose 80 11/05/2020 06:32 AM    BUN 52 (H) 11/05/2020 06:32 AM    Creatinine 0.96 11/05/2020 06:32 AM    Calcium 9.4 11/05/2020 06:32 AM    Albumin 1.9 (L) 11/05/2020 06:32 AM    Phosphorus 4.2 (H) 11/05/2020 06:32 AM     Current Nutrition Therapies:  DIET NPO    Current Intake: Average Meal Intake: NPO Average Supplement Intake: NPO      Anthropometric Measures:  · Height: 5' 1\" (154.9 cm)  · Current Body Wt: 65.8 kg (145 lb 1 oz)(recorded 11/4), Weight source: Not specified  · Admission Body Wt: 145 lb 1 oz(no source)  · Usual Body Wt: 65.8 kg (145 lb)(per EMR review weight at [facility] office visit 7/2020), Percent weight change: 0  · Ideal Body Wt: 105 lbs (48 lbs), 138.2 %  · BMI: 27.4, Overweight (BMI 25.0-29. 9)    Estimated Daily Nutrient Needs:  Energy (kcal): 3400-3558(42-49) (Kcal/kg, Weight Used: Ideal(ISRAEL))  Protein (g): 58-72(1.2-1.5 g/kg) Weight Used: (Ideal)  Fluid (ml/day): 1645 ml (ml/kg)    Nutrition Diagnosis:   · Inadequate protein-energy intake related to swallowing difficulty as evidenced by (NPO per SLP to begin PEG feeds for primary needs)    Nutrition Interventions:   Food and/or Nutrient Delivery: Start tube feeding     Coordination of Nutrition Care: Continue to monitor while inpatient Plan of Care Discussed with Dr. Noemi Langley and Seamus Norris RN/       Goals:     Tolerate goal TF within 5 days     Nutrition Monitoring and Evaluation:      Food/Nutrient Intake Outcomes: Enteral nutrition intake/tolerance  Physical Signs/Symptoms Outcomes: Biochemical data, GI status    Discharge Planning:    Enteral nutrition    Cody Scales MA, RD, LDN on 11/5/2020 at 11:35 AM  Contact: [phone]

## 2020-11-05 NOTE — PROGRESS NOTES
11/5/2020  OT evaluation orders received and chart reviewed. Per PT, \"Patient had RR called this AM; apparently less responsive and hypotensive. CT head pending. \" Will hold for now, continue to follow and re-attempt at a later time/date as schedule permits pending pt medical status  Thank you,  Ingrid Gonazles, OT

## 2020-11-06 NOTE — PROGRESS NOTES
Called pharmacy to confirm changing medications per G Tube, doxycycline should be changed suspension will notify MD.

## 2020-11-06 NOTE — PROGRESS NOTES
Problem: Self Care Deficits Care Plan (Adult)  Goal: *Acute Goals and Plan of Care (Insert Text)  Outcome: Progressing Towards Goal  Note: 1. Patient will complete upper body bathing and dressing with moderate assistance and adaptive equipment as needed. 2. Patient will complete simple grooming tasks with moderate assistance to improve independence with self-care. 3. Patient will complete sitting edge of bed with moderate assistance for static sitting balance to demonstrate improved trunk strength/control for seated ADL. 4. Patient will complete bed mobility with moderate assistance for hygiene and positioning. 5. Patient will participate in 8 minutes of therapeutic exercises to improve strength of B UE for ADL/functional transfers. Timeframe: 7 visits       OCCUPATIONAL THERAPY: Initial Assessment, Daily Note, and AM 11/6/2020  INPATIENT: OT Visit Days: 1  [place] / [place] / Product Type: Medicare /      NAME/AGE/GENDER: Travon Mulligan is a 72 y.o. female   PRIMARY DIAGNOSIS:  Severe sepsis with acute organ dysfunction ([facility] [A41.9, R65.20]  Acute renal failure (ARF) ([facility] [N17.9] Severe sepsis with acute organ dysfunction (HCC) Severe sepsis with acute organ dysfunction ([address]        ICD-10: Treatment Diagnosis:    Generalized Muscle Weakness (M62.81)  Other lack of cordination (R27.8)  Abnormal posture (R29.3)   Precautions/Allergies: Other food; Bee sting [sting, bee]; Invokana [canagliflozin]; and Other plant, animal, environmental      ASSESSMENT:     Ms. Jannette Maruer presents to the hospital from [address] [place] [address] with severe sepsis with acute organ dysfunction. Pt's hx is significant for Covid in August with pt requiring intubation and prolonged hospital stay. Pt's spouse arrived to the room today during session and reports that prior to August pt was living at home.  Pt was able to scoot/pivot to a wheelchair and was getting assistance with ADL and transfers from her spouse. Pt has had a significant decline since then with multiple hospitalizations. Pt is lethargic with AMS today. Pt does not verbalize throughout session but did nod \"no\" to pain with additional cueing. Pt breathing heavily prior to treatment and throughout. Pt did open her eyes with seated activity edge of bed. Pt worked on static sitting balance with total assistance throughout session. Pt did respond to facilitation for improved posture and cueing to hold head more at midline. Pt is very weak in all extremities with pt unable to wash her face without total facilitation. Pt's strength in her upper body is mostly non-functional. Pt was able to squeeze both hands and did hold to the railing with rolling today. Pt is currently maximal-total assistance with all functional transfers and total assistance with ADL. Pt fatigued at end of session and returned back to supine with pillows placed under all extremities. Pt is significantly limited with ADL/functional transfers and will benefit from OT services to address stated goals and plan of care. At this time, patient is appropriate for Co-treatment with physical  therapy due to patient's clinical complexity, decreased overall endurance/tolerance levels, as well as need for high level assistance and cues and intervention to complete functional transfers/mobility and functional tasks in safe manner. [facility] is appropriate for a multidisciplinary co-treatment of PT and OT to address goals of both disciplines.       This section established at most recent assessment   PROBLEM LIST (Impairments causing functional limitations):  Decreased Strength  Decreased ADL/Functional Activities  Decreased Transfer Abilities  Decreased Ambulation Ability/Technique  Decreased Balance  Increased Pain  Decreased Activity Tolerance  Increased Fatigue  Increased Shortness of Breath  Decreased Flexibility/Joint Mobility  Edema/Girth  Decreased Knowledge of Precautions  Decreased Skin Integrity/Hygeine  Decreased Craig with Home Exercise Program  Decreased Cognition   INTERVENTIONS PLANNED: (Benefits and precautions of occupational therapy have been discussed with the patient.)  Activities of daily living training  Adaptive equipment training  Balance training  Clothing management  Cognitive training  Donning&doffing training  Hygiene training  Neuromuscular re-eduation  Therapeutic activity  Therapeutic exercise     TREATMENT PLAN: Frequency/Duration: Follow patient 3 times per week to address above goals. Rehabilitation Potential For Stated Goals: [facility] (at time of discharge pending progress):    Placement: It is my opinion, based on this patient's performance to date, that Ms. Ugarte may benefit from intensive therapy at a [address] [place] [address] after discharge due to the functional deficits listed above that are likely to improve with skilled rehabilitation and concerns that he/she may be unsafe to be unsupervised at home due to risk for falls and further functional decline .   Equipment:   TBD              OCCUPATIONAL PROFILE AND HISTORY:   History of Present Injury/Illness (Reason for Referral):  See H&P  Past Medical History/Comorbidities:   Ms. Yi Brock  has a past medical history of Actinic keratosis, Arthritis, Asthma, Breast cancer ([address] (2019), Cancer ([address] DJD (degenerative joint disease), Environmental allergies, Environmental and seasonal allergies (7/25/2019), GERD (gastroesophageal reflux disease), History of malignant melanoma (1980's), History of TIA (transient ischemic attack), Hypertension, Hypothyroidism, Insulin dependent diabetes mellitus (1990's), Kidney stones, Mobitz type 1 second degree AV block, Obese, Osteoarthritis (12/22/2016), Osteopenia, PONV (postoperative nausea and vomiting), RA (rheumatoid arthritis) ([address] (dx--2013), Rheumatoid arthritis involving right knee with negative rheumatoid factor ([facility] (2019), Rosacea, S/P total knee arthroplasty (2016), [facility] Unspecified hypothyroidism, and UTI (urinary tract infection). She also has no past medical history of Adverse effect of anesthesia, Aneurysm ([address] CAD (coronary artery disease), Chronic kidney disease, Chronic obstructive pulmonary disease (HCC), Chronic pain, Coagulation disorder ([address] Difficult intubation, Endocarditis, Heart failure ([address] Liver disease, Malignant hyperthermia due to anesthesia, Nicotine vapor product user, Non-nicotine vapor product user, Pseudocholinesterase deficiency, Psychiatric disorder, PUD (peptic ulcer disease), Rheumatic fever, Seizures ([address] Sleep apnea, or Thromboembolus ([address] Ms. Karlie Edmonds  has a past surgical history that includes hx refractive surgery (); hx meniscectomy (Right, ); hx colonoscopy (; ); hx mohs procedure (Right, ); hx lap cholecystectomy (); pr abdomen surgery proc unlisted; hx dilation and curettage (); hx  section (); hx tubal ligation (); hx gyn (); hx other surgical (); hx other surgical (); hx knee arthroscopy (Right, ); hx knee replacement (Left, ); hx knee replacement (Right, ); hx heent (); fragment kidney stone/ eswl; hx lithotripsy (, ; 2016; 18); hx breast biopsy (Left, 2019); and hx breast lumpectomy (Left, 2019). Social History/Living Environment:   Home Environment: [address] [place] [address] Name: [facility]  One/Two Story Residence: One story  Living Alone: No  Support Systems: Spouse/Significant Other/Partner, Skilled nursing facility  Patient Expects to be Discharged to[de-identified] Skilled nursing facility  Current DME Used/Available at Home: Wheelchair  Tub or Shower Type: Shower  Prior Level of Function/Work/Activity:  Pt's spouse arrived to the room today during session and reports that prior to August pt was living at home.  Pt was able to scoot/pivot to a wheelchair and was getting assistance with ADL and transfers from her spouse. Pt has had a significant decline since then with multiple hospitalizations. Personal Factors:          Past/Current Experience: multiple hospitalizations        Overall Behavior:  AMS; lethargic; not verbalizing         Other factors that influence how disability is experienced by the patient:  multiple co-morbidities    Number of Personal Factors/Comorbidities that affect the Plan of Care: Extensive review of physical, cognitive, and psychosocial performance (3+):  HIGH COMPLEXITY   ASSESSMENT OF OCCUPATIONAL PERFORMANCE[de-identified]   Activities of Daily Living:   Basic ADLs (From Assessment) Complex ADLs (From Assessment)   Feeding: Total assistance  Oral Facial Hygiene/Grooming: Total assistance  Bathing: Total assistance  Upper Body Dressing: Total assistance  Lower Body Dressing: Total assistance  Toileting: Total assistance Instrumental ADL  Meal Preparation: Total assistance  Homemaking: Total assistance   Grooming/Bathing/Dressing Activities of Daily Living     Cognitive Retraining  Safety/Judgement: Fall prevention                       Bed/Mat Mobility  Rolling: Maximum assistance; Total assistance;Assist x2  Supine to Sit: Total assistance;Assist x2  Sit to Supine: Total assistance;Assist x2  Sit to Stand: Total assistance  Stand to Sit: Total assistance  Scooting: Total assistance;Assist x2     Most Recent Physical Functioning:   Gross Assessment:  AROM: Grossly decreased, non-functional  PROM: Generally decreased, functional  Strength: Grossly decreased, non-functional  Coordination: Grossly decreased, non-functional               Posture:  Posture (WDL): Exceptions to WDL  Posture Assessment: Forward head, Rounded shoulders  Balance:  Sitting: Impaired  Sitting - Static: Poor (constant support)  Sitting - Dynamic: Poor (constant support) Bed Mobility:  Rolling: Maximum assistance; Total assistance;Assist x2  Supine to Sit: Total assistance;Assist x2  Sit to Supine: Total assistance;Assist x2  Scooting: Total assistance;Assist x2  Interventions: Safety awareness training; Tactile cues; Verbal cues  Wheelchair Mobility:     Transfers:  Sit to Stand: Total assistance  Stand to Sit: Total assistance            Patient Vitals for the past 6 hrs:   BP BP Patient Position SpO2 Pulse   20 0728 99/61 Head of bed elevated (Comment degrees) -- (!) 104   20 1113 (!) 104/54 Head of bed elevated (Comment degrees) 98 % 88       Mental Status  Neurologic State: Confused, Lethargic  Orientation Level: Unable to verbalize  Cognition: Decreased attention/concentration, Decreased command following  Perception: Cues to maintain midline in sitting  Perseveration: No perseveration noted  Safety/Judgement: Fall prevention                          Physical Skills Involved:  Range of Motion  Balance  Strength  Activity Tolerance  Sensation  Fine Motor Control  Gross Motor Control  Pain (Chronic)  Edema  Skin Integrity Cognitive Skills Affected (resulting in the inability to perform in a timely and safe manner):  Perception  Executive Function  Sustained Attention  Comprehension  Expression Psychosocial Skills Affected:  Habits/Routines  Environmental Adaptation  Social Interaction  Self-Awareness   Number of elements that affect the Plan of Care: 5+:  HIGH COMPLEXITY   CLINICAL DECISION MAKIN [facility] [address] [id number] AM-PAC 6 Clicks   Daily Activity Inpatient Short Form  How much help from another person does the patient currently need. .. Total A Lot A Little None   1. Putting on and taking off regular lower body clothing? [x] 1   [] 2   [] 3   [] 4   2. Bathing (including washing, rinsing, drying)? [x] 1   [] 2   [] 3   [] 4   3. Toileting, which includes using toilet, bedpan or urinal?   [x] 1   [] 2   [] 3   [] 4   4. Putting on and taking off regular upper body clothing? [x] 1   [] 2   [] 3   [] 4   5.   Taking care of personal grooming such as brushing teeth? [x] 1   [] 2   [] 3   [] 4   6. Eating meals? [x] 1   [] 2   [] 3   [] 4   © 2007, Trustees of [address] [place] [address] [address] [id number], under license to [employer]. All rights reserved      Score:  Initial:6 Most Recent: X (Date: -- )    Interpretation of Tool:  Represents activities that are increasingly more difficult (i.e. Bed mobility, Transfers, Gait). Medical Necessity:     Patient demonstrates   good and fair   rehab potential due to higher previous functional level. Reason for Services/Other Comments:  Patient continues to require skilled intervention due to   Decreased independence with ADL/functional transfers  . Use of outcome tool(s) and clinical judgement create a POC that gives a: HIGH COMPLEXITY         TREATMENT:   (In addition to Assessment/Re-Assessment sessions the following treatments were rendered)     Pre-treatment Symptoms/Complaints:    Pain: Initial:   Pain Intensity 1: 0  Post Session:  0     Today's treatment session addressed Decreased Strength, Decreased ADL/Functional Activities, Decreased Transfer Abilities, Decreased Ambulation Ability/Technique, Decreased Balance, Increased Pain, Decreased Activity Tolerance, Increased Fatigue, Increased Shortness of Breath, Decreased Flexibility/Joint Mobility, and Decreased Cognition to progress towards achieving goal(s) stated above. During this session,  Physical Therapy addressed   therapeutic exercise  to progress towards their discipline specific goal(s). Co-treatment was necessary to improve patient's ability to increase activity demands and ability to return to normal functional activity. Neuromuscular Re-education: (23 minutes ):  Exercise/activities per grid below to improve balance, coordination, and posture. Required maximal  to total visual, verbal, manual, and tactile cues to promote static balance in sitting and promote coordination of bilateral, upper extremity(s).             Braces/Orthotics/Lines/Etc:   TARAH villalba catheter  O2 Device: Nasal cannula  Treatment/Session Assessment:    Response to Treatment:  Pt tolerated it with fatigue and lethargy. Interdisciplinary Collaboration:   Physical Therapist  Occupational Therapist  Registered Nurse  After treatment position/precautions:   Supine in bed  Bed/Chair-wheels locked  Call light within reach  RN notified  Family at bedside   Compliance with Program/Exercises: Will assess as treatment progresses. Recommendations/Intent for next treatment session: \"Next visit will focus on advancements to more challenging activities and reduction in assistance provided\".   Total Treatment Duration:  OT Patient Time In/Time Out  Time In: 1113  Time Out: 501 Emilio Bustos, OT

## 2020-11-06 NOTE — PROGRESS NOTES
Problem: Mobility Impaired (Adult and Pediatric)  Goal: *Acute Goals and Plan of Care  Outcome: Progressing Towards Goal  Acute PT Goals:  (1.) Sherlyn Ugarte will move from supine to sit and sit to supine , scoot up and down, and roll side to side with MODERATE ASSIST within 7 treatment day(s). (2.) Sherlyn Ugarte will transfer from bed to chair and chair to bed with MODERATE ASSIST using the least restrictive device within 7 treatment day(s). (3.) Sherlyn Ugarte will perform sitting static and dynamic balance activities x 10 minutes with MODERATE ASSIST to improve safety within 7 treatment day(s). (4.) Sherlyn Grijalvao will perform standing static and dynamic balance activities x 5 minutes with MAXIMAL ASSIST to improve safety within 7 treatment day(s). (5.) Sherlyn Ugarte will perform bilateral lower extremity exercises x 15 min for HEP with MODERATE ASSIST to improve strength, endurance, and functional mobility within 7 treatment day(s). PHYSICAL THERAPY: Initial Assessment, Daily Note, and AM 11/6/2020  INPATIENT: PT Visit Days : 1  [place] / [place] B / Product Type: Medicare /       NAME/AGE/GENDER: Bairon Gama is a 72 y.o. female   PRIMARY DIAGNOSIS: Severe sepsis with acute organ dysfunction ([facility] [A41.9, R65.20]  Acute renal failure (ARF) ([facility] [facility] [N17.9] Severe sepsis with acute organ dysfunction (HCC) Severe sepsis with acute organ dysfunction ([facility]        ICD-10: Treatment Diagnosis:   · Generalized Muscle Weakness (M62.81)  · Other lack of cordination (R27.8)  · Difficulty in walking, Not elsewhere classified (R26.2)  · Other abnormalities of gait and mobility (R26.89)  · Unspecified Lack of Coordination (R27.9)   Precaution/Allergies: Other food; Bee sting [sting, bee];  Invokana [canagliflozin]; and Other plant, animal, environmental      ASSESSMENT:     Ms. Pamela Benavides (\"Nell\") is a L handed 72year old F who presents to hospital with severe sepsis with acute organ dysfunction, acute renal failure. She has a history of COVID, where she had to be intubated for an extended period of time, had a PEG tube placed, and now she is very weak. She had been DC to [facility] rehab. PMH also includes TIAs, CPM placement, and vascular dementia. Pt's , Colton Foster, is present at bedside and is helpful in providing history. He states this past August pt was Mod I with use of pivot transfers to get to her [facility] with him standing by to ensure safety. She did require assist with ADLs such as dressing and bathing. He states at home they have a 1 level home, with a walk-in shower. He states prior to her intubation she had not worn oxygen, however since then she has been on O2 via a nasal cannula. Upon entering, pt resting in bed, agreeable to PT evaluation. She mostly just nods \"yes \" or \"no\" to questions, unable to verbalize  Much else. She responds to her preferred name, \"Nell\". She shakes her head \"no\" when asked about pain. On 2L O2 via nasal cannula. BLE grossly weak, 0 to 1/5 strength noted. Pt is able to  with BUE when prompted. Treatment initiated to include sit > supine Total Ax2, and sitting balance EOB with constant support, Total Ax2 with engagement of BUE to pull forward and maintain trunk control. Constant cuing required. AAROM/PROM BUE and BLE to facilitate mobility, strength, and motor control/coordination. Pt fatigued, returned sit > supine with Total Ax2. Rolling side to side Max-Total Ax2 in order to get brief changed and properly positioned in bed. Pt did utilize BUE to grab bed rails to maintain sidelying with therapist assist. At end of session pt resting, positioned for comfort with pillows in bed with all needs within reach,  at bedside, RN notified. Pt presents as functioning below her baseline, with deficits in mobility including bed mobility, transfers, balance, and activity tolerance.  Pt will benefit from skilled therapy services to address stated deficits to promote return to highest level of function, independence, and safety. Will continue to follow. At this time, patient is appropriate for Co-treatment with occupational therapy due to patient's decreased overall endurance/tolerance levels, as well as need for high level skilled assistance to complete functional transfers/mobility and functional tasks. Randel Blizzard Sanfilippo is appropriate for a multidisciplinary co-treatment of PT and OT to address goals of both disciplines. This section established at most recent assessment   PROBLEM LIST (Impairments causing functional limitations):  1. Decreased Strength  2. Decreased ADL/Functional Activities  3. Decreased Transfer Abilities  4. Decreased Ambulation Ability/Technique  5. Decreased Balance  6. Decreased Activity Tolerance  7. Increased Fatigue  8. Increased Shortness of Breath  9. Decreased Flexibility/Joint Mobility  10. Decreased [place] with Home Exercise Program  11. Decreased Cognition   INTERVENTIONS PLANNED: (Benefits and precautions of physical therapy have been discussed with the patient.)  1. Balance Exercise  2. Bed Mobility  3. Family Education  4. Gait Training  5. Home Exercise Program (HEP)  6. Neuromuscular Re-education/Strengthening  7. Range of Motion (ROM)  8. Therapeutic Activites  9. Therapeutic Exercise/Strengthening  10. Transfer Training     TREATMENT PLAN: Frequency/Duration: 3 times a week for duration of hospital stay  Rehabilitation Potential For Stated Goals: Good     REHAB RECOMMENDATIONS (at time of discharge pending progress):    Placement: It is my opinion, based on this patient's performance to date, that Ms. Ugarte may benefit from returning to SNF.   Equipment:    None at this time            HISTORY:   History of Present Injury/Illness (Reason for Referral):  Per H&P: Kim Beltran is a 72year old CF with a PMH of breath CA, HTN, hypothyroidism, IDDM2, h/o strokes/TIAs with vascular dementia, RA, breast cancer, completed CHB with CPM placement (in 9/2020), and COVID in 8/2020 requiring intubation presented to the ER on 11/4/20 from [facility] after she was found to be confused and have \"abnormal vital signs. \" HPI taken from previous notes and discussion with ER providers. Apparently the patient was at [facility] in Aug/Sept 2020 with COVID requiring intubation. She recovered but then developed acute GIB with anemia which required admission. She was then sent to [facility] where she was improving until the morning of 11/4/20 when she was found confused. Currently the patient is confused and mumbling so will not answer questions appropriately. Denies pain. Denies CP/SOB. \"  Past Medical History/Comorbidities:   Ms. Sha Wayne  has a past medical history of Actinic keratosis, Arthritis, Asthma, Breast cancer ([address] (2019), Cancer ([address] DJD (degenerative joint disease), Environmental allergies, Environmental and seasonal allergies (7/25/2019), GERD (gastroesophageal reflux disease), History of malignant melanoma (1980's), History of TIA (transient ischemic attack), Hypertension, Hypothyroidism, Insulin dependent diabetes mellitus (1990's), Kidney stones, Mobitz type 1 second degree AV block, Obese, Osteoarthritis (12/22/2016), Osteopenia, PONV (postoperative nausea and vomiting), RA (rheumatoid arthritis) ([address] (dx--2013), Rheumatoid arthritis involving right knee with negative rheumatoid factor ([address] (5/16/2019), Rosacea, S/P total knee arthroplasty (12/23/2016), Stroke ([address] Unspecified hypothyroidism, and UTI (urinary tract infection).  She also has no past medical history of Adverse effect of anesthesia, Aneurysm ([address] CAD (coronary artery disease), Chronic kidney disease, Chronic obstructive pulmonary disease (HCC), Chronic pain, Coagulation disorder ([address] Difficult intubation, Endocarditis, Heart failure ([address] Liver disease, Malignant hyperthermia due to anesthesia, Nicotine vapor product user, Non-nicotine vapor product user, Pseudocholinesterase deficiency, Psychiatric disorder, PUD (peptic ulcer disease), Rheumatic fever, Seizures ([facility] Sleep apnea, or Thromboembolus ([facility] [address] Ms. Olena Wang  has a past surgical history that includes hx refractive surgery (); hx meniscectomy (Right, ); hx colonoscopy (; ); hx mohs procedure (Right, ); hx lap cholecystectomy (); pr abdomen surgery proc unlisted; hx dilation and curettage (); hx  section (); hx tubal ligation (); hx gyn (); hx other surgical (); hx other surgical (); hx knee arthroscopy (Right, ); hx knee replacement (Left, ); hx knee replacement (Right, ); hx heent (); fragment kidney stone/ eswl; hx lithotripsy (, ; 2016; 18); hx breast biopsy (Left, 2019); and hx breast lumpectomy (Left, 2019). Social History/Living Environment:   Home Environment: [address] [place] [address] Name: [facility]  One/Two Story Residence: One story  Living Alone: No  Support Systems: Spouse/Significant Other/Partner, Skilled nursing facility  Patient Expects to be Discharged to[de-identified] Skilled nursing facility  Current DME Used/Available at Home: Wheelchair  Tub or Shower Type: Shower  Prior Level of Function/Work/Activity:  She has a history of COVID, where she had to be intubated for an extended period of time, had a PEG tube placed, and now she is very weak. She had been DC to [facility] rehab. PMH also includes TIAs, CPM placement, and vascular dementia. Pt's , Azucena Berger, is present at bedside and is helpful in providing history. He states this past August pt was Mod I with use of pivot transfers to get to her [facility] with him standing by to ensure safety. She did require assist with ADLs such as dressing and bathing. He states at home they have a 1 level home, with a walk-in shower.  He states prior to her intubation she had not worn oxygen, however since then she has been on O2 via a nasal cannula. Number of Personal Factors/Comorbidities that affect the Plan of Care: 3+: HIGH COMPLEXITY   EXAMINATION:   Most Recent Physical Functioning:   Gross Assessment:  AROM: Generally decreased, functional  PROM: Within functional limits  Strength: Grossly decreased, non-functional  Coordination: Grossly decreased, non-functional  Tone: Abnormal  Sensation: Intact               Posture:  Posture (WDL): Exceptions to WDL  Posture Assessment: Forward head, Rounded shoulders  Balance:  Sitting: Impaired  Sitting - Static: Poor (constant support)  Sitting - Dynamic: Poor (constant support) Bed Mobility:  Rolling: Maximum assistance; Total assistance;Assist x2  Supine to Sit: Total assistance;Assist x2  Sit to Supine: Total assistance;Assist x2  Scooting: Total assistance;Assist x2  Interventions: Safety awareness training; Tactile cues; Verbal cues  Wheelchair Mobility:     Transfers:  Sit to Stand: Total assistance  Stand to Sit: Total assistance  Gait:            Body Structures Involved:  1. Nerves  2. Heart  3. Lungs  4. Bones  5. Joints  6. Muscles Body Functions Affected:  1. Mental  2. Sensory/Pain  3. Cardio  4. Respiratory  5. Neuromusculoskeletal  6. Movement Related  7. Metobolic/Endocrine Activities and Participation Affected:  1. General Tasks and Demands  2. Communication  3. Mobility  4. Self Care   Number of elements that affect the Plan of Care: 4+: HIGH COMPLEXITY   CLINICAL PRESENTATION:   Presentation: Evolving clinical presentation with unstable and unpredictable characteristics: HIGH COMPLEXITY   CLINICAL DECISION MAKIN [facility] [address] [id number] AM-PAC 6 Clicks   Basic Mobility Inpatient Short Form  How much difficulty does the patient currently have. .. Unable A Lot A Little None   1. Turning over in bed (including adjusting bedclothes, sheets and blankets)? [] 1   [x] 2   [] 3   [] 4   2.   Sitting down on and standing up from a chair with arms ( e.g., wheelchair, bedside commode, etc.)   [x] 1   [] 2   [] 3   [] 4   3. Moving from lying on back to sitting on the side of the bed? [] 1   [x] 2   [] 3   [] 4   How much help from another person does the patient currently need. .. Total A Lot A Little None   4. Moving to and from a bed to a chair (including a wheelchair)? [x] 1   [] 2   [] 3   [] 4   5. Need to walk in hospital room? [x] 1   [] 2   [] 3   [] 4   6. Climbing 3-5 steps with a railing? [x] 1   [] 2   [] 3   [] 4   © 2007, Trustees of [facility] [employer] [employer], under license to [email or web address]. All rights reserved      Score:  Initial: 8 Most Recent: X (Date: -- )    Interpretation of Tool:  Represents activities that are increasingly more difficult (i.e. Bed mobility, Transfers, Gait). Medical Necessity:     · Patient is expected to demonstrate progress in strength, range of motion, balance, coordination and functional technique to decrease assistance required with all mobility. Reason for Services/Other Comments:  · Patient continues to require skilled intervention due to medical complications and mobility deficits which impact her level of function, safety, and independence as indicated above. Use of outcome tool(s) and clinical judgement create a POC that gives a: Questionable prediction of patient's progress: MODERATE COMPLEXITY        TREATMENT:   (In addition to Assessment/Re-Assessment sessions the following treatments were rendered)   Pre-treatment Symptoms/Complaints:  Unable to verbalize, nods \"yes\" to sitting up  Pain: Initial:   Pain Intensity 1: 0  Post Session:  0/10     Today's treatment session addressed Decreased Strength, Decreased ADL/Functional Activities, Decreased Transfer Abilities, Decreased Balance, Increased Pain, Decreased Activity Tolerance, Increased Fatigue, Increased Shortness of Breath, Decreased Flexibility/Joint Mobility and Decreased Cognition to progress towards achieving stated therapy goals.  During this session, Occupational Therapy addressed Balance to progress towards their discipline specific goal(s). Co-treatment was necessary to improve patient's cognitive participation, ability to follow higher level commands, ability to increase activity demands and ability to return to normal functional activity. Therapeutic Exercise: (  23 MInutes):  Bed mobility, sitting balance EOB with constant support, and AAROM/PROM BUE and BLE  to improve mobility, strength, balance and coordination. Required maximal visual, verbal, manual and tactile cues to promote proper body alignment, promote proper body posture, promote proper body mechanics and promote proper body breathing techniques. Braces/Orthotics/Lines/Etc:   · IV  · villalba catheter  · PEG  · O2 Device: Nasal cannula  Treatment/Session Assessment:    · Response to Treatment:  See above  · Interdisciplinary Collaboration:   o Physical Therapist  o Occupational Therapist  o Registered Nurse  · After treatment position/precautions:   o Supine in bed  o Bed/Chair-wheels locked  o Bed in low position  o Call light within reach  o RN notified  o Family at bedside   · Compliance with Program/Exercises: Will assess as treatment progresses  · Recommendations/Intent for next treatment session: \"Next visit will focus on advancements to more challenging activities and reduction in assistance provided\".     Total Treatment Duration:  PT Patient Time In/Time Out  Time In: 1113  Time Out: [id number], PT

## 2020-11-06 NOTE — INTERDISCIPLINARY ROUNDS
Interdisciplinary team rounds were held 11/6/2020 with the following team members:Care Management, Nursing, Physical Therapy and Physician and the patient and family. Plan of care discussed. See clinical pathway and/or care plan for interventions and desired outcomes. WBC is WNL today. Continue current plan. Pt has extensive hx and hospital course plan. Please review MD notes from this day for further POC. Dubois in place r/t sepsis and strict I/O.

## 2020-11-06 NOTE — PROGRESS NOTES
patient unable to tolerate MRI, patient unable to lie flat, patient started desaturating O2 from 98 to 84%

## 2020-11-06 NOTE — PROGRESS NOTES
Comprehensive Nutrition Assessment    Type and Reason for Visit: Reassess  Tube Feeding Management (Hospitalist)    Nutrition Recommendations/Plan:    Continue current bolus TF regimen. Osmolite 1.2 1 carton bolus x 5/day with 75 ml water flush before and after each bolus.  Give one time additional 250ml water flush for continued hypernatremia per discussion with Dr. Marilyn Schmid. Nutritional Supplement Therapy:   Electrolyte replacement per nutritional support protocols are active on MAR. Mg replacement per protocol. KPhos replacement per protocol IV secondary sodium content of neutraphos. Mag and phos added to tomorrow am labs. Pt may benefit from transition to outpatient fiber containing enteral formula once + BM. Malnutrition Assessment:  Malnutrition Status: At risk for malnutrition (specify)  Context: Chronic illness  Findings of clinical characteristics of malnutrition:   Energy Intake:  (Nocturnal TF at facility provided ~50% needs)  Weight Loss:  Unable to assess(current wt is not from validated source and bed scale is not functioning)    Body Fat Loss:  No significant body fat loss,     Muscle Mass Loss:  1 - Mild muscle mass loss, Calf (gastrocnemius), Hand (interosseous)  Fluid Accumulation:  Unable to assess,     Strength:  Not performed     Nutrition Assesment: Admitted with sepsis, ARF, UTI, dehydration, PNA, septic encephalopathy. PMH remarkable for breast ca, HTN, hypothyroidism, DM, CVAs/TIAs, vascular dementia, RA, Covid 8/20, PEG placed 10/14/2020. ARF resolved, dehydration improving per primary note. Nutrition regimen per facility records Jevity 1.5 patricia @ 60 ml/hr 8p-5a, 100 ml x 4/day oral intake per SLP during the day of unknown nutritional signifigance     TF initiated 11/5, IVF discontinued. At RD visit pt responds to name and attempts to answer questions. No difficulties with PEG feeds noted by RN.       Nutrition Related Findings:   Wound Type: Stage III(sacral per WC)  Abdominal Status (last documented): Semi-soft, Other (comment)(PEG) abdomen with Active  bowel sounds. Last BM unknown  . Pertinent Medications: Lantus, SSI (no coverage 11/5, 4 units thus far today)  Pertinent Labs:  Na 150, K 3.3, Cl 118, Glu 208, BUN 35, Phos 2, Mg 1.7  Labs trending down with hydration and TF initiation. Deplete K, Phos and Mg indicative of refeeding syndrome. Current Nutrition Therapies:  DIET NPO  DIET TUBE FEEDING Osmolite 1.2 bolus 1 carton times 5 per day. Water flush 75 ml before and after each bolus. Suggested feeding times 6 am, 9a, 12p, 6p and 9p. Current Tube Feeding (TF) Orders:   · Feeding Route: PEG  · Formula: Osmolite 1.2   · Schedule: Bolus    · Regimen: 1 carton x 5 daily  · Water Flushes: 75 ml before and after each bolus  · Current TF & Flush Orders Provides: At goal  · Goal TF & Flush Orders Provides: 1425 kcal (100% estimated calorie needs), 66 grams protein (100% estimated protein needs) and ~1645 ml free fluid (25ml/kg current body)    Current Intake: Average Meal Intake: NPO Average Supplement Intake: NPO      Anthropometric Measures:  · Height: 5' 1\" (154.9 cm)  · Current Body Wt: 65.8 kg (145 lb 1 oz)(recorded 11/4), Weight source: Not specified   · Bed wt not working documented 11/6  · Admission Body Wt: 145 lb 1 oz(no source)  · Usual Body Wt: 65.8 kg (145 lb)(per EMR review weight at [facility] office visit 7/2020), Percent weight change: 0  · Ideal Body Wt: 105 lbs (48 lbs), 138.2 %  · BMI: 27.4, Overweight (BMI 25.0-29. 9)    Estimated Daily Nutrient Needs:  Energy (kcal): 2308-8431(44-26) (Kcal/kg, Weight Used: Ideal(ISRAEL))  Protein (g): 58-72(1.2-1.5 g/kg) Weight Used: (Ideal)  Fluid (ml/day): 1645 ml (ml/kg)    Nutrition Diagnosis:   · Inadequate oral intake related to swallowing difficulty as evidenced by (NPO per SLP, PEG for primary needs)    Nutrition Interventions:   Food and/or Nutrient Delivery: Continue tube feeding     Coordination of Nutrition Care: Continue to monitor while inpatient  Plan of Care Discussed with Dr. Claudia Morgan and Corazon Ly RN.        Goals: Previous Goal Met: Goal(s) achieved  Maintain PEG feeds to meet estimated needs    Nutrition Monitoring and Evaluation:      Food/Nutrient Intake Outcomes: Enteral nutrition intake/tolerance  Physical Signs/Symptoms Outcomes: Biochemical data, Chewing or swallowing, GI status    Discharge Planning:    Enteral nutrition    Dre Briscoe MA, RD, LDN on 11/6/2020 at 10:58 AM  Contact: [phone]

## 2020-11-06 NOTE — PROGRESS NOTES
Hourly rounding completed on this shift. No new complaints at this time. All needs met. All 3 bolus feeds given during this shift. Pt is currently resting in bed. Will continue to monitor and give report to oncoming nurse.

## 2020-11-06 NOTE — PROGRESS NOTES
Problem: Falls - Risk of  Goal: *Absence of Falls  Description: Document Sunil Brunnertz Fall Risk and appropriate interventions in the flowsheet. Outcome: Progressing Towards Goal  Note: Fall Risk Interventions:       Mentation Interventions: Bed/chair exit alarm, Door open when patient unattended         Elimination Interventions: Call light in reach              Problem: Pressure Injury - Risk of  Goal: *Prevention of pressure injury  Description: Document Benjamín Scale and appropriate interventions in the flowsheet.   Outcome: Progressing Towards Goal  Note: Pressure Injury Interventions:  Sensory Interventions: Assess changes in LOC, Avoid rigorous massage over bony prominences    Moisture Interventions: Apply protective barrier, creams and emollients, Absorbent underpads, Check for incontinence Q2 hours and as needed, Internal/External urinary devices    Activity Interventions: Assess need for specialty bed    Mobility Interventions: HOB 30 degrees or less    Nutrition Interventions: Document food/fluid/supplement intake    Friction and Shear Interventions: Apply protective barrier, creams and emollients, Feet elevated on foot rest, HOB 30 degrees or less

## 2020-11-06 NOTE — PROGRESS NOTES
Problem: Dysphagia (Adult)  Goal: *Acute Goals and Plan of Care (Insert Text)  Outcome: Progressing Towards Goal  Note: LTG: Patient will tolerate least restrictive diet without overt signs or symptoms of airway compromise. STG: Patient will tolerate trials of puree and honey thick liquids without overt s/sx airway compromise. STG: Patient will participate in modified barium swallow study as clinically indicated. SPEECH LANGUAGE PATHOLOGY: DYSPHAGIA- Initial Assessment    NAME/AGE/GENDER: Rylie Adams is a 72 y.o. female  DATE: 11/6/2020  PRIMARY DIAGNOSIS: Severe sepsis with acute organ dysfunction ([facility] [A41.9, R65.20]  Acute renal failure (ARF) ([facility] [N17.9]       ICD-10: Treatment Diagnosis: R13.12 Dysphagia, Oropharyngeal Phase    RECOMMENDATIONS   DIET:   NPO with alternative means of nutrition    MEDICATIONS: Non-oral     ASPIRATION PRECAUTIONS  Aggressive oral care     COMPENSATORY STRATEGIES/MODIFICATIONS  None     EDUCATION:  Recommendations discussed with Hospitalist  Nursing  Patient     CONTINUATION OF SKILLED SERVICES/MEDICAL NECESSITY:  Patient is expected to demonstrate progress in  swallow strength, swallow timeliness, swallow function, diet tolerance, and swallow safety in order to  improve swallow safety, work toward diet advancement, and decrease aspiration risk. Patient continues to require skilled intervention due to dysphagia. RECOMMENDATIONS for CONTINUED SPEECH THERAPY:   YES: Anticipate need for ongoing speech therapy during this hospitalization and at next level of care. ASSESSMENT   Patient presents with history of PEG and history of significant dysphagia per modified barium swallow study 10/7/2020. Patient remains lethargic with prolonged oral holding and absent swallow. Suctioned tsp honey thick liquid from patient's oral cavity. Not appropriate for any additional trials. Recommend STRICT NPO with alternate means of nutrition. Patient has PEG.  Will follow for ongoing trials in efforts to resume baseline diet of puree and honey thick liquids, however not appropriate at this time. REHABILITATION POTENTIAL FOR STATED GOALS: Good    PLAN    FREQUENCY/DURATION: Continue to follow patient 3 times a week for duration of hospital stay to address above goals. - Recommendations for next treatment session: Next treatment will address po trials    SUBJECTIVE   Patient lethargic. Eyes open and responds to voice but impaired tracking. Unable to get patient to track past midline on left. Discussed with RN who reports MD aware. Messaged MD via [employer]. States named at extremely low volume, almost aphonic. History of Present Injury/Illness: Ms. Jannette Maurer  has a past medical history of Actinic keratosis, Arthritis, Asthma, Breast cancer ([address] (2019), Cancer ([address] DJD (degenerative joint disease), Environmental allergies, Environmental and seasonal allergies (7/25/2019), GERD (gastroesophageal reflux disease), History of malignant melanoma (1980's), History of TIA (transient ischemic attack), Hypertension, Hypothyroidism, Insulin dependent diabetes mellitus (1990's), Kidney stones, Mobitz type 1 second degree AV block, Obese, Osteoarthritis (12/22/2016), Osteopenia, PONV (postoperative nausea and vomiting), RA (rheumatoid arthritis) ([address] (dx--2013), Rheumatoid arthritis involving right knee with negative rheumatoid factor ([address] (5/16/2019), Rosacea, S/P total knee arthroplasty (12/23/2016), Stroke ([address] Unspecified hypothyroidism, and UTI (urinary tract infection).  She also has no past medical history of Adverse effect of anesthesia, Aneurysm ([address] CAD (coronary artery disease), Chronic kidney disease, Chronic obstructive pulmonary disease (HCC), Chronic pain, Coagulation disorder ([address] Difficult intubation, Endocarditis, Heart failure ([address] Liver disease, Malignant hyperthermia due to anesthesia, Nicotine vapor product user, Non-nicotine vapor product user, Pseudocholinesterase deficiency, Psychiatric disorder, PUD (peptic ulcer disease), Rheumatic fever, Seizures ([address] Sleep apnea, or Thromboembolus ([facility] . She also  has a past surgical history that includes hx refractive surgery (); hx meniscectomy (Right, ); hx colonoscopy (; ); hx mohs procedure (Right, ); hx lap cholecystectomy (); pr abdomen surgery proc unlisted; hx dilation and curettage (); hx  section (); hx tubal ligation (); hx gyn (); hx other surgical (); hx other surgical (); hx knee arthroscopy (Right, ); hx knee replacement (Left, ); hx knee replacement (Right, ); hx heent (); fragment kidney stone/ eswl; hx lithotripsy (, ; 2016; 18); hx breast biopsy (Left, 2019); and hx breast lumpectomy (Left, 2019). Problem List:  (Impairments causing functional limitations):  Oropharyngeal dysphagia       Previous Dysphagia:YES; patient has PEG. Prior diet before this hospitalization-PEG and puree/honey thick liquids. RESULTS- Modified barium swallow study 10/7/2020  Ms. Ugarte presents with moderate oropharyngeal dysphagia characterized by deep laryngeal penetration of thin liquids during swallow with no cough response resulting in possible silent aspiration of residue after swallow. Initially, no laryngeal penetration or aspiration observed with nectar liquids presented by spoon or cup sip. Nonclearing laryngeal penetration observed without aspiration with nectar presented by straw. No new laryngeal penetration or aspiration observed with trials of pudding. Increased oral prep and piecemeal deglutition with deep laryngeal penetration during swallow and moderate vallecular residue after swallow with mixed consistency. Oral holding of cracker for over 45 seconds.  Patient presented with nectar liquids by cup to use as liquid rinse with swallow triggered at valleculae after vallecular pooling for 8 seconds. Second presentation of nectar by cup resulted in [address] during the swallow. Subsequent trials of honey-thick liquids were swallowed without difficulty - no laryngeal penetration or aspiration and no pharyngeal residue after swallow. There was adequate laryngeal elevation and hyolaryngeal excursion during all swallows but no epiglottal inversion, possibly due to presence of NGT. Recommend initiate pureed diet and honey-thick liquids. Crush meds in puree. Diet Prior to Evaluation: NPO with PEG    Orientation:   name    Pain: Pain Scale 1: Numeric (0 - 10)  Pain Intensity 1: 0    Cognitive-Linguistic Screen:  Prior level of function: nursing facility. Speech Production:   Low volume/weak with cues. Essentially aphonic. Poor intelligibility. Expressive Language:  Impaired  Receptive Language: Follows 1 step commands  Cognition:   Impaired   Recommendations: Given results of speech, language, cognitive screening,  patient appears to be functioning below baseline. May need further evaluation pending course of hospitalization. OBJECTIVE   Oral Motor:   Labial: Decreased rate  Dentition: Natural  Oral Hygiene: Dry and Poor  Lingual: Decreased strength    Swallow evaluation:   Patient consumed trials of honey thick liquid via tsp only. Adequate acceptance. Prolonged oral holding with absent swallow despite max cueing. Unintelligible mumbling when cued to swallow. Suctioned bolus out of patient's oral cavity via yankeur. No additional trials. INTERDISCIPLINARY COLLABORATION: Registered Nurse and Physician  PRECAUTIONS/ALLERGIES: Other food; Bee sting [sting, bee];  Invokana [canagliflozin]; and Other plant, animal, environmental     Tool Used: Dysphagia Outcome and Severity Scale (LINDA)    Score Comments   Normal Diet  [] 7 With no strategies or extra time needed   Functional Swallow  [] 6 May have mild oral or pharyngeal delay   Mild Dysphagia  [] 5 Which may require one diet consistency restricted    Mild-Moderate Dysphagia  [] 4 With 1-2 diet consistencies restricted   Moderate Dysphagia  [] 3 With 2 or more diet consistencies restricted   Moderate-Severe Dysphagia  [] 2 With partial PO strategies (trials with ST only)   Severe Dysphagia  [] 1 With inability to tolerate any PO safely      Score:  Initial: 1 Most Recent: 1 (Date 11/06/20 )   Interpretation of Tool: The Dysphagia Outcome and Severity Scale (LINDA) is a simple, easy-to-use, 7-point scale developed to systematically rate the functional severity of dysphagia based on objective assessment and make recommendations for diet level, independence level, and type of nutrition. Current Medications:   No current facility-administered medications on file prior to encounter. Current Outpatient Medications on File Prior to Encounter   Medication Sig Dispense Refill    apixaban (ELIQUIS PO) Take  by mouth. Unknown dose      enoxaparin (Lovenox) 80 mg/0.8 mL injection by SubCUTAneous route. glucose blood VI test strips (blood glucose test) strip Use as directed- check sugars 3 times a day  Dx- Diabetes E11.9 100 Strip 3    levothyroxine (SYNTHROID) 112 mcg tablet Take 1 Tab by mouth Daily (before breakfast). 30 Tab 1    metFORMIN (GLUCOPHAGE) 1,000 mg tablet Take 1 Tab by mouth two (2) times daily (with meals). 180 Tab 1    lisinopriL (PRINIVIL, ZESTRIL) 10 mg tablet Take 1 Tab by mouth daily. 90 Tab 1    pantoprazole (PROTONIX) 40 mg tablet TAKE ONE TABLET BY MOUTH ONCE DAILY. 90 Tab 1    famotidine (PEPCID) 40 mg tablet Take 1 Tab by mouth two (2) times a day. 180 Tab 1    atorvastatin (LIPITOR) 40 mg tablet Take 1 Tab by mouth daily. 90 Tab 1    mirabegron ER (Myrbetriq) 50 mg ER tablet Take 1 Tab by mouth daily. 90 Tab 1    clopidogreL (PLAVIX) 75 mg tab TAKE ONE TABLET BY MOUTH ONCE DAILY. 90 Tab 1    magnesium oxide (MAG-OX) 400 mg tablet Take 1 Tab by mouth daily.  90 Tab 1    loratadine (Claritin) 10 mg tablet Take 1 Tab by mouth daily. 90 Tab 1    glipiZIDE (GLUCOTROL) 10 mg tablet TAKE ONE TABLET BY MOUTH TWICE DAILY  Indications: type 2 diabetes mellitus 180 Tab 1    aspirin delayed-release 81 mg tablet Take  by mouth daily. ondansetron (ZOFRAN ODT) 8 mg disintegrating tablet Take 1 Tab by mouth every eight (8) hours as needed for Nausea. 10 Tab 0    anastrozole (Arimidex) 1 mg tablet Take 1 mg by mouth daily. 30 Tab 5    insulin detemir U-100 (LEVEMIR FLEXTOUCH U-100 INSULN) 100 unit/mL (3 mL) inpn INJECT UNDER THE SKIN AS DIRECTED 10-15 UNITS IN THE MORNING AND 60 UNITS AT BEDTIME-- MAX IS 75U IN 24HR 30 mL 2    fluticasone propionate (FLONASE ALLERGY RELIEF) 50 mcg/actuation nasal spray 2 Sprays by Both Nostrils route daily. 3 Bottle 1    NOVOFINE 32 32 gauge x 1/4\" ndle Use  Pen Needle 3    calcium carbonate (TUMS) 200 mg calcium (500 mg) chew Take 1 Tab by mouth as needed. Indications: heartburn      krill/om-3/dha/epa/phospho/ast (MEGARED OMEGA-3 KRILL OIL PO) Take 1 Cap by mouth daily. cholecalciferol (VITAMIN D3) 1,000 unit cap Take 1,000 Units by mouth daily. Indications: Prevention of Vitamin D Deficiency      GLUCOSAMINE HCL/CHONDR MERAZ A NA (OSTEO BI-FLEX PO) Take 1 Tab by mouth two (2) times a day. calcium-cholecalciferol, d3, (CALCIUM 600 + D) 600-125 mg-unit tab Take 1 Tab by mouth two (2) times a day.  Indications: post-menopausal osteoporosis prevention         After treatment position/precautions:  Upright in bed  RN notified  Call light within reach    Total Treatment Duration:   Time In: 8732  Time Out: [address] [facility],[address], [facility] MEDICO DEL [facility] [address], [address] [facility], [place], operated by [facility]

## 2020-11-06 NOTE — PROGRESS NOTES
Hourly rounds completed with bed in low/locked position and call light within reach. Patient is disoriented x 4 only opening eyes to her throughout shift. PEG access for Osmolite 1.2 bolus at 2100 & 0600 this shift, see charting. Patient villalba patent; draining with yellow/straw UOP. Patient is currently hypotensive, MD notified started 500 mL NS Bolus per orders. Will continue to monitor patient and give report to oncoming RN.      Date 11/05/20 0700 - 11/06/20 0659 11/06/20 0700 - 11/07/20 0659   Shift 0747-2795 4725-9751 24 Hour Total 3590-2017 7100-1594 24 Hour Total   INTAKE   NG/ 507 777        Water Flush Volume (mL) (PEG/Gastrostomy Tube 11/04/20) 270 270 540        Intake (ml) (PEG/Gastrostomy Tube 11/04/20)  237 237      Shift Total(mL/kg) 270(4.1) 507(7.7) 777(11.8)      OUTPUT   Urine(mL/kg/hr) 1050(1.3) 600 1650        Urine Output (mL) (Urinary Catheter 11/04/20 Villalba) 7679 657 7735      Shift Total(mL/kg) 1050(16) 600(9.1) 1650(25.1)      NET -780 -93 -873      Weight (kg) 65.8 65.8 65.8 65.8 65.8 65.8

## 2020-11-06 NOTE — PROGRESS NOTES
SPEECH PATHOLOGY NOTE:    Attempted to see patient this AM; however, patient getting cleaned up. Will check back at later time as patient is available and as schedule permits. Wu Smith MS, CCC-SLP       ADDENDUM:    Attempted to see patient on 2nd occasion this date; however, patient drowsy and unable to sustain alertness to participate. RN at bedside.         Wu Smith MS, CCC-SLP

## 2020-11-06 NOTE — PROGRESS NOTES
All medications confirmed by pharmacy to be given in PEG. Osmolite bolus feed completed, patient is resting in bed, will continue to monitor.

## 2020-11-06 NOTE — PROGRESS NOTES
Unable to scan MRI until next week due to hospital policy concerning pacemaker patients. Pacemaker patients scanned with scheduled manufacture company representative between M-F 8-5. The doctor is aware.

## 2020-11-06 NOTE — PROGRESS NOTES
CM contacted patient's spouse to discuss discharge planning. Per PT/OT Eval this day, the patient has been recommended for STR. Spouse does not wish the patient to return to [facility]. Patient is concerned with STR placement, due to the patient having a history of COVID and due to SNF's not permitting visitors. However, spouse feels STR is needed. Spouse would like to explore additional SNF choices. CM sent SNF list to spouse this day, via e-mail. PPD has been placed. CM continues to monitor.

## 2020-11-07 NOTE — PROGRESS NOTES
Pt slept heavily all night. Eyes open to voice. No acute episodes overnight. Tolerating bolus feeding well. Hourly rounds performed through shift, pt denies needs at this time. Bed in low position and call light/ personal items within reach. Will continue to monitor and report to day shift nurse.

## 2020-11-07 NOTE — PROGRESS NOTES
Hospitalist Progress Note    Patient: Maisha Alcantar MRN: [id number]  SSN: [id number]    YOB: 1955  Age: 72 y.o. Sex: female      Admit Date: 11/4/2020    LOS: 3 days     Subjective:     72year old CF with a PMH of breath CA, HTN, hypothyroidism, IDDM2, h/o strokes/TIAs with vascular dementia, RA, breast cancer, completed CHB with CPM placement (in 9/2020), COVID in 8/2020 requiring intubation, and GIB with DVTs (started on Eliquis) in 9/2020 presented to the ER on 11/4/20 from [facility] after she was found to be confused and have \"abnormal vital signs. \" She was found to have severe sepsis due to a UTI. 11/7 - She is sleepy today. Minimally interactive. Tries to answer questions. Review of systems negative except stated above. Objective:     Visit Vitals  /65   Pulse 83   Temp 98.9 °F (37.2 °C)   Resp (!) 34   Ht 5' 1\" (1.549 m)   Wt 70.3 kg (155 lb)   LMP 06/30/2001   SpO2 98%   BMI 26.61 kg/m²      Oxygen Therapy  O2 Sat (%): 98 % (11/07/20 0738)  Pulse via Oximetry: 100 beats per minute (11/05/20 0814)  O2 Device: Nasal cannula (11/07/20 0336)  O2 Flow Rate (L/min): 2 l/min (11/07/20 0336)      Intake and Output:     Intake/Output Summary (Last 24 hours) at 11/7/2020 1022  Last data filed at 11/7/2020 0800  Gross per 24 hour   Intake 2975 ml   Output 1500 ml   Net 1475 ml         Physical Exam:   GENERAL: Alert, appears comfortable  EYE: conjunctivae/corneas clear. PERRL. THROAT & NECK: normal and no erythema or exudates noted. LUNG: scattered rhonchi  HEART: tachy, reg rhythm, S1S2, no murmur, no JVD  ABDOMEN: soft, non-tender, non-distended. Bowel sounds normal.   EXTREMITIES:  No edema, 2+ pedal/radial pulses bilaterally  SKIN: no rash or abnormalities  NEUROLOGIC: Alert. Cranial nerves 2-12 grossly intact.     Lab/Data Review:  Recent Results (from the past 24 hour(s))   GLUCOSE, POC    Collection Time: 11/06/20 11:03 AM   Result Value Ref Range    Glucose (POC) 289 (H) 65 - 100 mg/dL   LACTIC ACID    Collection Time: 11/06/20 12:19 PM   Result Value Ref Range    Lactic acid 1.8 0.4 - 2.0 MMOL/L   PLEASE READ & DOCUMENT PPD TEST IN 48 HRS    Collection Time: 11/06/20  2:08 PM   Result Value Ref Range    PPD Negative Negative    mm Induration 0 0 - 5 mm   GLUCOSE, POC    Collection Time: 11/06/20  3:23 PM   Result Value Ref Range    Glucose (POC) 153 (H) 65 - 100 mg/dL   GLUCOSE, POC    Collection Time: 11/06/20  7:57 PM   Result Value Ref Range    Glucose (POC) 202 (H) 65 - 100 mg/dL   RENAL FUNCTION PANEL    Collection Time: 11/07/20  6:31 AM   Result Value Ref Range    Sodium 146 (H) 136 - 145 mmol/L    Potassium 3.7 3.5 - 5.1 mmol/L    Chloride 113 (H) 98 - 107 mmol/L    CO2 28 21 - 32 mmol/L    Anion gap 5 (L) 7 - 16 mmol/L    Glucose 166 (H) 65 - 100 mg/dL    BUN 23 8 - 23 MG/DL    Creatinine 0.47 (L) 0.6 - 1.0 MG/DL    GFR est AA >60 >60 ml/min/1.73m2    GFR est non-AA >60 >60 ml/min/1.73m2    Calcium 8.5 8.3 - 10.4 MG/DL    Phosphorus 2.7 2.3 - 3.7 MG/DL    Albumin 1.6 (L) 3.2 - 4.6 g/dL   CBC W/O DIFF    Collection Time: 11/07/20  6:31 AM   Result Value Ref Range    WBC 7.1 4.3 - 11.1 K/uL    RBC 3.28 (L) 4.05 - 5.2 M/uL    HGB 9.0 (L) 11.7 - 15.4 g/dL    HCT 30.6 (L) 35.8 - 46.3 %    MCV 93.3 79.6 - 97.8 FL    MCH 27.4 26.1 - 32.9 PG    MCHC 29.4 (L) 31.4 - 35.0 g/dL    RDW 17.2 (H) 11.9 - 14.6 %    PLATELET 370 419 - 866 K/uL    MPV 10.6 9.4 - 12.3 FL    ABSOLUTE NRBC 0.00 0.0 - 0.2 K/uL   MAGNESIUM    Collection Time: 11/07/20  6:31 AM   Result Value Ref Range    Magnesium 1.7 (L) 1.8 - 2.4 mg/dL   GLUCOSE, POC    Collection Time: 11/07/20  7:41 AM   Result Value Ref Range    Glucose (POC) 212 (H) 65 - 100 mg/dL       SARS-CoV-2 Lab Results  \"Novel Coronavirus\" Test: No results found for: COV2NT   \"Emergent Disease\" Test: No results found for: EDPR  \"SARS-COV-2\" Test: No results found for: XGCOVT  \"[facility]\" Test: No results found for: RSLT  Rapid Test:   Lab Results   Component Value Date/Time    COVR Not detected 11/04/2020 11:46 AM           Imaging:  Xr Chest Sngl V    Result Date: 10/16/2020  Clinical History: The Female patient is 72years old  presenting with symptoms of deminished breath sounds on left. Comparison:  Chest x-ray 9/29/2020 Findings:  Frontal view of the chest was obtained. There is improving aeration over prior exam with resolving perihilar infiltrates. Coarse interstitial lung markings persist particularly at the left lung base. The cardiomediastinal silhouette is within normal limits. There are no acute osseous abnormalities. A left subclavian pacing device is demonstrated. The feeding tube has been removed over the interval.     Impression:  1. Resolved pulmonary edema, however with persistent coarse interstitial lung markings particularly at left lung base. CPT code(s) 76911     Xr Swallow Func Video    Result Date: 10/7/2020  Exam: Modified Barium Swallow INDICATION: Oropharyngeal dysphagia. Fluoro time: 2 minutes 19 seconds. Spot films:  0 Fluoroscopy was used to evaluate pharyngeal function while the patient was given barium solutions and barium coated solids. FINDINGS: Patient with aspiration with thickened liquids and laryngeal penetration with thin liquids. IMPRESSION: Patient with aspiration with thickened liquids. Please see full report from speech language pathologist.     Xr Chest Port    Result Date: 11/4/2020  CHEST X-RAY, one view. HISTORY:  Altered mental status. TECHNIQUE:  AP portable semiupright view. COMPARISON: 16 October. FINDINGS: Lungs: Right upper lung and left lower lung zone infiltrates. Costophrenic angles: Fairly sharp. Heart size: Normal for portable technique. Pulmonary vasculature: is unremarkable. Aorta: Unremarkable. Included portion of the upper abdomen: is unremarkable. Bones: No gross bony lesions.  Other: Left-sided cardiac pacemaker is present     IMPRESSION:  Persistent lung infiltrates without significant change. No results found for this visit on 11/04/20. Cultures:   All Micro Results     Procedure Component Value Units Date/Time    CULTURE, BLOOD [[id number]] Collected:  11/04/20 0755    Order Status:  Completed Specimen:  Blood Updated:  11/07/20 0643     Special Requests: --        RIGHT  Antecubital       Culture result: NO GROWTH 3 DAYS       CULTURE, URINE [[id number]]  (Abnormal) Collected:  11/04/20 1148    Order Status:  Completed Specimen:  Urine from [place] Specimen Updated:  11/06/20 0810     Special Requests: NO SPECIAL REQUESTS        Culture result:       10,000 to 50,000 COLONIES/mL GRAM POSITIVE COCCI IDENTIFICATION AND SUSCEPTIBILITY TO FOLLOW                  <10,000 COLONIES/mL MIXED SKIN FRANK ISOLATED          CULTURE, BLOOD [[id number]] Collected:  11/04/20 0745    Order Status:  Canceled Specimen:  Blood           Assessment/Plan:     Principal Problem:    Severe sepsis with acute organ dysfunction ([facility] (11/4/2020)  - In ER WBC 13.8 +  + RR 38 + UTI/pneumonia + ISRAEL + encephalopathy + lactic acid 4.5  - Resolving  - Likely due to UTI  - Continue Unasyn + Doxycycline  - Stopped normal saline @ 150 ml/hr  - Blood cultures sent in ER - NGTD  - UA consistent with UTI  - Urine culture with GPC  - CXR with stable infiltrates  - Procalcitonin 0.95  - Lactic Acid 4.5 --> 3.2 --> 1.6     Active Problems:    Rightward Gaze (11/7/2020)  - Concern for CVA  - CT with old CVAs  - Did not tolerate MRI on 11/6  - Continue ASA/Plavix  - Consult Neurology for assistance      Acute renal failure (ARF) ([facility] (11/4/2020)  - Likely due to profound dehydration  - Resolved  - Creatinine 2.66 --> 0.96 --> 0.65 --> 0.47 (baseline 0.40)  - BUN/Cr >20 indicating prerenal  - Stopped normal saline @ 150 ml/hr  - UA consistent with UTI  - Urine sodium 99  - Urine prot/creat high  - Strict I/O       UTI (urinary tract infection) (11/4/2020)  - Urine dipstick leukocyte +  - Continue Unasyn  - Full UA consistent with UTI  - Urine culture with GPC       Dehydration, severe (11/4/2020)  - Improving  - UA with SG >1.030 + ISRAEL  - Likely due to sepsis + hyperglycemia + decreased PO intake  - Stop normal saline @ 150 ml/hr  - Strict I/O       Pneumonia (11/4/2020)  - Had COVID in 8/2020 --> since tested negative  - CXR unchanged with RUL and LLL infiltrate  - Was on Cipro + Doxy as outpatient  - Continue Unasyn to cover aspiration  - Continue Doxycycline  - No SOB or cough per patient       Lactic acidosis (11/4/2020)  - Due to #1  - Resolved  - Lactic Acid 4.5 --> 3.2 --> 1.6  - Stopped normal saline @ 150 ml/hr       Hyperkalemia (11/4/2020)  - Due to ISRAEL  - Resolved  - Stopped normal saline @ 150 ml/hr  - EKG paced, no overt changes  - Repeat BMP daily       Septic encephalopathy (11/4/2020)  - Very confused and hard to understand speech on admission  - More alert but hard to understand today  - No focal neurological deficits  - CT head with chronic CVAs but nothing acute  - Likely due to UTI and sepsis + uremia  - Continue Unasyn  - Continue Doxycycline  - Monitor for improvement       Type 2 diabetes mellitus without complication, with long-term current use of insulin ([address] (5/16/2019)  - Hyperglycemic on presentation  - Takes Lantus 35U --> decrease to 30U until eating better  - Continue Humalog SSI  - Hold PO meds       S/P percutaneous endoscopic gastrostomy (PEG) tube placement ([address] (11/4/2020)  - PEG placed 10/14/2020  - Site looks clean and normal       Hypothyroidism ()  - Continue Levothyroxine  - THS high, FT4 normal       RA (rheumatoid arthritis) (HCC) ()       Gastroesophageal reflux disease without esophagitis ()  - Continue Pepcid       Complete heart block (HCC) (3/28/2018)  - S/P CPM in 9/2020       Malignant neoplasm of lower-outer quadrant of left breast of female, estrogen receptor positive ([address] (7/1/2019)  - Continue Anastrozole       Vascular dementia ([address] (11/4/2020)  - Continue ASA/Plavix       Cardiac pacemaker (11/4/2020)  - S/P CPM in 9/2020       Mixed hyperlipidemia (7/7/2020)  - Continue Lipitor       History of stroke (7/7/2020)  - Continue ASA/Plavix    Today's Plan: Continue current plan. DIET NPO  DIET TUBE FEEDING Osmolite 1.2 bolus 1 carton times 5 per day. Water flush 75 ml before and after each bolus. Suggested feeding times 6 am, 9a, 12p, 6p and 9p.     DVT Prophylaxis: Eliquis (H/o DVT)    Discharge Plan: TBD      Signed By: Miesha Bell DO     November 7, 2020

## 2020-11-07 NOTE — PROGRESS NOTES
Nutrition Note    Mg 1.7-replacement ordered per protocols.   Phos 2.7-WDL after IV supplementation yesterday  Add Phos and Mg lab for tomorrow AM    Electronically signed by Ruby Tejada RD on 11/7/2020 at 8:39 AM    Contact: [phone]

## 2020-11-07 NOTE — PROGRESS NOTES
Per Dr. Daniel More MRI needs to be done STAT, pacemaker rep from [address] [place] [address] notified and on the way. Will scan patient around 4 pm today. Per RN patient tolerated lying flat for more than 30 minutes without desaturation.

## 2020-11-07 NOTE — CONSULTS
Consult    Patient: Karina Pedraza MRN: [id number]     YOB: 1955  Age: 72 y.o. Sex: female      Subjective: Viri Ugarte is a 72 y.o. female who is being seen for possible stroke. The patient is known to the neurology service and has a history of multiple strokes. She recently was diagnosed with COVID-19. The patient is currently admitted with severe sepsis with acute organ dysfunction, acute renal failure, urinary tract infection and several other acute neurological issues. The patient has forced eye deviation to the right. She has diffuse muscle weakness but her muscle weakness is more evident on the left. The patient cannot participate in history. Past Medical History:   Diagnosis Date    Actinic keratosis     uses Solaraze gel when needed    Arthritis     rheumatoid.   Orencia infusion every 4 weeks    Asthma     none in yrs per pt--- no inhalers per pt    Breast cancer ([address] 2019    Cancer ([address]     melanoma    DJD (degenerative joint disease)     Environmental allergies     Environmental and seasonal allergies 7/25/2019    GERD (gastroesophageal reflux disease)     prn med    History of malignant melanoma 1980's    on back    History of TIA (transient ischemic attack)     Hypertension     controlled with medication    Hypothyroidism     Insulin dependent diabetes mellitus 1990's    Type 2. sqbs avg am--100---- hypo at 73- last A1C= 8.1 on 12/19/17    Kidney stones     4th one at present    Mobitz type 1 second degree AV block     EKG today 9/22/16- Dr Anderson Jimenez reviewed----- per pt-- \" been that way my whole life\"-- does not see a cardiologist    Obese     bmi =39    Osteoarthritis 12/22/2016    Osteopenia     PONV (postoperative nausea and vomiting)     POV- responds to IV antiemetic    RA (rheumatoid arthritis) ([address] dx--2013    Rheumatoid arthritis involving right knee with negative rheumatoid factor ([address] 5/16/2019    Rosacea     S/P total knee arthroplasty 12/23/2016    Stroke [facility])     followed by neurologist      Unspecified hypothyroidism     hypothyroid. Controlled with Levoxyl    UTI (urinary tract infection)      Past Surgical History:   Procedure Laterality Date    ABDOMEN SURGERY PROC UNLISTED      fatty tumor removed- from abd--benign    FRAGMENT KIDNEY STONE/ ESWL      HX BREAST BIOPSY Left 06/21/2019    stereo bx    HX BREAST LUMPECTOMY Left 7/5/2019    LEFT BREAST LUMPECTOMY WITH NEEDLE  AND SENTINAL NODE/ performed by Katie Lucero MD at [address] HX COLONOSCOPY  2008; 2010    HX [address]    and hysteroscopy    HX GYN  1978    vaginal cryocautery     HX HEENT  1986    Oral Surgery--per pt  still has metal in mouth since 18's    HX KNEE ARTHROSCOPY Right 2007    HX KNEE REPLACEMENT Left 2008    HX KNEE REPLACEMENT Right 2016    HX LAP CHOLECYSTECTOMY  1993    HX LITHOTRIPSY  1998, 2003; 2016; 2/6/18    HX MENISCECTOMY Right 2011    Right knee    HX MOHS PROCEDURES Right 2004    HX OTHER SURGICAL  1999    lipoma of abdomen    HX OTHER SURGICAL  1984    melanoma of back    HX REFRACTIVE SURGERY  2002    LASIK    HX TUBAL LIGATION  1989      Family History   Problem Relation Age of Onset    Cancer Mother         Lung    Arthritis-rheumatoid Mother     Thyroid Disease Mother     Cancer Father         Lung; Liver    Diabetes Father     Heart Disease Father     Hypertension Father     Diabetes Brother     SLE Sister     Breast Cancer Other         cousins     Social History     Tobacco Use    Smoking status: Never Smoker    Smokeless tobacco: Never Used   Substance Use Topics    Alcohol use:  Yes     Alcohol/week: 1.0 standard drinks     Types: 1 Glasses of wine per week     Comment: occasionally      Current Facility-Administered Medications   Medication Dose Route Frequency Provider Last Rate Last Dose    midodrine (PROAMATINE) tablet 5 mg  5 mg Oral TID WITH MEALS Waylon Steel, DO   5 mg at 11/07/20 1120    levothyroxine (SYNTHROID) tablet 112 mcg  112 mcg Per J Tube 6am Waylon Steel, DO   112 mcg at 11/07/20 0507    ampicillin-sulbactam (UNASYN) 3 g in 0.9% sodium chloride (MBP/ADV) 100 mL  3 g IntraVENous Q6H Waylon Steel,  mL/hr at 11/07/20 1119 3 g at 11/07/20 1119    NUTRITIONAL SUPPORT ELECTROLYTE PRN ORDERS   Does Not Apply PRN Carol Ades, DO        apixaban (ELIQUIS) tablet 5 mg  5 mg Per J Tube Q12H Waylon Steel, DO   5 mg at 11/07/20 1809    aspirin chewable tablet 81 mg  81 mg Per J Tube DAILY Waylon Steel, DO   81 mg at 11/07/20 6802    doxycycline (VIBRAMYCIN) capsule 100 mg  100 mg Per J Tube Q12H Waylon Steel, DO   100 mg at 11/07/20 0929    famotidine (PEPCID) 40 mg/5 mL (8 mg/mL) oral suspension 20 mg  20 mg Per J Tube Q24H Waylon Steel, DO   20 mg at 11/07/20 1120    anastrozole (ARIMIDEX) tablet 1 mg  1 mg Oral DAILY Waylon Steel, DO   1 mg at 11/07/20 5602    atorvastatin (LIPITOR) tablet 40 mg  40 mg Oral QHS Waylon Steel, DO   40 mg at 11/06/20 2129    clopidogreL (PLAVIX) tablet 75 mg  75 mg Oral DAILY Waylon Steel, DO   75 mg at 11/07/20 0929    sodium chloride (NS) flush 5-40 mL  5-40 mL IntraVENous Q8H Waylon Steel, DO   10 mL at 11/07/20 0507    sodium chloride (NS) flush 5-40 mL  5-40 mL IntraVENous PRN Waylon Steel, DO        insulin lispro (HUMALOG) injection   SubCUTAneous AC&HS Kamlesh BERNABE, DO   4 Units at 11/07/20 1121    acetaminophen (TYLENOL) tablet 650 mg  650 mg Oral Q4H PRN Waylon Steel DO        insulin glargine (LANTUS) injection 30 Units  30 Units SubCUTAneous QHS Waylon Steel DO   30 Units at 11/06/20 2140        Allergies   Allergen Reactions    Other Food Swelling     Mario. -- tongue and lip swells    Bee Sting [Sting, Bee] Swelling Local swelling    Invokana [Canagliflozin] Other (comments)     Caused frequent Urinary Tract Infections    Other Plant, Animal, Environmental Other (comments)     Trees, grass, dust, mold---- congestion       Review of Systems:  Could not obtain due to altered mental status      Objective:     Vitals:    11/06/20 2259 11/07/20 0336 11/07/20 0738 11/07/20 1114   BP: 104/60 104/64 104/65 96/60   Pulse: 75 79 83 75   Resp: 23 22 (!) 34 28   Temp: 97.7 °F (36.5 °C) 98.3 °F (36.8 °C) 98.9 °F (37.2 °C) 98.5 °F (36.9 °C)   SpO2: 97% 97% 98% 99%   Weight:       Height:            Physical Exam:  Neurological examination    Level of consciousness-obtunded    Brain stem reflexes  -Pupillary reflex to bright light: Pupils are equal, round, and reactive to light  -Ciliospinal reflexes: Present  -Oculovestibular and/or oculocephalic reflex response: Eyes are deviated to the right and cannot be overcome with oculocephalic maneuver  -Corneal reflex: Present  -Facial movement to painful stimulus at supraorbital nerve, temporomandibular joint: Present  -Cough/gag reflex: Present    Motor examination  -Muscle tone: Flaccid on the left.   She does have some withdrawal on the right  -Motor response to pain: As above  -Muscle stretch reflexes: Diminished throughout  -Response to plantar stimulation: Upgoing on the right          Lab Results   Component Value Date/Time    Cholesterol, total 101 07/07/2020 09:58 AM    HDL Cholesterol 42 07/07/2020 09:58 AM    LDL, calculated 31 07/07/2020 09:58 AM    VLDL, calculated 28 07/07/2020 09:58 AM    Triglyceride 142 07/07/2020 09:58 AM    CHOL/HDL Ratio 1.9 08/01/2019 05:28 AM        Lab Results   Component Value Date/Time    Hemoglobin A1c 7.2 (H) 09/29/2020 07:21 PM    Hemoglobin A1c (POC) 8.7 (A) 07/25/2016 02:11 PM        CT Results (most recent): Personally Reviewed   Results from [place] encounter on 11/04/20   CT HEAD WO CONT    Narrative EXAMINATION: CT HEAD WO CONT 11/5/2020 1:26 PM    INDICATION: Drowsy and lethargic    COMPARISON: CT head February 22, 2020    TECHNIQUE: Multiple-row detector helical CT examination of the head without  intravenous contrast.     Radiation dose reduction techniques were used for this study. Our CT scanners  use one or all of the following: Automated exposure control, adjustment of the  mA and/or kV according to patient size, iterative reconstruction. FINDINGS:  Chronic-appearing multiple infarcts in the superior left cerebellum, new from  February 22, 2020. Unchanged old left basal ganglia and right thalamic infarct  No space-occupying lesion. No evidence of recent hemorrhage. Normal size of  ventricles and extra-axial spaces for age. No calvarial abnormality is seen. Right maxillary sinus mucosal thickening Grossly normal orbital structures. No  abnormality of extracranial soft tissues. Impression IMPRESSION:  1. No acute intracranial hemorrhage or mass effect  2. Chronic appearing small infarcts in the left superior cerebellum, though new  from February 22, 2020  3. Unchanged old left basal ganglia and right thalamic infarcts. Results for orders placed or performed during the hospital encounter of 11/04/20   EKG, 12 LEAD, INITIAL   Result Value Ref Range    Ventricular Rate 114 BPM    Atrial Rate 114 BPM    P-R Interval 134 ms    QRS Duration 154 ms    Q-T Interval 374 ms    QTC Calculation (Bezet) 515 ms    Calculated P Axis 48 degrees    Calculated R Axis -74 degrees    Calculated T Axis 93 degrees    Diagnosis       Atrial tachycardia   Left bundle branch block  ? rate related     Confirmed by AMBAR SALDIVAR (), Acacia Vazquez ([id number]) on 11/4/2020 12:47:56 PM          MRI Results (most recent): Personally Reviewed  Results from [place] encounter on 07/31/19   MRI BRAIN W WO CONT    Narrative History: Diplopia and ataxia. History of breast cancer.     EXAM: MRI brain with and without contrast    TECHNIQUE: Multiplanar multisequence imaging is performed both before and after  the uneventful administration of 15 cc Dotarem    No comparison    FINDINGS: Diffusion-weighted imaging demonstrates a question of tiny focus of  restricted diffusion within the right cerebellum. Normal flow voids present  within the major intracranial vessels. There is no mass or mass effect. There is  no midline shift. Punctate foci of T2 signal prolongation seen in the corona  radiata and centrum semiovale. There is no extra-axial fluid collection, mass,  or mass effect. There is no midline shift. The basilar cisterns are patent. There is mucosal thickening of the right maxillary sinus. Gradient echo sequence  imaging demonstrates no blooming artifact to suggest retained intracranial blood  products. Postcontrast imaging demonstrates no abnormal parenchymal or meningeal  enhancement. Impression IMPRESSION:  1. Questionable tiny punctate focus of restricted diffusion within the right  cerebellum. Cannot exclude tiny lacunar infarct at this site. 2. Evidence of chronic microvascular ischemic change. 3. Mucosal thickening of the right maxillary sinus. Most recent CTA Personally Reviewed  Results from [place] encounter on 11/04/20   CT HEAD WO CONT    Narrative EXAMINATION: CT HEAD WO CONT 11/5/2020 1:26 PM    INDICATION: Drowsy and lethargic    COMPARISON: CT head February 22, 2020    TECHNIQUE: Multiple-row detector helical CT examination of the head without  intravenous contrast.     Radiation dose reduction techniques were used for this study. Our CT scanners  use one or all of the following: Automated exposure control, adjustment of the  mA and/or kV according to patient size, iterative reconstruction. FINDINGS:  Chronic-appearing multiple infarcts in the superior left cerebellum, new from  February 22, 2020. Unchanged old left basal ganglia and right thalamic infarct  No space-occupying lesion.  No evidence of recent hemorrhage. Normal size of  ventricles and extra-axial spaces for age. No calvarial abnormality is seen. Right maxillary sinus mucosal thickening Grossly normal orbital structures. No  abnormality of extracranial soft tissues. Impression IMPRESSION:  1. No acute intracranial hemorrhage or mass effect  2. Chronic appearing small infarcts in the left superior cerebellum, though new  from February 22, 2020  3. Unchanged old left basal ganglia and right thalamic infarcts. Assessment:     72-year-old woman with a history of stroke who is admitted with severe sepsis and multiorgan dysfunction. Her neurological examination is highly suggestive of a focal lesion in the brain, likely an ischemic stroke. Differential diagnosis also includes hemorrhage and other structural brain lesions. We will repeat a CT scan of the head. Symptoms have been present for greater than 24 hours so no acute intervention can be done in regards to alteplase or mechanical thrombectomy. Plan:     STAT CT scan of the head    The patient is on aspirin, Plavix, and Eliquis. I advise against being on 3 antithrombotics. MRI when able and if necessary (we will see what CT scan of the head shows)    The patient is critically ill. Prognosis guarded. If neuroimaging is normal then we will get an EEG.         Signed By: Ephraim Dover,      November 7, 2020

## 2020-11-08 PROBLEM — I63.133 CEREBROVASCULAR ACCIDENT (CVA) DUE TO BILATERAL EMBOLISM OF CAROTID ARTERIES (HCC): Status: ACTIVE | Noted: 2020-01-01

## 2020-11-08 NOTE — PROGRESS NOTES
Received an update from Dr. Alec Baldwin about the patient   patient is resting peacefully    was not in the room  Will follow closely

## 2020-11-08 NOTE — PROGRESS NOTES
Hourly rounding completed on this shift. No new complaints at this time. All needs met. 0800 & 1800 bolus given. 1200 bolus missed due to late breakfast bolus & pt being TESSY. Pt is currently resting in bed. Will continue to monitor and give report to oncoming nurse.

## 2020-11-08 NOTE — PROGRESS NOTES
Problem: Falls - Risk of  Goal: *Absence of Falls  Description: Document Cayla Greenberg Fall Risk and appropriate interventions in the flowsheet. Outcome: Progressing Towards Goal  Note: Fall Risk Interventions:       Mentation Interventions: Adequate sleep, hydration, pain control, Door open when patient unattended, Evaluate medications/consider consulting pharmacy         Elimination Interventions: Call light in reach, Toilet paper/wipes in reach              Problem: Pressure Injury - Risk of  Goal: *Prevention of pressure injury  Description: Document Benjamín Scale and appropriate interventions in the flowsheet.   Outcome: Progressing Towards Goal  Note: Pressure Injury Interventions:  Sensory Interventions: Assess changes in LOC    Moisture Interventions: Absorbent underpads, Internal/External urinary devices    Activity Interventions: Increase time out of bed, PT/OT evaluation    Mobility Interventions: Assess need for specialty bed, PT/OT evaluation    Nutrition Interventions: Document food/fluid/supplement intake    Friction and Shear Interventions: Apply protective barrier, creams and emollients, HOB 30 degrees or less, Minimize layers

## 2020-11-08 NOTE — PROGRESS NOTES
Hourly rounds completed with bed in low/locked position. Patient has rested throughout shift with no acute changes. Osmolite 1.2 bolus feeding completed at 2100 and 0600, patient tolerated well. Dubois catheter patent draining with yellow straw urine. Oral care completed this shift, bedside report given to oncoming RN.

## 2020-11-08 NOTE — PROGRESS NOTES
Neurology Daily Progress Note     Assessment:     Embolic stroke. [facility] stroke, COVID in 8/30, DVT (on Eliquis), who is admitted with severe sepsis and multiorgan dysfunction. CT of head showed prominent hypoattenuating area in the right basal ganglia may represent subacute CVA. MRI of brain showed multiple infarcts, embolic appearing. CTA and TTE pending. If normal, recommend JT to evaluate for proximal source of embolization. Plan:     · ASA 81 mg daily  · Continue Eliquis 5 mg BID  · Initiate high intensity statin   · Neurochecks Q4H  · CTA of head and neck   · Bedside swallow test   · TTE pending. If normal, recommend JT to evaluate for proximal source of embolization. · Telemetry and echocardiogram with bubble study  · PT/OT/ST  · DVT prophylaxis   · BP management - normotensive, with long-term goal <130/80  · Smoking cessation if applicable   · Diabetes education if applicable   · Depression Screening prior to discharge      Subjective: Interval history:    Continues to have forced eye deviation to right with gaze evoked nystagmus to the left, right hemiplegia. CT of head showed prominent hypoattenuating area in the right basal ganglia may represent subacute CVA. MRI of brain showed multiple infarcts, embolic appearing. CTA and TTE pending. If normal, recommend JT to evaluate for proximal source of embolization. History:    Franki Ortiz is a 72 y.o. female who is being seen for stroke. Review of systems negative with exception of pertinent positives and negatives noted above.        Objective:     Vitals:    11/07/20 1910 11/07/20 2352 11/08/20 0405 11/08/20 0741   BP: 110/61 132/76 128/79 136/80   Pulse: 82 76 79 91   Resp: 22 22 22 30   Temp: 98 °F (36.7 °C) 98.2 °F (36.8 °C) 98 °F (36.7 °C) 98.6 °F (37 °C)   SpO2: 98% 100% 99% 99%   Weight:       Height:              Current Facility-Administered Medications:     sodium chloride 0.9 % bolus infusion 100 mL, 100 mL, IntraVENous, RAD ONCE, Waylon Steel, DO    sodium chloride (NS) flush 10 mL, 10 mL, IntraVENous, RAD ONCE, Waylon Steel E, DO    iopamidoL (ISOVUE-370) 76 % injection 50 mL, 50 mL, IntraVENous, RAD ONCE, Waylon Steel E, DO    midodrine (PROAMATINE) tablet 5 mg, 5 mg, Oral, TID WITH MEALS, Waylon Steel, DO, 5 mg at 11/08/20 1004    levothyroxine (SYNTHROID) tablet 112 mcg, 112 mcg, Per J Tube, 6am, Waylon Steel DO, 112 mcg at 11/08/20 0604    ampicillin-sulbactam (UNASYN) 3 g in 0.9% sodium chloride (MBP/ADV) 100 mL, 3 g, IntraVENous, Q6H, Waylon Steel DO, Last Rate: 100 mL/hr at 11/08/20 0604, 3 g at 11/08/20 0604    NUTRITIONAL SUPPORT ELECTROLYTE PRN ORDERS, , Does Not Apply, PRN, Waylon Steel, DO    apixaban (ELIQUIS) tablet 5 mg, 5 mg, Per J Tube, Q12H, Waylon Steel, DO, 5 mg at 11/08/20 1004    aspirin chewable tablet 81 mg, 81 mg, Per J Tube, DAILY, Waylon Steel, DO, 81 mg at 11/08/20 1004    doxycycline (VIBRAMYCIN) capsule 100 mg, 100 mg, Per J Tube, Q12H, Waylon Steel, DO, 100 mg at 11/08/20 1004    famotidine (PEPCID) 40 mg/5 mL (8 mg/mL) oral suspension 20 mg, 20 mg, Per J Tube, Q24H, Waylon Steel, DO, 20 mg at 11/07/20 1120    anastrozole (ARIMIDEX) tablet 1 mg, 1 mg, Oral, DAILY, Waylon Steel, DO, 1 mg at 11/08/20 1003    atorvastatin (LIPITOR) tablet 40 mg, 40 mg, Oral, QHS, Waylon Steel, DO, 40 mg at 11/07/20 2205    sodium chloride (NS) flush 5-40 mL, 5-40 mL, IntraVENous, Q8H, Waylon Steel, , 10 mL at 11/08/20 0604    sodium chloride (NS) flush 5-40 mL, 5-40 mL, IntraVENous, PRN, Waylon Steel, DO    insulin lispro (HUMALOG) injection, , SubCUTAneous, AC&HS, Waylon Steel, , 4 Units at 11/08/20 0840    acetaminophen (TYLENOL) tablet 650 mg, 650 mg, Oral, Q4H PRN, Waylon Steel, DO    insulin glargine (LANTUS) injection 30 Units, 30 Units, SubCUTAneous, QHS, Milvia, Clement Cramer, DO, 30 Units at 11/07/20 2205    Recent Results (from the past 12 hour(s))   GLUCOSE, POC    Collection Time: 11/07/20 10:09 PM   Result Value Ref Range    Glucose (POC) 190 (H) 65 - 100 mg/dL   RENAL FUNCTION PANEL    Collection Time: 11/08/20  5:57 AM   Result Value Ref Range    Sodium 143 136 - 145 mmol/L    Potassium 4.2 3.5 - 5.1 mmol/L    Chloride 110 (H) 98 - 107 mmol/L    CO2 29 21 - 32 mmol/L    Anion gap 4 (L) 7 - 16 mmol/L    Glucose 153 (H) 65 - 100 mg/dL    BUN 18 8 - 23 MG/DL    Creatinine 0.46 (L) 0.6 - 1.0 MG/DL    GFR est AA >60 >60 ml/min/1.73m2    GFR est non-AA >60 >60 ml/min/1.73m2    Calcium 8.7 8.3 - 10.4 MG/DL    Phosphorus 3.0 2.3 - 3.7 MG/DL    Albumin 1.8 (L) 3.2 - 4.6 g/dL   CBC W/O DIFF    Collection Time: 11/08/20  5:57 AM   Result Value Ref Range    WBC 8.0 4.3 - 11.1 K/uL    RBC 3.44 (L) 4.05 - 5.2 M/uL    HGB 9.5 (L) 11.7 - 15.4 g/dL    HCT 31.2 (L) 35.8 - 46.3 %    MCV 90.7 79.6 - 97.8 FL    MCH 27.6 26.1 - 32.9 PG    MCHC 30.4 (L) 31.4 - 35.0 g/dL    RDW 16.9 (H) 11.9 - 14.6 %    PLATELET 474 808 - 216 K/uL    MPV 10.7 9.4 - 12.3 FL    ABSOLUTE NRBC 0.00 0.0 - 0.2 K/uL   MAGNESIUM    Collection Time: 11/08/20  5:57 AM   Result Value Ref Range    Magnesium 1.8 1.8 - 2.4 mg/dL   GLUCOSE, POC    Collection Time: 11/08/20  7:45 AM   Result Value Ref Range    Glucose (POC) 240 (H) 65 - 100 mg/dL     CT Results (most recent):  Results from Hospital Encounter encounter on 11/04/20   CT HEAD WO CONT    Narrative CT of the head without contrast.    CLINICAL INDICATION: Left-sided weakness, right IJV deviation    PROCEDURE: Serial thin section axial images are obtained from the cranial vertex  through the skull base without the administration of intravenous contrast.   Radiation dose reduction techniques were used for this study.  Our CT scanners  use one or all of the following: Automated exposure control, adjusted of the mA  and/or kV according to patient size, iterative reconstruction    COMPARISON: Head CT dated 11/5/2020. FINDINGS: Multifocal hypoattenuation is noted in the region of the left basal  ganglia extending into the external capsule in keeping with old infarct. More  prominent hypoattenuation is noted in the right basal ganglia that may represent  a more subacute infarct. . An old lacunar infarct is noted along the posterior  aspect of the right thalamus. Moderate bilateral hemispheric and posterior fossa  atrophy evident. There is no acute intracranial hemorrhage, mass, or mass  effect. No abnormal extra-axial fluid collections noted. Mucoperiosteal thickening and air-fluid levels noted in the right maxillary  sinus with hyperostosis of the maxillary sinus walls most in keeping with  chronic sinusitis. The remainder the paranasal sinuses and mastoid air cells are  clear. There is no skull fracture. Impression IMPRESSION:  1. More prominent hypoattenuating area in the right basal ganglia may represent  subacute CVA. Could consider further evaluation with brain MRI. 2. Extensive old infarct in the left basal ganglia and internal capsule with an  old lacunar infarct in the right thalamus. 3. Moderate hemispheric and posterior fossa atrophy. 4. Mild chronic white matter microangiopathic changes. MRI Results (most recent):  Results from [place] encounter on 11/04/20   MRI BRAIN WO CONT    Narrative Clinical History: The Female patient is 72years old  presenting with symptoms  of Rightward gaze, left hemiplegia, RMCA syndrome. Comparison:  Head CT 11/7/2020, brain MRI 7/31/2019    Technique:  Axial T2, axial FLAIR, axial diffusion-weighted,  sagittal T1 and  coronal gradient-echo scans were performed. Findings:      There are multiple foci of subacute ischemia most prominently involving the left  caudate head and anterior margin of the left putamen, however also the medial  right temporal lobe/hippocampus, and anterolateral right radhika and left brachium  pontis extending into the radhika. There is otherwise age-related cortical involution with moderate confluent  chronic periventricular white matter disease. Focal encephalomalacic changes are  seen involving the anterior margin of the left external capsule, with lacunae  throughout the basal ganglia and thalami as well as corona radiata and centrum  semiovale. There are also chronic ischemic changes involving both cerebellar  hemispheres particularly the middle cerebellar peduncles. Chronic ischemic  changes are also seen throughout the peripheral radhika. There are no abnormal extra-axial fluid collections. No evidence of mass or mass  effect is seen. Expected flow voids are maintained in the major intracranial  vessels. There is no evidence of Chiari malformation. The ventricular system and CSF containing spaces are unremarkable in appearance. Visualized extracranial soft tissues are unremarkable. There is chronic moderate right maxillary sinus mucosal inflammatory disease. Impression Impression:    1. Multifocal areas of subacute ischemia as described above with otherwise  chronic progressive microvascular disease. SIGNIFICANT RESULT:  The significant findings in this report have been referred to the Imaging  Navigator in order to communicate to the referring provider or his/her designee  as outlined in Section II.C.2.a.ii-iii of the ACR  Practice Guideline for Communication of Diagnostic Imaging Findings. CPT code(s) [id number]                 Physical Exam:  General - ill appearing, well nourished, in no apparent distress. HEENT - Normocephalic, atraumatic. Conjunctiva are clear. Neck - Supple without masses  Cardiovascular - Regular rate and rhythm. Normal S1, S2 without murmurs, rubs, or gallops. Lungs - Clear to auscultation. Abdomen - Soft, nontender with normal bowel sounds. Extremities - Peripheral pulses intact. No edema and no rashes.      Neurological examination - Alert, able to follow simple commands. Comprehension, attention, memory and reasoning are intact. Language and speech are expressive aphasia. On cranial nerve examination, (II, III, IV, VI) pupils are equal, round, and reactive to light. Visual acuity is adequate. Visual fields are full to finger confrontation. Right gaze deviation with gaze evoked nystagmus to left. (V, VII) right facial droop (VIII) Hearing is intact. (IX, X) Palate and uvula elevate symmetrically. Voice is normal. (XI) Shoulder shrug is strong and equal bilaterally. (XII)Tongue is midline. Motor examination - There is normal muscle tone and bulk. Power is flaccid on left UE/LE, withdraws from pain RUE/LE. Muscle stretch reflexes are 1+ throughout. Plantar response is upgoing on right. Unable to assess sensation, cerebellar examination, gait and stance due to AMS.        Signed By: Lamar Mcqueen NP     November 8, 2020

## 2020-11-08 NOTE — PROGRESS NOTES
Hospitalist Progress Note    Patient: Lester Shah MRN: [id number]  SSN: [id number]    YOB: 1955  Age: 72 y.o. Sex: female      Admit Date: 11/4/2020    LOS: 4 days     Subjective:     72year old CF with a PMH of breath CA, HTN, hypothyroidism, IDDM2, h/o strokes/TIAs with vascular dementia, RA, breast cancer, completed CHB with CPM placement (in 9/2020), COVID in 8/2020 requiring intubation, and GIB with DVTs (started on Eliquis) in 9/2020 presented to the ER on 11/4/20 from [facility] after she was found to be confused and have \"abnormal vital signs. \" She was found to have severe sepsis due to a UTI. MRI on 11/7 confirmed strokes in multiple territories. 11/8 - She is alert and tries to talk today. Minimally interactive. Review of systems negative except stated above. Objective:     Visit Vitals  /80   Pulse 91   Temp 98.6 °F (37 °C)   Resp 30   Ht 5' 1\" (1.549 m)   Wt 70.3 kg (155 lb)   LMP 06/30/2001   SpO2 99%   BMI 26.61 kg/m²      Oxygen Therapy  O2 Sat (%): 99 % (11/08/20 0741)  Pulse via Oximetry: 100 beats per minute (11/05/20 0814)  O2 Device: Nasal cannula (11/07/20 0336)  O2 Flow Rate (L/min): 2 l/min (11/07/20 0336)      Intake and Output:     Intake/Output Summary (Last 24 hours) at 11/8/2020 0816  Last data filed at 11/8/2020 0604  Gross per 24 hour   Intake 1469 ml   Output 1500 ml   Net -31 ml         Physical Exam:   GENERAL: Alert, appears comfortable  EYE: conjunctivae/corneas clear. PERRL. THROAT & NECK: normal and no erythema or exudates noted. LUNG: scattered rhonchi  HEART: tachy, reg rhythm, S1S2, no murmur, no JVD  ABDOMEN: soft, non-tender, non-distended. Bowel sounds normal.   EXTREMITIES:  No edema, 2+ pedal/radial pulses bilaterally  SKIN: no rash or abnormalities  NEUROLOGIC: Alert. Cranial nerves 2-12 grossly intact.     Lab/Data Review:  Recent Results (from the past 24 hour(s))   GLUCOSE, POC    Collection Time: 11/07/20 11:11 AM   Result Value Ref Range    Glucose (POC) 243 (H) 65 - 100 mg/dL   PLEASE READ & DOCUMENT PPD TEST IN 72 HRS    Collection Time: 11/07/20  2:12 PM   Result Value Ref Range    PPD Negative Negative    mm Induration 0 0 - 5 mm   GLUCOSE, POC    Collection Time: 11/07/20  5:22 PM   Result Value Ref Range    Glucose (POC) 153 (H) 65 - 100 mg/dL   GLUCOSE, POC    Collection Time: 11/07/20  7:13 PM   Result Value Ref Range    Glucose (POC) 197 (H) 65 - 100 mg/dL   GLUCOSE, POC    Collection Time: 11/07/20 10:09 PM   Result Value Ref Range    Glucose (POC) 190 (H) 65 - 100 mg/dL   RENAL FUNCTION PANEL    Collection Time: 11/08/20  5:57 AM   Result Value Ref Range    Sodium 143 136 - 145 mmol/L    Potassium 4.2 3.5 - 5.1 mmol/L    Chloride 110 (H) 98 - 107 mmol/L    CO2 29 21 - 32 mmol/L    Anion gap 4 (L) 7 - 16 mmol/L    Glucose 153 (H) 65 - 100 mg/dL    BUN 18 8 - 23 MG/DL    Creatinine 0.46 (L) 0.6 - 1.0 MG/DL    GFR est AA >60 >60 ml/min/1.73m2    GFR est non-AA >60 >60 ml/min/1.73m2    Calcium 8.7 8.3 - 10.4 MG/DL    Phosphorus 3.0 2.3 - 3.7 MG/DL    Albumin 1.8 (L) 3.2 - 4.6 g/dL   CBC W/O DIFF    Collection Time: 11/08/20  5:57 AM   Result Value Ref Range    WBC 8.0 4.3 - 11.1 K/uL    RBC 3.44 (L) 4.05 - 5.2 M/uL    HGB 9.5 (L) 11.7 - 15.4 g/dL    HCT 31.2 (L) 35.8 - 46.3 %    MCV 90.7 79.6 - 97.8 FL    MCH 27.6 26.1 - 32.9 PG    MCHC 30.4 (L) 31.4 - 35.0 g/dL    RDW 16.9 (H) 11.9 - 14.6 %    PLATELET 952 666 - 597 K/uL    MPV 10.7 9.4 - 12.3 FL    ABSOLUTE NRBC 0.00 0.0 - 0.2 K/uL   MAGNESIUM    Collection Time: 11/08/20  5:57 AM   Result Value Ref Range    Magnesium 1.8 1.8 - 2.4 mg/dL   GLUCOSE, POC    Collection Time: 11/08/20  7:45 AM   Result Value Ref Range    Glucose (POC) 240 (H) 65 - 100 mg/dL       SARS-CoV-2 Lab Results  \"Novel Coronavirus\" Test: No results found for: COV2NT   \"Emergent Disease\" Test: No results found for: EDPR  \"SARS-COV-2\" Test: No results found for: XGCOVT  \"Precision Labs\" Test: No results found for: RSLT  Rapid Test:   Lab Results   Component Value Date/Time    COVR Not detected 11/04/2020 11:46 AM           Imaging:  Xr Chest Sngl V    Result Date: 10/16/2020  Clinical History: The Female patient is 72years old  presenting with symptoms of deminished breath sounds on left. Comparison:  Chest x-ray 9/29/2020 Findings:  Frontal view of the chest was obtained. There is improving aeration over prior exam with resolving perihilar infiltrates. Coarse interstitial lung markings persist particularly at the left lung base. The cardiomediastinal silhouette is within normal limits. There are no acute osseous abnormalities. A left subclavian pacing device is demonstrated. The feeding tube has been removed over the interval.     Impression:  1. Resolved pulmonary edema, however with persistent coarse interstitial lung markings particularly at left lung base. CPT code(s) 86532     Xr Swallow Func Video    Result Date: 10/7/2020  Exam: Modified Barium Swallow INDICATION: Oropharyngeal dysphagia. Fluoro time: 2 minutes 19 seconds. Spot films:  0 Fluoroscopy was used to evaluate pharyngeal function while the patient was given barium solutions and barium coated solids. FINDINGS: Patient with aspiration with thickened liquids and laryngeal penetration with thin liquids. IMPRESSION: Patient with aspiration with thickened liquids. Please see full report from speech language pathologist.     Xr Chest Port    Result Date: 11/4/2020  CHEST X-RAY, one view. HISTORY:  Altered mental status. TECHNIQUE:  AP portable semiupright view. COMPARISON: 16 October. FINDINGS: Lungs: Right upper lung and left lower lung zone infiltrates. Costophrenic angles: Fairly sharp. Heart size: Normal for portable technique. Pulmonary vasculature: is unremarkable. Aorta: Unremarkable. Included portion of the upper abdomen: is unremarkable. Bones: No gross bony lesions.  Other: Left-sided cardiac pacemaker is present IMPRESSION:  Persistent lung infiltrates without significant change. No results found for this visit on 11/04/20. Cultures:   All Micro Results     Procedure Component Value Units Date/Time    CULTURE, URINE [[id number]]  (Abnormal)  (Susceptibility) Collected:  11/04/20 1148    Order Status:  Completed Specimen:  Urine from [place] Specimen Updated:  11/07/20 1210     Special Requests: NO SPECIAL REQUESTS        Culture result:       10,000 to 50,000 COLONIES/mL STAPHYLOCOCCUS SPECIES, COAGULASE NEGATIVE                  10,000 to 50,000 COLONIES/mL YEAST SUBCULTURE IN PROGRESS          CULTURE, BLOOD [[id number]] Collected:  11/04/20 0755    Order Status:  Completed Specimen:  Blood Updated:  11/07/20 0643     Special Requests: --        RIGHT  Antecubital       Culture result: NO GROWTH 3 DAYS       CULTURE, BLOOD [[id number]] Collected:  11/04/20 0745    Order Status:  Canceled Specimen:  Blood           Assessment/Plan:     Principal Problem:    Severe sepsis with acute organ dysfunction ([facility] (11/4/2020)  - In ER WBC 13.8 +  + RR 38 + UTI/pneumonia + ISRAEL + encephalopathy + lactic acid 4.5  - Resolving  - Likely due to UTI  - Continue Unasyn + Doxycycline  - Stopped normal saline @ 150 ml/hr  - Blood cultures sent in ER - NGTD  - UA consistent with UTI  - Urine culture with GPC  - CXR with stable infiltrates  - Procalcitonin 0.95  - Lactic Acid 4.5 --> 3.2 --> 1.6     Active Problems:    Cerebrovascular accident (CVA) due to bilateral embolism of carotid arteries ([facility]  - MRI on 11/7 with multifocal areas of subacute ischemia  - Continue Eliquis + ASA  - Stopped Plavix  - Check ECHO  - Check CTA H/N  - PT/OT/SLP  - Neurology following      Rightward Gaze (11/7/2020)  - MRI on 11/7 with multifocal areas of subacute ischemia  - CT with old CVAs  - Continue ASA + Eliquis  - Neurology following      Acute renal failure (ARF) ([facility] (11/4/2020)  - Likely due to profound dehydration  - Resolved  - Creatinine 2.66 --> 0.96 --> 0.65 --> 0.47 (baseline 0.40)  - BUN/Cr >20 indicating prerenal  - Stopped normal saline @ 150 ml/hr  - UA consistent with UTI  - Urine sodium 99  - Urine prot/creat high  - Strict I/O       UTI (urinary tract infection) (11/4/2020)  - Urine dipstick leukocyte +  - Continue Doxycycline  - Full UA consistent with UTI  - Urine culture with GPC       Dehydration, severe (11/4/2020)  - Improving  - UA with SG >1.030 + ISRAEL  - Likely due to sepsis + hyperglycemia + decreased PO intake  - Stop normal saline @ 150 ml/hr  - Strict I/O       Pneumonia (11/4/2020)  - Had COVID in 8/2020 --> since tested negative  - CXR unchanged with RUL and LLL infiltrate  - Was on Cipro + Doxy as outpatient  - Continue Unasyn to cover aspiration  - Continue Doxycycline  - No SOB or cough per patient       Lactic acidosis (11/4/2020)  - Due to #1  - Resolved  - Lactic Acid 4.5 --> 3.2 --> 1.6  - Stopped normal saline @ 150 ml/hr       Hyperkalemia (11/4/2020)  - Due to ISRAEL  - Resolved  - Stopped normal saline @ 150 ml/hr  - EKG paced, no overt changes  - Repeat BMP daily       Septic encephalopathy (11/4/2020)  - Very confused and hard to understand speech on admission  - Likely stroke component too  - More alert but hard to understand today  - No focal neurological deficits  - CT head with chronic CVAs but nothing acute  - Likely due to UTI and sepsis + uremia  - Continue Unasyn  - Continue Doxycycline  - Monitor for improvement       Type 2 diabetes mellitus without complication, with long-term current use of insulin ([address] (5/16/2019)  - Hyperglycemic on presentation  - Takes Lantus 35U --> decrease to 30U until eating better  - Continue Humalog SSI  - Hold PO meds       S/P percutaneous endoscopic gastrostomy (PEG) tube placement ([address] (11/4/2020)  - PEG placed 10/14/2020  - Site looks clean and normal       Hypothyroidism ()  - Continue Levothyroxine  - THS high, FT4 normal       RA (rheumatoid arthritis) ([facility] ()       Gastroesophageal reflux disease without esophagitis ()  - Continue Pepcid       Complete heart block (HCC) (3/28/2018)  - S/P CPM in 9/2020       Malignant neoplasm of lower-outer quadrant of left breast of female, estrogen receptor positive ([facility] (7/1/2019)  - Continue Anastrozole       Vascular dementia ([facility] (11/4/2020)  - Continue ASA/Plavix       Cardiac pacemaker (11/4/2020)  - S/P CPM in 9/2020       Mixed hyperlipidemia (7/7/2020)  - Continue Lipitor       History of stroke (7/7/2020)  - Continue ASA/Plavix    Today's Plan: Continue current plan. DIET NPO  DIET TUBE FEEDING Osmolite 1.2 bolus 1 carton times 5 per day. Water flush 75 ml before and after each bolus. Suggested feeding times 6 am, 9a, 12p, 6p and 9p.     DVT Prophylaxis: Eliquis (H/o DVT)    Discharge Plan: TBD - Consult Palliative to assist with [address]      Signed By: Alberto Alvares DO     November 8, 2020

## 2020-11-09 NOTE — PROGRESS NOTES
CM received SNF choices from the patient's spouse John Marin this day, via e-mail. CM sent referrals to :  1. [facility]  2.  [facility]  3. [facility]  4. [facility]. As spouse requested. CM will also follow up with MD Dolly Montalvo, regarding treatment plan. CM also left voicemail with [facility] department, to assist with [place] medicaid. As this may be beneficial, for additional services, regarding supportive care.

## 2020-11-09 NOTE — CONSULTS
Palliative Care    Patient: Max May MRN: [id number]  SSN: [id number]    YOB: 1955  Age: 72 y.o. Sex: female       Date of Request: 11/8/2020  Date of Consult:  11/9/2020  Reason for Consult:  goals of care  Requesting Physician: Dr Singh Mallory     Assessment/Plan:     Principal Diagnosis:    Pain, general  R52    Additional Diagnoses:   · Advance Care Planning Counseling Z71.89  · Altered Mental Status R41.82  · Debility, Unspecified  R53.81  · Dysphagia  R13.10  · Edema  R60.9  · Fatigue, Lethargy  R53.83  · Frailty  R54  · Hypersecretions, Respiratory  J39.9  · Counseling, Encounter for Medical Advice  Z71.9  · Encounter for Palliative Care  Z51.5    Palliative Performance Scale (PPS):       Medical Decision Making:   Reviewed and summarized notes from admission to present   Discussed case with appropriate providers- Dr Stuart Mccracken laboratory and x-ray data from admission to present    Pt in bed, no acute distress, although she does look mildly uncomfortable. Her skin is flushed, and she is diaphoretic. Pt opened her eyes when I called her name, and followed commands with her right hand. Axillary temp checked, which was 99.0. When her arm was lift, she stated \"Ow\" and nodded her head when asked if she was having pain. Left arm noted to edematous and bruising on the hand is noted. Provided Norco 5 mg q 6 hours PRN for pain, and nurse to give one now. Dr Singh Mallory joined the visit, and was notified of temperature and arm. I asked pt if she was suffering, and she clearly stated \"some. \"  When I asked her if she wanted us to focus on her comfort, and allow her to pass naturally, she very clearly became panicked and shook her head no adamantly. Assured her of our ongoing care. Will speak with  regarding goals of care. 1345-  In addition to the E&M time spent above, an additional 25 minutes was spent on ACP with pt's , Phani Flores.   Reviewed role of PC, and uncertainty of pt's prognosis.  voiced understanding, but feels she has improved some since admission. Tita Ibarra was unaware that pt had a HCPOA on file. Reviewed the document with him, which names him as agent, and her choices regarding life support and artifical nutrition (she left the decisions to him). He wants to give pt every chance to get better, but voiced understanding that she may have ongoing issues with debility and dysphagia. Pt to remain a full code. Counseled  on weighing the burden vs benefit of all medical choices moving forward, and choose what he feels is best for her. No further PC needs- we will sign off. Will discuss findings with members of the interdisciplinary team.      Thank you for this referral.          .    Subjective:     History obtained from:  Patient, Care Provider and Chart    Chief Complaint: CVA  History of Present Illness:  Ms Ugarte is a 73 yo female with PMH of RA, breast cancer, GERD, HTN, complete heart block with pacemaker, and recent COVID-19 infection, who presented to the ER from [facility] on 11/4/2020 after being found confused by staff. She was in her normal state of health the days prior. Staff denied fevers, cough, n/v/d. Work up revealed sepsis, [place]RA[place], UTI, pneumonia. Pt was admitted for further management. CT scan of the head was negative for acute processes, however MRI revealed multifocal areas of subacute ischemia. Neurology has evaluated pt, and echocardiogram is pending. Pt currently lethargic, with left hemiparesis. She is able to give one word answers, and endorses pain. Advance Directive: Yes       Code Status:  Full Code            Health Care Power of : Yes - Copy of [address] on file. Past Medical History:   Diagnosis Date    Actinic keratosis     uses Solaraze gel when needed    Arthritis     rheumatoid.   Orencia infusion every 4 weeks    Asthma     none in yrs per pt--- no inhalers per pt    Breast cancer ([facility] 2019    Cancer ([facility]     melanoma    Cerebrovascular accident (CVA) due to bilateral embolism of carotid arteries ([facility] 11/8/2020    DJD (degenerative joint disease)     Environmental allergies     Environmental and seasonal allergies 7/25/2019    GERD (gastroesophageal reflux disease)     prn med    History of malignant melanoma 1980's    on back    History of TIA (transient ischemic attack)     Hypertension     controlled with medication    Hypothyroidism     Insulin dependent diabetes mellitus 1990's    Type 2. sqbs avg am--100---- hypo at 73- last A1C= 8.1 on 12/19/17    Kidney stones     4th one at present    Mobitz type 1 second degree AV block     EKG today 9/22/16- Dr Eddie Aguirre reviewed----- per pt-- \" been that way my whole life\"-- does not see a cardiologist    Obese     bmi =39    Osteoarthritis 12/22/2016    Osteopenia     PONV (postoperative nausea and vomiting)     POV- responds to IV antiemetic    RA (rheumatoid arthritis) ([facility] dx--2013    Rheumatoid arthritis involving right knee with negative rheumatoid factor ([facility] 5/16/2019    Rosacea     S/P total knee arthroplasty 12/23/2016    Stroke [facility])     followed by neurologist      Unspecified hypothyroidism     hypothyroid.   Controlled with Levoxyl    UTI (urinary tract infection)       Past Surgical History:   Procedure Laterality Date    ABDOMEN SURGERY PROC UNLISTED      fatty tumor removed- from abd--benign    FRAGMENT KIDNEY STONE/ ESWL      HX BREAST BIOPSY Left 06/21/2019    stereo bx    HX BREAST LUMPECTOMY Left 7/5/2019    LEFT BREAST LUMPECTOMY WITH NEEDLE  AND SENTINAL NODE/ performed by Kwame Avina MD at [address] [place] [facility] Final HX COLONOSCOPY  2008; 2010    [id number]    and hysteroscopy    HX GYN  1978    vaginal cryocautery     HX HEENT  1986    Oral Surgery--per pt  still has metal in mouth since 1980's    HX KNEE ARTHROSCOPY Right 2007  HX KNEE REPLACEMENT Left 2008    HX KNEE REPLACEMENT Right 2016    HX LAP CHOLECYSTECTOMY  1993    HX LITHOTRIPSY  1998, 2003; 2016; 2/6/18    HX MENISCECTOMY Right 2011    Right knee    HX MOHS PROCEDURES Right 2004    HX OTHER SURGICAL  1999    lipoma of abdomen    HX OTHER SURGICAL  1984    melanoma of back    HX REFRACTIVE SURGERY  2002    LASIK    HX TUBAL LIGATION  1989     Family History   Problem Relation Age of Onset    Cancer Mother         Lung    Arthritis-rheumatoid Mother     Thyroid Disease Mother     Cancer Father         Lung; Liver    Diabetes Father     Heart Disease Father     Hypertension Father     Diabetes Brother     SLE Sister     Breast Cancer Other         cousins      Social History     Tobacco Use    Smoking status: Never Smoker    Smokeless tobacco: Never Used   Substance Use Topics    Alcohol use: Yes     Alcohol/week: 1.0 standard drinks     Types: 1 Glasses of wine per week     Comment: occasionally     Prior to Admission medications    Medication Sig Start Date End Date Taking? Authorizing Provider   apixaban (ELIQUIS PO) Take  by mouth. Unknown dose    Provider, Historical   enoxaparin (Lovenox) 80 mg/0.8 mL injection by SubCUTAneous route. Provider, Historical   glucose blood VI test strips (blood glucose test) strip Use as directed- check sugars 3 times a day  Dx- Diabetes E11.9 7/27/20   Kathy Earl MD   levothyroxine (SYNTHROID) 112 mcg tablet Take 1 Tab by mouth Daily (before breakfast). 7/20/20   Kathy Earl MD   metFORMIN (GLUCOPHAGE) 1,000 mg tablet Take 1 Tab by mouth two (2) times daily (with meals). 7/7/20   Kathy Earl MD   lisinopriL (PRINIVIL, ZESTRIL) 10 mg tablet Take 1 Tab by mouth daily. 7/7/20   Kathy Earl MD   pantoprazole (PROTONIX) 40 mg tablet TAKE ONE TABLET BY MOUTH ONCE DAILY. 7/7/20   Kathy Earl MD   famotidine (PEPCID) 40 mg tablet Take 1 Tab by mouth two (2) times a day.  7/7/20 Mindi Gupta MD   atorvastatin (LIPITOR) 40 mg tablet Take 1 Tab by mouth daily. 7/7/20   Mindi Gupta MD   mirabegron ER (Myrbetriq) 50 mg ER tablet Take 1 Tab by mouth daily. 7/7/20   Mindi Gupta MD   clopidogreL (PLAVIX) 75 mg tab TAKE ONE TABLET BY MOUTH ONCE DAILY. 7/7/20   Mindi Gupta MD   magnesium oxide (MAG-OX) 400 mg tablet Take 1 Tab by mouth daily. 7/7/20   Mindi Gupta MD   loratadine (Claritin) 10 mg tablet Take 1 Tab by mouth daily. 7/7/20   Mindi Gupta MD   glipiZIDE (GLUCOTROL) 10 mg tablet TAKE ONE TABLET BY MOUTH TWICE DAILY  Indications: type 2 diabetes mellitus 7/7/20   Mindi Gupta MD   aspirin delayed-release 81 mg tablet Take  by mouth daily. Provider, Historical   ondansetron (ZOFRAN ODT) 8 mg disintegrating tablet Take 1 Tab by mouth every eight (8) hours as needed for Nausea. 5/20/20   Mindi Gupta MD   anastrozole (Arimidex) 1 mg tablet Take 1 mg by mouth daily. 5/19/20   Nimisha Lin MD   insulin detemir U-100 (LEVEMIR FLEXTOUCH U-100 INSULN) 100 unit/mL (3 mL) inpn INJECT UNDER THE SKIN AS DIRECTED 10-15 UNITS IN THE MORNING AND 60 UNITS AT BEDTIME-- MAX IS 75U IN 24HR 1/16/20   Eryn Coreas NP   fluticasone propionate (FLONASE ALLERGY RELIEF) 50 mcg/actuation nasal spray 2 Sprays by Both Nostrils route daily. 1/16/20   Eryn Coreas NP   NOVOFINE 32 32 gauge x 1/4\" ndle Use TID 1/16/20   Eryn Coreas NP   calcium carbonate (TUMS) 200 mg calcium (500 mg) chew Take 1 Tab by mouth as needed. Indications: heartburn    Provider, Historical   krill/om-3/dha/epa/phospho/ast (MEGARED OMEGA-3 KRILL OIL PO) Take 1 Cap by mouth daily. Provider, Historical   cholecalciferol (VITAMIN D3) 1,000 unit cap Take 1,000 Units by mouth daily. Indications: Prevention of Vitamin D Deficiency    Provider, Historical   GLUCOSAMINE HCL/CHONDR MERAZ A NA (OSTEO BI-FLEX PO) Take 1 Tab by mouth two (2) times a day.     Provider, Historical calcium-cholecalciferol, d3, (CALCIUM 600 + D) 600-125 mg-unit tab Take 1 Tab by mouth two (2) times a day. Indications: post-menopausal osteoporosis prevention    Provider, Historical       Allergies   Allergen Reactions    Other Food Swelling     Jalapeno. -- tongue and lip swells    Bee Sting [Sting, Bee] Swelling     Local swelling    Invokana [Canagliflozin] Other (comments)     Caused frequent Urinary Tract Infections    Other Plant, Animal, Environmental Other (comments)     Trees, grass, dust, mold---- congestion        Review of Systems:  Review of systems not obtained due to patient factors- aphasia. She was able to say yes when asked if she was having pain      Objective:     Visit Vitals  BP 98/62 (BP 1 Location: Left arm, BP Patient Position: At rest)   Pulse 94   Temp 97.4 °F (36.3 °C)   Resp 18   Ht 5' 1\" (1.549 m)   Wt 155 lb (70.3 kg)   SpO2 98%   BMI 26.61 kg/m²        Physical Exam:    General:  Lethargic. Debilitated and frail. Eyes:  Conjunctivae/corneas clear    Nose: Nares normal. Septum midline. O2 via NC    Neck: Supple, symmetrical, trachea midline   Lungs:   Coarse bilaterally, unlabored   Heart:  Regular rate and rhythm   Abdomen:   Soft, non-tender, non-distended. PEG   Extremities: RUE edema and bruising to hand   Skin: Flushed, diaphoretic   Neurologic: Left hemiplegia.   She could give 1 word verbal answers    Psych: Lethargic       Assessment:     Hospital Problems  Date Reviewed: 10/13/2020          Codes Class Noted POA    Cerebrovascular accident (CVA) due to bilateral embolism [facility] ICD-10-CM: T31.125  ICD-9-CM: 433.31  11/8/2020 Yes        Acute renal failure (ARF) ([facility] ICD-10-CM: N17.9  ICD-9-CM: 584.9  11/4/2020 Yes        * (Principal) Severe sepsis with acute organ dysfunction ([facility] ICD-10-CM: A41.9, R65.20  ICD-9-CM: 038.9, 995.92  11/4/2020 Yes        UTI (urinary tract infection) ICD-10-CM: N39.0  ICD-9-CM: 599.0  11/4/2020 Yes Dehydration, severe ICD-10-CM: E86.0  ICD-9-CM: 276.51  11/4/2020 Yes        Vascular dementia ([facility] ICD-10-CM: F01.50  ICD-9-CM: 290.40  11/4/2020 Yes        Pneumonia ICD-10-CM: J18.9  ICD-9-CM: 161  11/4/2020 Yes        Cardiac pacemaker ICD-10-CM: Z95.0  ICD-9-CM: V45.01  11/4/2020 Yes        Lactic acidosis ICD-10-CM: E87.2  ICD-9-CM: 276.2  11/4/2020 Yes        Hyperkalemia ICD-10-CM: E87.5  ICD-9-CM: 276.7  11/4/2020 Yes        Septic encephalopathy ICD-10-CM: G93.41  ICD-9-CM: 348.31  11/4/2020 Yes        S/P percutaneous endoscopic gastrostomy (PEG) tube placement [facility]) ICD-10-CM: Z93.1  ICD-9-CM: V44.1  11/4/2020 Yes        Mixed hyperlipidemia ICD-10-CM: A37.9  ICD-9-CM: 272.2  7/7/2020 Yes        History of stroke ICD-10-CM: Z86.73  ICD-9-CM: V12.54  7/7/2020 Yes        Malignant neoplasm of lower-outer quadrant of left breast of female, estrogen receptor positive (HCC) (Chronic) ICD-10-CM: C50.512, Z17.0  ICD-9-CM: 174.5, V86.0  7/1/2019 Yes        Type 2 diabetes mellitus without complication, with long-term current use of insulin (HCC) (Chronic) ICD-10-CM: E11.9, Z79.4  ICD-9-CM: 250.00, V58.67  5/16/2019 Yes        Complete heart block (HCC) ICD-10-CM: I44.2  ICD-9-CM: 426.0  3/28/2018 Yes        Hypothyroidism (Chronic) ICD-10-CM: E03.9  ICD-9-CM: 244.9  Unknown Yes        RA [facility]rheumatoid arthritis) [facility] ICD-10-CM: M06.9  ICD-9-CM: 714.0  Unknown Yes        Gastroesophageal reflux disease without esophagitis ICD-10-CM: K21.9  ICD-9-CM: 530.81  Unknown Yes              Signed By: Jaiden Mcdonald NP     November 9, 2020

## 2020-11-09 NOTE — PROGRESS NOTES
Problem: Falls - Risk of  Goal: *Absence of Falls  Description: Document Em Jackman Fall Risk and appropriate interventions in the flowsheet.   Outcome: Progressing Towards Goal  Note: Fall Risk Interventions:       Mentation Interventions: Adequate sleep, hydration, pain control         Elimination Interventions: Call light in reach

## 2020-11-09 NOTE — PROGRESS NOTES
Hospitalist Progress Note    Patient: Shirin Simmons MRN: [id number]  SSN: [id number]    YOB: 1955  Age: 72 y.o. Sex: female      Admit Date: 11/4/2020    LOS: 5 days     Subjective:     72year old CF with a PMH of breath CA, HTN, hypothyroidism, IDDM2, h/o strokes/TIAs with vascular dementia, RA, breast cancer, completed CHB with CPM placement (in 9/2020), COVID in 8/2020 requiring intubation, and GIB with DVTs (started on Eliquis) in 9/2020 presented to the ER on 11/4/20 from [facility] after she was found to be confused and have \"abnormal vital signs. \" She was found to have severe sepsis due to a UTI. MRI on 11/7 confirmed strokes in multiple territories. 11/9 - She is drowsy but tries to talk today. Minimally interactive. Review of systems negative except stated above. Objective:     Visit Vitals  BP 98/62 (BP 1 Location: Left arm, BP Patient Position: At rest)   Pulse 94   Temp 97.4 °F (36.3 °C)   Resp 18   Ht 5' 1\" (1.549 m)   Wt 70.3 kg (155 lb)   LMP 06/30/2001   SpO2 98%   BMI 26.61 kg/m²      Oxygen Therapy  O2 Sat (%): 98 % (11/09/20 0648)  Pulse via Oximetry: 100 beats per minute (11/05/20 0814)  O2 Device: Nasal cannula (11/07/20 0336)  O2 Flow Rate (L/min): 2 l/min (11/07/20 0336)      Intake and Output:     Intake/Output Summary (Last 24 hours) at 11/9/2020 1014  Last data filed at 11/9/2020 2967  Gross per 24 hour   Intake 1479 ml   Output 1070 ml   Net 409 ml         Physical Exam:   GENERAL: Alert, appears comfortable  EYE: conjunctivae/corneas clear. PERRL. THROAT & NECK: normal and no erythema or exudates noted. LUNG: scattered rhonchi  HEART: tachy, reg rhythm, S1S2, no murmur, no JVD  ABDOMEN: soft, non-tender, non-distended. Bowel sounds normal.   EXTREMITIES:  No edema, 2+ pedal/radial pulses bilaterally  SKIN: no rash or abnormalities  NEUROLOGIC: Alert. Cranial nerves 2-12 grossly intact.     Lab/Data Review:  Recent Results (from the past 24 hour(s))   GLUCOSE, POC    Collection Time: 11/08/20 11:59 AM   Result Value Ref Range    Glucose (POC) 243 (H) 65 - 100 mg/dL   GLUCOSE, POC    Collection Time: 11/08/20  4:20 PM   Result Value Ref Range    Glucose (POC) 118 (H) 65 - 100 mg/dL   GLUCOSE, POC    Collection Time: 11/08/20  7:59 PM   Result Value Ref Range    Glucose (POC) 223 (H) 65 - 100 mg/dL   RENAL FUNCTION PANEL    Collection Time: 11/09/20  6:03 AM   Result Value Ref Range    Sodium 145 136 - 145 mmol/L    Potassium 3.4 (L) 3.5 - 5.1 mmol/L    Chloride 110 (H) 98 - 107 mmol/L    CO2 28 21 - 32 mmol/L    Anion gap 7 7 - 16 mmol/L    Glucose 108 (H) 65 - 100 mg/dL    BUN 17 8 - 23 MG/DL    Creatinine 0.35 (L) 0.6 - 1.0 MG/DL    GFR est AA >60 >60 ml/min/1.73m2    GFR est non-AA >60 >60 ml/min/1.73m2    Calcium 8.7 8.3 - 10.4 MG/DL    Phosphorus 3.0 2.3 - 3.7 MG/DL    Albumin 1.6 (L) 3.2 - 4.6 g/dL   CBC W/O DIFF    Collection Time: 11/09/20  6:03 AM   Result Value Ref Range    WBC 8.0 4.3 - 11.1 K/uL    RBC 3.15 (L) 4.05 - 5.2 M/uL    HGB 8.6 (L) 11.7 - 15.4 g/dL    HCT 28.7 (L) 35.8 - 46.3 %    MCV 91.1 79.6 - 97.8 FL    MCH 27.3 26.1 - 32.9 PG    MCHC 30.0 (L) 31.4 - 35.0 g/dL    RDW 16.9 (H) 11.9 - 14.6 %    PLATELET 096 802 - 125 K/uL    MPV 11.1 9.4 - 12.3 FL    ABSOLUTE NRBC 0.00 0.0 - 0.2 K/uL   MAGNESIUM    Collection Time: 11/09/20  6:03 AM   Result Value Ref Range    Magnesium 1.6 (L) 1.8 - 2.4 mg/dL   GLUCOSE, POC    Collection Time: 11/09/20  6:52 AM   Result Value Ref Range    Glucose (POC) 162 (H) 65 - 100 mg/dL       SARS-CoV-2 Lab Results  \"Novel Coronavirus\" Test: No results found for: COV2NT   \"Emergent Disease\" Test: No results found for: EDPR  \"SARS-COV-2\" Test: No results found for: XGCOVT  \"Precision Labs\" Test: No results found for: RSLT  Rapid Test:   Lab Results   Component Value Date/Time    COVR Not detected 11/04/2020 11:46 AM           Imaging:  Xr Chest Sngl V    Result Date: 10/16/2020  Clinical History:  The Female patient is 72years old  presenting with symptoms of deminished breath sounds on left. Comparison:  Chest x-ray 9/29/2020 Findings:  Frontal view of the chest was obtained. There is improving aeration over prior exam with resolving perihilar infiltrates. Coarse interstitial lung markings persist particularly at the left lung base. The cardiomediastinal silhouette is within normal limits. There are no acute osseous abnormalities. A left subclavian pacing device is demonstrated. The feeding tube has been removed over the interval.     Impression:  1. Resolved pulmonary edema, however with persistent coarse interstitial lung markings particularly at left lung base. CPT code(s) 69458     Xr Swallow Func Video    Result Date: 10/7/2020  Exam: Modified Barium Swallow INDICATION: Oropharyngeal dysphagia. Fluoro time: 2 minutes 19 seconds. Spot films:  0 Fluoroscopy was used to evaluate pharyngeal function while the patient was given barium solutions and barium coated solids. FINDINGS: Patient with aspiration with thickened liquids and laryngeal penetration with thin liquids. IMPRESSION: Patient with aspiration with thickened liquids. Please see full report from speech language pathologist.     Xr Chest Port    Result Date: 11/4/2020  CHEST X-RAY, one view. HISTORY:  Altered mental status. TECHNIQUE:  AP portable semiupright view. COMPARISON: 16 October. FINDINGS: Lungs: Right upper lung and left lower lung zone infiltrates. Costophrenic angles: Fairly sharp. Heart size: Normal for portable technique. Pulmonary vasculature: is unremarkable. Aorta: Unremarkable. Included portion of the upper abdomen: is unremarkable. Bones: No gross bony lesions. Other: Left-sided cardiac pacemaker is present     IMPRESSION:  Persistent lung infiltrates without significant change. No results found for this visit on 11/04/20. Cultures:   All Micro Results     Procedure Component Value Units Date/Time    CULTURE, BLOOD [[id number]] Collected:  11/04/20 0755    Order Status:  Completed Specimen:  Blood Updated:  11/09/20 0740     Special Requests: --        RIGHT  Antecubital       Culture result: NO GROWTH 5 DAYS       CULTURE, URINE [[id number]]  (Abnormal)  (Susceptibility) Collected:  11/04/20 1148    Order Status:  Completed Specimen:  Urine from [place] Specimen Updated:  11/07/20 1210     Special Requests: NO SPECIAL REQUESTS        Culture result:       10,000 to 50,000 COLONIES/mL STAPHYLOCOCCUS SPECIES, COAGULASE NEGATIVE                  10,000 to 50,000 COLONIES/mL YEAST SUBCULTURE IN PROGRESS          CULTURE, BLOOD [[id number]] Collected:  11/04/20 0745    Order Status:  Canceled Specimen:  Blood           Assessment/Plan:     Principal Problem:    Severe sepsis with acute organ dysfunction ([facility] (11/4/2020)  - In ER WBC 13.8 +  + RR 38 + UTI/pneumonia + ISRAEL + encephalopathy + lactic acid 4.5  - Resolving  - Likely due to UTI  - Continue Unasyn + Doxycycline  - Stopped normal saline @ 150 ml/hr  - Blood cultures sent in ER - NGTD  - UA consistent with UTI  - Urine culture with GPC  - CXR with stable infiltrates  - Procalcitonin 0.95  - Lactic Acid 4.5 --> 3.2 --> 1.6     Active Problems:    Cerebrovascular accident (CVA) due to bilateral embolism of carotid arteries ([facility]  - MRI on 11/7 with multifocal areas of subacute ischemia  - Continue Eliquis + ASA  - Stopped Plavix  - ECHO unremarkable  - CTA H/N with small fenestration at the base of the basilar artery, although a small focus of thrombus is not entirely excluded  - PT/OT/SLP  - Neurology following  - Palliative consulted      [place] (11/7/2020)  - MRI on 11/7 with multifocal areas of subacute ischemia  - CT with old CVAs  - Continue ASA + Eliquis  - Neurology following      Acute renal failure (ARF) ([facility] (11/4/2020)  - Likely due to profound dehydration  - Resolved  - Creatinine 2.66 --> 0.96 --> 0.65 --> 0.47 (baseline 0.40)  - BUN/Cr >20 indicating prerenal  - Stopped normal saline @ 150 ml/hr  - UA consistent with UTI  - Urine sodium 99  - Urine prot/creat high  - Strict I/O       UTI (urinary tract infection) (11/4/2020)  - Urine dipstick leukocyte +  - Continue Doxycycline  - Full UA consistent with UTI  - Urine culture with GPC       Dehydration, severe (11/4/2020)  - Improving  - UA with SG >1.030 + ISRAEL  - Likely due to sepsis + hyperglycemia + decreased PO intake  - Stop normal saline @ 150 ml/hr  - Strict I/O       Pneumonia (11/4/2020)  - Had COVID in 8/2020 --> since tested negative  - CXR unchanged with RUL and LLL infiltrate  - Was on Cipro + Doxy as outpatient  - Continue Unasyn to cover aspiration  - Continue Doxycycline  - No SOB or cough per patient       Lactic acidosis (11/4/2020)  - Due to #1  - Resolved  - Lactic Acid 4.5 --> 3.2 --> 1.6  - Stopped normal saline @ 150 ml/hr       Hyperkalemia (11/4/2020)  - Due to ISRAEL  - Resolved  - Stopped normal saline @ 150 ml/hr  - EKG paced, no overt changes  - Repeat BMP daily       Septic encephalopathy (11/4/2020)  - Very confused and hard to understand speech on admission  - Likely stroke component too  - More alert but hard to understand today  - No focal neurological deficits  - CT head with chronic CVAs but nothing acute  - Likely due to UTI and sepsis + uremia  - Continue Unasyn  - Continue Doxycycline  - Monitor for improvement       Type 2 diabetes mellitus without complication, with long-term current use of insulin ([facility] (5/16/2019)  - Hyperglycemic on presentation  - Takes Lantus 35U --> decrease to 30U until eating better  - Continue Humalog SSI  - Hold PO meds       S/P percutaneous endoscopic gastrostomy (PEG) tube placement ([facility] (11/4/2020)  - PEG placed 10/14/2020  - Site looks clean and normal       Hypothyroidism ()  - Continue Levothyroxine  - THS high, FT4 normal       RA (rheumatoid arthritis) (HCC) ()       Gastroesophageal reflux disease without esophagitis ()  - Continue Pepcid    Complete heart block (HCC) (3/28/2018)  - S/P CPM in 9/2020       Malignant neoplasm of lower-outer quadrant of left breast of female, estrogen receptor positive ([facility] (7/1/2019)  - Continue Anastrozole       Vascular dementia ([facility] (11/4/2020)  - Continue ASA/Plavix       Cardiac pacemaker (11/4/2020)  - S/P CPM in 9/2020       Mixed hyperlipidemia (7/7/2020)  - Continue Lipitor       History of stroke (7/7/2020)  - Continue ASA/Plavix    Today's Plan: Continue current plan. DIET NPO  DIET TUBE FEEDING Osmolite 1.2 bolus 1 carton times 5 per day. Water flush 75 ml before and after each bolus. Suggested feeding times 6 am, 9a, 12p, 6p and 9p.     DVT Prophylaxis: Eliquis (H/o DVT)    Discharge Plan: TBD - Consulted Palliative to assist with [address]      Signed By: Jeff Richardson DO     November 9, 2020

## 2020-11-09 NOTE — PROGRESS NOTES
Pt rounded on hourly and PRN. No complaints of pain, nausea,vomiting. Pt remained non communicative most of shift. Bolus feeds given x2 per order. Bed is low, locked, call bell within reach. All needs met this shift. Will continue to monitor until next RN comes on.      Date 11/08/20 0700 - 11/09/20 0659 11/09/20 0700 - 11/10/20 0659   Shift 1657-5643 0756-2472 24 Hour Total 0615-9225 7489-4174 24 Hour Total   INTAKE   NG/GT 5295 892 0981        Water Flush Volume (mL) (PEG/Gastrostomy Tube 11/04/20) 570 375 945        Medication Volume (PEG/Gastrostomy Tube 11/04/20) 180 120 300        Intake (ml) (PEG/Gastrostomy Tube 11/04/20) 474 237 711      Shift Total(mL/kg) 1224(17.4) 732(10.4) 3804(54.5)      OUTPUT   Urine(mL/kg/hr) 350(0.4) 720(0.9) 1070(0.6)        Urine Voided 350  350        Urine Output (mL) (Urinary Catheter 11/04/20 [place])  720 720      Shift Total(mL/kg) 350(5) 720(10.2) 2643(97.7)       12 886      Weight (kg) 70.3 70.3 70.3 70.3 70.3 70.3

## 2020-11-09 NOTE — PROGRESS NOTES
Comprehensive Nutrition Assessment    Type and Reason for Visit: Reassess  Tube Feeding Management (Hospitalist)    Nutrition Recommendations/Plan:   Enteral Nutrition   Continue current bolus feedings for primary needs. · Osmolite 1.2 bolus via PEG one carton times 5 per day. · 75ml water flush before and after each bolus feeding. · If a bolus is missed can be given as 1.5 cartons at the next two feedings to receive total kcal and protein scheduled. Vitamin and Mineral Supplement Therapy:  Nutrition support orders for electrolyte management replacement active on MAR.    K and Mg replaced per protocol  Medication Management:  Miralax added daily. Pt is on a fiber containing formula at baseline and potentially will benefit from transition to fiber containing formula once + BM. Malnutrition Assessment:  Malnutrition Status: At risk for malnutrition (specify)  Context: Chronic illness  Findings of clinical characteristics of malnutrition:   Energy Intake:  (Nocturnal TF at facility provided ~50% needs)  Weight Loss:  Unable to assess(current wt is not from validated source and bed scale is not functioning)    Body Fat Loss:  No significant body fat loss,     Muscle Mass Loss:  1 - Mild muscle mass loss, Calf (gastrocnemius), Hand (interosseous)  Fluid Accumulation:  Unable to assess,     Strength:  Not performed     Nutrition Assesment: Admitted with sepsis, ARF, UTI, dehydration, PNA, septic encephalopathy. PMH remarkable for breast ca, HTN, hypothyroidism, DM, CVAs/TIAs, vascular dementia, RA, Covid 8/20, PEG placed 10/14/2020. MRI on 11/7 confirmed strokes in multiple territories. Nutrition regimen per facility records Jevity 1.5 patricia @ 60 ml/hr 8p-5a, 100 ml x 4/day oral intake per SLP during the day of unknown nutritional signifigance     Pt remains NPO with bolus feeds via PEG for primary needs. Bolus feeds initiated 11/7.   At RD visit, pt is alert and intermittently answers questions. Difficult to understand at times. Missed one bolus feeding on 11/8 secondary to being off the unit at scheduled time. Nutrition Related Findings:   Wound Type: Stage III(sacral per WC)  Abdominal Status (last documented): Semi-soft(PEG tube ) abdomen with Active  bowel sounds. Last BM unknown. Pertinent Labs Na 145, K 3.4, Glu 108, Cr 0.35, Mg 1.6: POC glucose (11/8) 118-243 (11/9) 162, 257 thus far today  Na has corrected, K and Phos and Mg previously corrected with replacements noted decline in K and Mg again today. Pertinent Medications: pepcid, lantus, SSI    Current Nutrition Therapies:  DIET NPO  DIET TUBE FEEDING Osmolite 1.2 bolus 1 carton times 5 per day. Water flush 75 ml before and after each bolus. Suggested feeding times 6 am, 9a, 12p, 6p and 9p. Current Tube Feeding (TF) Orders:   · Feeding Route: PEG  · Formula: Osmolite 1.2   · Schedule: Bolus    · Regimen: 1 carton x 5 daily  · Water Flushes: 75 ml before and after each bolus  · Current TF & Flush Orders Provides: At goal  · Goal TF & Flush Orders Provides: 1425 kcal (100% estimated calorie needs), 66 grams protein (100% estimated protein needs) and ~1645 ml free fluid (25ml/kg current body)      Current Intake: Average Meal Intake: NPO Average Supplement Intake: NPO      Anthropometric Measures:  · Height: 5' 1\" (154.9 cm)  · Current Body Wt: 70.3 kg (154 lb 15.7 oz)(11/6), Weight source: Not specified  · Admission Body Wt: 145 lb 1 oz(no source)  · Usual Body Wt: 65.8 kg (145 lb)(per EMR review weight at [facility] office visit 7/2020), Percent weight change: 0  · Ideal Body Wt: 105 lbs (48 lbs), 138.2 %  · BMI: 26.6, Overweight (BMI 25.0-29. 9)    Estimated Daily Nutrient Needs:  Energy (kcal): 3131-0719(24-94) (Kcal/kg, Weight Used: Ideal(ISRAEL))  Protein (g): 58-72(1.2-1.5 g/kg) Weight Used: (Ideal)  Fluid (ml/day): 1645 ml (ml/kg)    Nutrition Diagnosis:   · Inadequate oral intake related to swallowing difficulty as evidenced by (NPO per SLP, PEG for primary needs)    Nutrition Interventions:   Food and/or Nutrient Delivery: Continue tube feeding     Coordination of Nutrition Care: Continue to monitor while inpatient   Plan of Care Discussed with Dr. Lucia Alvarez and Paty Riggs RN.        Goals: Previous Goal Met: Goal(s) achieved  Maintain PEG feeds to meet estimated needs    Nutrition Monitoring and Evaluation:      Food/Nutrient Intake Outcomes: Enteral nutrition intake/tolerance  Physical Signs/Symptoms Outcomes: Biochemical data, GI status    Discharge Planning:    Enteral nutrition    Jen Marie MA, RD, LDN on 11/9/2020 at 11:31 AM  Contact: [phone]

## 2020-11-09 NOTE — PROGRESS NOTES
SPEECH PATHOLOGY NOTE:    Attempted to see patient this PM. Palliative Care outside of room having just met with patient and . Per report, poor secretion management including frequent need for suctioning. Po trials not appropriate at this time given known dysphagia, impaired secretion management, and PEG for nutrition/hydration. Will continue to follow for po trials as medical status improves.      José [address] CCC-SLP

## 2020-11-09 NOTE — INTERDISCIPLINARY ROUNDS
Interdisciplinary Rounds completed. Nursing, Case Management, and Physician  present. Plan of care reviewed and updated. Discontinue villalba. Use purewick to keep pt dry and I/O's. Palliative care following. PT/OT/PPD ordered.

## 2020-11-10 NOTE — PROGRESS NOTES
Hospitalist Progress Note Subjective:  
Daily Progress Note: 11/10/2020 7:26 AM 
 
Patient with extensive history as below presented to ER 11/4 from [facility] after she was noted to be confused with \"abnormal vitals. \"  Found with severe sepsis due to UTI and pneumonia, begun on unasyn and doxycyline. [place]. Lactic acid on arrival: 4.5, since normalized. On eliquis for DVT. 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 CTA with fenestration of basilar artery. TTE negative. recs for JT to determine source of embolization. Cardiology consulted. Has PEG 11/4, nutrition managing tube feedings. Poor secretion management. Palliative in 11/9, patient clearly indicates she wants to be a full code. , Miracle Ortiz also feels the same. 11/10:  Remains non communicative, does follow some commands. Seems to understand what is said to her. Did not see  today. CM sending SNF referrals. Does not follow commands. Left arm remains edematous. Nutrition managing tube feedings. Cards in for consult, interrogating pacemaker. If normal there is no indication for JT at present. ADDITIONAL HISTORY:  Right breast cancer, Complete Heart Block w CPM placement 9/20, COVID 8/20 requiring vent, GIB, DVT on eliquis 9/20, hypertension, hypothyroidism, IDDM II, CVA, TIA, vascular dementia Current Facility-Administered Medications Medication Dose Route Frequency  anastrozole (ARIMIDEX) tablet 1 mg  1 mg Per J Tube DAILY  midodrine (PROAMATINE) tablet 5 mg  5 mg Per J Tube TID WITH MEALS  
 acetaminophen (TYLENOL) tablet 650 mg  650 mg Per J Tube Q4H PRN  
 HYDROcodone-acetaminophen (NORCO) 5-325 mg per tablet 1 Tab  1 Tab Per G Tube Q6H PRN  polyethylene glycol (MIRALAX) packet 17 g  17 g Per G Tube DAILY  atorvastatin (LIPITOR) tablet 40 mg  40 mg Per J Tube QHS  levothyroxine (SYNTHROID) tablet 112 mcg  112 mcg Per J Tube 6am  
  ampicillin-sulbactam (UNASYN) 3 g in 0.9% sodium chloride (MBP/ADV) 100 mL  3 g IntraVENous Q6H  
 NUTRITIONAL SUPPORT ELECTROLYTE PRN ORDERS   Does Not Apply PRN  
 apixaban (ELIQUIS) tablet 5 mg  5 mg Per J Tube Q12H  aspirin chewable tablet 81 mg  81 mg Per J Tube DAILY  doxycycline (VIBRAMYCIN) capsule 100 mg  100 mg Per J Tube Q12H  
 famotidine (PEPCID) 40 mg/5 mL (8 mg/mL) oral suspension 20 mg  20 mg Per J Tube Q24H  
 sodium chloride (NS) flush 5-40 mL  5-40 mL IntraVENous Q8H  
 sodium chloride (NS) flush 5-40 mL  5-40 mL IntraVENous PRN  
 insulin lispro (HUMALOG) injection   SubCUTAneous AC&HS  insulin glargine (LANTUS) injection 30 Units  30 Units SubCUTAneous QHS Review of Systems Patient is unable. Objective:  
 
Visit Vitals /76 (BP 1 Location: Left arm, BP Patient Position: At rest) Pulse (!) 106 Temp 98.1 °F (36.7 °C) Resp 18 Ht 5' 1\" (1.549 m) Wt 70.3 kg (155 lb) LMP 2001 SpO2 97% BMI 26.61 kg/m² O2 Flow Rate (L/min): 2 l/min O2 Device: Nasal cannula Temp (24hrs), Av.3 °F (36.8 °C), Min:97 °F (36.1 °C), Max:99 °F (37.2 °C)  1901 - 11/10 0700 In: 2233 Out: 720 [Urine:720] General appearance: Eyes open. Unable to respond verbally. No family in room. Able to follow some commands . Continued tube feedings. Head: Normocephalic, without obvious abnormality, atraumatic Eyes: conjunctivae/corneas clear. PERRL. Gaze deviated to right Nose: Nares normal. Septum midline. Mucosa normal. No drainage or sinus tenderness. Throat: Lips, mucosa, and tongue normal. Teeth and gums normal 
Neck: supple, symmetrical, trachea midline, and no JVD Lungs: clear to auscultation bilaterally Heart: Paced. Regular rate and rhythm, S1, S2 normal, no murmur, click, rub or gallop Abdomen: soft, apparently non-tender. Bowel sounds normal. No masses,  no organomegaly Extremities: All extremities with diminished strength. DTRs dulled. Skin: Skin color, texture, turgor normal. No rashes or lesions Neurologic: compromised, weaker on left. Does not move left leg at all. Additional comments: Notes,orders, test results, vitals reviewed Data Review Recent Results (from the past 24 hour(s)) GLUCOSE, POC Collection Time: 11/09/20 11:09 AM  
Result Value Ref Range Glucose (POC) 257 (H) 65 - 100 mg/dL GLUCOSE, POC Collection Time: 11/09/20  3:16 PM  
Result Value Ref Range Glucose (POC) 165 (H) 65 - 100 mg/dL GLUCOSE, POC Collection Time: 11/09/20  7:11 PM  
Result Value Ref Range Glucose (POC) 196 (H) 65 - 100 mg/dL GLUCOSE, POC Collection Time: 11/10/20  6:40 AM  
Result Value Ref Range Glucose (POC) 115 (H) 65 - 100 mg/dL Ref. Range 11/10/2020 07:16 WBC Latest Ref Range: 4.3 - 11.1 K/uL 11.3 (H) RBC Latest Ref Range: 4.05 - 5.2 M/uL 3.66 (L) HGB Latest Ref Range: 11.7 - 15.4 g/dL 9.9 (L) HCT Latest Ref Range: 35.8 - 46.3 % 33.4 (L) MCV Latest Ref Range: 79.6 - 97.8 FL 91.3 MCH Latest Ref Range: 26.1 - 32.9 PG 27.0 MCHC Latest Ref Range: 31.4 - 35.0 g/dL 29.6 (L) RDW Latest Ref Range: 11.9 - 14.6 % 17.2 (H) PLATELET Latest Ref Range: 150 - 450 K/uL 268 MPV Latest Ref Range: 9.4 - 12.3 FL 11.4 ABSOLUTE NRBC Latest Ref Range: 0.0 - 0.2 K/uL 0.00 Sodium Latest Ref Range: 136 - 145 mmol/L 142 Potassium Latest Ref Range: 3.5 - 5.1 mmol/L 4.4 Chloride Latest Ref Range: 98 - 107 mmol/L 107 CO2 Latest Ref Range: 21 - 32 mmol/L 28 Anion gap Latest Ref Range: 7 - 16 mmol/L 7 Glucose Latest Ref Range: 65 - 100 mg/dL 97 BUN Latest Ref Range: 8 - 23 MG/DL 17 Creatinine Latest Ref Range: 0.6 - 1.0 MG/DL 0.49 (L) Calcium Latest Ref Range: 8.3 - 10.4 MG/DL 9.2 Phosphorus Latest Ref Range: 2.3 - 3.7 MG/DL 3.6 GFR est non-AA Latest Ref Range: >60 ml/min/1.73m2 >60  
GFR est AA Latest Ref Range: >60 ml/min/1.73m2 >60 Albumin Latest Ref Range: 3.2 - 4.6 g/dL 1.7 (L) 11/4:  CXR:  Persistent lung infiltrates without significant change 11/5:  CT HEAD:  No acute intracranial hemorrhage or mass effect  Chronic appearing small infarcts in the left superior cerebellum, though new from February 22, 2020. Unchanged old left basal ganglia and right thalamic infarcts. 11/7:  CT HEAD:  More prominent hypoattenuating area in the right basal ganglia may represent subacute CVA. Could consider further evaluation with brain MRI. Extensive old infarct in the left basal ganglia and internal capsule with an old lacunar infarct in the right thalamus. Moderate hemispheric and posterior fossa atrophy. Mild chronic white matter microangiopathic changes. 11/7:  MRI BRAIN: Multifocal areas of subacute ischemia as described above with otherwise chronic progressive microvascular disease. 
  
11/8:  CTA HEAD AND NECK:  There is likely a small fenestration at the base of the basilar artery, although a small focus of thrombus is not entirely excluded. No evidence of large vessel occlusion. No significant proximal ICA stenosis. Patchy bilateral lung infiltrates. ECHOCARDIOGRAM:    
-  Left ventricle: Systolic function was normal. Ejection fraction was estimated in the range of 55 % to 60 %. There were no regional wall motion abnormalities.  
-  Atrial septum: Contrast injection was performed. There was no right-to-left shunt, with provocative maneuvers to increase right atrial pressure.  
-  Inferior vena cava, hepatic veins: Unable to assess due to feeding tube placement.   
-  Tricuspid valve: There was mild regurgitation.  
-  Pericardium: There was no pericardial effusion. 
  
Assessment/Plan:  
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    
 Recent COVID requiring vent care Continuing unasyn to cover aspiration Continue doxy No SOB or cough Acute UTI:  abx as above Urine culture with yeast and staph species, coagulase Negative: see micro for susceptibility Acute renal failure: resolved:  Monitor Continue strict I+O IVF discontinued Severe dehydration:  Due to sepsis and hyperglycemia, minimal intake:  Improved Hypothyroidism:  Elevated TSH, FT4 WNL Home meds RA:  Noted Gastroesophageal reflux disease without esophagitis:  Pepcid Complete heart block with pacemaker:  Cards on board IDDM II: continue SSI  
 DM management on board, appreciate Adjusting lantus History of malignant breast cancer 2019 Hyperlipidemia History of previous stroke 7/7/2020 Vascular dementia:  ASA, eliquis Plavix discontinued CM working on Navin Schwab placement Care Plan discussed with: Patient, CM, and Nurse Signed By: Nabil Domínguez NP November 10, 2020

## 2020-11-10 NOTE — PROGRESS NOTES
Call received from [place], [place], with Dr. Oralia Britt, no JT will be done today, discontinue NPO order, pt may return to tube feedings as previously ordered.

## 2020-11-10 NOTE — PROGRESS NOTES
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 PMH stroke, COVID in 8/30, DVT (on Eliquis), who is admitted with severe sepsis and multiorgan dysfunction. CTA shows fenestration of basilar artery. TTE was negative for source of embolus. Recommend JT to evaluate for proximal source of embolization. Will consult cardiology. Plan:     · Continue Eliquis  · Continue statin   · Neurochecks Q4H  · Cardiology consulted for consideration of JT  · Telemetry  · PT/OT/ST  · DVT prophylaxis   · BP management - normotensive, with long-term goal <130/80  · Smoking cessation if applicable   · Diabetes education if applicable   · Depression Screening prior to discharge      Subjective: Interval history:    Drowsy. Right gaze deviation with gaze evoked nystagmus. Right hemiplegia. CTA demonstrates basilar artery fenestration. TTE negative for source of embolus. History:    Katie Oliveira is a 72 y.o. female who is being seen for stroke. Unable to obtain ROS due to AMS.        Objective:     Vitals:    11/09/20 1907 11/09/20 2323 11/10/20 0425 11/10/20 0636   BP: 104/61 105/60 134/80 136/76   Pulse: 81 80 60 (!) 106   Resp: 17 19 18 18   Temp: 98.4 °F (36.9 °C) 98.5 °F (36.9 °C) 97 °F (36.1 °C) 98.1 °F (36.7 °C)   SpO2: 97% 99% 99% 97%   Weight:       Height:              Current Facility-Administered Medications:     anastrozole (ARIMIDEX) tablet 1 mg, 1 mg, Per J Tube, DAILY, Waylon Steel DO    midodrine (PROAMATINE) tablet 5 mg, 5 mg, Per J Tube, TID WITH MEALS, Waylon Steel, , 5 mg at 11/09/20 1715    acetaminophen (TYLENOL) tablet 650 mg, 650 mg, Per J Tube, Q4H PRN, Waylon Steel,     HYDROcodone-acetaminophen (NORCO) 5-325 mg per tablet 1 Tab, 1 Tab, Per G Tube, Q6H PRN, Tiki Gr, NP    polyethylene glycol (MIRALAX) packet 17 g, 17 g, Per G Tube, DAILY, Waylon Steel, DO, 17 g at 11/09/20 1700    atorvastatin (LIPITOR) tablet 40 mg, 40 mg, Per J Tube, QHS, Constantine Brothers, Patt Pond MD, 40 mg at 11/09/20 2126    levothyroxine (SYNTHROID) tablet 112 mcg, 112 mcg, Per J Tube, 6am, Waylon Steel, DO, Stopped at 11/10/20 9327    ampicillin-sulbactam (UNASYN) 3 g in 0.9% sodium chloride (MBP/ADV) 100 mL, 3 g, IntraVENous, Q6H, Waylon Steel E, DO, Last Rate: 100 mL/hr at 11/10/20 0538, 3 g at 11/10/20 0538    NUTRITIONAL SUPPORT ELECTROLYTE PRN ORDERS, , Does Not Apply, PRN, Waylon Steel E, DO    apixaban (ELIQUIS) tablet 5 mg, 5 mg, Per J Tube, Q12H, Waylon Steel, DO, 5 mg at 11/09/20 2126    aspirin chewable tablet 81 mg, 81 mg, Per J Tube, DAILY, Waylon Steel, DO, 81 mg at 11/09/20 1030    doxycycline (VIBRAMYCIN) capsule 100 mg, 100 mg, Per J Tube, Q12H, Waylon Steel, DO, 100 mg at 11/09/20 2126    famotidine (PEPCID) 40 mg/5 mL (8 mg/mL) oral suspension 20 mg, 20 mg, Per J Tube, Q24H, Waylon Steel, DO, 20 mg at 11/09/20 1144    sodium chloride (NS) flush 5-40 mL, 5-40 mL, IntraVENous, Q8H, Waylon Steel E, DO, 10 mL at 11/09/20 2130    sodium chloride (NS) flush 5-40 mL, 5-40 mL, IntraVENous, PRN, Waylon Steel E, DO    insulin lispro (HUMALOG) injection, , SubCUTAneous, AC&HS, Waylon Steel E, DO, 2 Units at 11/09/20 2130    insulin glargine (LANTUS) injection 30 Units, 30 Units, SubCUTAneous, QHS, Waylon Steel E, DO, 30 Units at 11/09/20 2130    Recent Results (from the past 12 hour(s))   GLUCOSE, POC    Collection Time: 11/10/20  6:40 AM   Result Value Ref Range    Glucose (POC) 115 (H) 65 - 100 mg/dL     CT Results (most recent):  Results from [facility] encounter on 11/04/20   CTA HEAD NECK W WO CONT    Narrative Title:  CT arteriogram of the neck and head. Indication: Multiple strokes. Right MCA syndrome. .    Technique: Axial images of the neck and head were obtained after the uneventful   administration of intravenous iodinated contrast media. Contrast was used to  best identify the arterial structures. Images were reviewed on a separate, free  standing, three-dimensional workstation as per the referring physicians request.       All stenosis percentages derived by comparing the narrowest segment with the  distal Internal Carotid Artery luminal diameter, as described in the [place]  American Symptomatic Carotid Endarterectomy Trial (NASCET) criteria. All CT scans at this facility are performed using dose reduction/dose modulation  techniques, as appropriate the performed exam, including the following:   Automated Exposure Control; Adjustment of the mA and/or kV according to patient  size (this includes techniques or standardized protocols for targeted exams  where dose is matched to indication/reason for exam); and Use of Iterative  Reconstruction Technique. Comparison: MRI of the brain November 7, 2020. Findings:     Lungs: Patchy areas of groundglass opacity suggest multifocal infiltrate. A  viral pneumonia is not excluded. Soft Tissues: Normal  Cervical Spine: Degenerative changes  Aorta: Conventional 3 vessel arch  Great Vessels: Patent and tortuous    Right ICA: Patent  % Stenosis: Less than 25  Right MCA: Patent  Right BURAK: Patent    Left ICA: Mild plaque at the bifurcation.  % Stenosis: Less than 25  Left MCA: Patent  Left BURAK: Patent    Right Vertebral: Patent  Left Vertebral: Patent  Dominance: Left  Basilar: Probable Small fenestration at the base of the artery. The remainder of  the vessel appears patent. Right PCA: Patent fetal origin. Left PCA: Patent    Other Vascular: Negative        Impression Impression:   1. There is likely a small fenestration at the base of the basilar artery,  although a small focus of thrombus is not entirely excluded. 2. No evidence of large vessel occlusion. 3. No significant proximal ICA stenosis. 4. Patchy bilateral lung infiltrates.        MRI Results (most recent):  Results from [facility] Encounter encounter on 11/04/20   MRI BRAIN WO CONT    Narrative Clinical History: The Female patient is 72years old  presenting with symptoms  of Rightward gaze, left hemiplegia, RMCA syndrome. Comparison:  Head CT 11/7/2020, brain MRI 7/31/2019    Technique:  Axial T2, axial FLAIR, axial diffusion-weighted,  sagittal T1 and  coronal gradient-echo scans were performed. Findings: There are multiple foci of subacute ischemia most prominently involving the left  caudate head and anterior margin of the left putamen, however also the medial  right temporal lobe/hippocampus, and anterolateral right radhika and left brachium  pontis extending into the radhika. There is otherwise age-related cortical involution with moderate confluent  chronic periventricular white matter disease. Focal encephalomalacic changes are  seen involving the anterior margin of the left external capsule, with lacunae  throughout the basal ganglia and thalami as well as corona radiata and centrum  semiovale. There are also chronic ischemic changes involving both cerebellar  hemispheres particularly the middle cerebellar peduncles. Chronic ischemic  changes are also seen throughout the peripheral radhika. There are no abnormal extra-axial fluid collections. No evidence of mass or mass  effect is seen. Expected flow voids are maintained in the major intracranial  vessels. There is no evidence of Chiari malformation. The ventricular system and CSF containing spaces are unremarkable in appearance. Visualized extracranial soft tissues are unremarkable. There is chronic moderate right maxillary sinus mucosal inflammatory disease. Impression Impression:    1. Multifocal areas of subacute ischemia as described above with otherwise  chronic progressive microvascular disease.     SIGNIFICANT RESULT:  The significant findings in this report have been referred to the Imaging  Navigator in order to communicate to the referring provider or his/her designee  as outlined in Section II.C.2.a.ii-iii of the ACR  Practice Guideline for Communication of Diagnostic Imaging Findings. CPT code(s) 16585             Results for orders placed or performed during the hospital encounter of 20   2D ECHO COMPLETE ADULT (TTE) W OR [address] [facility]  [address] [facility], [address] [facility]  [phone]    Transthoracic Echocardiogram  2D, M-mode, Doppler, and Color Doppler    Patient: Shantal Meek  MR #: [id number]  : 1955  Age: 72 years  Gender: Female  Study date: 2020  Account #: [de-identified]  Height: 64 in  Weight: 154.7 lb  BSA: 1.76 mï¾²  Status:Routine  Location: [facility]  BP: 104/ 64    Allergies: OTHER FOOD, STING, BEE, CANAGLIFLOZIN, OTHER PLANT, ANIMAL,  ENVIRONMENTAL    Sonographer:  Eddi Nieto RD  Group:  [id number] Cardiology  Referring Physician:  Frank Angeles. Aleksandr Lozano MD  Reading Physician:  Jesus Tineo. MD [place] [facility]    INDICATIONS: Multiple embolic appearing strokes while on [facility]. PROCEDURE: This was a routine study. A transthoracic echocardiogram was  performed. The study included complete 2D imaging, M-mode, complete spectral  Doppler, and color Doppler. Intravenous contrast (agitated saline) was  administered. Intravenous contrast (agitated saline) was administered. Image  quality was adequate. LEFT VENTRICLE: Size was normal. Systolic function was normal. Ejection  fraction was estimated in the range of 55 % to 60 %. There were no regional  wall motion abnormalities. Wall thickness was normal. Avg. E/e': 10.2. Left  ventricular diastolic function parameters were normal.    RIGHT VENTRICLE: The size was normal. Systolic function was normal. A line  (catheter or pacing wire) was present. LEFT ATRIUM: Size was normal.    ATRIAL SEPTUM: Contrast injection was performed.  There was no right-to-left  shunt, with provocative maneuvers to increase right atrial pressure. RIGHT ATRIUM: Size was normal. A line (catheter or pacing wire) was present. SYSTEMIC VEINS: IVC: Unable to assess due to feeding tube placement. AORTIC VALVE: The valve was structurally normal, tri-commissural. There was   no  evidence for stenosis. There was no insufficiency. MITRAL VALVE: Valve structure was normal. There was no evidence for stenosis. There was trivial regurgitation. TRICUSPID VALVE: The valve structure was normal. There was no evidence for  stenosis. There was mild regurgitation. PULMONIC VALVE: The valve structure was normal. There was no evidence for  stenosis. There was trivial regurgitation. PERICARDIUM: There was no pericardial effusion. AORTA: The root exhibited normal size. SUMMARY:    -  Left ventricle: Systolic function was normal. Ejection fraction was  estimated in the range of 55 % to 60 %. There were no regional wall motion  abnormalities. -  Atrial septum: Contrast injection was performed. There was no   right-to-left  shunt, with provocative maneuvers to increase right atrial pressure. -  Inferior vena cava, hepatic veins: Unable to assess due to feeding tube  placement. -  Tricuspid valve: There was mild regurgitation.    -  Pericardium: There was no pericardial effusion. SYSTEM MEASUREMENT TABLES    2D mode  AoR Diam (2D): 3.2 cm  LA Dimension (2D): 3.4 cm  Left Atrium Systolic Volume Index; Method of Disks, Biplane; 2D mode;: 28.2  ml/m2  IVS/LVPW (2D): 1  IVSd (2D): 1 cm  LVIDd (2D): 4.2 cm  LVIDs (2D): 2.8 cm  LVOT Area (2D): 3.1 cm2  LVPWd (2D): 1 cm  RVIDd (2D): 2.9 cm    Unspecified Scan Mode  Peak Grad; Mean; Antegrade Flow: 8 mm[Hg]  Vmax; Antegrade Flow: 143 cm/s  LVOT Diam: 2 cm    Prepared and signed by    Judie Jackman. MD [place] [facility]  Signed 09-Nov-2020 14:48:36         Physical Exam:  General - ill appearing, well nourished, in no apparent distress. HEENT - Normocephalic, atraumatic. Conjunctiva are clear. Neck - Supple without masses  Extremities - Peripheral pulses intact. No edema and no rashes. Neurological examination - drowsy, able to follow simple commands. The patient has expressive aphasia. On cranial nerve examination, (II, III, IV, VI) pupils are equal, round, and reactive to light. Unable to assess visual acuity or visual fields. Right gaze deviation with gaze evoked nystagmus to left. (V, VII) right facial droop (VIII) Hearing is intact. Motor examination - There is normal muscle tone and bulk. Strength is 1/5 LUE, LLE, moves spontaneously on the right, but not to command. Muscle stretch reflexes are 1+ throughout. Plantar response is upgoing on right. Unable to assess sensation, cerebellar examination, gait and stance due to AMS.        Signed By: Cassandra Spears NP     November 10, 2020

## 2020-11-10 NOTE — PROGRESS NOTES
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 PMH stroke, COVID in 8/30, DVT (on Eliquis), who is admitted with severe sepsis and multiorgan dysfunction. CTA shows fenestration of basilar artery. TTE was negative for source of embolus. Cardiology consulted to JT. Plan:     · Continue Eliquis  · Continue statin   · Neurochecks Q4H  · Cardiology consulted for JT  · Telemetry  · PT/OT/ST  · DVT prophylaxis   · BP management - normotensive, with long-term goal <130/80  · Smoking cessation if applicable   · Diabetes education if applicable   · Depression Screening prior to discharge      Subjective: Interval history:    Drowsy. Right gaze preference, can overcome with full EOMs. Generally weak, L>R. Cardiology following. History:    Shirin Simmons is a 72 y.o. female who is being seen for stroke. Unable to obtain ROS due to AMS.        Objective:     Vitals:    11/09/20 2323 11/10/20 0425 11/10/20 0636 11/10/20 1121   BP: 105/60 134/80 136/76 138/75   Pulse: 80 60 (!) 106 92   Resp: 19 18 18 18   Temp: 98.5 °F (36.9 °C) 97 °F (36.1 °C) 98.1 °F (36.7 °C) 98 °F (36.7 °C)   SpO2: 99% 99% 97% 95%   Weight:       Height:              Current Facility-Administered Medications:     anastrozole (ARIMIDEX) tablet 1 mg, 1 mg, Per J Tube, DAILY, Waylon Steel DO, Stopped at 11/10/20 0900    midodrine (PROAMATINE) tablet 5 mg, 5 mg, Per J Tube, TID WITH MEALS, Waylon Steel DO, Stopped at 11/10/20 0800    acetaminophen (TYLENOL) tablet 650 mg, 650 mg, Per J Tube, Q4H PRN, Waylon Steel DO    HYDROcodone-acetaminophen (NORCO) 5-325 mg per tablet 1 Tab, 1 Tab, Per G Tube, Q6H PRN, Tiki Gr, NP    polyethylene glycol (MIRALAX) packet 17 g, 17 g, Per G Tube, DAILY, Waylon Steel DO, Stopped at 11/10/20 0900    atorvastatin (LIPITOR) tablet 40 mg, 40 mg, Per J Tube, QHS, Bella Montano MD, 40 mg at 11/09/20 2126   levothyroxine (SYNTHROID) tablet 112 mcg, 112 mcg, Per J Tube, 6am, Waylon Steel DO, Stopped at 11/10/20 3131    ampicillin-sulbactam (UNASYN) 3 g in 0.9% sodium chloride (MBP/ADV) 100 mL, 3 g, IntraVENous, Q6H, Waylon Steel DO, Last Rate: 100 mL/hr at 11/10/20 0538, 3 g at 11/10/20 0538    NUTRITIONAL SUPPORT ELECTROLYTE PRN ORDERS, , Does Not Apply, PRN, Waylon Steel DO    apixaban (ELIQUIS) tablet 5 mg, 5 mg, Per J Tube, Q12H, Waylon Steel DO, Stopped at 11/10/20 0900    aspirin chewable tablet 81 mg, 81 mg, Per J Tube, DAILY, Waylon Steel DO, Stopped at 11/10/20 0900    doxycycline (VIBRAMYCIN) capsule 100 mg, 100 mg, Per J Tube, Q12H, Waylon Steel DO, Stopped at 11/10/20 0900    famotidine (PEPCID) 40 mg/5 mL (8 mg/mL) oral suspension 20 mg, 20 mg, Per J Tube, Q24H, Waylon Steel DO, 20 mg at 11/09/20 1144    sodium chloride (NS) flush 5-40 mL, 5-40 mL, IntraVENous, Q8H, Waylon Steel DO, 10 mL at 11/10/20 0745    sodium chloride (NS) flush 5-40 mL, 5-40 mL, IntraVENous, PRN, Waylon Steel DO    insulin lispro (HUMALOG) injection, , SubCUTAneous, AC&HS, Waylon Steel DO, Stopped at 11/10/20 0730    insulin glargine (LANTUS) injection 30 Units, 30 Units, SubCUTAneous, QHS, Waylon Steel DO, 30 Units at 11/09/20 2130    Recent Results (from the past 12 hour(s))   GLUCOSE, POC    Collection Time: 11/10/20  6:40 AM   Result Value Ref Range    Glucose (POC) 115 (H) 65 - 100 mg/dL   RENAL FUNCTION PANEL    Collection Time: 11/10/20  7:16 AM   Result Value Ref Range    Sodium 142 136 - 145 mmol/L    Potassium 4.4 3.5 - 5.1 mmol/L    Chloride 107 98 - 107 mmol/L    CO2 28 21 - 32 mmol/L    Anion gap 7 7 - 16 mmol/L    Glucose 97 65 - 100 mg/dL    BUN 17 8 - 23 MG/DL    Creatinine 0.49 (L) 0.6 - 1.0 MG/DL    GFR est AA >60 >60 ml/min/1.73m2    GFR est non-AA >60 >60 ml/min/1.73m2    Calcium 9.2 8.3 - 10.4 MG/DL    Phosphorus 3.6 2.3 - 3.7 MG/DL    Albumin 1.7 (L) 3.2 - 4.6 g/dL   CBC W/O DIFF    Collection Time: 11/10/20  7:16 AM   Result Value Ref Range    WBC 11.3 (H) 4.3 - 11.1 K/uL    RBC 3.66 (L) 4.05 - 5.2 M/uL    HGB 9.9 (L) 11.7 - 15.4 g/dL    HCT 33.4 (L) 35.8 - 46.3 %    MCV 91.3 79.6 - 97.8 FL    MCH 27.0 26.1 - 32.9 PG    MCHC 29.6 (L) 31.4 - 35.0 g/dL    RDW 17.2 (H) 11.9 - 14.6 %    PLATELET 813 208 - 899 K/uL    MPV 11.4 9.4 - 12.3 FL    ABSOLUTE NRBC 0.00 0.0 - 0.2 K/uL     CT Results (most recent):  Results from Hospital Encounter encounter on 11/04/20   CTA HEAD NECK W WO CONT    Narrative Title:  CT arteriogram of the neck and head. Indication: Multiple strokes. Right MCA syndrome. .    Technique: Axial images of the neck and head were obtained after the uneventful   administration of intravenous iodinated contrast media. Contrast was used to  best identify the arterial structures. Images were reviewed on a separate, free  standing, three-dimensional workstation as per the referring physicians request.       All stenosis percentages derived by comparing the narrowest segment with the  distal Internal Carotid Artery luminal diameter, as described in the [place]  [employer] Symptomatic Carotid Endarterectomy Trial (NASCET) criteria. All CT scans at this facility are performed using dose reduction/dose modulation  techniques, as appropriate the performed exam, including the following:   Automated Exposure Control; Adjustment of the mA and/or kV according to patient  size (this includes techniques or standardized protocols for targeted exams  where dose is matched to indication/reason for exam); and Use of Iterative  Reconstruction Technique. Comparison: MRI of the brain November 7, 2020. Findings:     Lungs: Patchy areas of groundglass opacity suggest multifocal infiltrate. A  viral pneumonia is not excluded.   Soft Tissues: Normal  Cervical Spine: Degenerative changes  Aorta: Conventional 3 vessel arch  [facility] Vessels: Patent and tortuous    Right ICA: Patent  % Stenosis: Less than 25  Right MCA: Patent  Right BURAK: Patent    Left ICA: Mild plaque at the bifurcation.  % Stenosis: Less than 25  Left MCA: Patent  Left BURAK: Patent    Right Vertebral: Patent  Left Vertebral: Patent  Dominance: Left  Basilar: Probable Small fenestration at the base of the artery. The remainder of  the vessel appears patent. Right PCA: Patent fetal origin. Left PCA: Patent    Other Vascular: Negative        Impression Impression:   1. There is likely a small fenestration at the base of the basilar artery,  although a small focus of thrombus is not entirely excluded. 2. No evidence of large vessel occlusion. 3. No significant proximal ICA stenosis. 4. Patchy bilateral lung infiltrates. MRI Results (most recent):  Results from [place] encounter on 11/04/20   MRI BRAIN WO CONT    Narrative Clinical History: The Female patient is 72years old  presenting with symptoms  of Rightward gaze, left hemiplegia, RMCA syndrome. Comparison:  Head CT 11/7/2020, brain MRI 7/31/2019    Technique:  Axial T2, axial FLAIR, axial diffusion-weighted,  sagittal T1 and  coronal gradient-echo scans were performed. Findings: There are multiple foci of subacute ischemia most prominently involving the left  caudate head and anterior margin of the left putamen, however also the medial  right temporal lobe/hippocampus, and anterolateral right radhika and left brachium  pontis extending into the radhika. There is otherwise age-related cortical involution with moderate confluent  chronic periventricular white matter disease. Focal encephalomalacic changes are  seen involving the anterior margin of the left external capsule, with lacunae  throughout the basal ganglia and thalami as well as corona radiata and centrum  semiovale.  There are also chronic ischemic changes involving both cerebellar  hemispheres particularly the middle cerebellar peduncles. Chronic ischemic  changes are also seen throughout the peripheral radhika. There are no abnormal extra-axial fluid collections. No evidence of mass or mass  effect is seen. Expected flow voids are maintained in the major intracranial  vessels. There is no evidence of Chiari malformation. The ventricular system and CSF containing spaces are unremarkable in appearance. Visualized extracranial soft tissues are unremarkable. There is chronic moderate right maxillary sinus mucosal inflammatory disease. Impression Impression:    1. Multifocal areas of subacute ischemia as described above with otherwise  chronic progressive microvascular disease. SIGNIFICANT RESULT:  The significant findings in this report have been referred to the Imaging  Navigator in order to communicate to the referring provider or his/her designee  as outlined in Section II.C.2.a.ii-iii of the ACR  Practice Guideline for Communication of Diagnostic Imaging Findings. CPT code(s) 89623             Results for orders placed or performed during the hospital encounter of 20   2D ECHO COMPLETE ADULT (TTE) W OR [address] [address] [address] [facility]  [phone]    Transthoracic Echocardiogram  2D, M-mode, Doppler, and Color Doppler    Patient: Kaye Pritchard  MR #: [id number]  : 1955  Age: 72 years  Gender: Female  Study date: 2020  Account #: [de-identified]  Height: 64 in  Weight: 154.7 lb  BSA: 1.76 mï¾²  Status:Routine  Location: [facility]  BP: 104/ 64    Allergies: OTHER FOOD, STING, BEE, CANAGLIFLOZIN, OTHER PLANT, ANIMAL,  ENVIRONMENTAL    Sonographer:  Anju Contreras RDCS  Group:  [facility]  Referring Physician:  Gomez Conway. Francisca Phelps MD  Reading Physician:  Eunice Bautista. MD [place] [facility]    INDICATIONS: Multiple embolic appearing strokes while on [address] [place] [address]. PROCEDURE: This was a routine study.  A transthoracic echocardiogram was  performed. The study included complete 2D imaging, M-mode, complete spectral  Doppler, and color Doppler. Intravenous contrast (agitated saline) was  administered. Intravenous contrast (agitated saline) was administered. Image  quality was adequate. LEFT VENTRICLE: Size was normal. Systolic function was normal. Ejection  fraction was estimated in the range of 55 % to 60 %. There were no regional  wall motion abnormalities. Wall thickness was normal. Avg. E/e': 10.2. Left  ventricular diastolic function parameters were normal.    RIGHT VENTRICLE: The size was normal. Systolic function was normal. A line  (catheter or pacing wire) was present. LEFT ATRIUM: Size was normal.    ATRIAL SEPTUM: Contrast injection was performed. There was no right-to-left  shunt, with provocative maneuvers to increase right atrial pressure. RIGHT ATRIUM: Size was normal. A line (catheter or pacing wire) was present. SYSTEMIC VEINS: IVC: Unable to assess due to feeding tube placement. AORTIC VALVE: The valve was structurally normal, tri-commissural. There was   no  evidence for stenosis. There was no insufficiency. MITRAL VALVE: Valve structure was normal. There was no evidence for stenosis. There was trivial regurgitation. TRICUSPID VALVE: The valve structure was normal. There was no evidence for  stenosis. There was mild regurgitation. PULMONIC VALVE: The valve structure was normal. There was no evidence for  stenosis. There was trivial regurgitation. PERICARDIUM: There was no pericardial effusion. AORTA: The root exhibited normal size. SUMMARY:    -  Left ventricle: Systolic function was normal. Ejection fraction was  estimated in the range of 55 % to 60 %. There were no regional wall motion  abnormalities. -  Atrial septum: Contrast injection was performed. There was no   right-to-left  shunt, with provocative maneuvers to increase right atrial pressure.     -  Inferior vena cava, hepatic veins: Unable to assess due to feeding tube  placement. -  Tricuspid valve: There was mild regurgitation.    -  Pericardium: There was no pericardial effusion. SYSTEM MEASUREMENT TABLES    2D mode  AoR Diam (2D): 3.2 cm  LA Dimension (2D): 3.4 cm  Left Atrium Systolic Volume Index; Method of Disks, Biplane; 2D mode;: 28.2  ml/m2  IVS/LVPW (2D): 1  IVSd (2D): 1 cm  LVIDd (2D): 4.2 cm  LVIDs (2D): 2.8 cm  LVOT Area (2D): 3.1 cm2  LVPWd (2D): 1 cm  RVIDd (2D): 2.9 cm    Unspecified Scan Mode  Peak Grad; Mean; Antegrade Flow: 8 mm[Hg]  Vmax; Antegrade Flow: 143 cm/s  LVOT Diam: 2 cm    Prepared and signed by    To Little. MD [place] [facility]  Signed 09-Nov-2020 14:48:36         Physical Exam:  General - ill appearing, well nourished, in no apparent distress. HEENT - Normocephalic, atraumatic. Conjunctiva are clear. Neck - Supple without masses  Extremities - Peripheral pulses intact. No edema and no rashes. Neurological examination - drowsy, able to follow simple commands. The patient has expressive aphasia. On cranial nerve examination, (II, III, IV, VI) pupils are equal, round, and reactive to light. Unable to assess visual acuity or visual fields. Right gaze preference with gaze evoked nystagmus to left. EOMs are full (V, VII) left facial droop (VIII) Hearing is intact. Motor examination - There is normal muscle tone and bulk. Strength is 1/5 in LUE, 0/5 LLE. 3/5 in RUE, 2/5 RLE. Muscle stretch reflexes are 1+ throughout. Plantar response is upgoing on right. Unable to assess sensation, cerebellar examination, gait and stance due to AMS.        Signed By: Radha Jean-Baptiste NP     November 10, 2020

## 2020-11-10 NOTE — CONSULTS
[facility]iology Consultation        Date of  Admission: 11/4/2020  7:27 AM     Primary Care Physician: Dr Aaron Padilla MD  Primary Cardiologist: Dr Sofía Schaefer MD  Referring Physician: Tracy Bales NP  Consulting Physician: Dr Sofía Schaefer MD    CC/Reason for consult: embolic stroke of undetermined source, JT    HPI:  Abel Ha is a 72 y.o. female with PMH of HTN, CHB s/p [employer] dual chamber pacemaker (8/15/2020), CVA/TIAs with vascular dementia, hypothyroidism, and COVID-19 infection requiring intubation in 8/2020 who presented to [facility] on 11/4 from NH with complaint of AMS and \"abnormal vital signs\". Patient is poor historian secondary to expressive aphasia therefore history is gathered from medical chart and nursing staff. Patient was admitted at [facility] during [address] for COVID-19 infections and GIB. Was discharged to [facility] for rehabilitation. On the morning of 11/4 at nursing home staff noted patient to be confused and mumbling therefore sent to ED. Patient admitted with  severe sepsis with acute organ dysfunction, acute renal failure, urinary tract infection and several other acute neurological issues. During admission patient was noted to have forced eye deviation to the right and diffuse muscle weakness concerning for possible stroke therefore neurology was consulted. Patient found to have multiple infarcts, embolic appearing. TTE was done and was negative therefore neurology consulted cardiology for further evaluation with JT. Past Medical History:   Diagnosis Date    Actinic keratosis     uses Solaraze gel when needed    Arthritis     rheumatoid.   Orencia infusion every 4 weeks    Asthma     none in yrs per pt--- no inhalers per pt    Breast cancer ([address] 2019    Cancer ([address]     melanoma    Cerebrovascular accident (CVA) due to bilateral embolism of carotid arteries ([address] 11/8/2020    DJD (degenerative joint disease)     Environmental allergies  Environmental and seasonal allergies 7/25/2019    GERD (gastroesophageal reflux disease)     prn med    History of malignant melanoma 1980's    on back    History of TIA (transient ischemic attack)     Hypertension     controlled with medication    Hypothyroidism     Insulin dependent diabetes mellitus 1990's    Type 2. sqbs avg am--100---- hypo at 73- last A1C= 8.1 on 12/19/17    Kidney stones     4th one at present    Mobitz type 1 second degree AV block     EKG today 9/22/16- Dr Jadyn Prado reviewed----- per pt-- \" been that way my whole life\"-- does not see a cardiologist    Obese     bmi =39    Osteoarthritis 12/22/2016    Osteopenia     PONV (postoperative nausea and vomiting)     POV- responds to IV antiemetic    RA (rheumatoid arthritis) ([facility] dx--2013    Rheumatoid arthritis involving right knee with negative rheumatoid factor ([facility] 5/16/2019    Rosacea     S/P total knee arthroplasty 12/23/2016    Stroke [facility])     followed by neurologist      Unspecified hypothyroidism     hypothyroid.   Controlled with Levoxyl    UTI (urinary tract infection)       Past Surgical History:   Procedure Laterality Date    ABDOMEN SURGERY PROC UNLISTED      fatty tumor removed- from abd--benign    FRAGMENT KIDNEY STONE/ ESWL      HX BREAST BIOPSY Left 06/21/2019    stereo bx    HX BREAST LUMPECTOMY Left 7/5/2019    LEFT BREAST LUMPECTOMY WITH NEEDLE  AND SENTINAL NODE/ performed by Nereyda Monroe MD at [address] [facility] [place] [facility] Final HX COLONOSCOPY  2008; 2010    [id number]    and hysteroscopy    HX GYN  1978    vaginal cryocautery     HX HEENT  1986    Oral Surgery--per pt  still has metal in mouth since 1980's    HX KNEE ARTHROSCOPY Right 2007    HX KNEE REPLACEMENT Left 2008    HX KNEE REPLACEMENT Right 2016    HX LAP CHOLECYSTECTOMY  1993    HX LITHOTRIPSY  1998, 2003; 2016; 2/6/18    HX MENISCECTOMY Right 2011    Right knee    HX MOHS PROCEDURES Right 2004    HX OTHER SURGICAL  1999    lipoma of abdomen    HX OTHER SURGICAL  1984    melanoma of back    HX REFRACTIVE SURGERY  2002    LASIK    HX TUBAL LIGATION  1989       Allergies   Allergen Reactions    Other Food Swelling     Jalapeno. -- tongue and lip swells    Bee Sting [Sting, Bee] Swelling     Local swelling    Invokana [Canagliflozin] Other (comments)     Caused frequent Urinary Tract Infections    Other Plant, Animal, Environmental Other (comments)     Trees, grass, dust, mold---- congestion      Social History     Socioeconomic History    Marital status:      Spouse name: Not on file    Number of children: 5    Years of education: Not on file    Highest education level: Not on file   Occupational History    Occupation: Clerical- retired   Social Needs    Financial resource strain: Not on file    Food insecurity     Worry: Not on file     Inability: Not on file   [employer] needs     Medical: Not on file     Non-medical: Not on file   Tobacco Use    Smoking status: Never Smoker    Smokeless tobacco: Never Used   Substance and Sexual Activity    Alcohol use:  Yes     Alcohol/week: 1.0 standard drinks     Types: 1 Glasses of wine per week     Comment: occasionally    Drug use: No    Sexual activity: Yes     Partners: Male   Lifestyle    Physical activity     Days per week: 0 days     Minutes per session: 0 min    Stress: Not on file   Relationships    Social connections     Talks on phone: Not on file     Gets together: Not on file     Attends Orthodoxy service: Not on file     Active member of club or organization: Not on file     Attends meetings of clubs or organizations: Not on file     Relationship status: Not on file    Intimate partner violence     Fear of current or ex partner: Not on file     Emotionally abused: Not on file     Physically abused: Not on file     Forced sexual activity: Not on file   Other Topics Concern   [address] Service Not Asked    Blood Transfusions Not Asked    Caffeine Concern Not Asked    Occupational Exposure Not Asked    Hobby Hazards Not Asked    Sleep Concern Not Asked    Stress Concern Not Asked    Weight Concern Not Asked    Special Diet Not Asked    Back Care Not Asked    Exercise Not Asked    Bike Helmet Not Asked   [address] Not Asked    Self-Exams Not Asked   Social History Narrative    Not on file     Family History   Problem Relation Age of Onset    Cancer Mother         Lung    Arthritis-rheumatoid Mother     Thyroid Disease Mother     Cancer Father         Lung; Liver    Diabetes Father     Heart Disease Father     Hypertension Father     Diabetes Brother     SLE Sister     Breast Cancer Other         cousins        Current Facility-Administered Medications   Medication Dose Route Frequency    ampicillin-sulbactam (UNASYN) 3 g in 0.9% sodium chloride (MBP/ADV) 100 mL MBP  3 g IntraVENous Q6H    anastrozole (ARIMIDEX) tablet 1 mg  1 mg Per J Tube DAILY    midodrine (PROAMATINE) tablet 5 mg  5 mg Per J Tube TID WITH MEALS    acetaminophen (TYLENOL) tablet 650 mg  650 mg Per J Tube Q4H PRN    HYDROcodone-acetaminophen (NORCO) 5-325 mg per tablet 1 Tab  1 Tab Per G Tube Q6H PRN    polyethylene glycol (MIRALAX) packet 17 g  17 g Per G Tube DAILY    atorvastatin (LIPITOR) tablet 40 mg  40 mg Per J Tube QHS    levothyroxine (SYNTHROID) tablet 112 mcg  112 mcg Per J Tube 6am    NUTRITIONAL SUPPORT ELECTROLYTE PRN ORDERS   Does Not Apply PRN    apixaban (ELIQUIS) tablet 5 mg  5 mg Per J Tube Q12H    aspirin chewable tablet 81 mg  81 mg Per J Tube DAILY    doxycycline (VIBRAMYCIN) capsule 100 mg  100 mg Per J Tube Q12H    famotidine (PEPCID) 40 mg/5 mL (8 mg/mL) oral suspension 20 mg  20 mg Per J Tube Q24H    sodium chloride (NS) flush 5-40 mL  5-40 mL IntraVENous Q8H    sodium chloride (NS) flush 5-40 mL  5-40 mL IntraVENous PRN    insulin lispro (HUMALOG) injection   SubCUTAneous AC&HS    Detail Level: Detailed insulin glargine (LANTUS) injection 30 Units  30 Units SubCUTAneous QHS       Review of Symptoms:  Unable to review with patient secondary to expressive aphasia. Subjective:     Physical Exam:  Vitals:    11/09/20 2323 11/10/20 0425 11/10/20 0636 11/10/20 1121   BP: 105/60 134/80 136/76 138/75   Pulse: 80 60 (!) 106 92   Resp: 19 18 18 18   Temp: 98.5 °F (36.9 °C) 97 °F (36.1 °C) 98.1 °F (36.7 °C) 98 °F (36.7 °C)   SpO2: 99% 99% 97% 95%   Weight:       Height:           General: Ill appearing, no distress  HEENT: pupils equal and round, no abnormalities noted  Neck: supple, no JVD, no carotid bruits  Heart: S1S2 with RRR without murmurs or gallops  Lungs: Clear throughout auscultation bilaterally without adventitious sounds  Abd: soft, nontender, nondistended, with good bowel sounds  Ext: warm, no edema, calves supple/nontender, pulses 2+ bilaterally  Skin: warm and dry  Psychiatric: unable to exam secondary to expressive aphasia  Neurologic: Awake, expressive aphasia     Cardiographics    Telemetry: not one  ECG: Atrial tachycardia, LBBB  Echocardiogram: 11/8/2020  LEFT VENTRICLE: Size was normal. Systolic function was normal. Ejection  fraction was estimated in the range of 55 % to 60 %. There were no regional  wall motion abnormalities. Wall thickness was normal. Avg. E/e': 10.2. Left  ventricular diastolic function parameters were normal.     RIGHT VENTRICLE: The size was normal. Systolic function was normal. A line  (catheter or pacing wire) was present.     LEFT ATRIUM: Size was normal.     ATRIAL SEPTUM: Contrast injection was performed. There was no right-to-left  shunt, with provocative maneuvers to increase right atrial pressure.     RIGHT ATRIUM: Size was normal. A line (catheter or pacing wire) was present.     SYSTEMIC VEINS: IVC: Unable to assess due to feeding tube placement.     AORTIC VALVE: The valve was structurally normal, tri-commissural. There was   no  evidence for stenosis.  There was no insufficiency.     MITRAL VALVE: Valve structure was normal. There was no evidence for stenosis. There was trivial regurgitation.     TRICUSPID VALVE: The valve structure was normal. There was no evidence for  stenosis. There was mild regurgitation.     PULMONIC VALVE: The valve structure was normal. There was no evidence for  stenosis. There was trivial regurgitation.     PERICARDIUM: There was no pericardial effusion.     AORTA: The root exhibited normal size.   Labs:   Recent Results (from the past 24 hour(s))   GLUCOSE, POC    Collection Time: 11/09/20  3:16 PM   Result Value Ref Range    Glucose (POC) 165 (H) 65 - 100 mg/dL   GLUCOSE, POC    Collection Time: 11/09/20  7:11 PM   Result Value Ref Range    Glucose (POC) 196 (H) 65 - 100 mg/dL   GLUCOSE, POC    Collection Time: 11/10/20  6:40 AM   Result Value Ref Range    Glucose (POC) 115 (H) 65 - 100 mg/dL   RENAL FUNCTION PANEL    Collection Time: 11/10/20  7:16 AM   Result Value Ref Range    Sodium 142 136 - 145 mmol/L    Potassium 4.4 3.5 - 5.1 mmol/L    Chloride 107 98 - 107 mmol/L    CO2 28 21 - 32 mmol/L    Anion gap 7 7 - 16 mmol/L    Glucose 97 65 - 100 mg/dL    BUN 17 8 - 23 MG/DL    Creatinine 0.49 (L) 0.6 - 1.0 MG/DL    GFR est AA >60 >60 ml/min/1.73m2    GFR est non-AA >60 >60 ml/min/1.73m2    Calcium 9.2 8.3 - 10.4 MG/DL    Phosphorus 3.6 2.3 - 3.7 MG/DL    Albumin 1.7 (L) 3.2 - 4.6 g/dL   CBC W/O DIFF    Collection Time: 11/10/20  7:16 AM   Result Value Ref Range    WBC 11.3 (H) 4.3 - 11.1 K/uL    RBC 3.66 (L) 4.05 - 5.2 M/uL    HGB 9.9 (L) 11.7 - 15.4 g/dL    HCT 33.4 (L) 35.8 - 46.3 %    MCV 91.3 79.6 - 97.8 FL    MCH 27.0 26.1 - 32.9 PG    MCHC 29.6 (L) 31.4 - 35.0 g/dL    RDW 17.2 (H) 11.9 - 14.6 %    PLATELET 308 222 - 208 K/uL    MPV 11.4 9.4 - 12.3 FL    ABSOLUTE NRBC 0.00 0.0 - 0.2 K/uL   GLUCOSE, POC    Collection Time: 11/10/20 11:20 AM   Result Value Ref Range    Glucose (POC) 96 65 - 100 mg/dL     Pt has been seen and examined by  Ethan. He agrees with the following assessment and plan. Assessment/Plan:      Principal Problem:    Severe sepsis with acute organ dysfunction ([address] (11/4/2020)- resolving, per primary     Active Problems:    CVA- MRI on 11/7 with multifocal areas of subacute ischemia, ECHO showed normal LV systolic function, there was no right-to-leftshunt, with provocative maneuvers to increase right atrial pressure, will interrogate device if normal interrogation there is no indication for JT at this time. CHB s/p dual chamber pacemaker (8/15/2020)- interrogate device       Hypothyroidism - per primary       RA (rheumatoid arthritis) ([address]       Gastroesophageal reflux disease without esophagitis - per primary       Type 2 diabetes mellitus without complication, with long-term current use of insulin ([address] (5/16/2019)- per primary       Malignant neoplasm of lower-outer quadrant of left breast of female, estrogen receptor positive ([address] (7/1/2019)- per primary       Mixed hyperlipidemia (7/7/2020)- statin        Acute renal failure (ARF) ([address] (11/4/2020)- per primary       UTI (urinary tract infection) (11/4/2020)- per primary       Dehydration, severe (11/4/2020)- per primary       Vascular dementia ([address] (11/4/2020)      Pneumonia (11/4/2020)- per primary       Lactic acidosis (11/4/2020)-per primary       Hyperkalemia (11/4/2020)- resolved       Septic encephalopathy (11/4/2020)- per primary       S/P percutaneous endoscopic gastrostomy (PEG) tube placement [facility]) (11/4/2020)    Thank you for consulting [facility] Cardiology and allowing us to participate in the care of this patient. We will continue to follow along with you. Solitario Browning PA-C    Attending Addendum    Patient independently seen and examined by me.   Agree with above note by physician extender with the following additions and exceptions: 72 y.o. female with PMH of HTN, CHB s/p [employer] dual chamber pacemaker (8/15/2020), CVA/TIAs with vascular dementia, hypothyroidism, and COVID-19 infection requiring intubation in 8/2020 who presented to [facility] on 11/4 from NH with complaint of AMS and \"abnormal vital signs\" and cardiology was consulted for consideration for JT. Key findings are:  No CP or HALL  CV- RRR without murmur  Lungs- Clear bilaterally  Ext- no edema    Plan: normal TTE, no evidence of left to right shunt, normal EF       --PPM interrogation with normal function, no evidence of atrial arrhythmias       --if neurology feels JT is indicated, could consider this admission, however, currently no evidence for cardioembolic source       --in addition, Pt is already on eliquis         --further plan pending clinical course and as noted above    Supriya JORDAN  [address] [facility]

## 2020-11-10 NOTE — PROGRESS NOTES
SPEECH PATHOLOGY NOTE:    Patient NPO for possible procedure today. Will follow up at later time/date.      [address] CCC-SLP

## 2020-11-10 NOTE — PROGRESS NOTES
CM contacted patient's spouse Josselin Brock this day to discuss discharge planning. Josselin Brock asked CM, how can the patient receive assistance with applying for disability. CM informed patient's spouse, he can contact his local [place] [address] to receive assistance. Spouse was receptive. [employer] has been consulted. [employer] informed spouse, the patient is not eligible for [place] medicaid. CM asked spouse would he like the patient to obtain STR at discharge, as [address] [place] [address], has provided bed offer. Spouse stated he will wait to make this decision and receive updates regarding anticipated discharge date, before deciding. CM discussed [facility] services with spouse, if he is not agreeable to STR for the patient. CM will continue to follow up with spouse, to confirm discharge plan. CM continues to monitor plan of care.

## 2020-11-11 NOTE — PROGRESS NOTES
Hourly rounds completed this shift, will give report to oncoming nurse. Pt resting in bed, PEG intact.

## 2020-11-11 NOTE — PROGRESS NOTES
SPEECH PATHOLOGY NOTE:    Attempted to see this AM. Patient only briefly woke to verbal and tactile stim, but then quickly fell back asleep. Unable to maintain adequate alertness for participation in therapy. Will continue to follow.      [address] CCC-SLP

## 2020-11-11 NOTE — PROGRESS NOTES
Hourly rounds complete this shift, no new complaints at this time, Pt 6 am Tube feeding held: bed in low, locked position, call light and bedside table within reach,  all needs met. Will continue to monitor Report to day shift nurse.

## 2020-11-11 NOTE — PROGRESS NOTES
[facility] CARDIOLOGY PROGRESS NOTE           11/11/2020 6:47 AM    Admit Date: 11/4/2020      Subjective:   Unable to obtain due to AMS. ROS:  Unable to obtain due to AMS. Objective:      Vitals:    11/10/20 1656 11/10/20 2007 11/10/20 2307 11/11/20 0353   BP: (!) 150/87 116/85 118/78 114/61   Pulse: (!) 111 99 87 (!) 102   Resp:  18 18 18   Temp:  98.3 °F (36.8 °C) 98.3 °F (36.8 °C) 98.3 °F (36.8 °C)   SpO2:  98% 98% 95%   Weight:       Height:           Physical Exam:  General-No Acute Distress  Neck- supple, no JVD  CV- regular rate and rhythm no RG  Lung- clear bilaterally  Abd- soft, nontender, nondistended  Ext- no edema bilaterally. Skin- warm and dry    Data Review:   Recent Labs     11/10/20  0716 11/09/20  0603    145   K 4.4 3.4*   MG  --  1.6*   BUN 17 17   CREA 0.49* 0.35*   GLU 97 108*   WBC 11.3* 8.0   HGB 9.9* 8.6*   HCT 33.4* 28.7*    219       Assessment/Plan:   Sepsis  - thought to be due to UTI  - per primary    CVA  - CTA 11/8: likely a small fenestration at the base of the basilar artery,   although a small focus of thrombus is not entirely excluded  - TTE with saline contrast without intracardiac shunt, no e/o thrombus, EF normal  - PM interrogation w/o e/o atrial arrhythmia  - JT would not : if PFO noted, PFO closure is a weak recommendation and that weak recommendation is only in patients age <60 years with cryptogenic embolic-appearing ischemic stroke (patient is 65 years)  - patient on Eliquis for DVT  - palliative consulted     CHB  - normal functioning PM w/o e/o atrial arrhythmias per EP    DVT  - on Eliquis    Will sign off. Please call with questions.     Sandra Zuñiga MD

## 2020-11-11 NOTE — PROGRESS NOTES
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 PMH stroke, COVID in 8/30, DVT (on Eliquis), who is admitted with severe sepsis and multiorgan dysfunction. CTA shows fenestration of basilar artery. TTE was negative for source of embolus. Cardiology consulted to JT, do not feel this is indicated currently. PPM was interrogated with no evidence of afib. Given history, endocarditis should be ruled out as cause of stroke. Plan:     · Continue Eliquis  · Continue statin   · Neurochecks Q4H  · Cardiology consulted for JT  · Telemetry  · PT/OT/ST  · DVT prophylaxis   · BP management - normotensive, with long-term goal <130/80  · Smoking cessation if applicable   · Diabetes education if applicable   · Depression Screening prior to discharge      Subjective: Interval history:    Drowsy. Right gaze preference, can overcome with full EOMs. Generally weak, L>R. Cardiology following. History:    Alise Gatica is a 72 y.o. female who is being seen for stroke. Unable to obtain ROS due to AMS.        Objective:     Vitals:    11/10/20 2007 11/10/20 2307 11/11/20 0353 11/11/20 0739   BP: 116/85 118/78 114/61 128/74   Pulse: 99 87 (!) 102 (!) 101   Resp: 18 18 18 21   Temp: 98.3 °F (36.8 °C) 98.3 °F (36.8 °C) 98.3 °F (36.8 °C) 99.1 °F (37.3 °C)   SpO2: 98% 98% 95% 95%   Weight:       Height:              Current Facility-Administered Medications:     ampicillin-sulbactam (UNASYN) 3 g in 0.9% sodium chloride (MBP/ADV) 100 mL MBP, 3 g, IntraVENous, Q6H, Waylon Steel, DO, Last Rate: 200 mL/hr at 11/11/20 0638, 3 g at 11/11/20 0875    anastrozole (ARIMIDEX) tablet 1 mg, 1 mg, Per J Tube, DAILY, Waylon Steel, DO, 1 mg at 11/11/20 0919    midodrine (PROAMATINE) tablet 5 mg, 5 mg, Per J Tube, TID WITH MEALS, Waylon Steel, DO, Stopped at 11/10/20 0800    acetaminophen (TYLENOL) tablet 650 mg, 650 mg, Per J Tube, Q4H PRN, Waylon Steel, DO    HYDROcodone-acetaminophen (NORCO) 5-325 mg per tablet 1 Tab, 1 Tab, Per G Tube, Q6H PRN, Tiki Gr, NP    polyethylene glycol (MIRALAX) packet 17 g, 17 g, Per G Tube, DAILY, Waylon Steel, , 17 g at 11/11/20 8192    atorvastatin (LIPITOR) tablet 40 mg, 40 mg, Per J Tube, QHS, Abimael Zavala MD, 40 mg at 11/10/20 2201    levothyroxine (SYNTHROID) tablet 112 mcg, 112 mcg, Per Julio Lees, DO, Stopped at 11/10/20 2107    NUTRITIONAL SUPPORT ELECTROLYTE PRN ORDERS, , Does Not Apply, PRN, Waylon Steel DO    apixaban (ELIQUIS) tablet 5 mg, 5 mg, Per J Tube, Q12H, Waylon Steel, , 5 mg at 11/11/20 0919    aspirin chewable tablet 81 mg, 81 mg, Per J Tube, DAILY, Waylon Steel, , 81 mg at 11/11/20 1378    doxycycline (VIBRAMYCIN) capsule 100 mg, 100 mg, Per J Tube, Q12H, Waylon Steel DO, 100 mg at 11/11/20 0918    famotidine (PEPCID) 40 mg/5 mL (8 mg/mL) oral suspension 20 mg, 20 mg, Per J Tube, Q24H, Waylon Steel DO, Stopped at 11/10/20 1200    sodium chloride (NS) flush 5-40 mL, 5-40 mL, IntraVENous, Q8H, Waylon Steel, DO, 10 mL at 11/11/20 0701    sodium chloride (NS) flush 5-40 mL, 5-40 mL, IntraVENous, PRN, Waylon Steel,     insulin lispro (HUMALOG) injection, , SubCUTAneous, AC&HS, Waylon Steel, , 2 Units at 11/11/20 0824    insulin glargine (LANTUS) injection 30 Units, 30 Units, SubCUTAneous, QHS, Waylon Steel, DO, Stopped at 11/10/20 2201    Recent Results (from the past 12 hour(s))   RENAL FUNCTION PANEL    Collection Time: 11/11/20  6:29 AM   Result Value Ref Range    Sodium 146 (H) 136 - 145 mmol/L    Potassium 4.1 3.5 - 5.1 mmol/L    Chloride 111 (H) 98 - 107 mmol/L    CO2 29 21 - 32 mmol/L    Anion gap 6 (L) 7 - 16 mmol/L    Glucose 146 (H) 65 - 100 mg/dL    BUN 25 (H) 8 - 23 MG/DL    Creatinine 0.67 0.6 - 1.0 MG/DL    GFR est AA >60 >60 ml/min/1.73m2    GFR est non-AA >60 >60 ml/min/1.73m2    Calcium 9.0 8.3 - 10.4 MG/DL    Phosphorus 5.0 (H) 2.3 - 3.7 MG/DL    Albumin 1.7 (L) 3.2 - 4.6 g/dL   CBC W/O DIFF    Collection Time: 11/11/20  6:29 AM   Result Value Ref Range    WBC 9.0 4.3 - 11.1 K/uL    RBC 3.35 (L) 4.05 - 5.2 M/uL    HGB 9.1 (L) 11.7 - 15.4 g/dL    HCT 30.0 (L) 35.8 - 46.3 %    MCV 89.6 79.6 - 97.8 FL    MCH 27.2 26.1 - 32.9 PG    MCHC 30.3 (L) 31.4 - 35.0 g/dL    RDW 17.3 (H) 11.9 - 14.6 %    PLATELET 329 012 - 463 K/uL    MPV 10.8 9.4 - 12.3 FL    ABSOLUTE NRBC 0.00 0.0 - 0.2 K/uL   MAGNESIUM    Collection Time: 11/11/20  6:29 AM   Result Value Ref Range    Magnesium 2.0 1.8 - 2.4 mg/dL   GLUCOSE, POC    Collection Time: 11/11/20  7:36 AM   Result Value Ref Range    Glucose (POC) 162 (H) 65 - 100 mg/dL     CT Results (most recent):  Results from [place] encounter on 11/04/20   CTA HEAD NECK W WO CONT    Narrative Title:  CT arteriogram of the neck and head. Indication: Multiple strokes. Right MCA syndrome. .    Technique: Axial images of the neck and head were obtained after the uneventful   administration of intravenous iodinated contrast media. Contrast was used to  best identify the arterial structures. Images were reviewed on a separate, free  standing, three-dimensional workstation as per the referring physicians request.       All stenosis percentages derived by comparing the narrowest segment with the  distal Internal Carotid Artery luminal diameter, as described in the Alejandrina  American Symptomatic Carotid Endarterectomy Trial (NASCET) criteria. All CT scans at this facility are performed using dose reduction/dose modulation  techniques, as appropriate the performed exam, including the following:   Automated Exposure Control;  Adjustment of the mA and/or kV according to patient  size (this includes techniques or standardized protocols for targeted exams  where dose is matched to indication/reason for exam); and Use of Iterative  Reconstruction Technique. Comparison: MRI of the brain November 7, 2020. Findings:     Lungs: Patchy areas of groundglass opacity suggest multifocal infiltrate. A  viral pneumonia is not excluded. Soft Tissues: Normal  Cervical Spine: Degenerative changes  Aorta: Conventional 3 vessel arch  Great Vessels: Patent and tortuous    Right ICA: Patent  % Stenosis: Less than 25  Right MCA: Patent  Right BURAK: Patent    Left ICA: Mild plaque at the bifurcation.  % Stenosis: Less than 25  Left MCA: Patent  Left BURAK: Patent    Right Vertebral: Patent  Left Vertebral: Patent  Dominance: Left  Basilar: Probable Small fenestration at the base of the artery. The remainder of  the vessel appears patent. Right PCA: Patent fetal origin. Left PCA: Patent    Other Vascular: Negative        Impression Impression:   1. There is likely a small fenestration at the base of the basilar artery,  although a small focus of thrombus is not entirely excluded. 2. No evidence of large vessel occlusion. 3. No significant proximal ICA stenosis. 4. Patchy bilateral lung infiltrates. MRI Results (most recent):  Results from [place] encounter on 11/04/20   MRI BRAIN WO CONT    Narrative Clinical History: The Female patient is 72years old  presenting with symptoms  of Rightward gaze, left hemiplegia, RMCA syndrome. Comparison:  Head CT 11/7/2020, brain MRI 7/31/2019    Technique:  Axial T2, axial FLAIR, axial diffusion-weighted,  sagittal T1 and  coronal gradient-echo scans were performed. Findings: There are multiple foci of subacute ischemia most prominently involving the left  caudate head and anterior margin of the left putamen, however also the medial  right temporal lobe/hippocampus, and anterolateral right radhika and left brachium  pontis extending into the radhika. There is otherwise age-related cortical involution with moderate confluent  chronic periventricular white matter disease.  Focal encephalomalacic changes are  seen involving the anterior margin of the left external capsule, with lacunae  throughout the basal ganglia and thalami as well as corona radiata and centrum  semiovale. There are also chronic ischemic changes involving both cerebellar  hemispheres particularly the middle cerebellar peduncles. Chronic ischemic  changes are also seen throughout the peripheral radhika. There are no abnormal extra-axial fluid collections. No evidence of mass or mass  effect is seen. Expected flow voids are maintained in the major intracranial  vessels. There is no evidence of Chiari malformation. The ventricular system and CSF containing spaces are unremarkable in appearance. Visualized extracranial soft tissues are unremarkable. There is chronic moderate right maxillary sinus mucosal inflammatory disease. Impression Impression:    1. Multifocal areas of subacute ischemia as described above with otherwise  chronic progressive microvascular disease. SIGNIFICANT RESULT:  The significant findings in this report have been referred to the Imaging  Navigator in order to communicate to the referring provider or his/her designee  as outlined in Section II.C.2.a.ii-iii of the ACR  Practice Guideline for Communication of Diagnostic Imaging Findings.     CPT code(s) 62462             Results for orders placed or performed during the hospital encounter of 20   2D ECHO COMPLETE ADULT (TTE) W OR [address]  [phone]    Transthoracic Echocardiogram  2D, M-mode, Doppler, and Color Doppler    Patient: Kimberley Zimmerman  MR #: [id number]  : 1955  Age: 72 years  Gender: Female  Study date: 2020  Account #: [de-identified]  Height: 64 in  Weight: 154.7 lb  BSA: 1.76 mï¾²  Status:Routine  Location: [facility]  BP: 104/ 64    Allergies: OTHER FOOD, STING, BEE, CANAGLIFLOZIN, OTHER PLANT, ANIMAL,  ENVIRONMENTAL    Sonographer:  Raul Montgomery RDCS  Group:  [facility]  Referring Physician:  Arben Esquivel. Ginny Lopez MD  Reading Physician:  Jose Miguel Finnegan. MD [place] [facility]    INDICATIONS: Multiple embolic appearing strokes while on [address] PROCEDURE: This was a routine study. A transthoracic echocardiogram was  performed. The study included complete 2D imaging, M-mode, complete spectral  Doppler, and color Doppler. Intravenous contrast (agitated saline) was  administered. Intravenous contrast (agitated saline) was administered. Image  quality was adequate. LEFT VENTRICLE: Size was normal. Systolic function was normal. Ejection  fraction was estimated in the range of 55 % to 60 %. There were no regional  wall motion abnormalities. Wall thickness was normal. Avg. E/e': 10.2. Left  ventricular diastolic function parameters were normal.    RIGHT VENTRICLE: The size was normal. Systolic function was normal. A line  (catheter or pacing wire) was present. LEFT ATRIUM: Size was normal.    ATRIAL SEPTUM: Contrast injection was performed. There was no right-to-left  shunt, with provocative maneuvers to increase right atrial pressure. RIGHT ATRIUM: Size was normal. A line (catheter or pacing wire) was present. SYSTEMIC VEINS: IVC: Unable to assess due to feeding tube placement. AORTIC VALVE: The valve was structurally normal, tri-commissural. There was   no  evidence for stenosis. There was no insufficiency. MITRAL VALVE: Valve structure was normal. There was no evidence for stenosis. There was trivial regurgitation. TRICUSPID VALVE: The valve structure was normal. There was no evidence for  stenosis. There was mild regurgitation. PULMONIC VALVE: The valve structure was normal. There was no evidence for  stenosis. There was trivial regurgitation. PERICARDIUM: There was no pericardial effusion. AORTA: The root exhibited normal size.     SUMMARY:    -  Left ventricle: Systolic function was normal. Ejection fraction was  estimated in the range of 55 % to 60 %. There were no regional wall motion  abnormalities. -  Atrial septum: Contrast injection was performed. There was no   right-to-left  shunt, with provocative maneuvers to increase right atrial pressure. -  Inferior vena cava, hepatic veins: Unable to assess due to feeding tube  placement. -  Tricuspid valve: There was mild regurgitation.    -  Pericardium: There was no pericardial effusion. SYSTEM MEASUREMENT TABLES    2D mode  AoR Diam (2D): 3.2 cm  LA Dimension (2D): 3.4 cm  Left Atrium Systolic Volume Index; Method of Disks, Biplane; 2D mode;: 28.2  ml/m2  IVS/LVPW (2D): 1  IVSd (2D): 1 cm  LVIDd (2D): 4.2 cm  LVIDs (2D): 2.8 cm  LVOT Area (2D): 3.1 cm2  LVPWd (2D): 1 cm  RVIDd (2D): 2.9 cm    Unspecified Scan Mode  Peak Grad; Mean; Antegrade Flow: 8 mm[Hg]  Vmax; Antegrade Flow: 143 cm/s  LVOT Diam: 2 cm    Prepared and signed by    Yash Lazo. MD [place] [facility]  Signed 09-Nov-2020 14:48:36         Physical Exam:  General - ill appearing, well nourished, in no apparent distress. HEENT - Normocephalic, atraumatic. Conjunctiva are clear. Neck - Supple without masses  Extremities - Peripheral pulses intact. No edema and no rashes. Neurological examination - drowsy, able to follow simple commands. The patient has expressive aphasia. On cranial nerve examination, (II, III, IV, VI) pupils are equal, round, and reactive to light. Unable to assess visual acuity or visual fields. Right gaze preference with gaze evoked nystagmus to left. EOMs are full (V, VII) left facial droop (VIII) Hearing is intact. Motor examination - There is normal muscle tone and bulk. Strength is 1/5 in LUE, 0/5 LLE. 3/5 in RUE, 2/5 RLE. Muscle stretch reflexes are 1+ throughout. Plantar response is upgoing on right. Unable to assess sensation, cerebellar examination, gait and stance due to AMS.        Signed By: Marixa Lala NP     November 11, 2020

## 2020-11-11 NOTE — PROGRESS NOTES
Call from [address], increase noon bolus feeding to 1.5 cartons to cover 0600 carton being held due to possible JT.  No JT indicated in notes, feedings resumed at 0900 this am.

## 2020-11-11 NOTE — PROGRESS NOTES
Progress Note    2020  Admit Date: 2020  7:27 AM   NAME: Kamala Ugarte   :  1955   MRN:  [id number]   Attending: Macrina Moore MD  PCP:  Angeline Chowdhury MD  Treatment Team: Attending Provider: Macrina Moore MD; Utilization Review: Zayra Llanes, RN; Care Manager: Hodgenrob Vela; Consulting Provider: Keena Peterson DO; Hospitalist: Aronld Key NP; Primary Nurse: Loyda Maciel, RN; Primary Nurse: Angie Watson, RN; Nurse Practitioner: Lyudmila Burger NP; Physical Therapy Assistant: Carmela Peabody; Occupational Therapist: Rissa Cesar OT    Full Code   SUBJECTIVE:   Ms. Rufino Murrieta is a 73 yo female with PMH of RA, asthma, breast cancer, CVA, GERD, DM II, hypothyroidism, who presented from [facility] with confusion and abnormal VS.  Found to have sepsis secondary to UTI and pneumonia. Started on unasyn and doxycycline. [place]RA[place]. MRI showed embolic strokes in multiple territories. CTA with fenestration of basilar artery. TTE neg. Cardiology consulted, no need for JT initially. Had PEG . Palliative care consulted. Family wants full code. Pacemaker interrogated, no events noted. Neurology following. Neurology feels endocarditis should be ruled out as cause of CVA, request JT, Cards consulted. On eliquis and statin. Non communicative, follows minimal commands. Past Medical History:   Diagnosis Date    Actinic keratosis     uses Solaraze gel when needed    Arthritis     rheumatoid.   Orencia infusion every 4 weeks    Asthma     none in yrs per pt--- no inhalers per pt    Breast cancer ([address] 2019    Cancer ([address]     melanoma    Cerebrovascular accident (CVA) due to bilateral embolism of carotid arteries ([address] 2020    DJD (degenerative joint disease)     Environmental allergies     Environmental and seasonal allergies 2019    GERD (gastroesophageal reflux disease)     prn med    History of malignant melanoma     on back    History of TIA (transient ischemic attack)     Hypertension     controlled with medication    Hypothyroidism     Insulin dependent diabetes mellitus 1990's    Type 2. sqbs avg am--100---- hypo at 73- last A1C= 8.1 on 12/19/17    Kidney stones     4th one at present    Mobitz type 1 second degree AV block     EKG today 9/22/16- Dr Teetee Lei reviewed----- per pt-- \" been that way my whole life\"-- does not see a cardiologist    Obese     bmi =39    Osteoarthritis 12/22/2016    Osteopenia     PONV (postoperative nausea and vomiting)     POV- responds to IV antiemetic    RA (rheumatoid arthritis) ([facility] dx--2013    Rheumatoid arthritis involving right knee with negative rheumatoid factor ([facility] [address] 5/16/2019    Rosacea     S/P total knee arthroplasty 12/23/2016    Stroke [facility])     followed by neurologist      Unspecified hypothyroidism     hypothyroid.   Controlled with Levoxyl    UTI (urinary tract infection)      Recent Results (from the past 24 hour(s))   GLUCOSE, POC    Collection Time: 11/10/20  8:34 PM   Result Value Ref Range    Glucose (POC) 96 65 - 100 mg/dL   RENAL FUNCTION PANEL    Collection Time: 11/11/20  6:29 AM   Result Value Ref Range    Sodium 146 (H) 136 - 145 mmol/L    Potassium 4.1 3.5 - 5.1 mmol/L    Chloride 111 (H) 98 - 107 mmol/L    CO2 29 21 - 32 mmol/L    Anion gap 6 (L) 7 - 16 mmol/L    Glucose 146 (H) 65 - 100 mg/dL    BUN 25 (H) 8 - 23 MG/DL    Creatinine 0.67 0.6 - 1.0 MG/DL    GFR est AA >60 >60 ml/min/1.73m2    GFR est non-AA >60 >60 ml/min/1.73m2    Calcium 9.0 8.3 - 10.4 MG/DL    Phosphorus 5.0 (H) 2.3 - 3.7 MG/DL    Albumin 1.7 (L) 3.2 - 4.6 g/dL   CBC W/O DIFF    Collection Time: 11/11/20  6:29 AM   Result Value Ref Range    WBC 9.0 4.3 - 11.1 K/uL    RBC 3.35 (L) 4.05 - 5.2 M/uL    HGB 9.1 (L) 11.7 - 15.4 g/dL    HCT 30.0 (L) 35.8 - 46.3 %    MCV 89.6 79.6 - 97.8 FL    MCH 27.2 26.1 - 32.9 PG    MCHC 30.3 (L) 31.4 - 35.0 g/dL    RDW 17.3 (H) 11.9 - 14.6 % PLATELET 748 082 - 074 K/uL    MPV 10.8 9.4 - 12.3 FL    ABSOLUTE NRBC 0.00 0.0 - 0.2 K/uL   MAGNESIUM    Collection Time: 11/11/20  6:29 AM   Result Value Ref Range    Magnesium 2.0 1.8 - 2.4 mg/dL   GLUCOSE, POC    Collection Time: 11/11/20  7:36 AM   Result Value Ref Range    Glucose (POC) 162 (H) 65 - 100 mg/dL   GLUCOSE, POC    Collection Time: 11/11/20 11:35 AM   Result Value Ref Range    Glucose (POC) 284 (H) 65 - 100 mg/dL     Allergies   Allergen Reactions    Other Food Swelling     Jalapeno. -- tongue and lip swells    Bee Sting [Sting, Bee] Swelling     Local swelling    Invokana [Canagliflozin] Other (comments)     Caused frequent Urinary Tract Infections    Other Plant, Animal, Environmental Other (comments)     Trees, grass, dust, mold---- congestion     Current Facility-Administered Medications   Medication Dose Route Frequency Provider Last Rate Last Dose    ampicillin-sulbactam (UNASYN) 3 g in 0.9% sodium chloride (MBP/ADV) 100 mL MBP  3 g IntraVENous Q6H Waylon Steel  mL/hr at 11/11/20 1209 3 g at 11/11/20 1209    anastrozole (ARIMIDEX) tablet 1 mg  1 mg Per J Tube DAILY Waylon Steel DO   1 mg at 11/11/20 0919    midodrine (PROAMATINE) tablet 5 mg  5 mg Per J Tube TID WITH MEALS Waylon Steel DO   Stopped at 11/10/20 0800    acetaminophen (TYLENOL) tablet 650 mg  650 mg Per J Tube Q4H PRN Waylon Steel DO        HYDROcodone-acetaminophen (NORCO) 5-325 mg per tablet 1 Tab  1 Tab Per G Tube Q6H PRN [address]iki [address], KARUNA        polyethylene glycol (MIRALAX) packet 17 g  17 g Per G Tube DAILY Waylon Steel DO   17 g at 11/11/20 5923    atorvastatin (LIPITOR) tablet 40 mg  40 mg Per J Tube QHS Ishan Garcia MD   40 mg at 11/10/20 2201    levothyroxine (SYNTHROID) tablet 112 mcg  112 mcg Per J Tube 6am Waylon Steel DO   Stopped at 11/10/20 2888    NUTRITIONAL SUPPORT ELECTROLYTE PRN ORDERS   Does Not Apply PRN Cee Sullivan DO  apixaban (ELIQUIS) tablet 5 mg  5 mg Per J Tube Q12H Waylon Steel, DO   5 mg at 20 2830    aspirin chewable tablet 81 mg  81 mg Per J Tube DAILY Waylon Steel, DO   81 mg at 20 4811    doxycycline (VIBRAMYCIN) capsule 100 mg  100 mg Per J Tube Q12H Waylon Steel, DO   100 mg at 20 5027    famotidine (PEPCID) 40 mg/5 mL (8 mg/mL) oral suspension 20 mg  20 mg Per J Tube Q24H Waylon Steel, DO   20 mg at 20 1214    sodium chloride (NS) flush 5-40 mL  5-40 mL IntraVENous Q8H Waylon Steel, DO   10 mL at 20 1440    sodium chloride (NS) flush 5-40 mL  5-40 mL IntraVENous PRN Waylon Steel, DO        insulin lispro (HUMALOG) injection   SubCUTAneous AC&HS Namrata Jarrett E, DO   6 Units at 20 1210    insulin glargine (LANTUS) injection 30 Units  30 Units SubCUTAneous QHS Waylon Steel, DO   Stopped at 11/10/20 2201       Review of Systems unable to obtain  PHYSICAL EXAM     Visit Vitals  /64   Pulse 79   Temp 99.3 °F (37.4 °C)   Resp 22   Ht 5' 1\" (1.549 m)   Wt 70.3 kg (155 lb)   LMP 2001   SpO2 97%   BMI 26.61 kg/m²      Temp (24hrs), Av.8 °F (37.1 °C), Min:98.3 °F (36.8 °C), Max:99.4 °F (37.4 °C)    Oxygen Therapy  O2 Sat (%): 97 % (20 1527)  Pulse via Oximetry: 70 beats per minute (20 1143)  O2 Device: Nasal cannula (20 1143)  O2 Flow Rate (L/min): 2 l/min (20 1143)    Intake/Output Summary (Last 24 hours) at 2020 1552  Last data filed at 2020 1214  Gross per 24 hour   Intake 1354.5 ml   Output --   Net 1354.5 ml      General: Ill appearing, NAD    Lungs: CTA bilaterally. Resp even and nonlabored  Heart:  S1S2 present without murmurs rubs gallops. RRR. No LE edema  Abdomen: Soft, non distended. BS present. PEG in place  Extremities: All ext diminished strength  Neurologic:  Eyes open, left facial droop. PERRLA. Left LE flaccid. LUE 1/5 strength, RUE/RLE strength 2/5. Follows simple commands. Expressive aphasia.       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 ([facility] [address] 11/08/2020    Acute renal failure (ARF) ([facility] [address] 11/04/2020    Severe sepsis with acute organ dysfunction ([address] 11/04/2020    UTI (urinary tract infection) 11/04/2020    Dehydration, severe 11/04/2020    Vascular dementia ([facility] [facility] 11/04/2020    Pneumonia 11/04/2020    Cardiac pacemaker 11/04/2020    Lactic acidosis 11/04/2020    Hyperkalemia 11/04/2020    Septic encephalopathy 11/04/2020    S/P percutaneous endoscopic gastrostomy (PEG) tube placement ([facility] [address] 11/04/2020    History of stroke 07/07/2020    Mixed hyperlipidemia 07/07/2020    Malignant neoplasm of lower-outer quadrant of left breast of female, estrogen receptor positive ([facility] [address] [facility] 07/01/2019    Type 2 diabetes mellitus without complication, with long-term current use of insulin ([facility] [address] 05/16/2019    Complete heart block (HCC) 03/28/2018    Gastroesophageal reflux disease without esophagitis     Hypothyroidism     RA [facility]rheumatoid arthritis) ([facility])      Plan:  Severe sepsis with acute organ dysfunction   Due to pneumonia and UTI   Continue unasyn and doxycycline     IVF discontinued  Lactic acidosis resolved     Cerebrovascular accident due to bilateral embolism of carotid arteries     MRI 11/7 with multifocal areas of subacute ischemia   CT with old CVAs   Continuing ASA and eliqus    Neurology following   CTA with small fenestration at the base of the basilar artery, although a small focus of thrombus is not entirely excluded   Palliative consulted  JT to r/o endocarditis as cause for embolic CVA        S/P percutaneous endoscopic gastrostomy tube placement   Continue tube feedings:  Nutrition managing      Pneumonia              Recent COVID requiring vent care Continuing unasyn to cover aspiration               Continue doxy              No SOB or cough     Acute UTI   abx as above         Urine culture with yeast and staph species, coagulase   Negative     Acute renal failure   resolved:  Monitor   Continue strict I+O   IVF discontinued        Hypothyroidism   Elevated TSH, FT4 WNL  Home meds      RA    aware      Gastroesophageal reflux disease without esophagitis  Pepcid     Complete heart block with pacemaker    Cards on board      IDDM II   continue SSI    DM management on board, appreciate    Adjusting lantus                    Notes, labs, VS, diagnostic testing reviewed      DVT Prophylaxis: eliquis  Plan of Care Discussed with: Supervising MD Dr. Heidy Flores, care team       Bharat Felder NP

## 2020-11-11 NOTE — PROGRESS NOTES
Comprehensive Nutrition Assessment    Type and Reason for Visit: Reassess  Tube Feeding Management (Hospitalist)     Nutrition Recommendations/Plan:   Enteral Nutrition  · Continue current bolus feedings for primary needs. · Osmolite 1.2 bolus via PEG one carton times 5 per day. · 75ml water flush before and after each bolus feeding.     · If a bolus is missed can be given as 1.5 cartons at the next two feedings to receive total kcal and protein scheduled. Vitamin and Mineral Supplement Therapy:  Nutrition support orders for electrolyte management replacement active on MAR. No replacements indicated today. Once pt with more substantial BM may benefit from transition to baseline fiber containing formula. Malnutrition Assessment:  Malnutrition Status: At risk for malnutrition (specify)  Context: Chronic illness  Findings of clinical characteristics of malnutrition:   Energy Intake:  (Nocturnal TF at facility provided ~50% needs)  Weight Loss:  Unable to assess(current wt is not from validated source and bed scale is not functioning)    Body Fat Loss:  No significant body fat loss,     Muscle Mass Loss:  1 - Mild muscle mass loss, Calf (gastrocnemius), Hand (interosseous)  Fluid Accumulation:  Unable to assess,     Strength:  Not performed     Nutrition Assesment:   Nutrition History: Nutrition regimen per facility records Jevity 1.5 patricia @ 60 ml/hr 8p-5a, 100 ml x 4/day oral intake per SLP during the day of unknown nutritional signifigance      Nutrition Background: Admitted with sepsis, ARF, UTI, dehydration, PNA, septic encephalopathy. PMH remarkable for breast ca, HTN, hypothyroidism, DM, CVAs/TIAs, vascular dementia, RA, Covid 8/20, PEG placed 10/14/2020. MRI on 11/7 confirmed strokes in multiple territories. JT pending. Daily Note: Pt sleeping at RD visit, only briefly arouses.   TF held 11/10 for possible JT, she received only 2 boluses (40% goal), this am 6 am feeding was held for potential JT. 9 am feeding was given today. Nutrition Related Findings:   Wound Type: Stage III(sacral per WC)  Abdominal Status (last documented): Semi-soft(PEG) abdomen with Active  bowel sounds. Last BM (PTA). RN reports + small hard BM yesterday    Pertinent Medications: pepcid, lantus (held last pm), SSI, miralax (held 11/10, 2 doses thus far)  Pertinent Labs: Na 146, Cl 111, Glu 146, BUN 25, Phos 5 (note 11/10 labs were unremarkable). Current labs are similar to presentation of dehydration on admission potentially associated with TF being held therefore will not make any changes to TF regimen today. Current Nutrition Therapies:  DIET TUBE FEEDING Osmolite 1.2 bolus 1 carton times 5 per day. Water flush 75 ml before and after each bolus. Suggested feeding times 6 am, 9a, 12p, 6p and 9p. Current Tube Feeding (TF) Orders:   · Feeding Route: PEG  · Formula: Osmolite 1.2   · Schedule: Bolus    · Regimen: 1 carton x 5 daily  · Water Flushes: 75 ml before and after each bolus  · Current TF & Flush Orders Provides: Resumed today  · Goal TF & Flush Orders Provides: 1425 kcal (100% estimated calorie needs), 66 grams protein (100% estimated protein needs) and ~1645 ml free fluid (25ml/kg current body)      Current Intake: Average Meal Intake: NPO Average Supplement Intake: NPO      Anthropometric Measures:  Height: 5' 1\" (154.9 cm)  Current Body Wt: 70.3 kg (154 lb 15.7 oz)(11/6), Weight source: Not specified  BMI: 26.6, Overweight (BMI 25.0-29. 9)  Admission Body Weight: 145 lb 1 oz(no source)  Ideal Body Wt: 105 lbs  138.2 %  Usual Body Wt: 65.8 kg (145 lb)(per EMR review weight at [facility] office visit 7/2020), Percent weight change: 0          Estimated Daily Nutrient Needs:  Energy (kcal): 9588-7830(45-21) (Kcal/kg, Weight Used: Ideal(ISRAEL))  Protein (g): 58-72(1.2-1.5 g/kg) Weight Used: (Ideal)  Fluid (ml/day): 1645 ml (ml/kg)    Nutrition Diagnosis:   · Inadequate oral intake related to swallowing difficulty as evidenced by (NPO per SLP, PEG for primary needs)    Nutrition Interventions:   Food and/or Nutrient Delivery: Continue tube feeding     Coordination of Nutrition Care: Continue to monitor while inpatient   Plan of Care discussed with Gopi Chávez RN.        Goals: Previous Goal Met: Progress towards goal(s) declining(TF held for procedure)  Tolerate TF to meet estimated needs    Nutrition Monitoring and Evaluation:      Food/Nutrient Intake Outcomes: Enteral nutrition intake/tolerance  Physical Signs/Symptoms Outcomes: Biochemical data, GI status    Discharge Planning:    Enteral nutrition    Phi Bustamante MA, RD, LDN on 11/11/2020 at 12:08 PM  Contact: [phone]

## 2020-11-12 NOTE — PROGRESS NOTES
Problem: Self Care Deficits Care Plan (Adult)  Goal: *Acute Goals and Plan of Care (Insert Text)  Outcome: Progressing Towards Goal  Note: 1. Patient will complete upper body bathing and dressing with moderate assistance and adaptive equipment as needed. 2. Patient will complete simple grooming tasks with moderate assistance to improve independence with self-care. 3. Patient will complete sitting edge of bed with moderate assistance for static sitting balance to demonstrate improved trunk strength/control for seated ADL. 4. Patient will complete bed mobility with moderate assistance for hygiene and positioning. 5. Patient will participate in 8 minutes of therapeutic exercises to improve strength of B UE for ADL/functional transfers. Timeframe: 7 visits       OCCUPATIONAL THERAPY: Daily Note and PM    11/12/2020  INPATIENT: OT Visit Days: 2  [place] / [place] / Product Type: Medicare /      NAME/AGE/GENDER: Bryan Mcmahon is a 72 y.o. female   PRIMARY DIAGNOSIS:  Severe sepsis with acute organ dysfunction ([facility] [address] [A41.9, R65.20]  Acute renal failure (ARF) (HCC) [N17.9] Severe sepsis with acute organ dysfunction (HCC) Severe sepsis with acute organ dysfunction ([facility] [address]       ICD-10: Treatment Diagnosis:    · Generalized Muscle Weakness (M62.81)  · Other lack of cordination (R27.8)  · Abnormal posture (R29.3)   Precautions/Allergies: Other food; Bee sting [sting, bee]; Invokana [canagliflozin]; and Other plant, animal, environmental      ASSESSMENT:     Ms. Fior Murdock presents to the hospital from [facility] with severe sepsis with acute organ dysfunction. Pt's hx is significant for Covid in August with pt requiring intubation and prolonged hospital stay. 11/12/2020 Pt required total assist with all mobility and with poor balance throughout treatment session. Pt required total assist with grooming. No progress this session.         At this time, patient is appropriate for Co-treatment with physical  therapy due to patient's clinical complexity, decreased overall endurance/tolerance levels, as well as need for high level assistance and cues and intervention to complete functional transfers/mobility and functional tasks in safe manner. Randel Blizzard Sanfilippo is appropriate for a multidisciplinary co-treatment of PT and OT to address goals of both disciplines. This section established at most recent assessment   PROBLEM LIST (Impairments causing functional limitations):  1. Decreased Strength  2. Decreased ADL/Functional Activities  3. Decreased Transfer Abilities  4. Decreased Ambulation Ability/Technique  5. Decreased Balance  6. Increased Pain  7. Decreased Activity Tolerance  8. Increased Fatigue  9. Increased Shortness of Breath  10. Decreased Flexibility/Joint Mobility  11. Edema/Girth  12. Decreased Knowledge of Precautions  13. Decreased Skin Integrity/Hygeine  14. Decreased [employer] with Home Exercise Program  15. Decreased Cognition   INTERVENTIONS PLANNED: (Benefits and precautions of occupational therapy have been discussed with the patient.)  1. Activities of daily living training  2. Adaptive equipment training  3. Balance training  4. Clothing management  5. Cognitive training  6. Donning&doffing training  7. Hygiene training  8. Neuromuscular re-eduation  9. Therapeutic activity  10. Therapeutic exercise     TREATMENT PLAN: Frequency/Duration: Follow patient 3 times per week to address above goals. Rehabilitation Potential For Stated Goals: [address] [facility] (at time of discharge pending progress):    Placement: It is my opinion, based on this patient's performance to date, that Ms. Ugarte may benefit from intensive therapy at a [address] [place] [address] after discharge due to the functional deficits listed above that are likely to improve with skilled rehabilitation and concerns that he/she may be unsafe to be unsupervised at home due to risk for falls and further functional decline . Equipment:    TBD              OCCUPATIONAL PROFILE AND HISTORY:   History of Present Injury/Illness (Reason for Referral):  See H&P  Past Medical History/Comorbidities:   Ms. Cyrus Wilder  has a past medical history of Actinic keratosis, Arthritis, Asthma, Breast cancer ([address] (2019), Cancer ([address] Cerebrovascular accident (CVA) due to bilateral embolism of carotid arteries ([address] (11/8/2020), DJD (degenerative joint disease), Environmental allergies, Environmental and seasonal allergies (7/25/2019), GERD (gastroesophageal reflux disease), History of malignant melanoma (1980's), History of TIA (transient ischemic attack), Hypertension, Hypothyroidism, Insulin dependent diabetes mellitus (1990's), Kidney stones, Mobitz type 1 second degree AV block, Obese, Osteoarthritis (12/22/2016), Osteopenia, PONV (postoperative nausea and vomiting), RA (rheumatoid arthritis) ([address] (dx--2013), Rheumatoid arthritis involving right knee with negative rheumatoid factor ([address] (5/16/2019), Rosacea, S/P total knee arthroplasty (12/23/2016), Stroke ([address] Unspecified hypothyroidism, and UTI (urinary tract infection). She also has no past medical history of Adverse effect of anesthesia, Aneurysm ([address] CAD (coronary artery disease), Chronic kidney disease, Chronic obstructive pulmonary disease (HCC), Chronic pain, Coagulation disorder ([address] Difficult intubation, Endocarditis, Heart failure ([address] Liver disease, Malignant hyperthermia due to anesthesia, Nicotine vapor product user, Non-nicotine vapor product user, Pseudocholinesterase deficiency, Psychiatric disorder, PUD (peptic ulcer disease), Rheumatic fever, Seizures ([address] or Sleep apnea.   Ms. Cyrus Wilder  has a past surgical history that includes hx refractive surgery (2002); hx meniscectomy (Right, 2011); hx colonoscopy (2008; 2010); hx mohs procedure (Right, 2004); hx lap cholecystectomy (1993); pr abdomen surgery proc unlisted; hx dilation and curettage (); hx  section (); hx tubal ligation (); hx gyn (); hx other surgical (); hx other surgical (); hx knee arthroscopy (Right, ); hx knee replacement (Left, ); hx knee replacement (Right, ); hx heent (); fragment kidney stone/ eswl; hx lithotripsy (, ; 2016; 18); hx breast biopsy (Left, 2019); and hx breast lumpectomy (Left, 2019). Social History/Living Environment:   Home Environment: [address] [place] [address] Name: [facility]  One/Two Story Residence: One story  Living Alone: No  Support Systems: Spouse/Significant Other/Partner, Skilled nursing facility  Patient Expects to be Discharged to[de-identified] Skilled nursing facility  Current DME Used/Available at Home: Wheelchair  Tub or Shower Type: Shower  Prior Level of Function/Work/Activity:  Pt's spouse arrived to the room today during session and reports that prior to August pt was living at home. Pt was able to scoot/pivot to a wheelchair and was getting assistance with ADL and transfers from her spouse. Pt has had a significant decline since then with multiple hospitalizations. Personal Factors:          Past/Current Experience: multiple hospitalizations        Overall Behavior:  AMS; lethargic; not verbalizing         Other factors that influence how disability is experienced by the patient:  multiple co-morbidities    Number of Personal Factors/Comorbidities that affect the Plan of Care: Extensive review of physical, cognitive, and psychosocial performance (3+):  HIGH COMPLEXITY   ASSESSMENT OF OCCUPATIONAL PERFORMANCE[de-identified]   Activities of Daily Living:   Basic ADLs (From Assessment) Complex ADLs (From Assessment)   Feeding: Total assistance  Oral Facial Hygiene/Grooming: Total assistance  Bathing: Total assistance  Upper Body Dressing: Total assistance  Lower Body Dressing: Total assistance  Toileting:  Total assistance Instrumental ADL  Meal Preparation: Total assistance  Homemaking: Total assistance   Grooming/Bathing/Dressing Activities of Daily Living   Grooming  Brushing/Combing Hair: Total assistance (dependent)                         Bed/Mat Mobility  Rolling: Maximum assistance; Total assistance;Assist x2  Supine to Sit: Total assistance;Assist x2  Sit to Supine: Total assistance;Assist x2  Scooting: Total assistance;Assist x2     Most Recent Physical Functioning:   Gross Assessment:                  Posture:  Posture (WDL): Exceptions to WDL  Posture Assessment: Forward head, Rounded shoulders  Balance:  Sitting: Impaired  Sitting - Static: Poor (constant support)  Sitting - Dynamic: Poor (constant support) Bed Mobility:  Rolling: Maximum assistance; Total assistance;Assist x2  Supine to Sit: Total assistance;Assist x2  Sit to Supine: Total assistance;Assist x2  Scooting: Total assistance;Assist x2  Wheelchair Mobility:     Transfers:               Patient Vitals for the past 6 hrs:   BP BP Patient Position SpO2 Pulse   11/12/20 1121 100/74 At rest 94 % 78   11/12/20 1459 99/64 At rest 98 % 85       Mental Status  Neurologic State: Confused, Drowsy  Orientation Level: Unable to verbalize  Cognition: Decreased attention/concentration  Perception: Cues to maintain midline in sitting  Perseveration: No perseveration noted  Safety/Judgement: Fall prevention                          Physical Skills Involved:  1. Range of Motion  2. Balance  3. Strength  4. Activity Tolerance  5. Sensation  6. Fine Motor Control  7. Gross Motor Control  8. Pain (Chronic)  9. Edema  10. Skin Integrity Cognitive Skills Affected (resulting in the inability to perform in a timely and safe manner):  1. Perception  2. Executive Function  3. Sustained Attention  4. Comprehension  5. Expression Psychosocial Skills Affected:  1. Habits/Routines  2. Environmental Adaptation  3. Social Interaction  4.  Self-Awareness   Number of elements that affect the Plan of Care: 5+:  HIGH COMPLEXITY   CLINICAL DECISION MAKIN[address] [place] [address] AM-PAC 6 Clicks   Daily Activity Inpatient Short Form  How much help from another person does the patient currently need. .. Total A Lot A Little None   1. Putting on and taking off regular lower body clothing? [x] 1   [] 2   [] 3   [] 4   2. Bathing (including washing, rinsing, drying)? [x] 1   [] 2   [] 3   [] 4   3. Toileting, which includes using toilet, bedpan or urinal?   [x] 1   [] 2   [] 3   [] 4   4. Putting on and taking off regular upper body clothing? [x] 1   [] 2   [] 3   [] 4   5. Taking care of personal grooming such as brushing teeth? [x] 1   [] 2   [] 3   [] 4   6. Eating meals? [x] 1   [] 2   [] 3   [] 4   © , Trustees of [address] [place] [address], under license to [employer]. All rights reserved      Score:  Initial:6 Most Recent: X (Date: -- )    Interpretation of Tool:  Represents activities that are increasingly more difficult (i.e. Bed mobility, Transfers, Gait). Medical Necessity:     · Patient demonstrates   · good and fair  ·  rehab potential due to higher previous functional level. Reason for Services/Other Comments:  · Patient continues to require skilled intervention due to   · Decreased independence with ADL/functional transfers  · . Use of outcome tool(s) and clinical judgement create a POC that gives a: HIGH COMPLEXITY         TREATMENT:   (In addition to Assessment/Re-Assessment sessions the following treatments were rendered)     Pre-treatment Symptoms/Complaints:    Pain: Initial:   Pain Intensity 1: 0  Post Session:  0     Today's treatment session addressed Decreased Strength, Decreased ADL/Functional Activities, Decreased Transfer Abilities, Decreased Ambulation Ability/Technique, Decreased Balance, Increased Pain, Decreased Activity Tolerance, Increased Fatigue, Increased Shortness of Breath, Decreased Flexibility/Joint Mobility, and Decreased Cognition to progress towards achieving goal(s) stated above.  During this session,  Physical Therapy addressed   therapeutic exercise  to progress towards their discipline specific goal(s). Co-treatment was necessary to improve patient's ability to increase activity demands and ability to return to normal functional activity. Self Care: (16 minutes): Procedure(s) (per grid) utilized to improve and/or restore self-care/home management as related to grooming. Required total assist to facilitate activities of daily living skills. Neuromuscular Re-education: (23 minutes):  Exercise/activities per grid below to improve balance, coordination, and posture. Required  total visual, verbal, manual, and tactile cues to promote static balance in sitting and promote coordination of bilateral, upper extremity(s). Re-Education Training  Re-Education Training: Yes    Trunk Re-Education  Other Activities: Sitting balance; postural alignment    Braces/Orthotics/Lines/Etc:   · IV  · villalba catheter  · O2 Device: Nasal cannula  Treatment/Session Assessment:    · Response to Treatment:  Pt tolerated it with fatigue and lethargy. · Interdisciplinary Collaboration:   o Physical Therapist  o Occupational Therapist  o Registered Nurse  · After treatment position/precautions:   o Supine in bed  o Bed/Chair-wheels locked  o Call light within reach  o RN notified   · Compliance with Program/Exercises: Will assess as treatment progresses. · Recommendations/Intent for next treatment session: \"Next visit will focus on advancements to more challenging activities and reduction in assistance provided\".   Total Treatment Duration:  OT Patient Time In/Time Out  Time In: 1450  Time Out: [address] Cyrus Lobo

## 2020-11-12 NOTE — PROGRESS NOTES
Problem: Communication Impaired (Adult)  Goal: *Acute Goals and Plan of Care (Insert Text)  Outcome: Progressing Towards Goal  Note: LTG: Patient will increase receptive/expressive language skills demonstrated by the ability to communicate basic wants/needs across environments   STG: Patient will answer yes/no questions with 80% accuracy given minimal cueing  STG: Patient will follow 1 step commands with 100% accuracy given minimal cueing  STG: Patient will complete automatic naming tasks with 80% accuracy given moderate cueing  STG: Patient will complete responsive naming tasks with 80% accuracy given moderate cueing   STG: Patient will repeat at word level with 70% intelligibility given moderate cueing for increased vocal intensity   STG: Patient will participate in ongoing therapeutic assessment of language abilities    SPEECH LANGUAGE PATHOLOGY: SPEECH-LANGUAGE/COGNITION: Initial Assessment    NAME/AGE/GENDER: Geo Martines is a 72 y.o. female  DATE: 11/12/2020  PRIMARY DIAGNOSIS: Severe sepsis with acute organ dysfunction (HCC) [A41.9, R65.20]  Acute renal failure (ARF) ([facility] [address] [N17.9]       ICD-10: Treatment Diagnosis: F80.2 Mixed Receptive-Expressive Language Disorder  R49.0 Dysphonia; Hoarseness    RECOMMENDATIONS   TREATMENT RECOMMENDATIONS:    Speech and language treatment     STRATEGIES:    Cues to increase volume      EDUCATION:  · Recommendations discussed with RN, patient, patient's , and notified NP via perfectserve      Continuation of Skilled Services/Medical Necessity:   Patient is expected to demonstrate progress in  speech/language  to decrease assistance required communication, increase independence with activities of daily living, and increase communication with family/caregivers.  Patient continues to require skilled intervention due to speech and language impairments.      RECOMMENDATIONS for CONTINUED SPEECH THERAPY: YES: Anticipate need for ongoing speech therapy during this hospitalization and at next level of care. ASSESSMENT   Patient presents with impaired vocal quality with reduced intensity making speech significantly unintelligible. Patient's very basic language including 1 step commands, yes/no questions, responsive, and confrontational naming functional. Speech extremely soft, at times just mouthing, and difficulty increasing intensity with explicit cues and model, with subsequent wet vocal quality and increased congestion. Ongoing therapeutic evaluation of language warranted as session limited this date. Recommend ongoing speech therapy to improve voice, speech intelligibility, and functional communication as patient is functioning significantly below baseline. History of significant dysphagia with PEG and puree/honey thick liquids upon admission, therefore dysphagia treatment/po trials also indicated given decline in swallow function. Will follow up as secretion management improves. Discussed need for aggressive oral care and likely deep suctioning to improve pharyngeal secretions with RN. REHABILITATION POTENTIAL FOR STATED GOALS: Good    PLAN    FREQUENCY/DURATION: Continue to follow patient 3 times a week for duration of hospital stay to address above goals. - Recommendations for next treatment session: Next treatment will address speech/language and possibly po trials    SUBJECTIVE   , Kassidy Prabhakars at bedside.      History of Present Injury/Illness: Ms. Sha Wayne  has a past medical history of Actinic keratosis, Arthritis, Asthma, Breast cancer ([facility] [address] (2019), Cancer [facility]), Cerebrovascular accident (CVA) due to bilateral embolism of carotid arteries ([facility] [address] [address] (11/8/2020), DJD (degenerative joint disease), Environmental allergies, Environmental and seasonal allergies (7/25/2019), GERD (gastroesophageal reflux disease), History of malignant melanoma (1980's), History of TIA (transient ischemic attack), Hypertension, Hypothyroidism, Insulin dependent diabetes mellitus (), Kidney stones, Mobitz type 1 second degree AV block, Obese, Osteoarthritis (2016), Osteopenia, PONV (postoperative nausea and vomiting), RA (rheumatoid arthritis) ([address] (--), Rheumatoid arthritis involving right knee with negative rheumatoid factor ([address] (2019), Rosacea, S/P total knee arthroplasty (2016), Stroke ([address] Unspecified hypothyroidism, and UTI (urinary tract infection). She also has no past medical history of Adverse effect of anesthesia, Aneurysm ([address] CAD (coronary artery disease), Chronic kidney disease, Chronic obstructive pulmonary disease (HCC), Chronic pain, Coagulation disorder ([address] Difficult intubation, Endocarditis, Heart failure ([address] Liver disease, Malignant hyperthermia due to anesthesia, Nicotine vapor product user, Non-nicotine vapor product user, Pseudocholinesterase deficiency, Psychiatric disorder, PUD (peptic ulcer disease), Rheumatic fever, Seizures ([address] or Sleep apnea. . She also  has a past surgical history that includes hx refractive surgery (); hx meniscectomy (Right, ); hx colonoscopy (; ); hx mohs procedure (Right, ); hx lap cholecystectomy (); pr abdomen surgery proc unlisted; hx dilation and curettage (); hx  section (); hx tubal ligation (); hx gyn (); hx other surgical (); hx other surgical (); hx knee arthroscopy (Right, ); hx knee replacement (Left, ); hx knee replacement (Right, ); hx heent (); fragment kidney stone/ eswl; hx lithotripsy (, ; 2016; 18); hx breast biopsy (Left, 2019); and hx breast lumpectomy (Left, 2019). Problem List:  (Impairments causing functional limitations): 1. voice impairment   2. Language  3.  Dysphagia     Orientation:   Person    Pain: Pain Scale 1: FLACC  Pain Intensity 1: 0    OBJECTIVE   Patient seen for initial speech/language evaluation:   Vocal quality: dysphonia; low volume significantly reduced intensity with wet vocal quality upon vocalization   -weak/ineffective throat clear attempts with max cues; unable to elicit cough on command. 1 step commands: 4/5; 5/5 with repetition   Confrontational namin/5  Responsive namin/5 independently; 5/5 in yes/no format   Automatic speech: counting 1-10 ~40% with patient intermittently mouthing every other number   Increased audible congestion noted and significantly weak throat clear attempts as patient engaging in expressive language tasks. Tool Used: MODIFIED GRAEME SCALE (mRS)   Score   No Symptoms  [] 0   No significant disability despite symptoms; able to carry out all usual duties and activities  [] 1   Slight disability; unable to carry out all previous activities but able to look after own affairs without assistance. [] 2   Moderate disability; requiring some help but able to walk without assistance  [] 3   Moderately severe disability; unable to walk without assistance and unable to attend to own bodily needs without assistance  [x] 4   Severe disability; bedridden, incontinent, and requiring constant nursing care and attention  [] 5      Score:  Initial: 4    Interpretation of Tool: The Modified [place] Scale is a 7-point scaled used to quantify level of disability as it relates to a patient's functional abilities. Current Medications:   No current facility-administered medications on file prior to encounter. Current Outpatient Medications on File Prior to Encounter   Medication Sig Dispense Refill    apixaban (ELIQUIS PO) Take  by mouth. Unknown dose      enoxaparin (Lovenox) 80 mg/0.8 mL injection by SubCUTAneous route.  glucose blood VI test strips (blood glucose test) strip Use as directed- check sugars 3 times a day  Dx- Diabetes E11.9 100 Strip 3    levothyroxine (SYNTHROID) 112 mcg tablet Take 1 Tab by mouth Daily (before breakfast).  30 Tab 1    metFORMIN (GLUCOPHAGE) 1,000 mg tablet Take 1 Tab by mouth two (2) times daily (with meals). 180 Tab 1    lisinopriL (PRINIVIL, ZESTRIL) 10 mg tablet Take 1 Tab by mouth daily. 90 Tab 1    pantoprazole (PROTONIX) 40 mg tablet TAKE ONE TABLET BY MOUTH ONCE DAILY. 90 Tab 1    famotidine (PEPCID) 40 mg tablet Take 1 Tab by mouth two (2) times a day. 180 Tab 1    atorvastatin (LIPITOR) 40 mg tablet Take 1 Tab by mouth daily. 90 Tab 1    mirabegron ER (Myrbetriq) 50 mg ER tablet Take 1 Tab by mouth daily. 90 Tab 1    clopidogreL (PLAVIX) 75 mg tab TAKE ONE TABLET BY MOUTH ONCE DAILY. 90 Tab 1    magnesium oxide (MAG-OX) 400 mg tablet Take 1 Tab by mouth daily. 90 Tab 1    loratadine (Claritin) 10 mg tablet Take 1 Tab by mouth daily. 90 Tab 1    glipiZIDE (GLUCOTROL) 10 mg tablet TAKE ONE TABLET BY MOUTH TWICE DAILY  Indications: type 2 diabetes mellitus 180 Tab 1    aspirin delayed-release 81 mg tablet Take  by mouth daily.  ondansetron (ZOFRAN ODT) 8 mg disintegrating tablet Take 1 Tab by mouth every eight (8) hours as needed for Nausea. 10 Tab 0    anastrozole (Arimidex) 1 mg tablet Take 1 mg by mouth daily. 30 Tab 5    insulin detemir U-100 (LEVEMIR FLEXTOUCH U-100 INSULN) 100 unit/mL (3 mL) inpn INJECT UNDER THE SKIN AS DIRECTED 10-15 UNITS IN THE MORNING AND 60 UNITS AT BEDTIME-- MAX IS 75U IN 24HR 30 mL 2    fluticasone propionate (FLONASE ALLERGY RELIEF) 50 mcg/actuation nasal spray 2 Sprays by Both Nostrils route daily. 3 Bottle 1    NOVOFINE 32 32 gauge x 1/4\" ndle Use  Pen Needle 3    calcium carbonate (TUMS) 200 mg calcium (500 mg) chew Take 1 Tab by mouth as needed. Indications: heartburn      krill/om-3/dha/epa/phospho/ast (MEGARED OMEGA-3 KRILL OIL PO) Take 1 Cap by mouth daily.  cholecalciferol (VITAMIN D3) 1,000 unit cap Take 1,000 Units by mouth daily. Indications: Prevention of Vitamin D Deficiency      GLUCOSAMINE HCL/CHONDR MERAZ A NA (OSTEO BI-FLEX PO) Take 1 Tab by mouth two (2) times a day.       calcium-cholecalciferol, d3, (CALCIUM 600 + D) 600-125 mg-unit tab Take 1 Tab by mouth two (2) times a day. Indications: post-menopausal osteoporosis prevention         INTERDISCIPLINARY COLLABORATION: Registered Nurse  PRECAUTIONS/ALLERGIES: Other food; Bee sting [sting, bee];  Invokana [canagliflozin]; and Other plant, animal, environmental     SAFETY:  After treatment position/precautions:  · Upright in bed  · RN notified  · Patient's  at bedside  · Call light within reach    Total Treatment Duration:     Time In: 8381  Time Out: 1815 [facility] [address] [address] [facility]

## 2020-11-12 NOTE — PROGRESS NOTES
[facility] CARDIOLOGY PROGRESS NOTE           11/12/2020 1:46 PM    Admit Date: 11/4/2020    Subjective:   Stable overnight, unable to obtain subjective history due to aphasia. ROS:  - can not obtain due to inability to speak clearly     Objective:      Vitals:    11/12/20 0015 11/12/20 0414 11/12/20 0714 11/12/20 1121   BP: 99/63 98/65 (!) 111/55 100/74   Pulse: 73 68 79 78   Resp: 18 18 18 18   Temp: 98.9 °F (37.2 °C) 98.1 °F (36.7 °C) 98.2 °F (36.8 °C) 98.1 °F (36.7 °C)   SpO2: 97% 98% 95% 94%   Weight:       Height:         Physical Exam:  General-No Acute Distress  Neck- supple, no JVD  CV- regular rate and rhythm no MRG  Lung- clear bilaterally  Abd- soft, nontender, nondistended  Ext- no edema bilaterally in legs   Skin- warm and dry    Data Review:   Recent Labs     11/12/20  0726 11/11/20  0629    146*   K 3.6 4.1   MG  --  2.0   BUN 25* 25*   CREA 0.57* 0.67   * 146*   WBC 7.6 9.0   HGB 8.5* 9.1*   HCT 29.0* 30.0*    280       Assessment/Plan:     Principal Problem:    Severe sepsis with acute organ dysfunction (HCC) (11/4/2020)    - per primary    - blood cultures negative , low risk for endocarditis     Active Problems:    Cerebrovascular accident (CVA) (11/8/2020)    - uncelar etiology, no a fib on interrogation    - on Eliquis at home    - concern for embolic phenomenon from neurology, unfortunately not a candidate for JT, cardiac CT would require patient to hold breath as directed to be diagnostic which it does not seem she is able to do at this time.      - further care per neurology with medical therapy       Complete heart block ([address] (3/28/2018)    - pacer indwelling, hemodynamics stable       Type 2 diabetes mellitus without complication, with long-term current use of insulin ([address] (5/16/2019)    - per primary       Malignant neoplasm of lower-outer quadrant of left breast of female, estrogen receptor positive ([address] (7/1/2019)    - per onc      Mixed hyperlipidemia (7/7/2020)    - statin      S/P percutaneous endoscopic gastrostomy (PEG) tube placement ([facility] (11/4/2020)    - dysphagia that is severe and needing PEG contraindicates TJ    Discussed with neurology, will be on standby.          Roxy Seymour DO  11/12/2020 1:46 PM

## 2020-11-12 NOTE — PROGRESS NOTES
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 PMH stroke, COVID in 8/30, DVT (on Eliquis), who is admitted with severe sepsis and multiorgan dysfunction. CTA shows fenestration of basilar artery. TTE was negative for source of embolus. PPM was interrogated with no evidence of atrial arrhythmia. Given history, endocarditis should be ruled out as cause of stroke. Cardiology consulted to consider JT, do not feel this is indicated currently. Plan:     · Continue Eliquis  · Continue statin   · Neurochecks Q4H  · Cardiology consulted for JT  · Telemetry  · PT/OT/ST  · DVT prophylaxis   · BP management - normotensive, with long-term goal <130/80  · Smoking cessation if applicable   · Diabetes education if applicable   · Depression Screening prior to discharge      Subjective: Interval history:    Remains unchanged clinically. Lethargic. Able to follow commands. Left hemiplegia. Expressive aphasia. History:    Shweta Paul is a 72 y.o. female who is being seen for stroke. Unable to obtain ROS due to AMS.        Objective:     Vitals:    11/11/20 2026 11/12/20 0015 11/12/20 0414 11/12/20 0714   BP: (!) 98/59 99/63 98/65 (!) 111/55   Pulse: 88 73 68 79   Resp: 20 18 18 18   Temp: 99.2 °F (37.3 °C) 98.9 °F (37.2 °C) 98.1 °F (36.7 °C) 98.2 °F (36.8 °C)   SpO2: 97% 97% 98% 95%   Weight:       Height:              Current Facility-Administered Medications:     ampicillin-sulbactam (UNASYN) 3 g in 0.9% sodium chloride (MBP/ADV) 100 mL MBP, 3 g, IntraVENous, Q6H, Waylon Steel E, DO, Last Rate: 200 mL/hr at 11/12/20 0558, 3 g at 11/12/20 0558    anastrozole (ARIMIDEX) tablet 1 mg, 1 mg, Per J Tube, DAILY, Waylon Steel, DO, 1 mg at 11/12/20 0920    midodrine (PROAMATINE) tablet 5 mg, 5 mg, Per J Tube, TID WITH MEALS, Waylon Steel E, DO, 5 mg at 11/12/20 6815    acetaminophen (TYLENOL) tablet 650 mg, 650 mg, Per J Tube, Q4H PRN, Milvia, Waylon BERNABE, DO    HYDROcodone-acetaminophen ([address]) 5-325 mg per tablet 1 Tab, 1 Tab, Per G Tube, Q6H PRN, Tiki Gr, NP    polyethylene glycol (MIRALAX) packet 17 g, 17 g, Per G Tube, DAILY, Waylon Steel, DO, 17 g at 11/12/20 8114    atorvastatin (LIPITOR) tablet 40 mg, 40 mg, Per J Tube, QHS, Francisco Landsman, Ferris Bernheim, MD, 40 mg at 11/11/20 2200    levothyroxine (SYNTHROID) tablet 112 mcg, 112 mcg, Per J Tube, Tad Waylon Alejo DO, 112 mcg at 11/12/20 0558    NUTRITIONAL SUPPORT ELECTROLYTE PRN ORDERS, , Does Not Apply, PRN, Waylon Steel DO    apixaban (ELIQUIS) tablet 5 mg, 5 mg, Per J Tube, Q12H, Waylon Steel DO, 5 mg at 11/12/20 9864    aspirin chewable tablet 81 mg, 81 mg, Per J Tube, DAILY, Waylon Steel DO, 81 mg at 11/12/20 1426    doxycycline (VIBRAMYCIN) capsule 100 mg, 100 mg, Per J Tube, Q12H, Waylon Steel, DO, 100 mg at 11/12/20 8641    famotidine (PEPCID) 40 mg/5 mL (8 mg/mL) oral suspension 20 mg, 20 mg, Per J Tube, Q24H, Waylon Steel DO, 20 mg at 11/11/20 1214    sodium chloride (NS) flush 5-40 mL, 5-40 mL, IntraVENous, Q8H, Waylon Steel, DO, 10 mL at 11/12/20 0558    sodium chloride (NS) flush 5-40 mL, 5-40 mL, IntraVENous, PRN, Waylno Steel,     insulin lispro (HUMALOG) injection, , SubCUTAneous, AC&HS, Waylon Steel, DO, 4 Units at 11/12/20 0816    insulin glargine (LANTUS) injection 30 Units, 30 Units, SubCUTAneous, QHS, Waylon Steel, DO, 30 Units at 11/11/20 2116    Recent Results (from the past 12 hour(s))   GLUCOSE, POC    Collection Time: 11/12/20  7:16 AM   Result Value Ref Range    Glucose (POC) 246 (H) 65 - 100 mg/dL   RENAL FUNCTION PANEL    Collection Time: 11/12/20  7:26 AM   Result Value Ref Range    Sodium 144 136 - 145 mmol/L    Potassium 3.6 3.5 - 5.1 mmol/L    Chloride 110 (H) 98 - 107 mmol/L    CO2 30 21 - 32 mmol/L    Anion gap 4 (L) 7 - 16 mmol/L    Glucose 246 (H) 65 - 100 mg/dL BUN 25 (H) 8 - 23 MG/DL    Creatinine 0.57 (L) 0.6 - 1.0 MG/DL    GFR est AA >60 >60 ml/min/1.73m2    GFR est non-AA >60 >60 ml/min/1.73m2    Calcium 8.3 8.3 - 10.4 MG/DL    Phosphorus 3.0 2.3 - 3.7 MG/DL    Albumin 1.6 (L) 3.2 - 4.6 g/dL   CBC W/O DIFF    Collection Time: 11/12/20  7:26 AM   Result Value Ref Range    WBC 7.6 4.3 - 11.1 K/uL    RBC 3.14 (L) 4.05 - 5.2 M/uL    HGB 8.5 (L) 11.7 - 15.4 g/dL    HCT 29.0 (L) 35.8 - 46.3 %    MCV 92.4 79.6 - 97.8 FL    MCH 27.1 26.1 - 32.9 PG    MCHC 29.3 (L) 31.4 - 35.0 g/dL    RDW 17.3 (H) 11.9 - 14.6 %    PLATELET 808 594 - 892 K/uL    MPV 11.4 9.4 - 12.3 FL    ABSOLUTE NRBC 0.00 0.0 - 0.2 K/uL     CT Results (most recent):  Results from Hospital Encounter encounter on 11/04/20   CTA HEAD NECK W WO CONT    Narrative Title:  CT arteriogram of the neck and head. Indication: Multiple strokes. Right MCA syndrome. .    Technique: Axial images of the neck and head were obtained after the uneventful   administration of intravenous iodinated contrast media. Contrast was used to  best identify the arterial structures. Images were reviewed on a separate, free  standing, three-dimensional workstation as per the referring physicians request.       All stenosis percentages derived by comparing the narrowest segment with the  distal Internal Carotid Artery luminal diameter, as described in the [place]  American Symptomatic Carotid Endarterectomy Trial (NASCET) criteria. All CT scans at this facility are performed using dose reduction/dose modulation  techniques, as appropriate the performed exam, including the following:   Automated Exposure Control; Adjustment of the mA and/or kV according to patient  size (this includes techniques or standardized protocols for targeted exams  where dose is matched to indication/reason for exam); and Use of Iterative  Reconstruction Technique. Comparison: MRI of the brain November 7, 2020.     Findings:     Lungs: Patchy areas of groundglass opacity suggest multifocal infiltrate. A  viral pneumonia is not excluded. Soft Tissues: Normal  Cervical Spine: Degenerative changes  Aorta: Conventional 3 vessel arch  Great Vessels: Patent and tortuous    Right ICA: Patent  % Stenosis: Less than 25  Right MCA: Patent  Right BURAK: Patent    Left ICA: Mild plaque at the bifurcation.  % Stenosis: Less than 25  Left MCA: Patent  Left BURAK: Patent    Right Vertebral: Patent  Left Vertebral: Patent  Dominance: Left  Basilar: Probable Small fenestration at the base of the artery. The remainder of  the vessel appears patent. Right PCA: Patent fetal origin. Left PCA: Patent    Other Vascular: Negative        Impression Impression:   1. There is likely a small fenestration at the base of the basilar artery,  although a small focus of thrombus is not entirely excluded. 2. No evidence of large vessel occlusion. 3. No significant proximal ICA stenosis. 4. Patchy bilateral lung infiltrates. MRI Results (most recent):  Results from [place] encounter on 11/04/20   MRI BRAIN WO CONT    Narrative Clinical History: The Female patient is 72years old  presenting with symptoms  of Rightward gaze, left hemiplegia, RMCA syndrome. Comparison:  Head CT 11/7/2020, brain MRI 7/31/2019    Technique:  Axial T2, axial FLAIR, axial diffusion-weighted,  sagittal T1 and  coronal gradient-echo scans were performed. Findings: There are multiple foci of subacute ischemia most prominently involving the left  caudate head and anterior margin of the left putamen, however also the medial  right temporal lobe/hippocampus, and anterolateral right radhika and left brachium  pontis extending into the radhika. There is otherwise age-related cortical involution with moderate confluent  chronic periventricular white matter disease.  Focal encephalomalacic changes are  seen involving the anterior margin of the left external capsule, with lacunae  throughout the basal ganglia and thalami as well as corona radiata and centrum  semiovale. There are also chronic ischemic changes involving both cerebellar  hemispheres particularly the middle cerebellar peduncles. Chronic ischemic  changes are also seen throughout the peripheral radhika. There are no abnormal extra-axial fluid collections. No evidence of mass or mass  effect is seen. Expected flow voids are maintained in the major intracranial  vessels. There is no evidence of Chiari malformation. The ventricular system and CSF containing spaces are unremarkable in appearance. Visualized extracranial soft tissues are unremarkable. There is chronic moderate right maxillary sinus mucosal inflammatory disease. Impression Impression:    1. Multifocal areas of subacute ischemia as described above with otherwise  chronic progressive microvascular disease. SIGNIFICANT RESULT:  The significant findings in this report have been referred to the Imaging  Navigator in order to communicate to the referring provider or his/her designee  as outlined in Section II.C.2.a.ii-iii of the ACR  Practice Guideline for Communication of Diagnostic Imaging Findings. CPT code(s) 41207             Results for orders placed or performed during the hospital encounter of 20   2D ECHO COMPLETE ADULT (TTE) W OR [address]  [phone]    Transthoracic Echocardiogram  2D, M-mode, Doppler, and Color Doppler    Patient: Brooke Cool  MR #: [id number]  : 1955  Age: 72 years  Gender: Female  Study date: 2020  Account #: [de-identified]  Height: 64 in  Weight: 154.7 lb  BSA: 1.76 mï¾²  Status:Routine  Location: [facility]  BP: 104/ 64    Allergies: OTHER FOOD, STING, BEE, CANAGLIFLOZIN, OTHER PLANT, ANIMAL,  ENVIRONMENTAL    Sonographer:  Mino Copeland [facility]  Group:  [facility]  Referring Physician:  Claudio Adams.  Sang Ennis, MD  Reading Physician:  Jose Miguel Finnegan. MD [place] [facility]    INDICATIONS: Multiple embolic appearing strokes while on [address] PROCEDURE: This was a routine study. A transthoracic echocardiogram was  performed. The study included complete 2D imaging, M-mode, complete spectral  Doppler, and color Doppler. Intravenous contrast (agitated saline) was  administered. Intravenous contrast (agitated saline) was administered. Image  quality was adequate. LEFT VENTRICLE: Size was normal. Systolic function was normal. Ejection  fraction was estimated in the range of 55 % to 60 %. There were no regional  wall motion abnormalities. Wall thickness was normal. Avg. E/e': 10.2. Left  ventricular diastolic function parameters were normal.    RIGHT VENTRICLE: The size was normal. Systolic function was normal. A line  (catheter or pacing wire) was present. LEFT ATRIUM: Size was normal.    ATRIAL SEPTUM: Contrast injection was performed. There was no right-to-left  shunt, with provocative maneuvers to increase right atrial pressure. RIGHT ATRIUM: Size was normal. A line (catheter or pacing wire) was present. SYSTEMIC VEINS: IVC: Unable to assess due to feeding tube placement. AORTIC VALVE: The valve was structurally normal, tri-commissural. There was   no  evidence for stenosis. There was no insufficiency. MITRAL VALVE: Valve structure was normal. There was no evidence for stenosis. There was trivial regurgitation. TRICUSPID VALVE: The valve structure was normal. There was no evidence for  stenosis. There was mild regurgitation. PULMONIC VALVE: The valve structure was normal. There was no evidence for  stenosis. There was trivial regurgitation. PERICARDIUM: There was no pericardial effusion. AORTA: The root exhibited normal size. SUMMARY:    -  Left ventricle: Systolic function was normal. Ejection fraction was  estimated in the range of 55 % to 60 %. There were no regional wall motion  abnormalities.     - Atrial septum: Contrast injection was performed. There was no   right-to-left  shunt, with provocative maneuvers to increase right atrial pressure. -  Inferior vena cava, hepatic veins: Unable to assess due to feeding tube  placement. -  Tricuspid valve: There was mild regurgitation.    -  Pericardium: There was no pericardial effusion. SYSTEM MEASUREMENT TABLES    2D mode  AoR Diam (2D): 3.2 cm  LA Dimension (2D): 3.4 cm  Left Atrium Systolic Volume Index; Method of Disks, Biplane; 2D mode;: 28.2  ml/m2  IVS/LVPW (2D): 1  IVSd (2D): 1 cm  LVIDd (2D): 4.2 cm  LVIDs (2D): 2.8 cm  LVOT Area (2D): 3.1 cm2  LVPWd (2D): 1 cm  RVIDd (2D): 2.9 cm    Unspecified Scan Mode  Peak Grad; Mean; Antegrade Flow: 8 mm[Hg]  Vmax; Antegrade Flow: 143 cm/s  LVOT Diam: 2 cm    Prepared and signed by    Lauren Fraser. MD [place] [facility] [place], [place]  Signed 09-Nov-2020 14:48:36         Physical Exam:  General - ill appearing, well nourished, in no apparent distress. HEENT - Normocephalic, atraumatic. Conjunctiva are clear. Neck - Supple without masses  Extremities - Peripheral pulses intact. No edema and no rashes. Neurological examination - drowsy, able to follow simple commands. The patient has expressive aphasia. On cranial nerve examination, (II, III, IV, VI) pupils are equal, round, and reactive to light. Unable to assess visual acuity or visual fields. Right gaze preference. V, VII) left facial droop (VIII) Hearing is intact. Motor examination - There is normal muscle tone and bulk. Strength is 2/5 in LUE, 0/5 LLE. 3/5 in RUE, 1/5 RLE. Muscle stretch reflexes are 1+ throughout. Plantar response is upgoing on right. Unable to assess sensation, cerebellar examination, gait and stance due to AMS.        Signed By: [place] NP     November 12, 2020

## 2020-11-12 NOTE — PROGRESS NOTES
Problem: Mobility Impaired (Adult and Pediatric)  Goal: *Acute Goals and Plan of Care  Outcome: Progressing Towards Goal  Acute PT Goals:  (1.) Savannah Service Shanel will move from supine to sit and sit to supine , scoot up and down, and roll side to side with MODERATE ASSIST within 7 treatment day(s). (2.) Savannah Service Shanel will transfer from bed to chair and chair to bed with MODERATE ASSIST using the least restrictive device within 7 treatment day(s). (3.) Savannah Service Shanel will perform sitting static and dynamic balance activities x 10 minutes with MODERATE ASSIST to improve safety within 7 treatment day(s). (4.) Savannah Service Shanel will perform standing static and dynamic balance activities x 5 minutes with MAXIMAL ASSIST to improve safety within 7 treatment day(s). (5.) Savannah Service Shanel will perform bilateral lower extremity exercises x 15 min for HEP with MODERATE ASSIST to improve strength, endurance, and functional mobility within 7 treatment day(s). PHYSICAL THERAPY: Daily Note and PM 11/12/2020  INPATIENT: PT Visit Days : 2  [place] / [place] / Product Type: Medicare /       NAME/AGE/GENDER: Shirin Simmons is a 72 y.o. female   PRIMARY DIAGNOSIS: Severe sepsis with acute organ dysfunction ([facility] [A41.9, R65.20]  Acute renal failure (ARF) (HCC) [N17.9] Severe sepsis with acute organ dysfunction (HCC) Severe sepsis with acute organ dysfunction ([facility]       ICD-10: Treatment Diagnosis:   · Generalized Muscle Weakness (M62.81)  · Other lack of cordination (R27.8)  · Difficulty in walking, Not elsewhere classified (R26.2)  · Other abnormalities of gait and mobility (R26.89)  · Unspecified Lack of Coordination (R27.9)   Precaution/Allergies: Other food; Bee sting [sting, bee];  Invokana [canagliflozin]; and Other plant, animal, environmental      ASSESSMENT:     Ms. Marlin Lowe (\"Nell\") is a L handed 72year old F who presents to hospital with severe sepsis with acute organ dysfunction, acute renal failure. She has a history of COVID, where she had to be intubated for an extended period of time, had a PEG tube placed, and now she is very weak. She had been DC to [facility] rehab. PMH also includes TIAs, CPM placement, and vascular dementia. Pt's , Kassidy Fajardo, is present at bedside and is helpful in providing history. He states this past August pt was Mod I with use of pivot transfers to get to her [facility] with him standing by to ensure safety. She did require assist with ADLs such as dressing and bathing. He states at home they have a 1 level home, with a walk-in shower. He states prior to her intubation she had not worn oxygen, however since then she has been on O2 via a nasal cannula. 11/12/20:  Pt supine in bed on arrival. She is mostly non verbal but will answer some questions. Supine to sit on EOB with total assist x2. She was not able to maintain sitting balance. She required total assist for sitting balance. Pt was able to hold her head up for a short period of time. She tolerated sitting EOB for extended time for self care from OT. Pt returned supine with needs in reach and pillows in place for comfort. At this time, patient is appropriate for Co-treatment with occupational therapy due to patient's decreased overall endurance/tolerance levels, as well as need for high level skilled assistance to complete functional transfers/mobility and functional tasks. Farzad Irais Ugarte is appropriate for a multidisciplinary co-treatment of PT and OT to address goals of both disciplines. This section established at most recent assessment   PROBLEM LIST (Impairments causing functional limitations):  1. Decreased Strength  2. Decreased ADL/Functional Activities  3. Decreased Transfer Abilities  4. Decreased Ambulation Ability/Technique  5. Decreased Balance  6. Decreased Activity Tolerance  7. Increased Fatigue  8. Increased Shortness of Breath  9.  Decreased Flexibility/Joint Mobility  10. Decreased [place] with Home Exercise Program  11. Decreased Cognition   INTERVENTIONS PLANNED: (Benefits and precautions of physical therapy have been discussed with the patient.)  1. Balance Exercise  2. Bed Mobility  3. Family Education  4. Gait Training  5. Home Exercise Program (HEP)  6. Neuromuscular Re-education/Strengthening  7. Range of Motion (ROM)  8. Therapeutic Activites  9. Therapeutic Exercise/Strengthening  10. Transfer Training     TREATMENT PLAN: Frequency/Duration: 3 times a week for duration of hospital stay  Rehabilitation Potential For Stated Goals: Good     REHAB RECOMMENDATIONS (at time of discharge pending progress):    Placement: It is my opinion, based on this patient's performance to date, that Ms. Ugarte may benefit from returning to SNF. Equipment:    None at this time            HISTORY:   History of Present Injury/Illness (Reason for Referral):  Per H&P: Rosa Santana is a 72year old CF with a PMH of breath CA, HTN, hypothyroidism, IDDM2, h/o strokes/TIAs with vascular dementia, RA, breast cancer, completed CHB with CPM placement (in 9/2020), and COVID in 8/2020 requiring intubation presented to the ER on 11/4/20 from [facility] after she was found to be confused and have \"abnormal vital signs. \" HPI taken from previous notes and discussion with ER providers. Apparently the patient was at [facility] in Aug/Sept 2020 with COVID requiring intubation. She recovered but then developed acute GIB with anemia which required admission. She was then sent to [facility] where she was improving until the morning of 11/4/20 when she was found confused. Currently the patient is confused and mumbling so will not answer questions appropriately. Denies pain. Denies CP/SOB. \"  Past Medical History/Comorbidities:   Ms. Estrella Treadwell  has a past medical history of Actinic keratosis, Arthritis, Asthma, Breast cancer ([address] (2019), Cancer ([address] Cerebrovascular accident (CVA) due to bilateral embolism of carotid arteries ([address] (2020), DJD (degenerative joint disease), Environmental allergies, Environmental and seasonal allergies (2019), GERD (gastroesophageal reflux disease), History of malignant melanoma ('s), History of TIA (transient ischemic attack), Hypertension, Hypothyroidism, Insulin dependent diabetes mellitus (s), Kidney stones, Mobitz type 1 second degree AV block, Obese, Osteoarthritis (2016), Osteopenia, PONV (postoperative nausea and vomiting), RA (rheumatoid arthritis) ([address] (dx--), Rheumatoid arthritis involving right knee with negative rheumatoid factor ([address] (2019), Rosacea, S/P total knee arthroplasty (2016), Stroke ([address] Unspecified hypothyroidism, and UTI (urinary tract infection). She also has no past medical history of Adverse effect of anesthesia, Aneurysm ([address] CAD (coronary artery disease), Chronic kidney disease, Chronic obstructive pulmonary disease (HCC), Chronic pain, Coagulation disorder ([address] Difficult intubation, Endocarditis, Heart failure ([address] Liver disease, Malignant hyperthermia due to anesthesia, Nicotine vapor product user, Non-nicotine vapor product user, Pseudocholinesterase deficiency, Psychiatric disorder, PUD (peptic ulcer disease), Rheumatic fever, Seizures ([address] or Sleep apnea.   Ms. Ja Zavala  has a past surgical history that includes hx refractive surgery (); hx meniscectomy (Right, ); hx colonoscopy (; ); hx mohs procedure (Right, ); hx lap cholecystectomy (); pr abdomen surgery proc unlisted; hx dilation and curettage (); hx  section (); hx tubal ligation (); hx gyn (); hx other surgical (); hx other surgical (); hx knee arthroscopy (Right, ); hx knee replacement (Left, ); hx knee replacement (Right, 2016); hx heent (); fragment kidney stone/ eswl; hx lithotripsy (, ; ; 18); hx breast biopsy (Left, 06/21/2019); and hx breast lumpectomy (Left, 7/5/2019). Social History/Living Environment:   Home Environment: [address] Name: [facility]  One/Two Story Residence: One story  Living Alone: No  Support Systems: Spouse/Significant Other/Partner, Skilled nursing facility  Patient Expects to be Discharged to[de-identified] Skilled nursing facility  Current DME Used/Available at Home: Wheelchair  Tub or Shower Type: Shower  Prior Level of Function/Work/Activity:  She has a history of COVID, where she had to be intubated for an extended period of time, had a PEG tube placed, and now she is very weak. She had been DC to [facility] rehab. PMH also includes TIAs, CPM placement, and vascular dementia. Pt's , Barbara Bonds, is present at bedside and is helpful in providing history. He states this past August pt was Mod I with use of pivot transfers to get to her [facility] with him standing by to ensure safety. She did require assist with ADLs such as dressing and bathing. He states at home they have a 1 level home, with a walk-in shower. He states prior to her intubation she had not worn oxygen, however since then she has been on O2 via a nasal cannula. Number of Personal Factors/Comorbidities that affect the Plan of Care: 3+: HIGH COMPLEXITY   EXAMINATION:   Most Recent Physical Functioning:   Gross Assessment:                  Posture:     Balance:  Sitting: Impaired  Sitting - Static: Poor (constant support)  Sitting - Dynamic: Poor (constant support) Bed Mobility:  Rolling: Total assistance;Assist x2  Supine to Sit: Total assistance;Assist x2  Sit to Supine: Total assistance;Assist x2  Scooting: Total assistance;Assist x2  Wheelchair Mobility:     Transfers:     Gait:            Body Structures Involved:  1. Nerves  2. Heart  3. Lungs  4. Bones  5. Joints  6. Muscles Body Functions Affected:  1. Mental  2. Sensory/Pain  3. Cardio  4. Respiratory  5. Neuromusculoskeletal  6.  Movement Related  7. Metobolic/Endocrine Activities and Participation Affected:  1. General Tasks and Demands  2. Communication  3. Mobility  4. Self Care   Number of elements that affect the Plan of Care: 4+: HIGH COMPLEXITY   CLINICAL PRESENTATION:   Presentation: Evolving clinical presentation with unstable and unpredictable characteristics: HIGH COMPLEXITY   CLINICAL DECISION MAKIN[address] [place] [address] AM-PAC 6 Clicks   Basic Mobility Inpatient Short Form  How much difficulty does the patient currently have. .. Unable A Lot A Little None   1. Turning over in bed (including adjusting bedclothes, sheets and blankets)? [] 1   [x] 2   [] 3   [] 4   2. Sitting down on and standing up from a chair with arms ( e.g., wheelchair, bedside commode, etc.)   [x] 1   [] 2   [] 3   [] 4   3. Moving from lying on back to sitting on the side of the bed? [] 1   [x] 2   [] 3   [] 4   How much help from another person does the patient currently need. .. Total A Lot A Little None   4. Moving to and from a bed to a chair (including a wheelchair)? [x] 1   [] 2   [] 3   [] 4   5. Need to walk in hospital room? [x] 1   [] 2   [] 3   [] 4   6. Climbing 3-5 steps with a railing? [x] 1   [] 2   [] 3   [] 4   © , Trustees of [address] [place] [address], under license to [employer]. All rights reserved      Score:  Initial: 8 Most Recent: X (Date: -- )    Interpretation of Tool:  Represents activities that are increasingly more difficult (i.e. Bed mobility, Transfers, Gait). Medical Necessity:     · Patient is expected to demonstrate progress in strength, range of motion, balance, coordination and functional technique to decrease assistance required with all mobility. Reason for Services/Other Comments:  · Patient continues to require skilled intervention due to medical complications and mobility deficits which impact her level of function, safety, and independence as indicated above.    Use of outcome tool(s) and clinical judgement create a POC that gives a: Questionable prediction of patient's progress: MODERATE COMPLEXITY        TREATMENT:   (In addition to Assessment/Re-Assessment sessions the following treatments were rendered)   Pre-treatment Symptoms/Complaints: Answers simple questions   Pain: Initial:  Not rated     Post Session:  Not rated     Today's treatment session addressed Decreased Strength, Decreased ADL/Functional Activities, Decreased Transfer Abilities, Decreased Balance, Increased Pain, Decreased Activity Tolerance, Increased Fatigue, Increased Shortness of Breath, Decreased Flexibility/Joint Mobility and Decreased Cognition to progress towards achieving stated therapy goals. During this session, Occupational Therapy addressed Balance to progress towards their discipline specific goal(s). Co-treatment was necessary to improve patient's cognitive participation, ability to follow higher level commands, ability to increase activity demands and ability to return to normal functional activity. Therapeutic Activity: (    39 minutes): Therapeutic activities including Bed transfers to improve mobility, strength and balance. Required minimal   to promote static balance in sitting. Braces/Orthotics/Lines/Etc:   · villalba catheter  · PEG  · O2 Device: Nasal cannula  Treatment/Session Assessment:    · Response to Treatment:  See above  · Interdisciplinary Collaboration:   o Physical Therapy Assistant  o Registered Nurse  · After treatment position/precautions:   o Supine in bed  o Bed/Chair-wheels locked  o Bed in low position  o Call light within reach  o RN notified   · Compliance with Program/Exercises: Will assess as treatment progresses  · Recommendations/Intent for next treatment session: \"Next visit will focus on advancements to more challenging activities and reduction in assistance provided\".     Total Treatment Duration:  PT Patient Time In/Time Out  Time In: 1450  [facility]

## 2020-11-12 NOTE — PROGRESS NOTES
Hourly rounds completed with bed in low/locked position and call light within reach. Patient has rested throughout shift, with minimal communication but no acute changes. Patient is more alert this morning due 0600 bolus feed and med pass. Patient has tolerated bolus feeding well this shift, 1 wet brief and 1 BM this shift. All needs met at this time, will continue to monitor patient and give report to oncoming RN.      Date 11/11/20 0700 - 11/12/20 0659 11/12/20 0700 - 11/13/20 0659   Shift 6896-9162 7744-7463 24 Hour Total 9870-7027 0717-9777 24 Hour Total   INTAKE   I.V.(mL/kg/hr) 671(0.8)  671        I.V. 671  671      NG/GT 1776 139 2046        Water Flush Volume (mL) (PEG/Gastrostomy Tube 11/04/20) 450 300 750        Medication Volume (PEG/Gastrostomy Tube 11/04/20) 105 50 155        Intake (ml) (PEG/Gastrostomy Tube 11/04/20)       Shift Total(mL/kg) 3171(58.2) 824(11.7) 0369(82.5)      OUTPUT   Urine(mL/kg/hr)           Urine Occurrence(s) 2 x 1 x 3 x      Stool           Stool Occurrence(s) 1 x 1 x 2 x      Shift Total(mL/kg)         NET 2172 824 2998      Weight (kg) 70.3 70.3 70.3 70.3 70.3 70.3

## 2020-11-12 NOTE — PROGRESS NOTES
Progress Note    2020  Admit Date: 2020  7:27 AM   NAME: Randel Blizzard Sanfilippo   :  1955   MRN:  [id number]   Attending: Kyung Avitia MD  PCP:  Miguel Bowers MD  Treatment Team: Attending Provider: Kyung Avitia MD; Utilization Review: Melisa Doyle RN; Care Manager: James Gasca; Consulting Provider: Antonetta Siemens, DO; Hospitalist: Ming Tejada NP; Nurse Practitioner: Yvon Mak NP; Speech Language Pathologist: Debbie Jamison, JAYY; Occupational Therapy Assistant: Asiya Cates; Physical Therapy Assistant: Dante Garza; Speech Language Pathologist: Jessica Alarcon    Full Code   SUBJECTIVE:   Ms. Hannah Pino is a 71 yo female with PMH of RA, asthma, breast cancer, CVA, GERD, DM II, hypothyroidism, who presented from [facility] with confusion and abnormal VS.  Found to have sepsis secondary to UTI and pneumonia. Started on unasyn and doxycycline. [place]. MRI showed embolic strokes in multiple territories. CTA with fenestration of basilar artery. TTE neg. Cardiology consulted, no need for JT initially. Had PEG . Palliative care consulted. Family wants full code. Pacemaker interrogated, no events noted. Neurology following. Neurology feels endocarditis should be ruled out as cause of CVA, Neuro spoke to Cards today. On eliquis and statin. Non communicative, follows minimal commands. Past Medical History:   Diagnosis Date    Actinic keratosis     uses Solaraze gel when needed    Arthritis     rheumatoid.   Orencia infusion every 4 weeks    Asthma     none in yrs per pt--- no inhalers per pt    Breast cancer ([address] 2019    Cancer ([address]     melanoma    Cerebrovascular accident (CVA) due to bilateral embolism of carotid arteries ([address] 2020    DJD (degenerative joint disease)     Environmental allergies     Environmental and seasonal allergies 2019    GERD (gastroesophageal reflux disease)     prn med    History of malignant melanoma 1980's    on back    History of TIA (transient ischemic attack)     Hypertension     controlled with medication    Hypothyroidism     Insulin dependent diabetes mellitus 1990's    Type 2. sqbs avg am--100---- hypo at 73- last A1C= 8.1 on 12/19/17    Kidney stones     4th one at present    Mobitz type 1 second degree AV block     EKG today 9/22/16- Dr Kera Marsh reviewed----- per pt-- \" been that way my whole life\"-- does not see a cardiologist    Obese     bmi =39    Osteoarthritis 12/22/2016    Osteopenia     PONV (postoperative nausea and vomiting)     POV- responds to IV antiemetic    RA (rheumatoid arthritis) ([facility] dx--2013    Rheumatoid arthritis involving right knee with negative rheumatoid factor ([facility] 5/16/2019    Rosacea     S/P total knee arthroplasty 12/23/2016    Stroke [facility])     followed by neurologist      Unspecified hypothyroidism     hypothyroid.   Controlled with Levoxyl    UTI (urinary tract infection)      Recent Results (from the past 24 hour(s))   GLUCOSE, POC    Collection Time: 11/11/20 11:35 AM   Result Value Ref Range    Glucose (POC) 284 (H) 65 - 100 mg/dL   GLUCOSE, POC    Collection Time: 11/11/20  4:05 PM   Result Value Ref Range    Glucose (POC) 249 (H) 65 - 100 mg/dL   GLUCOSE, POC    Collection Time: 11/11/20  8:38 PM   Result Value Ref Range    Glucose (POC) 163 (H) 65 - 100 mg/dL   GLUCOSE, POC    Collection Time: 11/12/20  7:16 AM   Result Value Ref Range    Glucose (POC) 246 (H) 65 - 100 mg/dL   RENAL FUNCTION PANEL    Collection Time: 11/12/20  7:26 AM   Result Value Ref Range    Sodium 144 136 - 145 mmol/L    Potassium 3.6 3.5 - 5.1 mmol/L    Chloride 110 (H) 98 - 107 mmol/L    CO2 30 21 - 32 mmol/L    Anion gap 4 (L) 7 - 16 mmol/L    Glucose 246 (H) 65 - 100 mg/dL    BUN 25 (H) 8 - 23 MG/DL    Creatinine 0.57 (L) 0.6 - 1.0 MG/DL    GFR est AA >60 >60 ml/min/1.73m2    GFR est non-AA >60 >60 ml/min/1.73m2    Calcium 8.3 8.3 - 10.4 MG/DL Phosphorus 3.0 2.3 - 3.7 MG/DL    Albumin 1.6 (L) 3.2 - 4.6 g/dL   CBC W/O DIFF    Collection Time: 11/12/20  7:26 AM   Result Value Ref Range    WBC 7.6 4.3 - 11.1 K/uL    RBC 3.14 (L) 4.05 - 5.2 M/uL    HGB 8.5 (L) 11.7 - 15.4 g/dL    HCT 29.0 (L) 35.8 - 46.3 %    MCV 92.4 79.6 - 97.8 FL    MCH 27.1 26.1 - 32.9 PG    MCHC 29.3 (L) 31.4 - 35.0 g/dL    RDW 17.3 (H) 11.9 - 14.6 %    PLATELET 876 040 - 004 K/uL    MPV 11.4 9.4 - 12.3 FL    ABSOLUTE NRBC 0.00 0.0 - 0.2 K/uL     Allergies   Allergen Reactions    Other Food Swelling     Jalapeno. -- tongue and lip swells    Bee Sting [Sting, Bee] Swelling     Local swelling    Invokana [Canagliflozin] Other (comments)     Caused frequent Urinary Tract Infections    Other Plant, Animal, Environmental Other (comments)     Trees, grass, dust, mold---- congestion     Current Facility-Administered Medications   Medication Dose Route Frequency Provider Last Rate Last Dose    ampicillin-sulbactam (UNASYN) 3 g in 0.9% sodium chloride (MBP/ADV) 100 mL MBP  3 g IntraVENous Q6H Waylon Steel  mL/hr at 11/12/20 0558 3 g at 11/12/20 0558    anastrozole (ARIMIDEX) tablet 1 mg  1 mg Per J Tube DAILY Waylon Steel DO   1 mg at 11/12/20 0920    midodrine (PROAMATINE) tablet 5 mg  5 mg Per J Tube TID WITH MEALS Waylon Steel DO   5 mg at 11/12/20 2039    acetaminophen (TYLENOL) tablet 650 mg  650 mg Per J Tube Q4H PRN Waylon Steel, DO        HYDROcodone-acetaminophen (NORCO) 5-325 mg per tablet 1 Tab  1 Tab Per G Tube Q6H PRN Tiki Gr, NP        polyethylene glycol (MIRALAX) packet 17 g  17 g Per G Tube DAILY Waylon Steel, DO   17 g at 11/12/20 1068    atorvastatin (LIPITOR) tablet 40 mg  40 mg Per J Tube QHS Ishan Garcia MD   40 mg at 11/11/20 2200    levothyroxine (SYNTHROID) tablet 112 mcg  112 mcg Per J Tube 6am Waylon Steel DO   112 mcg at 11/12/20 0558    NUTRITIONAL SUPPORT ELECTROLYTE PRN ORDERS   Does Not Apply PRN Roverto Samuels, DO        apixaban [facility]) tablet 5 mg  5 mg Per J Tube Q12H Waylon Steel, DO   5 mg at 20 2325    aspirin chewable tablet 81 mg  81 mg Per J Tube DAILY Waylon Steel, DO   81 mg at 20 4554    doxycycline (VIBRAMYCIN) capsule 100 mg  100 mg Per J Tube Q12H Waylon Steel, DO   100 mg at 20 9480    famotidine (PEPCID) 40 mg/5 mL (8 mg/mL) oral suspension 20 mg  20 mg Per J Tube Q24H Waylon Steel, DO   20 mg at 20 1214    sodium chloride (NS) flush 5-40 mL  5-40 mL IntraVENous Q8H Waylon Steel, DO   10 mL at 20 0558    sodium chloride (NS) flush 5-40 mL  5-40 mL IntraVENous PRN Waylon Steel, DO        insulin lispro (HUMALOG) injection   SubCUTAneous AC&HS Roverto Samuels, DO   4 Units at 20 6062    insulin glargine (LANTUS) injection 30 Units  30 Units SubCUTAneous QHS Loi BERNABE DO   30 Units at 20 2116       Review of Systems unable to obtain  PHYSICAL EXAM     Visit Vitals  BP (!) 111/55 (BP 1 Location: Left arm, BP Patient Position: At rest)   Pulse 79   Temp 98.2 °F (36.8 °C)   Resp 18   Ht 5' 1\" (1.549 m)   Wt 70.3 kg (155 lb)   LMP 2001   SpO2 95%   BMI 26.61 kg/m²      Temp (24hrs), Av.9 °F (37.2 °C), Min:98.1 °F (36.7 °C), Max:99.4 °F (37.4 °C)    Oxygen Therapy  O2 Sat (%): 95 % (20 0714)  Pulse via Oximetry: 70 beats per minute (20 1143)  O2 Device: Nasal cannula (20 1143)  O2 Flow Rate (L/min): 2 l/min (20 1143)    Intake/Output Summary (Last 24 hours) at 2020 1111  Last data filed at 2020 0800  Gross per 24 hour   Intake 3033 ml   Output --   Net 3033 ml      General:       Ill appearing, NAD    Lungs:          CTA bilaterally. Resp even and nonlabored  Heart:            S1S2 present without murmurs rubs gallops. RRR. No LE edema  Abdomen:    Soft, non distended. BS present. PEG in place  Extremities:  All ext diminished strength  Neurologic:  Eyes open, left facial droop. PERRLA. Left LE flaccid. LUE 1/5 strength, RUE/RLE strength 2/5. Follows simple commands.  Expressive aphasia.           Results summary of Diagnostic Studies/Procedures copied from within [facility] EMR:        [address] Problems    Diagnosis Date Noted    Cerebrovascular accident (CVA) due to bilateral embolism of carotid arteries ([address] 11/08/2020    Acute renal failure (ARF) ([address] 11/04/2020    Severe sepsis with acute organ dysfunction ([address] 11/04/2020    UTI (urinary tract infection) 11/04/2020    Dehydration, severe 11/04/2020    Vascular dementia ([address] 11/04/2020    Pneumonia 11/04/2020    Cardiac pacemaker 11/04/2020    Lactic acidosis 11/04/2020    Hyperkalemia 11/04/2020    Septic encephalopathy 11/04/2020    S/P percutaneous endoscopic gastrostomy (PEG) tube placement ([facility] 11/04/2020    History of stroke 07/07/2020    Mixed hyperlipidemia 07/07/2020    Malignant neoplasm of lower-outer quadrant of left breast of female, estrogen receptor positive ([address] 07/01/2019    Type 2 diabetes mellitus without complication, with long-term current use of insulin ([address] 05/16/2019    Complete heart block (HCC) 03/28/2018    Gastroesophageal reflux disease without esophagitis     Hypothyroidism     RA (rheumatoid arthritis) (HCC)      Plan:    Severe sepsis with acute organ dysfunction   Due to pneumonia and UTI   Continue unasyn and doxycycline     IVF discontinued  Lactic acidosis resolved     Cerebrovascular accident due to bilateral embolism of carotid arteries    [facility] 11/7 with multifocal areas of subacute ischemia   CT with old CVAs   Continuing ASA and eliqus    Neurology following   CTA with small fenestration at the base of the basilar artery, although a small focus of thrombus is not entirely excluded   Palliative consulted  JT to r/o endocarditis as cause for embolic CVA, Neuro spoke to Cards         S/P percutaneous endoscopic gastrostomy tube placement   Continue tube feedings:  Nutrition managing      Pneumonia              Recent COVID requiring vent care               Continuing unasyn to cover aspiration               Continue doxy              No SOB or cough     Acute UTI   abx as above         Urine culture with yeast and staph species, coagulase   Negative     Acute renal failure   resolved:  Monitor   Continue strict I+O   IVF discontinued         Hypothyroidism   Elevated TSH, FT4 WNL  Home meds      RA    aware      Gastroesophageal reflux disease without esophagitis  Pepcid     Complete heart block with pacemaker    Cards on board      IDDM II   continue SSI    DM management on board, appreciate    Adjusting lantus                        Notes, labs, VS, diagnostic testing reviewed        DVT Prophylaxis: eliquis  Plan of Care Discussed with: Supervising MD Dr. Martinez Poster, care team         Beverley Hugo, KARUNA

## 2020-11-12 NOTE — PROGRESS NOTES
Problem: Falls - Risk of  Goal: *Absence of Falls  Description: Document Vinod Apo Fall Risk and appropriate interventions in the flowsheet. Outcome: Progressing Towards Goal  Note: Fall Risk Interventions:       Mentation Interventions: Adequate sleep, hydration, pain control, Bed/chair exit alarm, Door open when patient unattended    Medication Interventions: Evaluate medications/consider consulting pharmacy    Elimination Interventions: Call light in reach              Problem: Pressure Injury - Risk of  Goal: *Prevention of pressure injury  Description: Document Benjamín Scale and appropriate interventions in the flowsheet.   Outcome: Progressing Towards Goal  Note: Pressure Injury Interventions:  Sensory Interventions: Assess changes in LOC, Check visual cues for pain    Moisture Interventions: Absorbent underpads, Apply protective barrier, creams and emollients, Check for incontinence Q2 hours and as needed    Activity Interventions: Assess need for specialty bed, Pressure redistribution bed/mattress(bed type)    Mobility Interventions: Assess need for specialty bed, PT/OT evaluation, HOB 30 degrees or less    Nutrition Interventions: Document food/fluid/supplement intake    Friction and Shear Interventions: Apply protective barrier, creams and emollients, HOB 30 degrees or less

## 2020-11-12 NOTE — PROGRESS NOTES
Comprehensive Nutrition Assessment    Type and Reason for Visit: Reassess  Tube Feeding Management (Hospitalist)    Nutrition Recommendations/Plan:   Enteral Nutrition  · Continue current bolus feedings for primary needs.   · Osmolite 1.2 bolus via PEG one carton times 5 per day. · 75ml water flush before and after each bolus feeding.     Vitamin and Mineral Supplement Therapy:  Nutrition support orders for electrolyte management replacement active on MAR.    No replacements indicated today. Pt continues with minimal BM, if no significant BM by 11/13 she may benefit from addition to current bowel regimen of miralax. Once pt with +BM of significance she would benefit from transition to baseline fiber containing formula. Her needs can be met with Jevity 1.5 patricia one carton times 4 boluses per day with 85 ml water flush before and after each bolus. Malnutrition Assessment:  Malnutrition Status: At risk for malnutrition (specify)  Context: Chronic illness  Findings of clinical characteristics of malnutrition:   Energy Intake:  (Nocturnal TF at facility provided ~50% needs)  Weight Loss:  Unable to assess(current wt is not from validated source and bed scale is not functioning)    Body Fat Loss:  No significant body fat loss,     Muscle Mass Loss:  1 - Mild muscle mass loss, Calf (gastrocnemius), Hand (interosseous)  Fluid Accumulation:  Unable to assess,     Strength:  Not performed     Nutrition Assesment:   Nutrition History: Nutrition regimen per facility records Jevity 1.5 patricia @ 60 ml/hr 8p-5a, 100 ml x 4/day oral intake per SLP during the day of unknown nutritional signifigance      Nutrition Background: Admitted with sepsis, ARF, UTI, dehydration, PNA, septic encephalopathy. PMH remarkable for breast ca, HTN, hypothyroidism, DM, CVAs/TIAs, vascular dementia, RA, Covid 8/20, PEG placed 10/14/2020. MRI on 11/7 confirmed strokes in multiple territories. JT pending.      Visit with pt and  at bedside today. Pt is more alert and interactive, her voice is very week. She primarily nods to questions with brief responses. She c/o abdominal pain, described as fullness. Abdomen soft. Nursing reports no difficulties with TF administration. Nutrition Related Findings:   Wound Type: Stage III(sacral per WC)  Abdominal Status (last documented): Semi-soft, Other (comment)(PEG) abdomen with Active  bowel sounds. Small hard BM recorded times 2 yesterday. Pertinent Medications: pepcid, lantus (held 11/10), SSI (16 units yesterday, 4 thus far today), miralax (held 11/10, 3 doses thus far)  Pertinent Labs: Na 144, K 3.6, Cl 110, Glu 246, BUN/Cr 25/0.57, Phos 3  POC glucose 162-284 (11/11); 246 (11/12 thus far today)  Sodium and Phos have corrected with resuming bolus feeds, increasing in glucose potentially associated with prior lantus dose held. Current Nutrition Therapies:  DIET TUBE FEEDING Osmolite 1.2 bolus 1 carton times 5 per day. Water flush 75 ml before and after each bolus. Suggested feeding times 6 am, 9a, 12p, 6p and 9p. Current Tube Feeding (TF) Orders:   · Feeding Route: PEG  · Formula: Osmolite 1.2   · Schedule: Bolus    · Regimen: 1 carton x 5 daily  · Water Flushes: 75 ml before and after each bolus  · Current TF & Flush Orders Provides: At goal  · Goal TF & Flush Orders Provides: 1425 kcal (100% estimated calorie needs), 66 grams protein (100% estimated protein needs) and ~1645 ml free fluid (25ml/kg current body)    Current Intake: Average Meal Intake: NPO Average Supplement Intake: NPO      Anthropometric Measures:  Height: 5' 1\" (154.9 cm)  Current Body Wt: 70.3 kg (154 lb 15.7 oz)(11/6), Weight source: Not specified  BMI: 26.6, Overweight (BMI 25.0-29. 9)  Admission Body Weight: 145 lb 1 oz(no source)  Ideal Body Wt: 105 lbs (48 lbs), 138.2 %  Usual Body Wt: 65.8 kg (145 lb)(per EMR review weight at [facility] office visit 7/2020), Percent weight change: 0          Estimated Daily Nutrient Needs:  Energy (kcal): 3291-6235(20-98) (Kcal/kg, Weight Used: Ideal([place]))  Protein (g): 58-72(1.2-1.5 g/kg) Weight Used: (Ideal)  Fluid (ml/day): 1645 ml (ml/kg)    Nutrition Diagnosis:   · Inadequate oral intake related to swallowing difficulty as evidenced by (NPO per SLP, PEG for primary needs)    Nutrition Interventions:   Food and/or Nutrient Delivery: Continue tube feeding     Coordination of Nutrition Care: Continue to monitor while inpatient  Plan of Care discussed with Stas Rios NP via [address] [place] [address] and Joseph Núñez RN.      Goals: Previous Goal Met: Goal(s) achieved  Maintain TF at bolus goal for primary needs    Nutrition Monitoring and Evaluation:      Food/Nutrient Intake Outcomes: Enteral nutrition intake/tolerance  Physical Signs/Symptoms Outcomes: Biochemical data, Chewing or swallowing, GI status    Discharge Planning:    Enteral nutrition    Ade Mosley MA, RD, LDN on 11/12/2020 at 11:22 AM  Contact: [phone]

## 2020-11-13 NOTE — PROGRESS NOTES
SPEECH PATHOLOGY NOTE:    Speech therapy attempt this AM. Patient very drowsy and only briefly opened eyes in response to verbal and tactile stim. Nursing student at bedside- states patient has been sleeping soundly since bath earlier this morning. Will continue to follow.     [address] CCC-SLP

## 2020-11-13 NOTE — PROGRESS NOTES
Hospitalist Progress Note Admit Date:  2020  7:27 AM  
Name:  Sharita Sanon Age:  72 y.o. 
:  1955 MRN:  [id number] PCP:  Shae Hernández MD 
Treatment Team: Attending Provider: Kerline Prado MD; Utilization Review: Lionel Linares RN; Care Manager: Stephani Hoskins; Consulting Provider: Morrell Essex, DO; Hospitalist: Elizabeth John NP; Nurse Practitioner: Elif Beckford NP Subjective: HPI and or CC: 
71 yo female with PMH of RA, asthma, breast cancer, CVA, GERD, DM II, hypothyroidism, who presented from [address] [place] [address] with confusion and abnormal VS.  Found to have sepsis secondary to UTI and pneumonia. Started on unasyn and doxycycline. [place].  MRI showed embolic strokes in multiple territories.  CTA with fenestration of basilar artery.  TTE neg. Cardiology consulted, no need for JT initially.  Had PEG .  Palliative care consulted. Family wants full code. Pacemaker interrogated, no events noted. Neurology following. Neurology feels endocarditis should be ruled out as cause of CVA, Neuro spoke to Cards and patient not a candidate for TTE. On eliquis and statin.  Non communicative, follows minimal commands. : 
Patient remains non communicative. Follows minimal commands. **Ctvirg-Gqo-lgkaoxq today. All questions answered. Discharge plan: 
[facility] AT [facility]. 
 
Objective:  
 
Patient Vitals for the past 24 hrs: 
 Temp Pulse Resp BP SpO2  
20 1111 98.3 °F (36.8 °C) 84 26 111/68 97 % 20 0905  87  128/73   
20 0738 98.4 °F (36.9 °C) 87 26 (!) 148/78 98 % 20 0343 98.3 °F (36.8 °C) 89 19 115/74 98 % 20 2320 97.9 °F (36.6 °C) 85 17 112/65 99 % 20 1948 98.5 °F (36.9 °C) 91 19 114/83 97 % 20 1459 97.9 °F (36.6 °C) 85 18 99/64 98 % Oxygen Therapy O2 Sat (%): 97 % (20 1111) Pulse via Oximetry: 70 beats per minute (20 1143) O2 Device: Nasal cannula (20 1143) O2 Flow Rate (L/min): 2 l/min (11/11/20 1143) Intake/Output Summary (Last 24 hours) at 11/13/2020 1257 Last data filed at 11/13/2020 1252 Gross per 24 hour Intake 2515 ml Output 60 ml Net 2455 ml REVIEW OF SYSTEMS: Comprehensive ROS performed and negative except as stated in HPI. Physical Examination: 
General:       Ill appearing, NAD   
Lungs:          CTA bilaterally. Resp even and nonlabored Heart:            S1S2 present without murmurs rubs gallops. RRR. No LE edema Abdomen:    Soft, non distended. BS present. PEG in place Extremities: All ext diminished strength Neurologic:  Eyes open, left facial droop. PERRLA.  Left LE flaccid.  LUE 1/5 strength, RUE/RLE strength 2/5.  Follows simple commands. Expressive aphasia. Data Review: 
I have reviewed all labs, meds, telemetry events, and studies from the last 24 hours. Recent Results (from the past 24 hour(s)) GLUCOSE, POC Collection Time: 11/12/20  2:58 PM  
Result Value Ref Range Glucose (POC) 348 (H) 65 - 100 mg/dL GLUCOSE, POC Collection Time: 11/12/20  8:01 PM  
Result Value Ref Range Glucose (POC) 231 (H) 65 - 100 mg/dL RENAL FUNCTION PANEL Collection Time: 11/13/20  6:41 AM  
Result Value Ref Range Sodium 143 138 - 145 mmol/L Potassium 4.0 3.5 - 5.1 mmol/L Chloride 107 98 - 107 mmol/L  
 CO2 28 21 - 32 mmol/L Anion gap 8 7 - 16 mmol/L Glucose 202 (H) 65 - 100 mg/dL BUN 26 (H) 8 - 23 MG/DL Creatinine 0.71 0.6 - 1.0 MG/DL  
 GFR est AA >60 >60 ml/min/1.73m2 GFR est non-AA >60 >60 ml/min/1.73m2 Calcium 8.8 8.3 - 10.4 MG/DL Phosphorus 2.9 2.3 - 3.7 MG/DL Albumin 1.8 (L) 3.2 - 4.6 g/dL CBC W/O DIFF Collection Time: 11/13/20  6:41 AM  
Result Value Ref Range WBC 9.3 4.3 - 11.1 K/uL  
 RBC 3.26 (L) 4.05 - 5.2 M/uL HGB 8.9 (L) 11.7 - 15.4 g/dL HCT 29.5 (L) 35.8 - 46.3 % MCV 90.5 79.6 - 97.8 FL  
 MCH 27.3 26.1 - 32.9 PG  
 MCHC 30.2 (L) 31.4 - 35.0 g/dL RDW 17.2 (H) 11.9 - 14.6 % PLATELET 153 322 - 217 K/uL MPV 10.7 9.4 - 12.3 FL ABSOLUTE NRBC 0.00 0.0 - 0.2 K/uL MAGNESIUM Collection Time: 11/13/20  6:41 AM  
Result Value Ref Range Magnesium 1.6 (L) 1.8 - 2.4 mg/dL GLUCOSE, POC Collection Time: 11/13/20  8:40 AM  
Result Value Ref Range Glucose (POC) 251 (H) 65 - 100 mg/dL GLUCOSE, POC Collection Time: 11/13/20 11:01 AM  
Result Value Ref Range Glucose (POC) 342 (H) 65 - 100 mg/dL All Micro Results Procedure Component Value Units Date/Time CULTURE, URINE [[id number]]  (Abnormal)  (Susceptibility) Collected:  11/04/20 1148 Order Status:  Completed Specimen:  Urine from [place] Specimen Updated:  11/11/20 8702 Special Requests: NO SPECIAL REQUESTS Culture result:    
  10,000 to 50,000 COLONIES/mL STAPHYLOCOCCUS SPECIES, COAGULASE NEGATIVE  
     
      
  10,000 to 50,000 COLONIES/mL CANDIDA GLABRATA CULTURE, BLOOD [[id number]] Collected:  11/04/20 0755 Order Status:  Completed Specimen:  Blood Updated:  11/09/20 0709 Special Requests: --     
  RIGHT Antecubital 
  
  Culture result: NO GROWTH 5 DAYS     
 CULTURE, BLOOD [[id number]] Collected:  11/04/20 0745 Order Status:  Canceled Specimen:  Blood Current Meds: 
Current Facility-Administered Medications Medication Dose Route Frequency  ampicillin-sulbactam (UNASYN) 3 g in 0.9% sodium chloride (MBP/ADV) 100 mL MBP  3 g IntraVENous Q6H  
 anastrozole (ARIMIDEX) tablet 1 mg  1 mg Per J Tube DAILY  midodrine (PROAMATINE) tablet 5 mg  5 mg Per J Tube TID WITH MEALS  
 acetaminophen (TYLENOL) tablet 650 mg  650 mg Per J Tube Q4H PRN  
 HYDROcodone-acetaminophen (NORCO) 5-325 mg per tablet 1 Tab  1 Tab Per G Tube Q6H PRN  polyethylene glycol (MIRALAX) packet 17 g  17 g Per G Tube DAILY  atorvastatin (LIPITOR) tablet 40 mg  40 mg Per J Tube QHS  levothyroxine (SYNTHROID) tablet 112 mcg  112 mcg Per J Tube 6am  
  NUTRITIONAL SUPPORT ELECTROLYTE PRN ORDERS   Does Not Apply PRN  
 apixaban (ELIQUIS) tablet 5 mg  5 mg Per J Tube Q12H  aspirin chewable tablet 81 mg  81 mg Per J Tube DAILY  doxycycline (VIBRAMYCIN) capsule 100 mg  100 mg Per J Tube Q12H  
 famotidine (PEPCID) 40 mg/5 mL (8 mg/mL) oral suspension 20 mg  20 mg Per J Tube Q24H  
 sodium chloride (NS) flush 5-40 mL  5-40 mL IntraVENous Q8H  
 sodium chloride (NS) flush 5-40 mL  5-40 mL IntraVENous PRN  
 insulin lispro (HUMALOG) injection   SubCUTAneous AC&HS  insulin glargine (LANTUS) injection 30 Units  30 Units SubCUTAneous QHS Diet: DIET TUBE FEEDING Other Studies (last 24 hours): No results found. Assessment and Plan:  
 
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  
   
 UTI (urinary tract infection) ICD-10-CM: N39.0 ICD-9-CM: 599.0  11/4/2020 - Present Yes Dehydration, severe ICD-10-CM: E86.0 ICD-9-CM: 276.51  11/4/2020 - Present Yes Vascular dementia [facility]) ICD-10-CM: F01.50 ICD-9-CM: 290.40  11/4/2020 - Present Yes Pneumonia ICD-10-CM: J18.9 ICD-9-CM: 360  11/4/2020 - Present Yes Cardiac pacemaker ICD-10-CM: Z95.0 ICD-9-CM: V45.01  11/4/2020 - Present Yes Lactic acidosis ICD-10-CM: E87.2 ICD-9-CM: 276.2  11/4/2020 - Present Yes Hyperkalemia ICD-10-CM: E87.5 ICD-9-CM: 276.7  11/4/2020 - Present Yes Septic encephalopathy ICD-10-CM: G93.41 
ICD-9-CM: 348.31  11/4/2020 - Present Yes S/P percutaneous endoscopic gastrostomy (PEG) tube placement ([facility] [address] ICD-10-CM: Z93.1 ICD-9-CM: V44.1  11/4/2020 - Present Yes Mixed hyperlipidemia ICD-10-CM: E78.2 ICD-9-CM: 272.2  7/7/2020 - Present Yes History of stroke ICD-10-CM: Z86.73 
ICD-9-CM: V12.54  7/7/2020 - Present Yes Essential hypertension (Chronic) ICD-10-CM: I10 
ICD-9-CM: 401.9  7/25/2019 - Present Malignant neoplasm of lower-outer quadrant of left breast of female, estrogen receptor positive (HCC) (Chronic) ICD-10-CM: C50.512, Z17.0 ICD-9-CM: 174.5, V86.0  7/1/2019 - Present Yes Type 2 diabetes mellitus without complication, with long-term current use of insulin (HCC) (Chronic) ICD-10-CM: E11.9, Z79.4 ICD-9-CM: 250.00, V58.67  5/16/2019 - Present Yes Complete heart block (HCC) ICD-10-CM: I44.2 ICD-9-CM: 426.0  3/28/2018 - Present Yes Hypothyroidism (Chronic) ICD-10-CM: E03.9 ICD-9-CM: 244.9  Unknown - Present Yes RA (rheumatoid arthritis) (HCC) ICD-10-CM: M06.9 ICD-9-CM: 714.0  Unknown - Present Yes Gastroesophageal reflux disease without esophagitis ICD-10-CM: K21.9 ICD-9-CM: 530.81  Unknown - Present Yes A/P:   
Severe sepsis with acute organ dysfunction 
 Due to pneumonia and UTI 
 Continue unasyn and doxycycline  
  IVF discontinued Lactic acidosis resolved 
  
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   
[facility] 11/7 with multifocal areas of subacute ischemia 
 CT with old CVAs 
 Continuing ASA and eliqus 
  Neurology following 
 CTA with small fenestration at the base of the basilar artery, although a small focus of thrombus is not entirely excluded Palliative consulted JT to r/o endocarditis as cause for embolic CVA, Neuro spoke to Cards  
  
  
S/P percutaneous endoscopic gastrostomy tube placement Continue tube feedings:  Nutrition managing  
  
Pneumonia             Recent COVID requiring vent care             Continuing unasyn to cover aspiration  
            Continue doxy 
            No SOB or cough 
  
Acute UTI  
abx as above        
Urine culture with yeast and staph species, coagulase 
 Negative 
  
Acute renal failure  
resolved:  Monitor Continue strict I+O 
 IVF discontinued  
  
  
Hypothyroidism  
Elevated TSH, FT4 WNL Home meds  
  
RA   
aware  
  
Gastroesophageal reflux disease without esophagitis  Pepcid 
  
Complete heart block with pacemaker   
Cards on board  
  
IDDM II 
 continue SSI  
 DM management on board, appreciate  
 Adjusting lantus  
  
 
 
 
 
Signed: 
Adam Connell NP

## 2020-11-13 NOTE — PROGRESS NOTES
Report given to [facility]. All questions & concerns answered. Family updated on transport. Telemetry removed per MD order. RN will continue to monitor until transport arrives.

## 2020-11-13 NOTE — DISCHARGE INSTRUCTIONS
· Continue current bolus feedings for primary needs.   · Osmolite 1.2 bolus via PEG one carton times 5 per day.    · 75ml water flush before and after each bolus feeding.

## 2020-11-13 NOTE — DISCHARGE SUMMARY
Hospitalist Discharge Summary     Admit Date:  2020  7:27 AM   Name:  Wild Alva   Age:  72 y.o.  :  1955   MRN:  [id number]   PCP:  Ana Rodriguez MD  Treatment Team: Attending Provider: Vandana Wang MD; Utilization Review: Washington Neely RN; Care Manager: Antione Beltran;  Consulting Provider: Reinaldo Rubi DO; Hospitalist: Edel Montgomery NP; Nurse Practitioner: Joon Abad NP    Problem List for this Hospitalization:  Hospital Problems as of 2020 Date Reviewed: 10/13/2020          Codes Class Noted - Resolved POA    Cerebrovascular accident (CVA) due to bilateral embolism of carotid arteries ([facility].) ICD-10-CM: Z34.593  ICD-9-CM: 433.31  2020 - Present Yes        Acute renal failure (ARF) ([facility].) ICD-10-CM: N17.9  ICD-9-CM: 584.9  2020 - Present Yes        * (Principal) Severe sepsis with acute organ dysfunction ([facility].) ICD-10-CM: A41.9, R65.20  ICD-9-CM: 038.9, 995.92  2020 - Present Yes        UTI (urinary tract infection) ICD-10-CM: N39.0  ICD-9-CM: 599.0  2020 - Present Yes        Dehydration, severe ICD-10-CM: E86.0  ICD-9-CM: 276.51  2020 - Present Yes        Vascular dementia ([facility] ICD-10-CM: F01.50  ICD-9-CM: 290.40  2020 - Present Yes        Pneumonia ICD-10-CM: J18.9  ICD-9-CM: 841  2020 - Present Yes        Cardiac pacemaker ICD-10-CM: Z95.0  ICD-9-CM: V45.01  2020 - Present Yes        Lactic acidosis ICD-10-CM: E87.2  ICD-9-CM: 276.2  2020 - Present Yes        Hyperkalemia ICD-10-CM: E87.5  ICD-9-CM: 276.7  2020 - Present Yes        Septic encephalopathy ICD-10-CM: G93.41  ICD-9-CM: 348.31  2020 - Present Yes        S/P percutaneous endoscopic gastrostomy (PEG) tube placement ([facility] ICD-10-CM: Z93.1  ICD-9-CM: V44.1  2020 - Present Yes        Mixed hyperlipidemia ICD-10-CM: E78.2  ICD-9-CM: 272.2  2020 - Present Yes        History of stroke ICD-10-CM: Z86.73  ICD-9-CM: V12.54  2020 - Present Yes        Essential hypertension (Chronic) ICD-10-CM: I10  ICD-9-CM: 401.9  7/25/2019 - Present         Malignant neoplasm of lower-outer quadrant of left breast of female, estrogen receptor positive (HCC) (Chronic) ICD-10-CM: C50.512, Z17.0  ICD-9-CM: 174.5, V86.0  7/1/2019 - Present Yes        Type 2 diabetes mellitus without complication, with long-term current use of insulin (HCC) (Chronic) ICD-10-CM: E11.9, Z79.4  ICD-9-CM: 250.00, V58.67  5/16/2019 - Present Yes        Complete heart block (HCC) ICD-10-CM: I44.2  ICD-9-CM: 426.0  3/28/2018 - Present Yes        Hypothyroidism (Chronic) ICD-10-CM: E03.9  ICD-9-CM: 244.9  Unknown - Present Yes        RA (rheumatoid arthritis) ([facility] ICD-10-CM: M06.9  ICD-9-CM: 714.0  Unknown - Present Yes        Gastroesophageal reflux disease without esophagitis ICD-10-CM: K21.9  ICD-9-CM: 530.81  Unknown - Present Yes                Admission HPI from 11/4/2020:    73 yo female with PMH of RA, asthma, breast cancer, CVA, GERD, DM II, hypothyroidism, who presented from [facility] with confusion and abnormal VS.  Found to have sepsis secondary to UTI and pneumonia. Started on unasyn and doxycycline. [place].  MRI showed embolic strokes in multiple territories.  CTA with fenestration of basilar artery.  TTE neg. Cardiology consulted, no need for JT initially.  Had PEG 11/4.  Palliative care consulted. Family wants full code. Pacemaker interrogated, no events noted. Neurology following. Neurology feels endocarditis should be ruled out as cause of CVA, Neuro spoke to Cards and patient not a candidate for JT. On eliquis and statin.  Non communicative, follows minimal commands. Patient has been accepted to [facility] at this time. She is stable for discharge. Follow up instructions and discharge meds at bottom of this note. Plan was discussed with Bruce-spouse, CM. All questions answered. Patient was stable at time of discharge.     10 systems reviewed and negative except as noted in HPI. Diagnostic Imaging/Tests:   Ct Head Wo Cont    Result Date: 11/5/2020  EXAMINATION: CT HEAD WO CONT 11/5/2020 1:26 PM INDICATION: Drowsy and lethargic COMPARISON: CT head February 22, 2020 TECHNIQUE: Multiple-row detector helical CT examination of the head without intravenous contrast. Radiation dose reduction techniques were used for this study. Our CT scanners use one or all of the following: Automated exposure control, adjustment of the mA and/or kV according to patient size, iterative reconstruction. FINDINGS: Chronic-appearing multiple infarcts in the superior left cerebellum, new from February 22, 2020. Unchanged old left basal ganglia and right thalamic infarct No space-occupying lesion. No evidence of recent hemorrhage. Normal size of ventricles and extra-axial spaces for age. No calvarial abnormality is seen. Right maxillary sinus mucosal thickening Grossly normal orbital structures. No abnormality of extracranial soft tissues. IMPRESSION: 1. No acute intracranial hemorrhage or mass effect 2. Chronic appearing small infarcts in the left superior cerebellum, though new from February 22, 2020 3. Unchanged old left basal ganglia and right thalamic infarcts. Xr Chest Port    Result Date: 11/4/2020  CHEST X-RAY, one view. HISTORY:  Altered mental status. TECHNIQUE:  AP portable semiupright view. COMPARISON: 16 October. FINDINGS: Lungs: Right upper lung and left lower lung zone infiltrates. Costophrenic angles: Fairly sharp. Heart size: Normal for portable technique. Pulmonary vasculature: is unremarkable. Aorta: Unremarkable. Included portion of the upper abdomen: is unremarkable. Bones: No gross bony lesions. Other: Left-sided cardiac pacemaker is present     IMPRESSION:  Persistent lung infiltrates without significant change.        Echocardiogram results:  Results for orders placed or performed during the hospital encounter of 11/04/20   2D ECHO COMPLETE ADULT (TTE) W OR WO [address] [place] [address] 140[address] [facility], [address] [facility]  [phone]    Transthoracic Echocardiogram  2D, M-mode, Doppler, and Color Doppler    Patient: Elgin Mayfield  MR #: [id number]  : 1955  Age: 72 years  Gender: Female  Study date: 2020  Account #: [de-identified]  Height: 64 in  Weight: 154.7 lb  BSA: 1.76 mï¾²  Status:Routine  Location: [facility]  BP: 104/ 64    Allergies: OTHER FOOD, STING, BEE, CANAGLIFLOZIN, OTHER PLANT, ANIMAL,  ENVIRONMENTAL    Sonographer:  Belinda Hickey [facility]  Group:  [facility]  Referring Physician:  Rylee Jones. Matias Delacruz MD  Reading Physician:  Lauren Fraser. MD [facility]    INDICATIONS: Multiple embolic appearing strokes while on [address] [place] [address] [employer]. PROCEDURE: This was a routine study. A transthoracic echocardiogram was  performed. The study included complete 2D imaging, M-mode, complete spectral  Doppler, and color Doppler. Intravenous contrast (agitated saline) was  administered. Intravenous contrast (agitated saline) was administered. Image  quality was adequate. LEFT VENTRICLE: Size was normal. Systolic function was normal. Ejection  fraction was estimated in the range of 55 % to 60 %. There were no regional  wall motion abnormalities. Wall thickness was normal. Avg. E/e': 10.2. Left  ventricular diastolic function parameters were normal.    RIGHT VENTRICLE: The size was normal. Systolic function was normal. A line  (catheter or pacing wire) was present. LEFT ATRIUM: Size was normal.    ATRIAL SEPTUM: Contrast injection was performed. There was no right-to-left  shunt, with provocative maneuvers to increase right atrial pressure. RIGHT ATRIUM: Size was normal. A line (catheter or pacing wire) was present. SYSTEMIC VEINS: IVC: Unable to assess due to feeding tube placement. AORTIC VALVE: The valve was structurally normal, tri-commissural. There was   no  evidence for stenosis.  There was no insufficiency. MITRAL VALVE: Valve structure was normal. There was no evidence for stenosis. There was trivial regurgitation. TRICUSPID VALVE: The valve structure was normal. There was no evidence for  stenosis. There was mild regurgitation. PULMONIC VALVE: The valve structure was normal. There was no evidence for  stenosis. There was trivial regurgitation. PERICARDIUM: There was no pericardial effusion. AORTA: The root exhibited normal size. SUMMARY:    -  Left ventricle: Systolic function was normal. Ejection fraction was  estimated in the range of 55 % to 60 %. There were no regional wall motion  abnormalities. -  Atrial septum: Contrast injection was performed. There was no   right-to-left  shunt, with provocative maneuvers to increase right atrial pressure. -  Inferior vena cava, hepatic veins: Unable to assess due to feeding tube  placement. -  Tricuspid valve: There was mild regurgitation.    -  Pericardium: There was no pericardial effusion. SYSTEM MEASUREMENT TABLES    2D mode  AoR Diam (2D): 3.2 cm  LA Dimension (2D): 3.4 cm  Left Atrium Systolic Volume Index; Method of Disks, Biplane; 2D mode;: 28.2  ml/m2  IVS/LVPW (2D): 1  IVSd (2D): 1 cm  LVIDd (2D): 4.2 cm  LVIDs (2D): 2.8 cm  LVOT Area (2D): 3.1 cm2  LVPWd (2D): 1 cm  RVIDd (2D): 2.9 cm    Unspecified Scan Mode  Peak Grad; Mean; Antegrade Flow: 8 mm[Hg]  Vmax; Antegrade Flow: 143 cm/s  LVOT Diam: 2 cm    Prepared and signed by    Mica Flores.  MD [place] [facility]  Signed [id number] 14:48:36           All Micro Results     Procedure Component Value Units Date/Time    CULTURE, URINE [[id number]]  (Abnormal)  (Susceptibility) Collected:  11/04/20 1148    Order Status:  Completed Specimen:  Urine from [place] Specimen Updated:  11/11/20 0718     Special Requests: NO SPECIAL REQUESTS        Culture result:       10,000 to 50,000 COLONIES/mL STAPHYLOCOCCUS SPECIES, COAGULASE NEGATIVE                  10,000 to 50,000 COLONIES/mL BLAYNE GLABRATA          CULTURE, BLOOD [[id number]] Collected:  11/04/20 0755    Order Status:  Completed Specimen:  Blood Updated:  11/09/20 0740     Special Requests: --        RIGHT  Antecubital       Culture result: NO GROWTH 5 DAYS       CULTURE, BLOOD [[id number]] Collected:  11/04/20 0745    Order Status:  Canceled Specimen:  Blood           Labs: Results:       BMP, Mg, Phos Recent Labs     11/13/20 0641 11/12/20 0726 11/11/20 0629    144 146*   K 4.0 3.6 4.1    110* 111*   CO2 28 30 29   AGAP 8 4* 6*   BUN 26* 25* 25*   CREA 0.71 0.57* 0.67   CA 8.8 8.3 9.0   * 246* 146*   MG 1.6*  --  2.0   PHOS 2.9 3.0 5.0*      CBC Recent Labs     11/13/20 0641 11/12/20 0726 11/11/20 0629   WBC 9.3 7.6 9.0   RBC 3.26* 3.14* 3.35*   HGB 8.9* 8.5* 9.1*   HCT 29.5* 29.0* 30.0*    255 280      LFT Recent Labs     11/13/20 0641 11/12/20 0726 11/11/20 0629   ALB 1.8* 1.6* 1.7*      Cardiac Testing Lab Results   Component Value Date/Time    Troponin-I, Qt. <0.02 (L) 03/09/2019 09:45 PM      Coagulation Tests Lab Results   Component Value Date/Time    Prothrombin time 15.6 (H) 10/14/2020 09:31 AM    Prothrombin time 12.8 04/13/2019 10:20 PM    Prothrombin time 10.0 12/06/2016 07:40 AM    INR 1.2 10/14/2020 09:31 AM    INR 1.0 04/13/2019 10:20 PM    INR 0.9 12/06/2016 07:40 AM    aPTT 26.3 04/13/2019 10:20 PM    aPTT 22.9 (L) 12/06/2016 07:40 AM    aPTT 26.4 04/28/2008 11:00 AM      A1c Lab Results   Component Value Date/Time    Hemoglobin A1c 7.2 (H) 09/29/2020 07:21 PM    Hemoglobin A1c 7.4 (H) 07/07/2020 09:58 AM    Hemoglobin A1c 6.4 (H) 11/07/2019 02:00 PM      Lipid Panel Lab Results   Component Value Date/Time    Cholesterol, total 101 07/07/2020 09:58 AM    HDL Cholesterol 42 07/07/2020 09:58 AM    LDL, calculated 31 07/07/2020 09:58 AM    VLDL, calculated 28 07/07/2020 09:58 AM    Triglyceride 142 07/07/2020 09:58 AM    CHOL/HDL Ratio 1.9 08/01/2019 05:28 AM      Thyroid Panel Lab Results   Component Value Date/Time    TSH 7.860 (H) 11/04/2020 11:23 AM    TSH 5.460 (H) 09/29/2020 07:21 PM    T4, Free 1.1 11/04/2020 11:23 AM    T4, Free 1.0 09/29/2020 07:21 PM        Most Recent UA Lab Results   Component Value Date/Time    Color YELLOW 11/04/2020 10:30 AM    Appearance TURBID 11/04/2020 10:30 AM    Specific gravity 1.014 11/04/2020 10:30 AM    pH (UA) 7.0 11/04/2020 10:30 AM    Protein 100 (A) 11/04/2020 10:30 AM    Glucose 100 11/04/2020 10:30 AM    Ketone Negative 11/04/2020 10:30 AM    Bilirubin Negative 11/04/2020 10:30 AM    Blood LARGE (A) 11/04/2020 10:30 AM    Urobilinogen 0.2 11/04/2020 10:30 AM    Nitrites Positive (A) 11/04/2020 10:30 AM    Leukocyte Esterase LARGE (A) 11/04/2020 10:30 AM        Allergies   Allergen Reactions    Other Food Swelling     Jalapeno. -- tongue and lip swells    Bee Sting [Sting, Bee] Swelling     Local swelling    Invokana [Canagliflozin] Other (comments)     Caused frequent Urinary Tract Infections    Other Plant, Animal, Environmental Other (comments)     Trees, grass, dust, mold---- congestion     Immunization History   Administered Date(s) Administered    Influenza Vaccine Ksplice) PF (>6 Mo Flulaval, Fluarix, and >3 Yrs Afluria, Fluzone 10957) 09/19/2019    Pneumococcal Polysaccharide (PPSV-23) 03/02/2015    TB Skin Test (PPD) Intradermal 12/22/2016, 03/10/2019, 11/04/2020    Tdap 09/19/2019       All Labs from Last 24 Hrs:  Recent Results (from the past 24 hour(s))   GLUCOSE, POC    Collection Time: 11/12/20  2:58 PM   Result Value Ref Range    Glucose (POC) 348 (H) 65 - 100 mg/dL   GLUCOSE, POC    Collection Time: 11/12/20  8:01 PM   Result Value Ref Range    Glucose (POC) 231 (H) 65 - 100 mg/dL   RENAL FUNCTION PANEL    Collection Time: 11/13/20  6:41 AM   Result Value Ref Range    Sodium 143 138 - 145 mmol/L    Potassium 4.0 3.5 - 5.1 mmol/L    Chloride 107 98 - 107 mmol/L    CO2 28 21 - 32 mmol/L    Anion gap 8 7 - 16 mmol/L    Glucose 202 (H) 65 - 100 mg/dL    BUN 26 (H) 8 - 23 MG/DL    Creatinine 0.71 0.6 - 1.0 MG/DL    GFR est AA >60 >60 ml/min/1.73m2    GFR est non-AA >60 >60 ml/min/1.73m2    Calcium 8.8 8.3 - 10.4 MG/DL    Phosphorus 2.9 2.3 - 3.7 MG/DL    Albumin 1.8 (L) 3.2 - 4.6 g/dL   CBC W/O DIFF    Collection Time: 11/13/20  6:41 AM   Result Value Ref Range    WBC 9.3 4.3 - 11.1 K/uL    RBC 3.26 (L) 4.05 - 5.2 M/uL    HGB 8.9 (L) 11.7 - 15.4 g/dL    HCT 29.5 (L) 35.8 - 46.3 %    MCV 90.5 79.6 - 97.8 FL    MCH 27.3 26.1 - 32.9 PG    MCHC 30.2 (L) 31.4 - 35.0 g/dL    RDW 17.2 (H) 11.9 - 14.6 %    PLATELET 450 155 - 091 K/uL    MPV 10.7 9.4 - 12.3 FL    ABSOLUTE NRBC 0.00 0.0 - 0.2 K/uL   MAGNESIUM    Collection Time: 11/13/20  6:41 AM   Result Value Ref Range    Magnesium 1.6 (L) 1.8 - 2.4 mg/dL   GLUCOSE, POC    Collection Time: 11/13/20  8:40 AM   Result Value Ref Range    Glucose (POC) 251 (H) 65 - 100 mg/dL   GLUCOSE, POC    Collection Time: 11/13/20 11:01 AM   Result Value Ref Range    Glucose (POC) 342 (H) 65 - 100 mg/dL       Discharge Exam:  Patient Vitals for the past 24 hrs:   Temp Pulse Resp BP SpO2   11/13/20 1111 98.3 °F (36.8 °C) 84 26 111/68 97 %   11/13/20 0905 -- 87 -- 128/73 --   11/13/20 0738 98.4 °F (36.9 °C) 87 26 (!) 148/78 98 %   11/13/20 0343 98.3 °F (36.8 °C) 89 19 115/74 98 %   11/12/20 2320 97.9 °F (36.6 °C) 85 17 112/65 99 %   11/12/20 1948 98.5 °F (36.9 °C) 91 19 114/83 97 %   11/12/20 1459 97.9 °F (36.6 °C) 85 18 99/64 98 %     Oxygen Therapy  O2 Sat (%): 97 % (11/13/20 1111)  Pulse via Oximetry: 70 beats per minute (11/11/20 1143)  O2 Device: Nasal cannula (11/11/20 1143)  O2 Flow Rate (L/min): 2 l/min (11/11/20 1143)    Intake/Output Summary (Last 24 hours) at 11/13/2020 1450  Last data filed at 11/13/2020 1252  Gross per 24 hour   Intake 2515 ml   Output 60 ml   Net 2455 ml         Physical exam:  General:    Well nourished. Alert. No distress. Eyes:   Normal sclera.   Extraocular movements intact. ENT:  Normocephalic, atraumatic. Moist mucous membranes  CV:   Regular rate and rhythm. No murmur, rub, or gallop. Lungs:  Clear to auscultation bilaterally. No wheezing, rhonchi, or rales. Abdomen: Soft, nontender, nondistended. Bowel sounds normal.   Extremities: Warm and dry. No cyanosis or edema. Neurologic: No focal deficits  Skin:     No rashes or jaundice. Psych:  Normal mood and affect. Discharge Info:   Current Discharge Medication List      START taking these medications    Details   aspirin 81 mg chewable tablet 1 Tab by Per J Tube route daily for 30 days. Qty: 30 Tab, Refills: 0      famotidine (PEPCID) 40 mg/5 mL (8 mg/mL) suspension 2.5 mL by Per J Tube route every twenty-four (24) hours for 20 days. Qty: 50 mL, Refills: 0      ampicillin-sulbactam 3 gram 3 g IVPB 3 g by IntraVENous route every six (6) hours for 3 days. Qty: 12 Dose, Refills: 0      polyethylene glycol (MIRALAX) 17 gram packet 1 Packet by Per G Tube route daily for 30 days. Indications: constipation  Qty: 30 Packet, Refills: 0      NUTRITIONAL SUPPORT ELECTROLYTE PRN ORDERS 1 Each by Does Not Apply route as needed for Other for up to 30 days. Qty: 30 Each, Refills: 0      insulin glargine (LANTUS) 100 unit/mL injection 30 Units by SubCUTAneous route nightly for 30 days. Qty: 1 Vial, Refills: 0      midodrine (PROAMATINE) 5 mg tablet 1 Tab by Per J Tube route three (3) times daily (with meals) for 30 days. Qty: 90 Tab, Refills: 0      insulin lispro (HUMALOG) 100 unit/mL injection Less than 150 =   0 units           150 -199 =   2 units   200 -249 =   4 units   250 -299 =   6 units   300 -349 =   8 units   350 and above = 10 units and Call Physician  Qty: 1 Vial, Refills: 0         CONTINUE these medications which have CHANGED    Details   anastrozole (Arimidex) 1 mg tablet 1 mg by Per J Tube route daily for 30 days.   Qty: 30 Tab, Refills: 0      apixaban (Eliquis) 5 mg tablet Take 1 Tab by mouth every twelve (12) hours for 30 days. Qty: 60 Tab, Refills: 0         CONTINUE these medications which have NOT CHANGED    Details   levothyroxine (SYNTHROID) 112 mcg tablet Take 1 Tab by mouth Daily (before breakfast). Qty: 30 Tab, Refills: 1      atorvastatin (LIPITOR) 40 mg tablet Take 1 Tab by mouth daily. Qty: 90 Tab, Refills: 1    Associated Diagnoses: Mixed hyperlipidemia      calcium carbonate (TUMS) 200 mg calcium (500 mg) chew Take 1 Tab by mouth as needed. Indications: heartburn      calcium-cholecalciferol, d3, (CALCIUM 600 + D) 600-125 mg-unit tab Take 1 Tab by mouth two (2) times a day.  Indications: post-menopausal osteoporosis prevention         STOP taking these medications       enoxaparin (Lovenox) 80 mg/0.8 mL injection Comments:   Reason for Stopping:         glucose blood VI test strips (blood glucose test) strip Comments:   Reason for Stopping:         metFORMIN (GLUCOPHAGE) 1,000 mg tablet Comments:   Reason for Stopping:         lisinopriL (PRINIVIL, ZESTRIL) 10 mg tablet Comments:   Reason for Stopping:         pantoprazole (PROTONIX) 40 mg tablet Comments:   Reason for Stopping:         famotidine (PEPCID) 40 mg tablet Comments:   Reason for Stopping:         mirabegron ER (Myrbetriq) 50 mg ER tablet Comments:   Reason for Stopping:         clopidogreL (PLAVIX) 75 mg tab Comments:   Reason for Stopping:         magnesium oxide (MAG-OX) 400 mg tablet Comments:   Reason for Stopping:         loratadine (Claritin) 10 mg tablet Comments:   Reason for Stopping:         glipiZIDE (GLUCOTROL) 10 mg tablet Comments:   Reason for Stopping:         aspirin delayed-release 81 mg tablet Comments:   Reason for Stopping:         ondansetron (ZOFRAN ODT) 8 mg disintegrating tablet Comments:   Reason for Stopping:         insulin detemir U-100 (LEVEMIR FLEXTOUCH U-100 INSULN) 100 unit/mL (3 mL) inpn Comments:   Reason for Stopping:         fluticasone propionate (FLONASE ALLERGY RELIEF) 50 mcg/actuation nasal spray Comments:   Reason for Stopping:         NOVOFINE 32 32 gauge x 1/4\" ndle Comments:   Reason for Stopping:         krill/om-3/dha/epa/phospho/ast (MEGARED OMEGA-3 KRILL OIL PO) Comments:   Reason for Stopping:         cholecalciferol (VITAMIN D3) 1,000 unit cap Comments:   Reason for Stopping:         GLUCOSAMINE HCL/CHONDR MERAZ A NA (OSTEO BI-FLEX PO) Comments:   Reason for Stopping:                 Disposition: SNF    Activity: Activity as tolerated  Diet: DIET TUBE FEEDING Osmolite 1.2 bolus 1 carton times 5 per day. Water flush 75 ml before and after each bolus. Suggested feeding times 6 am, 9a, 12p, 6p and 9p. Follow-up Appointments   Procedures    FOLLOW UP VISIT Appointment in: [phone] [facility] 59   provider     Facility provider     Standing Status:   Standing     Number of Occurrences:   1     Order Specific Question:   Appointment in     Answer:   3 - 5 Days         Follow-up Information     Follow up With Specialties Details Why Contact Info    Gallito Huynh MD [place] [address]  [phone]                Time spent in patient discharge planning and coordination 35 minutes.     Signed:  Daryle Reagin, NP

## 2020-11-13 NOTE — INTERDISCIPLINARY ROUNDS
Interdisciplinary Rounds completed. Nursing, Case Management, and Physician  present. Plan of care reviewed and updated. Regen for possible admission.

## 2020-11-13 NOTE — PROGRESS NOTES
Hourly rounds completed. Remains on NPO, tube feeding performed Osmolite 1.2 bolus with 75 ml water before and after feeding, tolerated. Dressing to sacrum changed. IV antibiotics tolerating. Turned and repositioned. Oral care done. Will continue to monitor and give report to oncoming RN.

## 2020-11-13 NOTE — PROGRESS NOTES
Referral placed with [facility], with spouse's approval. Markie Gamez is able to accept patient this day. Spouse notified. Patient to discharge to [facility]. Floor RN  Gavino Gordillo, notified report is needed. No additional needs voiced at this time. Please consult or notify CM if any needs shall arise. CM remains available. Care Management Interventions  PCP Verified by CM: Yes  Mode of Transport at Discharge: Other (see comment)  Transition of Care Consult (CM Consult): Discharge Planning, LTAC  Discharge Durable Medical Equipment: No(Per daughter, the patient has a wheelchair. )  Physical Therapy Consult: Yes  Occupational Therapy Consult: Yes  Speech Therapy Consult: Yes  Current Support Network: Lives with Spouse, Own Home  Confirm Follow Up Transport: Family  The Plan for Transition of Care is Related to the Following Treatment Goals : Patient to obtain care to become medically stable and to discharge to [facility], to assist with supportive care needs. The Patient and/or Patient Representative was Provided with a Choice of Provider and Agrees with the Discharge Plan?: Yes  Name of the Patient Representative Who was Provided with a Choice of Provider and Agrees with the Discharge Plan: Spouse/Patient's Daughter Jim Mireles.    The [employer] Information Provided?: No  Discharge Location  Discharge Placement: [address] [place] [address] (LTAC)

## 2020-11-18 NOTE — PROGRESS NOTES
SPEECH LANGUAGE PATHOLOGY: MODIFIED BARIUM SWALLOW STUDY- LTACH Initial Assessment NAME/AGE/GENDER: Oziel Bueno is a 72 y.o. female DATE: 11/18/2020 PRIMARY DIAGNOSIS: Z02.9 ICD-10: Treatment Diagnosis: Oropharyngeal dysphagia (R13.12) INTERDISCIPLINARY COLLABORATION: Radiologist, David Barragan PRECAUTIONS/ALLERGIES: Other food; Bee sting [sting, bee]; Invokana [canagliflozin]; and Other plant, animal, environmental  
 
RECOMMENDATIONS/PLAN  
DIET:  
? Alternate method for Nutrition, Hydration and Medication ? PUREE/HONEY THICK LIQUIDS therapeutically with SLP only MEDICATIONS: Non-oral 
  
COMPENSATORY STRATEGIES/MODIFICATIONS INCLUDING: 
· None OTHER RECOMMENDATIONS (including follow up treatment recommendations): · Treatment to improve/facilitate oral/pharyngeal skills · Training in laryngeal strengthening and coordination exercises · Training in use of compensatory safe swallowing strategies/feeding guidelines · Patient/family education RECOMMENDATIONS for CONTINUED SPEECH THERAPY:  
YES: Anticipate need for ongoing speech therapy during this hospitalization and at next level of care. FREQUENCY/DURATION: To be determined by primary SLP Recommendations for next treatment session: Next treatment will address dysphagia ASSESSMENT Ms. Ugarte presents with moderate-severe oropharyngeal dysphagia. Decreased base of tongue retraction with premature spillage of thin and nectar thick liquids to the pyriforms prior to swallow. Pharyngeal pooling ranging 10-30 seconds before swallow of thin and nectar. She requires moderate to max verbal cues to produce swallow. Pooling in pyriforms spills into laryngeal vestibule during the swallow and results in clearing penetration with thin via tsp. Deep non-clearing penetration with tiny cup sips; Overt aspiration with larger cup sips.  Strong cough in response to aspiration. Penetration with nectar thick by tsp that occurs as a result of spillage from pyriforms during swallow. Aspiration during the swallow with nectar cup sips and only results in mild throat clearing. Timely swallow with honey thick trials. No pharyngeal residue; however, she only accepted small bolus. Tongue pumping with impaired propulsion with puree textures. Oral holding >13 seconds before swallow was initiation. No penetration, aspiration, or residue after the swallow. Greatly prolonged oral prep with soft chewable textures and spillage of bolus to vallecula prior to swallow. Recommend primary nutrition via PEG tube. Puree and honey thick liquids in therapy with SLP only. Will continue to follow for dysphagia treatment as determined by primary SLP. SUBJECTIVE History of Present Injury/Illness: Ms. Karlie Edmonds  has a past medical history of Actinic keratosis, Arthritis, Asthma, Breast cancer ([address] (2019), Cancer [facility]), Cerebrovascular accident (CVA) due to bilateral embolism of carotid arteries ([address] (11/8/2020), DJD (degenerative joint disease), Environmental allergies, Environmental and seasonal allergies (7/25/2019), GERD (gastroesophageal reflux disease), History of malignant melanoma (1980's), History of TIA (transient ischemic attack), Hypertension, Hypothyroidism, Insulin dependent diabetes mellitus (1990's), Kidney stones, Mobitz type 1 second degree AV block, Obese, Osteoarthritis (12/22/2016), Osteopenia, PONV (postoperative nausea and vomiting), RA (rheumatoid arthritis) ([address] (dx--2013), Rheumatoid arthritis involving right knee with negative rheumatoid factor ([address] (5/16/2019), Rosacea, S/P total knee arthroplasty (12/23/2016), Stroke ([address] Unspecified hypothyroidism, and UTI (urinary tract infection).  She also has no past medical history of Adverse effect of anesthesia, Aneurysm ([address] CAD (coronary artery disease), Chronic kidney disease, Chronic obstructive pulmonary disease (HCC), Chronic pain, Coagulation disorder ([address] Difficult intubation, Endocarditis, Heart failure ([address] Liver disease, Malignant hyperthermia due to anesthesia, Nicotine vapor product user, Non-nicotine vapor product user, Pseudocholinesterase deficiency, Psychiatric disorder, PUD (peptic ulcer disease), Rheumatic fever, Seizures ([address] or Sleep apnea. . She also  has a past surgical history that includes hx refractive surgery (); hx meniscectomy (Right, ); hx colonoscopy (; ); hx mohs procedure (Right, ); hx lap cholecystectomy (); pr abdomen surgery proc unlisted; hx dilation and curettage (); hx  section (); hx tubal ligation (); hx gyn (); hx other surgical (); hx other surgical (); hx knee arthroscopy (Right, ); hx knee replacement (Left, ); hx knee replacement (Right, ); hx heent (); fragment kidney stone/ eswl; hx lithotripsy (, ; 2016; 18); hx breast biopsy (Left, 2019); and hx breast lumpectomy (Left, 2019). Pain: 
Pain Intensity 1: 0 Results of most recent clinical bedside swallow assessment: 20- NPO Current dietary status prior to evaluation today:  NPO Previous Modified Barium Swallow studies: Oct 2020- Mechanical soft diet/honey thick liquids. Current Medications: No current facility-administered medications on file prior to encounter. Current Outpatient Medications on File Prior to Encounter Medication Sig Dispense Refill  anastrozole (Arimidex) 1 mg tablet 1 mg by Per J Tube route daily for 30 days. 30 Tab 0  
 apixaban (Eliquis) 5 mg tablet Take 1 Tab by mouth every twelve (12) hours for 30 days. 60 Tab 0  
 aspirin 81 mg chewable tablet 1 Tab by Per J Tube route daily for 30 days. 30 Tab 0  
 famotidine (PEPCID) 40 mg/5 mL (8 mg/mL) suspension 2.5 mL by Per J Tube route every twenty-four (24) hours for 20 days.  50 mL 0  
  polyethylene glycol (MIRALAX) 17 gram packet 1 Packet by Per G Tube route daily for 30 days. Indications: constipation 30 Packet 0  
 NUTRITIONAL SUPPORT ELECTROLYTE PRN ORDERS 1 Each by Does Not Apply route as needed for Other for up to 30 days. 30 Each 0  
 insulin glargine (LANTUS) 100 unit/mL injection 30 Units by SubCUTAneous route nightly for 30 days. 1 Vial 0  
 midodrine (PROAMATINE) 5 mg tablet 1 Tab by Per J Tube route three (3) times daily (with meals) for 30 days. 90 Tab 0  
 insulin lispro (HUMALOG) 100 unit/mL injection Less than 150 =   0 units          
150 -199 =   2 units 200 -249 =   4 units 250 -299 =   6 units 300 -349 =   8 units 350 and above = 10 units and Call Physician 1 Vial 0  
 levothyroxine (SYNTHROID) 112 mcg tablet Take 1 Tab by mouth Daily (before breakfast). 30 Tab 1  
 atorvastatin (LIPITOR) 40 mg tablet Take 1 Tab by mouth daily. 90 Tab 1  calcium carbonate (TUMS) 200 mg calcium (500 mg) chew Take 1 Tab by mouth as needed. Indications: heartburn  calcium-cholecalciferol, d3, (CALCIUM 600 + D) 600-125 mg-unit tab Take 1 Tab by mouth two (2) times a day. Indications: post-menopausal osteoporosis prevention OBJECTIVE Orientation:  
? Person Oral Assessment:  Labial: Decreased rate, Decreased seal and Right droop Dentition: Limited Oral Hygiene: Adequate Lingual: Decreased rate, Decreased strength and Impaired coordination Vocal Quality: Low volume Modified barium swallow study was performed in the radiology suite with Ms. Ugarte in the lateral plane using C-arm. To evaluate her swallow function, barium coated liquid and food was administered in the form of thin liquids, nectar-thick liquids, honey-thick liquids, pudding and mixed consistency. Oral phase of swallow:  
? anterior bolus holding ? prolonged mastication 
? delayed oral transit 
? reduced posterior propulsion skills 
? lingual pumping Pharyngeal phase of swallow: ? Swallows of thin liquids were triggered at pyriform sinuses. Pooling in pyriforms prior to swallow. Aspiration of bolus in pyriforms during the eventual swallow of thin by cup. Strong cough observed . ? Swallows of nectar-thick liquids were triggered at pyriform sinuses. Pooling of tsp sips in pyrifroms for 10-30 seconds prior to eventual swallow. Liquid from cup sip pools in pyriforms and spills into laryngeal vestibule during the swallow Minimal throat clear in response to aspiration with cup sip. ? Swallows of honey-thick liquids were timely. No laryngeal penetration or aspiration was observed. ? Swallows of pudding were timely. No laryngeal penetration or aspiration was observed. No pharyngeal residue after swallow. ? Swallows of mixed consistencies were delayed. No laryngeal penetration or aspiration was observed. Residue in valleculae was mild. Pharyngeal characteristics: 
? delayed pharyngeal swallow initiation ? pooling in valleculae for 10 seconds prior to swallow ? pooling in pyriform sinuses for 10-30 prior to swallow Attempted strategies:  
? none Effective strategies:  
? none Aspiration/Penetration Scale: 7 (Aspiration. Contrast passes below the folds/glottis, but is not cleared despite attempt.) Cervical esophageal phase of swallow:  
? adequate and timely clearance of all boluses through cervical esophagus **Distal esophagus not assessed due to limitations of MBS study. Assessment/Reassessment only, no treatment provided today. Tool Used: Dysphagia Outcome and Severity Scale (LINDA) Comments Normal Diet With no strategies or extra time needed Functional Swallow May have mild oral or pharyngeal delay Mild Dysphagia Which may require one diet consistency restricted Mild-Moderate Dysphagia With 1-2 diet consistencies restricted Moderate Dysphagia With 2 or more diet consistencies restricted Moderately Severe Dysphagia With partial PO strategies (trials with ST only) Severe Dysphagia With inability to tolerate any PO safely Score:  Initial: 2 Most Recent: x (Date:11/18/2020) Interpretation of Tool: The Dysphagia Outcome and Severity Scale (LINDA) is a simple, easy-to-use, 7-point scale developed to systematically rate the functional severity of dysphagia based on objective assessment and make recommendations for diet level, independence level, and type of nutrition. Payor: /  
 
Education: · Recommendations discussed with patient, RN, and primary slp. Safety: After treatment position/precautions: 
· Patient waiting in radiology holding bay for transport back to room. ·  
 
Total Treatment Duration: 
Time In: 1100 Time Out: 1130 [address] CCC-SLP

## 2021-07-22 NOTE — ED NOTES
TRANSFER - OUT REPORT:    Verbal report given to [facility], rn on [address], [address]  being transferred to   for routine progression of care       Report consisted of patients Situation, Background, Assessment and   Recommendations(SBAR). Information from the following report(s) SBAR was reviewed with the receiving nurse. Lines:   Saline Lock 11/04/20 Left Wrist (Active)        Opportunity for questions and clarification was provided.       Patient transported with:   O2 @ 1 liters  Tech Statement Selected

## 2023-02-27 NOTE — PROGRESS NOTES
Hospitalist Progress Note    Patient: Cristopher Willoughby MRN: [id number]  SSN: [id number]    YOB: 1955  Age: 72 y.o. Sex: female      Admit Date: 11/4/2020    LOS: 2 days     Subjective:     72year old CF with a PMH of breath CA, HTN, hypothyroidism, IDDM2, h/o strokes/TIAs with vascular dementia, RA, breast cancer, completed CHB with CPM placement (in 9/2020), and COVID in 8/2020 requiring intubation presented to the ER on 11/4/20 from [facility] after she was found to be confused and have \"abnormal vital signs. \" She was found to have severe sepsis due to a UTI.    11/6 - She is more alert today. Tries to answer questions. Review of systems negative except stated above. Objective:     Visit Vitals  BP 99/61 (BP 1 Location: Left arm, BP Patient Position: Head of bed elevated (Comment degrees))   Pulse (!) 104   Temp 99 °F (37.2 °C)   Resp (!) 38   Ht 5' 1\" (1.549 m)   Wt 65.8 kg (145 lb)   LMP 06/30/2001   SpO2 96%   BMI 27.40 kg/m²      Oxygen Therapy  O2 Sat (%): 96 % (11/06/20 0348)  Pulse via Oximetry: 100 beats per minute (11/05/20 0814)  O2 Device: Nasal cannula (11/05/20 0814)  O2 Flow Rate (L/min): 2 l/min (11/05/20 0814)      Intake and Output:     Intake/Output Summary (Last 24 hours) at 11/6/2020 0950  Last data filed at 11/6/2020 0602  Gross per 24 hour   Intake 777 ml   Output 1650 ml   Net -873 ml         Physical Exam:   GENERAL: Alert, appears comfortable  EYE: conjunctivae/corneas clear. PERRL. THROAT & NECK: normal and no erythema or exudates noted. LUNG: scattered rhonchi  HEART: tachy, reg rhythm, S1S2, no murmur, no JVD  ABDOMEN: soft, non-tender, non-distended. Bowel sounds normal.   EXTREMITIES:  No edema, 2+ pedal/radial pulses bilaterally  SKIN: no rash or abnormalities  NEUROLOGIC: Alert. Cranial nerves 2-12 grossly intact.     Lab/Data Review:  Recent Results (from the past 24 hour(s))   POC G3    Collection Time: 11/05/20 10:23 AM   Result Value Ref Range    Device: NASAL CANNULA      pH (POC) 7.41 7.35 - 7.45      pCO2 (POC) 40.0 35 - 45 MMHG    pO2 (POC) 83 75 - 100 MMHG    HCO3 (POC) 25.5 22 - 26 MMOL/L    sO2 (POC) 96 95 - 98 %    Base excess (POC) 1 mmol/L    Allens test (POC) YES      Site LEFT RADIAL      Specimen type (POC) ARTERIAL      Performed by Kaia     CO2, POC 27 MMOL/L    Flow rate (POC) 2.000 L/min    COLLECT TIME 1,018     GLUCOSE, POC    Collection Time: 11/05/20 10:58 AM   Result Value Ref Range    Glucose (POC) 79 65 - 100 mg/dL   AMMONIA    Collection Time: 11/05/20 12:51 PM   Result Value Ref Range    Ammonia 23 11 - 32 UMOL/L   LACTIC ACID    Collection Time: 11/05/20 12:51 PM   Result Value Ref Range    Lactic acid 1.6 0.4 - 2.0 MMOL/L   LACTIC ACID    Collection Time: 11/05/20  3:46 PM   Result Value Ref Range    Lactic acid 1.8 0.4 - 2.0 MMOL/L   GLUCOSE, POC    Collection Time: 11/05/20  3:51 PM   Result Value Ref Range    Glucose (POC) 117 (H) 65 - 100 mg/dL   GLUCOSE, POC    Collection Time: 11/05/20  9:05 PM   Result Value Ref Range    Glucose (POC) 152 (H) 65 - 100 mg/dL   GLUCOSE, POC    Collection Time: 11/06/20  7:21 AM   Result Value Ref Range    Glucose (POC) 213 (H) 65 - 100 mg/dL   RENAL FUNCTION PANEL    Collection Time: 11/06/20  7:50 AM   Result Value Ref Range    Sodium 150 (H) 138 - 145 mmol/L    Potassium 3.3 (L) 3.5 - 5.1 mmol/L    Chloride 118 (H) 98 - 107 mmol/L    CO2 28 21 - 32 mmol/L    Anion gap 4 (L) 7 - 16 mmol/L    Glucose 208 (H) 65 - 100 mg/dL    BUN 35 (H) 8 - 23 MG/DL    Creatinine 0.65 0.6 - 1.0 MG/DL    GFR est AA >60 >60 ml/min/1.73m2    GFR est non-AA >60 >60 ml/min/1.73m2    Calcium 8.6 8.3 - 10.4 MG/DL    Phosphorus 2.0 (L) 2.3 - 3.7 MG/DL    Albumin 1.6 (L) 3.2 - 4.6 g/dL   CBC W/O DIFF    Collection Time: 11/06/20  7:50 AM   Result Value Ref Range    WBC 7.3 4.3 - 11.1 K/uL    RBC 2.83 (L) 4.05 - 5.2 M/uL    HGB 7.9 (L) 11.7 - 15.4 g/dL    HCT 26.8 (L) 35.8 - 46.3 %    MCV 94.7 79.6 - 97.8 FL    MCH 27.9 26.1 - 32.9 PG    MCHC 29.5 (L) 31.4 - 35.0 g/dL    RDW 17.8 (H) 11.9 - 14.6 %    PLATELET 620 199 - 589 K/uL    MPV 11.2 9.4 - 12.3 FL    ABSOLUTE NRBC 0.00 0.0 - 0.2 K/uL   MAGNESIUM    Collection Time: 11/06/20  7:50 AM   Result Value Ref Range    Magnesium 1.7 (L) 1.8 - 2.4 mg/dL   LACTIC ACID    Collection Time: 11/06/20  7:50 AM   Result Value Ref Range    Lactic acid 1.6 0.4 - 2.0 MMOL/L       SARS-CoV-2 Lab Results  \"Novel Coronavirus\" Test: No results found for: COV2NT   \"Emergent Disease\" Test: No results found for: EDPR  \"SARS-COV-2\" Test: No results found for: XGCOVT  \"Precision Labs\" Test: No results found for: RSLT  Rapid Test:   Lab Results   Component Value Date/Time    COVR Not detected 11/04/2020 11:46 AM           Imaging:  Xr Chest Sngl V    Result Date: 10/16/2020  Clinical History: The Female patient is 72years old  presenting with symptoms of deminished breath sounds on left. Comparison:  Chest x-ray 9/29/2020 Findings:  Frontal view of the chest was obtained. There is improving aeration over prior exam with resolving perihilar infiltrates. Coarse interstitial lung markings persist particularly at the left lung base. The cardiomediastinal silhouette is within normal limits. There are no acute osseous abnormalities. A left subclavian pacing device is demonstrated. The feeding tube has been removed over the interval.     Impression:  1. Resolved pulmonary edema, however with persistent coarse interstitial lung markings particularly at left lung base. CPT code(s) 43559     Xr Swallow Func Video    Result Date: 10/7/2020  Exam: Modified Barium Swallow INDICATION: Oropharyngeal dysphagia. Fluoro time: 2 minutes 19 seconds. Spot films:  0 Fluoroscopy was used to evaluate pharyngeal function while the patient was given barium solutions and barium coated solids. FINDINGS: Patient with aspiration with thickened liquids and laryngeal penetration with thin liquids. IMPRESSION: Patient with aspiration with thickened liquids. Please see full report from speech language pathologist.     Xr Chest Port    Result Date: 11/4/2020  CHEST X-RAY, one view. HISTORY:  Altered mental status. TECHNIQUE:  AP portable semiupright view. COMPARISON: 16 October. FINDINGS: Lungs: Right upper lung and left lower lung zone infiltrates. Costophrenic angles: Fairly sharp. Heart size: Normal for portable technique. Pulmonary vasculature: is unremarkable. Aorta: Unremarkable. Included portion of the upper abdomen: is unremarkable. Bones: No gross bony lesions. Other: Left-sided cardiac pacemaker is present     IMPRESSION:  Persistent lung infiltrates without significant change. No results found for this visit on 11/04/20. Cultures:   All Micro Results     Procedure Component Value Units Date/Time    CULTURE, BLOOD [[id number]] Collected:  11/04/20 0755    Order Status:  Completed Specimen:  Blood Updated:  11/06/20 0948     Special Requests: --        RIGHT  Antecubital       Culture result: NO GROWTH 2 DAYS       CULTURE, URINE [[id number]]  (Abnormal) Collected:  11/04/20 1148    Order Status:  Completed Specimen:  Urine from [place] Specimen Updated:  11/06/20 0810     Special Requests: NO SPECIAL REQUESTS        Culture result:       10,000 to 50,000 COLONIES/mL GRAM POSITIVE COCCI IDENTIFICATION AND SUSCEPTIBILITY TO FOLLOW                  <10,000 COLONIES/mL MIXED SKIN FRANK ISOLATED          CULTURE, BLOOD [[id number]] Collected:  11/04/20 0745    Order Status:  Canceled Specimen:  Blood           Assessment/Plan:     Principal Problem:    Severe sepsis with acute organ dysfunction ([facility] (11/4/2020)  - In ER WBC 13.8 +  + RR 38 + UTI/pneumonia + ISRAEL + encephalopathy + lactic acid 4.5  - Resolving  - Likely due to UTI  - Continue Unasyn + Doxycycline  - Stopped normal saline @ 150 ml/hr  - Blood cultures sent in ER - NGTD  - UA consistent with UTI  - Urine culture with GPC  - CXR with stable infiltrates  - Procalcitonin 0.95  - Lactic Acid 4.5 --> 3.2 --> 1.6     Active Problems:    Acute renal failure (ARF) ([facility] [address] (11/4/2020)  - Likely due to profound dehydration  - Resolved  - Creatinine 2.66 --> 0.96 --> 0.65 (baseline 0.40)  - BUN/Cr >20 indicating prerenal  - Stopped normal saline @ 150 ml/hr  - UA consistent with UTI  - Urine sodium 99  - Urine prot/creat high  - Strict I/O       UTI (urinary tract infection) (11/4/2020)  - Urine dipstick leukocyte +  - Continue Unasyn  - Full UA consistent with UTI  - Urine culture with GPC       Dehydration, severe (11/4/2020)  - Improving  - UA with SG >1.030 + ISRAEL  - Likely due to sepsis + hyperglycemia + decreased PO intake  - Stop normal saline @ 150 ml/hr  - Strict I/O       Pneumonia (11/4/2020)  - Had COVID in 8/2020 --> since tested negative  - CXR unchanged with RUL and LLL infiltrate  - Was on Cipro + Doxy as outpatient  - Continue Unasyn to cover aspiration  - Continue Doxycycline  - No SOB or cough per patient       Lactic acidosis (11/4/2020)  - Due to #1  - Resolved  - Lactic Acid 4.5 --> 3.2 --> 1.6  - Stopped normal saline @ 150 ml/hr       Hyperkalemia (11/4/2020)  - Due to ISRAEL  - Resolved  - Stopped normal saline @ 150 ml/hr  - EKG paced, no overt changes  - Repeat BMP daily       Septic encephalopathy (11/4/2020)  - Very confused and hard to understand speech on admission  - More alert but hard to understand today  - No focal neurological deficits  - CT head with chronic CVAs but nothing acute  - Likely due to UTI and sepsis + uremia  - Continue Unasyn  - Continue Doxycycline  - Monitor for improvement       Type 2 diabetes mellitus without complication, with long-term current use of insulin ([facility] (5/16/2019)  - Hyperglycemic on presentation  - Takes Lantus 35U --> decrease to 30U until eating better  - Continue Humalog SSI  - Hold PO meds       S/P percutaneous endoscopic gastrostomy (PEG) tube placement ([facility] [address] (11/4/2020)  - PEG placed 10/14/2020  - Site looks clean and normal       Hypothyroidism ()  - Continue Levothyroxine  - THS high, FT4 normal       RA (rheumatoid arthritis) (HCC) ()       Gastroesophageal reflux disease without esophagitis ()  - Continue Pepcid       Complete heart block (HCC) (3/28/2018)  - S/P CPM in 9/2020       Malignant neoplasm of lower-outer quadrant of left breast of female, estrogen receptor positive ([facility] [address] (7/1/2019)  - Continue Anastrozole       Vascular dementia ([facility] [address] (11/4/2020)  - Continue ASA/Plavix       Cardiac pacemaker (11/4/2020)  - S/P CPM in 9/2020       Mixed hyperlipidemia (7/7/2020)  - Continue Lipitor       History of stroke (7/7/2020)  - Continue ASA/Plavix    Today's Plan: Continue current plan. DIET NPO  DIET TUBE FEEDING Osmolite 1.2 bolus 1 carton times 5 per day. Water flush 75 ml before and after each bolus. Suggested feeding times 6 am, 9a, 12p, 6p and 9p.     DVT Prophylaxis: Eliquis    Discharge Plan: TBD      Signed By: Crist Shone, DO     November 6, 2020 Yes

## 2023-05-11 NOTE — WOUND CARE
Patient seen for reported sacral wound. Noted 3cm shallow stage 3 with pink base. Also noted moisture skin damage below this 1cm shallow pink and irregular shape. Both sites included in silicone foam dressing. Both heels blanchable red. Recommend adding heel suspension boots while in the bed. Will continue to follow.
interest in learning

## 2024-08-12 NOTE — ED TRIAGE NOTES
Pt ambulatory to triage without complications. Pt states she had a TIA on 03/09/2019 and states she went to bed about 2300 last night and woke up to left leg and arm weakness 0700 this morning, but the symptoms resolved in 5 minutes or less. Pt has no symptoms in triage. Pt speech clear, pupils PERRLA,  equal bilaterally, no drift or ataxia noted to any limbs.
Quality 226: Preventive Care And Screening: Tobacco Use: Screening And Cessation Intervention: Patient screened for tobacco use and is an ex/non-smoker
Detail Level: Detailed
Quality 47: Advance Care Plan: Advance care planning not documented, reason not otherwise specified.

## (undated) DEVICE — NDL SPNE QNCKE 18GX3.5IN LF --

## (undated) DEVICE — KIT GASTMY PERC PEG PULL 20FR -- ENDOVIVE BX/2

## (undated) DEVICE — SUT SLK 2-0SH 30IN BLK --

## (undated) DEVICE — KENDALL SCD EXPRESS SLEEVES, KNEE LENGTH, MEDIUM: Brand: KENDALL SCD

## (undated) DEVICE — MASTISOL ADHESIVE LIQ 2/3ML

## (undated) DEVICE — CLEAR-TRAC 7.0 MM X 72 MM THREADED                                    CANNULA, WITH DISPOSABLE OBTURATOR,                                    GREY, STERILE: Brand: CLEAR-TRAC

## (undated) DEVICE — 90-S ACCELERATOR, SUCTION PROBE, NON-BENDABLE, MAX CUT LEVEL 11: Brand: SERFAS ENERGY

## (undated) DEVICE — TUBE PERC ENDO GASTSTMY 20FR

## (undated) DEVICE — SOLUTION IV 1000ML 0.9% SOD CHL

## (undated) DEVICE — WIRE CUTTER, STERILE (WCS144): Brand: CENTURION MEDICAL PRODUCTS CORP

## (undated) DEVICE — STERILE POLYISOPRENE POWDER-FREE SURGICAL GLOVES: Brand: PROTEXIS

## (undated) DEVICE — CLIP LIG ADH PD HORZ MED BLU --

## (undated) DEVICE — SUTURE MCRYL SZ 3-0 L27IN ABSRB UD L19MM PS-2 3/8 CIR PRIM Y427H

## (undated) DEVICE — SOLUTION IRRIG 3000ML 0.9% SOD CHL FLX CONT 0797208] ICU MEDICAL INC]

## (undated) DEVICE — COVER PRB L11.9CM TAPR L3.8X61CM TRNSPAR SFT PLIABLE

## (undated) DEVICE — (D)PREP SKN CHLRAPRP APPL 26ML -- CONVERT TO ITEM 371833

## (undated) DEVICE — OUTFLOW CASSETTE TUBING, DO NOT USE IF PACKAGE IS DAMAGED: Brand: CROSSFLOW

## (undated) DEVICE — SURGICAL PROCEDURE PACK BASIC ST FRANCIS

## (undated) DEVICE — KENDALL RADIOLUCENT FOAM MONITORING ELECTRODE RECTANGULAR SHAPE: Brand: KENDALL

## (undated) DEVICE — SUTURE NEEDLE SHUTTLE STERILE: Brand: ELITE

## (undated) DEVICE — 4-PORT MANIFOLD: Brand: NEPTUNE 2

## (undated) DEVICE — 3M™ IOBAN™ 2 ANTIMICROBIAL INCISE DRAPE 6650EZ: Brand: IOBAN™ 2

## (undated) DEVICE — [AGGRESSIVE 6-FLUTE BARREL BUR, ARTHROSCOPIC SHAVER BLADE,  DO NOT RESTERILIZE,  DO NOT USE IF PACKAGE IS DAMAGED,  KEEP DRY,  KEEP AWAY FROM SUNLIGHT]: Brand: FORMULA

## (undated) DEVICE — SOFT SILICONE HYDROCELLULAR FOAM DRESSING WITH LOCK AWAY LAYER: Brand: ALLEVYN LIFE XL 21X21 CTN10

## (undated) DEVICE — INTENDED FOR TISSUE SEPARATION, AND OTHER PROCEDURES THAT REQUIRE A SHARP SURGICAL BLADE TO PUNCTURE OR CUT.: Brand: BARD-PARKER ® STAINLESS STEEL BLADES

## (undated) DEVICE — 2000CC GUARDIAN II: Brand: GUARDIAN

## (undated) DEVICE — SUTURE PERMAHAND SZ 3-0 L30IN NONABSORBABLE BLK SILK BRAID A304H

## (undated) DEVICE — CANNULA THREADED FLEX 6.5 X 72MM: Brand: CLEAR-TRAC

## (undated) DEVICE — PACK,SHOULDER,DRAPE,POUCH: Brand: MEDLINE

## (undated) DEVICE — X-RAY SPONGES,12 PLY: Brand: DERMACEA

## (undated) DEVICE — BLOCK BITE AD 60FR W/ VELC STRP ADDRESSES MOST PT AND

## (undated) DEVICE — BLADE SHV CUT MENIS AGG + 4MM --

## (undated) DEVICE — CONNECTOR TBNG OD5-7MM O2 END DISP

## (undated) DEVICE — CANNULA NSL ORAL AD FOR CAPNOFLEX CO2 O2 AIRLFE

## (undated) DEVICE — SHEET, DRAPE, SPLIT, STERILE: Brand: MEDLINE

## (undated) DEVICE — SUTURE MCRYL SZ 4-0 L27IN ABSRB UD L19MM PS-2 1/2 CIR PRIM Y426H

## (undated) DEVICE — Device

## (undated) DEVICE — ULTRATAPE SUTURE COBRAID BLUE, 6                                    PER BOX: Brand: ULTRATAPE

## (undated) DEVICE — SUTURE VCRL SZ 3-0 L27IN ABSRB UD L26MM SH 1/2 CIR J416H

## (undated) DEVICE — SYR 50ML LR LCK 1ML GRAD NSAF --

## (undated) DEVICE — INFLOW CASSETTE TUBING, DO NOT USE IF PACKAGE IS DAMAGED: Brand: CROSSFLOW

## (undated) DEVICE — NEEDLE HYPO 21GA L1.5IN INTRAMUSCULAR S STL LATCH BVL UP

## (undated) DEVICE — DRAPE,TOP,102X53,STERILE: Brand: MEDLINE

## (undated) DEVICE — CONTAINER,SPECIMEN,O.R.STRL,4.5OZ: Brand: MEDLINE

## (undated) DEVICE — (D)STRIP SKN CLSR 0.5X4IN WHT --

## (undated) DEVICE — ULTRATAPE 2MM BLUE 38 PKG OF 6

## (undated) DEVICE — CONTAINER COLL/TRNSPRT BX BRST -- E-Z-EM CAT 8390

## (undated) DEVICE — REM POLYHESIVE ADULT PATIENT RETURN ELECTRODE: Brand: VALLEYLAB